# Patient Record
Sex: MALE | Race: WHITE | NOT HISPANIC OR LATINO | Employment: OTHER | URBAN - METROPOLITAN AREA
[De-identification: names, ages, dates, MRNs, and addresses within clinical notes are randomized per-mention and may not be internally consistent; named-entity substitution may affect disease eponyms.]

---

## 2021-04-16 ENCOUNTER — TELEPHONE (OUTPATIENT)
Dept: SCHEDULING | Facility: IMAGING CENTER | Age: 86
End: 2021-04-16

## 2021-05-28 RX ORDER — LEVOTHYROXINE SODIUM 175 UG/1
175 TABLET ORAL
COMMUNITY
End: 2021-06-01 | Stop reason: SDUPTHER

## 2021-05-28 NOTE — PROGRESS NOTES
NEW PATIENT VISIT PRE-VISIT PLANNING    1.  [employer] Patient is checked in Patient Demographics?Yes    2.  Immunizations were updated in Epic using Reconcile Outside Information activity? Yes         3.  Is this appointment scheduled as a Hospital Follow-Up? No    4.  Patient is due for the following Health Maintenance Topics:   Health Maintenance Due   Topic Date Due   • IMM DTaP/Tdap/Td Vaccine (1 - Tdap) Never done   • COLONOSCOPY  Never done   • IMM ZOSTER VACCINES (1 of 2) Never done   • IMM PNEUMOCOCCAL VACCINE: 65+ Years (1 of 1 - PPSV23) Never done   • COVID-19 Vaccine (2 - Pfizer 2-dose series) 05/13/2021       - Patient has completed Colonoscopy/FIT and HMR Letter faxed to:  [phone].    5.  Reviewed/Updated the following with patient:       •   Preferred Pharmacy? Yes       •   Preferred Lab? No       •   Preferred Communication? Yes       •   Allergies? Yes       •   Medications? YES. Was Abstract Encounter opened and chart updated? YES       •   Social History? Yes       •   Family History (document living status of immediate family members and if + hx of  cancer, diabetes, hypertension, hyperlipidemia, heart attack, stroke) Yes    6.  Updated Care Team?       •   DME Company (gait device, O2, CPAP, etc.) N\A       •   Other Specialists (eye doctor, derm, GYN, cardiology, endo, etc): N\A    7.  AHA (Puls8) form printed for Provider? N/A

## 2021-06-01 ENCOUNTER — OFFICE VISIT (OUTPATIENT)
Dept: MEDICAL GROUP | Age: 86
End: 2021-06-01
Payer: MEDICARE

## 2021-06-01 VITALS
HEART RATE: 87 BPM | OXYGEN SATURATION: 93 % | DIASTOLIC BLOOD PRESSURE: 72 MMHG | TEMPERATURE: 97.7 F | WEIGHT: 181.6 LBS | SYSTOLIC BLOOD PRESSURE: 122 MMHG | BODY MASS INDEX: 26.9 KG/M2 | HEIGHT: 69 IN

## 2021-06-01 DIAGNOSIS — E03.8 OTHER SPECIFIED HYPOTHYROIDISM: ICD-10-CM

## 2021-06-01 DIAGNOSIS — R10.13 EPIGASTRIC PAIN: ICD-10-CM

## 2021-06-01 DIAGNOSIS — H54.3 IMPAIRED VISION IN BOTH EYES: ICD-10-CM

## 2021-06-01 DIAGNOSIS — E53.8 VITAMIN B 12 DEFICIENCY: ICD-10-CM

## 2021-06-01 DIAGNOSIS — E78.5 DYSLIPIDEMIA: ICD-10-CM

## 2021-06-01 DIAGNOSIS — I48.20 CHRONIC ATRIAL FIBRILLATION (HCC): ICD-10-CM

## 2021-06-01 DIAGNOSIS — G47.33 OSA ON CPAP: ICD-10-CM

## 2021-06-01 DIAGNOSIS — E55.9 VITAMIN D DEFICIENCY: ICD-10-CM

## 2021-06-01 PROCEDURE — 99204 OFFICE O/P NEW MOD 45 MIN: CPT | Performed by: INTERNAL MEDICINE

## 2021-06-01 RX ORDER — LEVOTHYROXINE SODIUM 175 UG/1
175 TABLET ORAL
Qty: 90 TABLET | Refills: 4 | Status: SHIPPED | OUTPATIENT
Start: 2021-06-01 | End: 2021-06-21 | Stop reason: SDUPTHER

## 2021-06-01 ASSESSMENT — ENCOUNTER SYMPTOMS
MUSCULOSKELETAL NEGATIVE: 1
PSYCHIATRIC NEGATIVE: 1
RESPIRATORY NEGATIVE: 1
NEUROLOGICAL NEGATIVE: 1
GASTROINTESTINAL NEGATIVE: 1
EYES NEGATIVE: 1
CONSTITUTIONAL NEGATIVE: 1
CARDIOVASCULAR NEGATIVE: 1

## 2021-06-01 ASSESSMENT — PATIENT HEALTH QUESTIONNAIRE - PHQ9: CLINICAL INTERPRETATION OF PHQ2 SCORE: 0

## 2021-06-01 NOTE — PROGRESS NOTES
Subjective:      Guru Pena is a 89 y.o. 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  He relocated to [place] with his wife about 3 months ago and is living in assisted living at [facility] with minimal need for assistance to to no evidence of impaired mental status or memory.  He does require the use of a walker for stability of gait impaired balance.    He needs review of his medications and new lab work and follow-up of the above issues and problems related above.  Since changing his diet over the last few weeks his digestive problems have improved with no further midepigastric pain but would still like to see a GI doctor for further evaluation management.  Patient avoids all NSAIDs.  Has a prior history of peptic ulcer due to H. pylori several years ago which resolved with appropriate antibiotic therapy.    The hip pain does not bother him excessively after he gets up and moves around warms up.  His balance is well controlled with his walker.  I explained we can discuss his thick nail problem on follow-up visit.  He feels he might be napping too much during the day but explained that this would be very common for someone at 89 and I see no issues with this as long as its not causing him to wake up during the middle the night and ramble unobserved.    Outpatient Medications Prior to Visit   Medication Sig Dispense Refill   • apixaban (ELIQUIS) 5mg Tab Take 5 mg by mouth 2 times a day.     • Multiple  "Vitamins-Minerals (PRESERVISION AREDS 2 PO) Take  by mouth 2 times a day.     • Cholecalciferol (VITAMIN D3 PO) Take  by mouth.     • levothyroxine (SYNTHROID) 175 MCG Tab Take 175 mcg by mouth every morning on an empty stomach. Once a day, Sunday NIGHT take extra 1/2 Tablet       No facility-administered medications prior to visit.     Patient has no known allergies.  No visits with results within 1 Month(s) from this visit.   Latest known visit with results is:   No results found for any previous visit.      No results found for: HBA1C  No results found for: SODIUM, POTASSIUM, CHLORIDE, CO2, GLUCOSE, BUN, CREATININE, BUNCREATRAT, GLOMRATE, ALKPHOSPHAT, ASTSGOT, ALTSGPT, TBILIRUBIN, ALB  No results found for: INR  No results found for: CHOLSTRLTOT, LDL, HDL, TRIGLYCERIDE    No results found for: TESTOSTERONE  No results found for: TSH  No results found for: FREET4  No results found for: URICACID  No components found for: VITB12  No results found for: 25HYDROXY    '            HPI    Review of Systems   Constitutional: Negative.    HENT: Negative.    Eyes: Negative.    Respiratory: Negative.    Cardiovascular: Negative.    Gastrointestinal: Negative.    Genitourinary: Negative.    Musculoskeletal: Negative.    Skin: Negative.    Neurological: Negative.    Endo/Heme/Allergies: Negative.    Psychiatric/Behavioral: Negative.           Objective:     /72 (BP Location: Right arm, Patient Position: Sitting, BP Cuff Size: Adult)   Pulse 87   Temp 36.5 °C (97.7 °F) (Temporal)   Ht 1.753 m (5' 9\")   Wt 82.4 kg (181 lb 9.6 oz)   SpO2 93%   BMI 26.82 kg/m²      Physical Exam  Constitutional:       General: He is not in acute distress.     Appearance: He is well-developed. He is not diaphoretic.   HENT:      Head: Normocephalic and atraumatic.      Right Ear: External ear normal.      Left Ear: External ear normal.      Nose: Nose normal.      Mouth/Throat:      Pharynx: No oropharyngeal exudate.   Eyes:      " General: No scleral icterus.        Right eye: No discharge.         Left eye: No discharge.      Conjunctiva/sclera: Conjunctivae normal.      Pupils: Pupils are equal, round, and reactive to light.   Neck:      Thyroid: No thyromegaly.      Vascular: No JVD.      Trachea: No tracheal deviation.   Cardiovascular:      Rate and Rhythm: Normal rate. Rhythm irregular.      Heart sounds: Normal heart sounds. No murmur heard.   No friction rub. No gallop.       Comments: Irregularly irregular rhythm with controlled rate of around 70-90.  Variable intensity of S1  Pulmonary:      Effort: Pulmonary effort is normal. No respiratory distress.      Breath sounds: Normal breath sounds. No stridor. No wheezing or rales.   Chest:      Chest wall: No tenderness.   Abdominal:      General: Bowel sounds are normal. There is no distension.      Palpations: Abdomen is soft. There is no mass.      Tenderness: There is no abdominal tenderness. There is no guarding or rebound.   Musculoskeletal:         General: No tenderness. Normal range of motion.      Cervical back: Normal range of motion and neck supple.   Lymphadenopathy:      Cervical: No cervical adenopathy.   Skin:     General: Skin is warm and dry.      Coloration: Skin is not pale.      Findings: No erythema or rash.   Neurological:      Mental Status: He is alert and oriented to person, place, and time.      Motor: No abnormal muscle tone.      Coordination: Coordination normal.      Deep Tendon Reflexes: Reflexes are normal and symmetric. Reflexes normal.   Psychiatric:         Behavior: Behavior normal.         Thought Content: Thought content normal.         Judgment: Judgment normal.                        Assessment/Plan:        1. Epigastric pain  This problem has basically resolved with change in diet but will refer to GI for further evaluation at the request.  - REFERRAL TO GASTROENTEROLOGY  - Comp Metabolic Panel; Future  - CBC WITH DIFFERENTIAL; Future    2. Other  specified hypothyroidism-good control continue current regimen and check TSH     - levothyroxine (SYNTHROID) 175 MCG Tab; Take 1 tablet by mouth every morning on an empty stomach. Once a day, Sunday NIGHT take extra 1/2 Tablet  Dispense: 90 tablet; Refill: 4  - TSH; Future  - Comp Metabolic Panel; Future    3. Chronic atrial fibrillation (HCC)  Patient remains in atrial fibrillation with a controlled rate at this time.  Continue with anticoagulation therapy and consider referral to cardiology.    4. Vitamin D deficiency  Unknown control.  Check level  - VITAMIN D,25 HYDROXY; Future    5. Impaired vision in both eyes     - REFERRAL TO OPHTHALMOLOGY    6. JAMES on CPAP  Patient been doing well on CPAP and uses it during his naps during the day.  Refer to pulmonary medicine for further management  - REFERRAL TO PULMONARY AND SLEEP MEDICINE    7. Dyslipidemia      Needs lab recheck.  Has never had any serious problem with dyslipidemia not require treatment for.  - Lipid Profile; Future    8. Vitamin B 12 deficiency-patient wants vitamin levels checked.     - VITAMIN B12; Future  - FOLATE; Future

## 2021-06-11 ENCOUNTER — HOSPITAL ENCOUNTER (OUTPATIENT)
Dept: LAB | Facility: MEDICAL CENTER | Age: 86
End: 2021-06-11
Attending: INTERNAL MEDICINE
Payer: MEDICARE

## 2021-06-11 DIAGNOSIS — E78.5 DYSLIPIDEMIA: ICD-10-CM

## 2021-06-11 DIAGNOSIS — E03.8 OTHER SPECIFIED HYPOTHYROIDISM: ICD-10-CM

## 2021-06-11 DIAGNOSIS — R10.13 EPIGASTRIC PAIN: ICD-10-CM

## 2021-06-11 DIAGNOSIS — E53.8 VITAMIN B 12 DEFICIENCY: ICD-10-CM

## 2021-06-11 DIAGNOSIS — E55.9 VITAMIN D DEFICIENCY: ICD-10-CM

## 2021-06-11 LAB
25(OH)D3 SERPL-MCNC: 45 NG/ML (ref 30–100)
ALBUMIN SERPL BCP-MCNC: 3.9 G/DL (ref 3.2–4.9)
ALBUMIN/GLOB SERPL: 1.2 G/DL
ALP SERPL-CCNC: 93 U/L (ref 30–99)
ALT SERPL-CCNC: 17 U/L (ref 2–50)
ANION GAP SERPL CALC-SCNC: 11 MMOL/L (ref 7–16)
AST SERPL-CCNC: 21 U/L (ref 12–45)
BASOPHILS # BLD AUTO: 0.6 % (ref 0–1.8)
BASOPHILS # BLD: 0.03 K/UL (ref 0–0.12)
BILIRUB SERPL-MCNC: 0.7 MG/DL (ref 0.1–1.5)
BUN SERPL-MCNC: 25 MG/DL (ref 8–22)
CALCIUM SERPL-MCNC: 9 MG/DL (ref 8.5–10.5)
CHLORIDE SERPL-SCNC: 107 MMOL/L (ref 96–112)
CHOLEST SERPL-MCNC: 157 MG/DL (ref 100–199)
CO2 SERPL-SCNC: 25 MMOL/L (ref 20–33)
CREAT SERPL-MCNC: 1.4 MG/DL (ref 0.5–1.4)
EOSINOPHIL # BLD AUTO: 0.11 K/UL (ref 0–0.51)
EOSINOPHIL NFR BLD: 2.1 % (ref 0–6.9)
ERYTHROCYTE [DISTWIDTH] IN BLOOD BY AUTOMATED COUNT: 56 FL (ref 35.9–50)
FASTING STATUS PATIENT QL REPORTED: NORMAL
FOLATE SERPL-MCNC: 15.4 NG/ML
GLOBULIN SER CALC-MCNC: 3.3 G/DL (ref 1.9–3.5)
GLUCOSE SERPL-MCNC: 96 MG/DL (ref 65–99)
HCT VFR BLD AUTO: 43.4 % (ref 42–52)
HDLC SERPL-MCNC: 49 MG/DL
HGB BLD-MCNC: 14.6 G/DL (ref 14–18)
IMM GRANULOCYTES # BLD AUTO: 0.02 K/UL (ref 0–0.11)
IMM GRANULOCYTES NFR BLD AUTO: 0.4 % (ref 0–0.9)
LDLC SERPL CALC-MCNC: 98 MG/DL
LYMPHOCYTES # BLD AUTO: 1.85 K/UL (ref 1–4.8)
LYMPHOCYTES NFR BLD: 35.1 % (ref 22–41)
MCH RBC QN AUTO: 33.3 PG (ref 27–33)
MCHC RBC AUTO-ENTMCNC: 33.6 G/DL (ref 33.7–35.3)
MCV RBC AUTO: 99.1 FL (ref 81.4–97.8)
MONOCYTES # BLD AUTO: 0.67 K/UL (ref 0–0.85)
MONOCYTES NFR BLD AUTO: 12.7 % (ref 0–13.4)
NEUTROPHILS # BLD AUTO: 2.59 K/UL (ref 1.82–7.42)
NEUTROPHILS NFR BLD: 49.1 % (ref 44–72)
NRBC # BLD AUTO: 0 K/UL
NRBC BLD-RTO: 0 /100 WBC
PLATELET # BLD AUTO: 170 K/UL (ref 164–446)
PMV BLD AUTO: 11.4 FL (ref 9–12.9)
POTASSIUM SERPL-SCNC: 4.8 MMOL/L (ref 3.6–5.5)
PROT SERPL-MCNC: 7.2 G/DL (ref 6–8.2)
RBC # BLD AUTO: 4.38 M/UL (ref 4.7–6.1)
SODIUM SERPL-SCNC: 143 MMOL/L (ref 135–145)
TRIGL SERPL-MCNC: 51 MG/DL (ref 0–149)
TSH SERPL DL<=0.005 MIU/L-ACNC: 14.5 UIU/ML (ref 0.38–5.33)
VIT B12 SERPL-MCNC: 371 PG/ML (ref 211–911)
WBC # BLD AUTO: 5.3 K/UL (ref 4.8–10.8)

## 2021-06-11 PROCEDURE — 82746 ASSAY OF FOLIC ACID SERUM: CPT

## 2021-06-11 PROCEDURE — 36415 COLL VENOUS BLD VENIPUNCTURE: CPT

## 2021-06-11 PROCEDURE — 82607 VITAMIN B-12: CPT

## 2021-06-11 PROCEDURE — 80061 LIPID PANEL: CPT

## 2021-06-11 PROCEDURE — 80053 COMPREHEN METABOLIC PANEL: CPT

## 2021-06-11 PROCEDURE — 82306 VITAMIN D 25 HYDROXY: CPT

## 2021-06-11 PROCEDURE — 84443 ASSAY THYROID STIM HORMONE: CPT

## 2021-06-11 PROCEDURE — 85025 COMPLETE CBC W/AUTO DIFF WBC: CPT

## 2021-06-21 ENCOUNTER — OFFICE VISIT (OUTPATIENT)
Dept: MEDICAL GROUP | Age: 86
End: 2021-06-21
Payer: MEDICARE

## 2021-06-21 ENCOUNTER — HOSPITAL ENCOUNTER (INPATIENT)
Facility: MEDICAL CENTER | Age: 86
LOS: 1 days | DRG: 683 | End: 2021-06-23
Attending: EMERGENCY MEDICINE | Admitting: INTERNAL MEDICINE
Payer: MEDICARE

## 2021-06-21 ENCOUNTER — APPOINTMENT (OUTPATIENT)
Dept: RADIOLOGY | Facility: MEDICAL CENTER | Age: 86
DRG: 683 | End: 2021-06-21
Attending: EMERGENCY MEDICINE
Payer: MEDICARE

## 2021-06-21 VITALS
HEIGHT: 69 IN | HEART RATE: 98 BPM | WEIGHT: 178.6 LBS | OXYGEN SATURATION: 92 % | DIASTOLIC BLOOD PRESSURE: 80 MMHG | BODY MASS INDEX: 26.45 KG/M2 | SYSTOLIC BLOOD PRESSURE: 110 MMHG | TEMPERATURE: 97.7 F

## 2021-06-21 DIAGNOSIS — I48.20 CHRONIC ATRIAL FIBRILLATION (HCC): ICD-10-CM

## 2021-06-21 DIAGNOSIS — Z79.01 ANTICOAGULATED: ICD-10-CM

## 2021-06-21 DIAGNOSIS — R53.1 WEAKNESS: ICD-10-CM

## 2021-06-21 DIAGNOSIS — N17.9 AKI (ACUTE KIDNEY INJURY) (HCC): ICD-10-CM

## 2021-06-21 DIAGNOSIS — Z23 NEED FOR VACCINATION: ICD-10-CM

## 2021-06-21 DIAGNOSIS — E03.8 OTHER SPECIFIED HYPOTHYROIDISM: ICD-10-CM

## 2021-06-21 DIAGNOSIS — L60.2 NAIL HYPERTROPHY: ICD-10-CM

## 2021-06-21 LAB
ALBUMIN SERPL BCP-MCNC: 4 G/DL (ref 3.2–4.9)
ALBUMIN/GLOB SERPL: 1.1 G/DL
ALP SERPL-CCNC: 88 U/L (ref 30–99)
ALT SERPL-CCNC: 23 U/L (ref 2–50)
ANION GAP SERPL CALC-SCNC: 12 MMOL/L (ref 7–16)
AST SERPL-CCNC: 31 U/L (ref 12–45)
BASOPHILS # BLD AUTO: 0.3 % (ref 0–1.8)
BASOPHILS # BLD: 0.02 K/UL (ref 0–0.12)
BILIRUB SERPL-MCNC: 0.4 MG/DL (ref 0.1–1.5)
BUN SERPL-MCNC: 31 MG/DL (ref 8–22)
CALCIUM SERPL-MCNC: 8.8 MG/DL (ref 8.5–10.5)
CHLORIDE SERPL-SCNC: 101 MMOL/L (ref 96–112)
CO2 SERPL-SCNC: 21 MMOL/L (ref 20–33)
CREAT SERPL-MCNC: 1.64 MG/DL (ref 0.5–1.4)
EKG IMPRESSION: NORMAL
EOSINOPHIL # BLD AUTO: 0.01 K/UL (ref 0–0.51)
EOSINOPHIL NFR BLD: 0.1 % (ref 0–6.9)
ERYTHROCYTE [DISTWIDTH] IN BLOOD BY AUTOMATED COUNT: 53.9 FL (ref 35.9–50)
FLUAV RNA SPEC QL NAA+PROBE: NEGATIVE
FLUBV RNA SPEC QL NAA+PROBE: NEGATIVE
GLOBULIN SER CALC-MCNC: 3.7 G/DL (ref 1.9–3.5)
GLUCOSE SERPL-MCNC: 110 MG/DL (ref 65–99)
HCT VFR BLD AUTO: 44.4 % (ref 42–52)
HGB BLD-MCNC: 14.8 G/DL (ref 14–18)
IMM GRANULOCYTES # BLD AUTO: 0.03 K/UL (ref 0–0.11)
IMM GRANULOCYTES NFR BLD AUTO: 0.4 % (ref 0–0.9)
LYMPHOCYTES # BLD AUTO: 0.86 K/UL (ref 1–4.8)
LYMPHOCYTES NFR BLD: 12 % (ref 22–41)
MCH RBC QN AUTO: 32.6 PG (ref 27–33)
MCHC RBC AUTO-ENTMCNC: 33.3 G/DL (ref 33.7–35.3)
MCV RBC AUTO: 97.8 FL (ref 81.4–97.8)
MONOCYTES # BLD AUTO: 1.26 K/UL (ref 0–0.85)
MONOCYTES NFR BLD AUTO: 17.5 % (ref 0–13.4)
NEUTROPHILS # BLD AUTO: 5 K/UL (ref 1.82–7.42)
NEUTROPHILS NFR BLD: 69.7 % (ref 44–72)
NRBC # BLD AUTO: 0 K/UL
NRBC BLD-RTO: 0 /100 WBC
PLATELET # BLD AUTO: 159 K/UL (ref 164–446)
PMV BLD AUTO: 10.2 FL (ref 9–12.9)
POTASSIUM SERPL-SCNC: 4.2 MMOL/L (ref 3.6–5.5)
PROT SERPL-MCNC: 7.7 G/DL (ref 6–8.2)
RBC # BLD AUTO: 4.54 M/UL (ref 4.7–6.1)
RSV RNA SPEC QL NAA+PROBE: NEGATIVE
SARS-COV-2 RNA RESP QL NAA+PROBE: NOTDETECTED
SODIUM SERPL-SCNC: 134 MMOL/L (ref 135–145)
SPECIMEN SOURCE: NORMAL
TROPONIN T SERPL-MCNC: 49 NG/L (ref 6–19)
WBC # BLD AUTO: 7.2 K/UL (ref 4.8–10.8)

## 2021-06-21 PROCEDURE — 93005 ELECTROCARDIOGRAM TRACING: CPT

## 2021-06-21 PROCEDURE — C9803 HOPD COVID-19 SPEC COLLECT: HCPCS | Performed by: EMERGENCY MEDICINE

## 2021-06-21 PROCEDURE — 99285 EMERGENCY DEPT VISIT HI MDM: CPT

## 2021-06-21 PROCEDURE — 0241U HCHG SARS-COV-2 COVID-19 NFCT DS RESP RNA 4 TRGT MIC: CPT

## 2021-06-21 PROCEDURE — 90471 IMMUNIZATION ADMIN: CPT | Performed by: INTERNAL MEDICINE

## 2021-06-21 PROCEDURE — 36415 COLL VENOUS BLD VENIPUNCTURE: CPT

## 2021-06-21 PROCEDURE — 85025 COMPLETE CBC W/AUTO DIFF WBC: CPT

## 2021-06-21 PROCEDURE — 93005 ELECTROCARDIOGRAM TRACING: CPT | Performed by: EMERGENCY MEDICINE

## 2021-06-21 PROCEDURE — G0378 HOSPITAL OBSERVATION PER HR: HCPCS

## 2021-06-21 PROCEDURE — 700105 HCHG RX REV CODE 258: Performed by: EMERGENCY MEDICINE

## 2021-06-21 PROCEDURE — 71045 X-RAY EXAM CHEST 1 VIEW: CPT

## 2021-06-21 PROCEDURE — 84484 ASSAY OF TROPONIN QUANT: CPT

## 2021-06-21 PROCEDURE — 90750 HZV VACC RECOMBINANT IM: CPT | Performed by: INTERNAL MEDICINE

## 2021-06-21 PROCEDURE — 80053 COMPREHEN METABOLIC PANEL: CPT

## 2021-06-21 PROCEDURE — 99214 OFFICE O/P EST MOD 30 MIN: CPT | Mod: 25 | Performed by: INTERNAL MEDICINE

## 2021-06-21 RX ORDER — SODIUM CHLORIDE 9 MG/ML
1000 INJECTION, SOLUTION INTRAVENOUS ONCE
Status: COMPLETED | OUTPATIENT
Start: 2021-06-21 | End: 2021-06-21

## 2021-06-21 RX ORDER — ONDANSETRON 4 MG/1
4 TABLET, ORALLY DISINTEGRATING ORAL EVERY 4 HOURS PRN
Status: DISCONTINUED | OUTPATIENT
Start: 2021-06-21 | End: 2021-06-21

## 2021-06-21 RX ORDER — FUROSEMIDE 20 MG/1
TABLET ORAL
COMMUNITY
Start: 2021-05-21 | End: 2021-06-21

## 2021-06-21 RX ORDER — LEVOTHYROXINE SODIUM 175 UG/1
175 TABLET ORAL
Status: DISCONTINUED | OUTPATIENT
Start: 2021-06-22 | End: 2021-06-23 | Stop reason: HOSPADM

## 2021-06-21 RX ORDER — LABETALOL HYDROCHLORIDE 5 MG/ML
10 INJECTION, SOLUTION INTRAVENOUS EVERY 4 HOURS PRN
Status: DISCONTINUED | OUTPATIENT
Start: 2021-06-21 | End: 2021-06-23 | Stop reason: HOSPADM

## 2021-06-21 RX ORDER — ENALAPRILAT 1.25 MG/ML
1.25 INJECTION INTRAVENOUS EVERY 6 HOURS PRN
Status: DISCONTINUED | OUTPATIENT
Start: 2021-06-21 | End: 2021-06-23 | Stop reason: HOSPADM

## 2021-06-21 RX ORDER — LEVOTHYROXINE SODIUM 175 UG/1
175 TABLET ORAL
Qty: 108 TABLET | Refills: 4 | Status: SHIPPED | OUTPATIENT
Start: 2021-06-21 | End: 2021-06-30

## 2021-06-21 RX ORDER — ONDANSETRON 2 MG/ML
4 INJECTION INTRAMUSCULAR; INTRAVENOUS EVERY 4 HOURS PRN
Status: DISCONTINUED | OUTPATIENT
Start: 2021-06-21 | End: 2021-06-21

## 2021-06-21 RX ORDER — VIT A/VIT C/VIT E/ZINC/COPPER 2148-113
1 TABLET ORAL 2 TIMES DAILY
Status: ON HOLD | COMMUNITY
End: 2023-08-18

## 2021-06-21 RX ORDER — ACETAMINOPHEN 325 MG/1
650 TABLET ORAL EVERY 6 HOURS PRN
Status: DISCONTINUED | OUTPATIENT
Start: 2021-06-21 | End: 2021-06-23 | Stop reason: HOSPADM

## 2021-06-21 RX ORDER — DIGOXIN 125 MCG
TABLET ORAL
COMMUNITY
Start: 2021-06-11 | End: 2021-06-21

## 2021-06-21 RX ORDER — VITAMIN B COMPLEX
1000 TABLET ORAL DAILY
COMMUNITY

## 2021-06-21 RX ORDER — SODIUM CHLORIDE 9 MG/ML
INJECTION, SOLUTION INTRAVENOUS CONTINUOUS
Status: DISCONTINUED | OUTPATIENT
Start: 2021-06-21 | End: 2021-06-23 | Stop reason: HOSPADM

## 2021-06-21 RX ADMIN — SODIUM CHLORIDE 1000 ML: 9 INJECTION, SOLUTION INTRAVENOUS at 21:43

## 2021-06-21 ASSESSMENT — CHA2DS2 SCORE
AGE 75 OR GREATER: YES
AGE 65 TO 74: NO
PRIOR STROKE OR TIA OR THROMBOEMBOLISM: NO
HYPERTENSION: NO
SEX: MALE
VASCULAR DISEASE: NO
DIABETES: NO
CHF OR LEFT VENTRICULAR DYSFUNCTION: NO
CHA2DS2 VASC SCORE: 2

## 2021-06-21 ASSESSMENT — ENCOUNTER SYMPTOMS
EYES NEGATIVE: 1
RESPIRATORY NEGATIVE: 1
PSYCHIATRIC NEGATIVE: 1
CONSTITUTIONAL NEGATIVE: 1
NEUROLOGICAL NEGATIVE: 1
CARDIOVASCULAR NEGATIVE: 1
GASTROINTESTINAL NEGATIVE: 1
MUSCULOSKELETAL NEGATIVE: 1

## 2021-06-21 ASSESSMENT — FIBROSIS 4 INDEX
FIB4 SCORE: 2.85
FIB4 SCORE: 2.85

## 2021-06-21 NOTE — PROGRESS NOTES
"Subjective:      Guru Pena is a 89 y.o. male who presents with Lab Results, Medication Management (dosage adjustment), and Foot Problem (scaling swelling)    .FU  Patient Active Problem List    Diagnosis Date Noted   • Other specified hypothyroidism 06/01/2021   • Chronic atrial fibrillation (HCC) 06/01/2021   • Vitamin D deficiency 06/01/2021   • Impaired vision in both eyes 06/01/2021     No Known Allergies  ,  Outpatient Medications Prior to Visit   Medication Sig Dispense Refill   • Multiple Vitamins-Minerals (PRESERVISION AREDS 2 PO) Take  by mouth 2 times a day.     • Cholecalciferol (VITAMIN D3 PO) Take  by mouth.     • furosemide (LASIX) 20 MG Tab  (Patient not taking: Reported on 6/21/2021)     • digoxin (LANOXIN) 125 MCG Tab  (Patient not taking: Reported on 6/21/2021)     • levothyroxine (SYNTHROID) 175 MCG Tab Take 1 tablet by mouth every morning on an empty stomach. Once a day, Sunday NIGHT take extra 1/2 Tablet 90 tablet 4   • apixaban (ELIQUIS) 5mg Tab Take 5 mg by mouth 2 times a day.       No facility-administered medications prior to visit.             HPI    Review of Systems   Constitutional: Negative.    HENT: Negative.    Eyes: Negative.    Respiratory: Negative.    Cardiovascular: Negative.    Gastrointestinal: Negative.    Genitourinary: Negative.    Musculoskeletal: Negative.    Skin: Negative.    Neurological: Negative.    Endo/Heme/Allergies: Negative.    Psychiatric/Behavioral: Negative.           Objective:     /80 (BP Location: Left arm, Patient Position: Sitting, BP Cuff Size: Adult)   Pulse 98   Temp 36.5 °C (97.7 °F) (Temporal)   Ht 1.753 m (5' 9\")   Wt 81 kg (178 lb 9.6 oz)   SpO2 92%   BMI 26.37 kg/m²      Physical Exam  Constitutional:       General: He is not in acute distress.     Appearance: He is well-developed. He is not diaphoretic.   HENT:      Head: Normocephalic and atraumatic.      Right Ear: External ear normal.      Left Ear: External ear normal. "      Nose: Nose normal.      Mouth/Throat:      Pharynx: No oropharyngeal exudate.   Eyes:      General: No scleral icterus.        Right eye: No discharge.         Left eye: No discharge.      Conjunctiva/sclera: Conjunctivae normal.      Pupils: Pupils are equal, round, and reactive to light.   Neck:      Thyroid: No thyromegaly.      Vascular: No JVD.      Trachea: No tracheal deviation.   Cardiovascular:      Rate and Rhythm: Normal rate and regular rhythm.      Heart sounds: Normal heart sounds. No murmur heard.   No friction rub. No gallop.    Pulmonary:      Effort: Pulmonary effort is normal. No respiratory distress.      Breath sounds: Normal breath sounds. No stridor. No wheezing or rales.   Chest:      Chest wall: No tenderness.   Abdominal:      General: Bowel sounds are normal. There is no distension.      Palpations: Abdomen is soft. There is no mass.      Tenderness: There is no abdominal tenderness. There is no guarding or rebound.   Musculoskeletal:         General: No tenderness. Normal range of motion.      Cervical back: Normal range of motion and neck supple.   Lymphadenopathy:      Cervical: No cervical adenopathy.   Skin:     General: Skin is warm and dry.      Coloration: Skin is not pale.      Findings: No erythema or rash.   Neurological:      Mental Status: He is alert and oriented to person, place, and time.      Motor: No abnormal muscle tone.      Coordination: Coordination normal.      Deep Tendon Reflexes: Reflexes are normal and symmetric. Reflexes normal.   Psychiatric:         Behavior: Behavior normal.         Thought Content: Thought content normal.         Judgment: Judgment normal.                 Hospital Outpatient Visit on 06/11/2021   Component Date Value   • Folate -Folic Acid 06/11/2021 15.4    • Vitamin B12 -True Cobala* 06/11/2021 371    • 25-Hydroxy   Vitamin D 25 06/11/2021 45    • WBC 06/11/2021 5.3    • RBC 06/11/2021 4.38*   • Hemoglobin 06/11/2021 14.6    •  Hematocrit 06/11/2021 43.4    • MCV 06/11/2021 99.1*   • MCH 06/11/2021 33.3*   • MCHC 06/11/2021 33.6*   • RDW 06/11/2021 56.0*   • Platelet Count 06/11/2021 170    • MPV 06/11/2021 11.4    • Neutrophils-Polys 06/11/2021 49.10    • Lymphocytes 06/11/2021 35.10    • Monocytes 06/11/2021 12.70    • Eosinophils 06/11/2021 2.10    • Basophils 06/11/2021 0.60    • Immature Granulocytes 06/11/2021 0.40    • Nucleated RBC 06/11/2021 0.00    • Neutrophils (Absolute) 06/11/2021 2.59    • Lymphs (Absolute) 06/11/2021 1.85    • Monos (Absolute) 06/11/2021 0.67    • Eos (Absolute) 06/11/2021 0.11    • Baso (Absolute) 06/11/2021 0.03    • Immature Granulocytes (a* 06/11/2021 0.02    • NRBC (Absolute) 06/11/2021 0.00    • Cholesterol,Tot 06/11/2021 157    • Triglycerides 06/11/2021 51    • HDL 06/11/2021 49    • LDL 06/11/2021 98    • Sodium 06/11/2021 143    • Potassium 06/11/2021 4.8    • Chloride 06/11/2021 107    • Co2 06/11/2021 25    • Anion Gap 06/11/2021 11.0    • Glucose 06/11/2021 96    • Bun 06/11/2021 25*   • Creatinine 06/11/2021 1.40    • Calcium 06/11/2021 9.0    • AST(SGOT) 06/11/2021 21    • ALT(SGPT) 06/11/2021 17    • Alkaline Phosphatase 06/11/2021 93    • Total Bilirubin 06/11/2021 0.7    • Albumin 06/11/2021 3.9    • Total Protein 06/11/2021 7.2    • Globulin 06/11/2021 3.3    • A-G Ratio 06/11/2021 1.2    • TSH 06/11/2021 14.500*   • Fasting Status 06/11/2021 Fasting    • GFR If  06/11/2021 57*   • GFR If Non  [employer]i* 06/11/2021 47*      No results found for: HBA1C  Lab Results   Component Value Date/Time    SODIUM 143 06/11/2021 06:42 AM    POTASSIUM 4.8 06/11/2021 06:42 AM    CHLORIDE 107 06/11/2021 06:42 AM    CO2 25 06/11/2021 06:42 AM    GLUCOSE 96 06/11/2021 06:42 AM    BUN 25 (H) 06/11/2021 06:42 AM    CREATININE 1.40 06/11/2021 06:42 AM    ALKPHOSPHAT 93 06/11/2021 06:42 AM    ASTSGOT 21 06/11/2021 06:42 AM    ALTSGPT 17 06/11/2021 06:42 AM    TBILIRUBIN 0.7 06/11/2021 06:42  AM     No results found for: INR  Lab Results   Component Value Date/Time    CHOLSTRLTOT 157 06/11/2021 06:42 AM    LDL 98 06/11/2021 06:42 AM    HDL 49 06/11/2021 06:42 AM    TRIGLYCERIDE 51 06/11/2021 06:42 AM       No results found for: TESTOSTERONE  No results found for: TSH  No results found for: FREET4  No results found for: URICACID  No components found for: VITB12  Lab Results   Component Value Date/Time    25HYDROXY 45 06/11/2021 06:42 AM   \  ]    TSH = 14.     Assessment/Plan:        1. Other specified hypothyroidism-not at goal.  Increased dosage to 1 extra pill a week instead of one half extra pill a week.  Recheck in 3 months.     - levothyroxine (SYNTHROID) 175 MCG Tab; Take 1 tablet by mouth every morning on an empty stomach for 102 days. Once a day, Sunday NIGHT take extra 1 Tablet- NEW RX  Dispense: 108 tablet; Refill: 4  - TSH; Future    2. Chronic atrial fibrillation (HCC)   Good control continue current regimen  - apixaban (ELIQUIS) 5mg Tab; Take 1 tablet by mouth 2 times a day.  Dispense: 180 tablet; Refill: 4    3. Anticoagulated  Good control continue current regimen  - apixaban (ELIQUIS) 5mg Tab; Take 1 tablet by mouth 2 times a day.  Dispense: 180 tablet; Refill: 4    4. Need for vaccination      - Shingrix Vaccine    5. Nail hypertrophy            - REFERRAL TO PODIATRY

## 2021-06-22 PROBLEM — R19.7 DIARRHEA: Status: ACTIVE | Noted: 2021-06-22

## 2021-06-22 PROBLEM — R53.1 GENERALIZED WEAKNESS: Status: ACTIVE | Noted: 2021-06-22

## 2021-06-22 PROBLEM — N17.9 ACUTE RENAL FAILURE (ARF) (HCC): Status: ACTIVE | Noted: 2021-06-22

## 2021-06-22 PROBLEM — I48.91 ATRIAL FIBRILLATION (HCC): Status: ACTIVE | Noted: 2021-06-01

## 2021-06-22 LAB
ANION GAP SERPL CALC-SCNC: 10 MMOL/L (ref 7–16)
BUN SERPL-MCNC: 28 MG/DL (ref 8–22)
CALCIUM SERPL-MCNC: 8 MG/DL (ref 8.5–10.5)
CHLORIDE SERPL-SCNC: 103 MMOL/L (ref 96–112)
CO2 SERPL-SCNC: 21 MMOL/L (ref 20–33)
CREAT SERPL-MCNC: 1.41 MG/DL (ref 0.5–1.4)
ERYTHROCYTE [DISTWIDTH] IN BLOOD BY AUTOMATED COUNT: 56.7 FL (ref 35.9–50)
GLUCOSE SERPL-MCNC: 84 MG/DL (ref 65–99)
HCT VFR BLD AUTO: 41 % (ref 42–52)
HGB BLD-MCNC: 13.6 G/DL (ref 14–18)
MAGNESIUM SERPL-MCNC: 2.1 MG/DL (ref 1.5–2.5)
MCH RBC QN AUTO: 33.2 PG (ref 27–33)
MCHC RBC AUTO-ENTMCNC: 33.2 G/DL (ref 33.7–35.3)
MCV RBC AUTO: 100 FL (ref 81.4–97.8)
PLATELET # BLD AUTO: 136 K/UL (ref 164–446)
PMV BLD AUTO: 10.8 FL (ref 9–12.9)
POTASSIUM SERPL-SCNC: 4.2 MMOL/L (ref 3.6–5.5)
RBC # BLD AUTO: 4.1 M/UL (ref 4.7–6.1)
SODIUM SERPL-SCNC: 134 MMOL/L (ref 135–145)
TROPONIN T SERPL-MCNC: 46 NG/L (ref 6–19)
WBC # BLD AUTO: 5.2 K/UL (ref 4.8–10.8)

## 2021-06-22 PROCEDURE — A9270 NON-COVERED ITEM OR SERVICE: HCPCS | Performed by: INTERNAL MEDICINE

## 2021-06-22 PROCEDURE — 83735 ASSAY OF MAGNESIUM: CPT

## 2021-06-22 PROCEDURE — 770006 HCHG ROOM/CARE - MED/SURG/GYN SEMI*

## 2021-06-22 PROCEDURE — 36415 COLL VENOUS BLD VENIPUNCTURE: CPT

## 2021-06-22 PROCEDURE — 84484 ASSAY OF TROPONIN QUANT: CPT

## 2021-06-22 PROCEDURE — 700102 HCHG RX REV CODE 250 W/ 637 OVERRIDE(OP): Performed by: INTERNAL MEDICINE

## 2021-06-22 PROCEDURE — 97161 PT EVAL LOW COMPLEX 20 MIN: CPT

## 2021-06-22 PROCEDURE — 80048 BASIC METABOLIC PNL TOTAL CA: CPT

## 2021-06-22 PROCEDURE — 85027 COMPLETE CBC AUTOMATED: CPT

## 2021-06-22 PROCEDURE — 99223 1ST HOSP IP/OBS HIGH 75: CPT | Mod: AI | Performed by: INTERNAL MEDICINE

## 2021-06-22 PROCEDURE — 700105 HCHG RX REV CODE 258: Performed by: INTERNAL MEDICINE

## 2021-06-22 RX ADMIN — SODIUM CHLORIDE: 9 INJECTION, SOLUTION INTRAVENOUS at 08:26

## 2021-06-22 RX ADMIN — SODIUM CHLORIDE: 9 INJECTION, SOLUTION INTRAVENOUS at 16:12

## 2021-06-22 RX ADMIN — APIXABAN 5 MG: 5 TABLET, FILM COATED ORAL at 17:23

## 2021-06-22 RX ADMIN — APIXABAN 2.5 MG: 5 TABLET, FILM COATED ORAL at 05:22

## 2021-06-22 RX ADMIN — METOPROLOL TARTRATE 12.5 MG: 25 TABLET, FILM COATED ORAL at 04:06

## 2021-06-22 RX ADMIN — SODIUM CHLORIDE: 9 INJECTION, SOLUTION INTRAVENOUS at 00:45

## 2021-06-22 RX ADMIN — METOPROLOL TARTRATE 12.5 MG: 25 TABLET, FILM COATED ORAL at 17:23

## 2021-06-22 RX ADMIN — LEVOTHYROXINE SODIUM 175 MCG: 0.17 TABLET ORAL at 05:22

## 2021-06-22 ASSESSMENT — COGNITIVE AND FUNCTIONAL STATUS - GENERAL
MOBILITY SCORE: 18
STANDING UP FROM CHAIR USING ARMS: A LITTLE
MOVING FROM LYING ON BACK TO SITTING ON SIDE OF FLAT BED: UNABLE
WALKING IN HOSPITAL ROOM: A LITTLE
HELP NEEDED FOR BATHING: A LITTLE
TURNING FROM BACK TO SIDE WHILE IN FLAT BAD: A LITTLE
SUGGESTED CMS G CODE MODIFIER MOBILITY: CK
MOVING FROM LYING ON BACK TO SITTING ON SIDE OF FLAT BED: A LITTLE
CLIMB 3 TO 5 STEPS WITH RAILING: A LOT
SUGGESTED CMS G CODE MODIFIER DAILY ACTIVITY: CJ
MOVING TO AND FROM BED TO CHAIR: A LITTLE
MOBILITY SCORE: 15
DAILY ACTIVITIY SCORE: 20
STANDING UP FROM CHAIR USING ARMS: A LITTLE
SUGGESTED CMS G CODE MODIFIER MOBILITY: CK
TOILETING: A LITTLE
CLIMB 3 TO 5 STEPS WITH RAILING: A LOT
MOVING TO AND FROM BED TO CHAIR: A LITTLE
DRESSING REGULAR UPPER BODY CLOTHING: A LITTLE
DRESSING REGULAR LOWER BODY CLOTHING: A LITTLE
WALKING IN HOSPITAL ROOM: A LITTLE

## 2021-06-22 ASSESSMENT — ENCOUNTER SYMPTOMS
SORE THROAT: 0
CHILLS: 0
DIARRHEA: 0
NAUSEA: 0
ABDOMINAL PAIN: 0
NECK PAIN: 0
COUGH: 0
HEADACHES: 0
STRIDOR: 0
WEIGHT LOSS: 0
BLURRED VISION: 0
PALPITATIONS: 0
FEVER: 0
MYALGIAS: 0
DEPRESSION: 0
INSOMNIA: 0
HEMOPTYSIS: 0
VOMITING: 0
DOUBLE VISION: 0
BACK PAIN: 0
BRUISES/BLEEDS EASILY: 0
DIZZINESS: 0

## 2021-06-22 ASSESSMENT — LIFESTYLE VARIABLES
HAVE YOU EVER FELT YOU SHOULD CUT DOWN ON YOUR DRINKING: NO
ALCOHOL_USE: NO
EVER FELT BAD OR GUILTY ABOUT YOUR DRINKING: NO
TOTAL SCORE: 0
AVERAGE NUMBER OF DAYS PER WEEK YOU HAVE A DRINK CONTAINING ALCOHOL: 0
EVER HAD A DRINK FIRST THING IN THE MORNING TO STEADY YOUR NERVES TO GET RID OF A HANGOVER: NO
CONSUMPTION TOTAL: NEGATIVE
TOTAL SCORE: 0
DOES PATIENT WANT TO STOP DRINKING: NO
HOW MANY TIMES IN THE PAST YEAR HAVE YOU HAD 5 OR MORE DRINKS IN A DAY: 0
ON A TYPICAL DAY WHEN YOU DRINK ALCOHOL HOW MANY DRINKS DO YOU HAVE: 0
HAVE PEOPLE ANNOYED YOU BY CRITICIZING YOUR DRINKING: NO
TOTAL SCORE: 0

## 2021-06-22 ASSESSMENT — GAIT ASSESSMENTS
DISTANCE (FEET): 60
ASSISTIVE DEVICE: FRONT WHEEL WALKER
GAIT LEVEL OF ASSIST: MINIMAL ASSIST

## 2021-06-22 ASSESSMENT — PATIENT HEALTH QUESTIONNAIRE - PHQ9
2. FEELING DOWN, DEPRESSED, IRRITABLE, OR HOPELESS: NOT AT ALL
SUM OF ALL RESPONSES TO PHQ9 QUESTIONS 1 AND 2: 0
1. LITTLE INTEREST OR PLEASURE IN DOING THINGS: NOT AT ALL

## 2021-06-22 NOTE — PROGRESS NOTES
SBAR report received, patient in bed resting comfortably at this time, awake and alert. Able to communicate needs at this time. Call light within reach. Bed in lowest locked position. Bed alarm in place.

## 2021-06-22 NOTE — CARE PLAN
Problem: Knowledge Deficit - Standard  Goal: Patient and family/care givers will demonstrate understanding of plan of care, disease process/condition, diagnostic tests and medications  Outcome: Progressing   The patient is Stable - Low risk of patient condition declining or worsening    Shift Goals  Clinical Goals: rest, rehydrate  Patient Goals: rest, ambulate     Progress made toward(s) clinical / shift goals:  encourage fluids, administer IV fluids.     Patient is not progressing towards the following goals:

## 2021-06-22 NOTE — PROGRESS NOTES
Pt arrived on unit via transport. Ambulated to bed with handheld assist. Denies pain. Pt oriented to room and call light. Waffle cushion, bed alarm, appropriate fall precautions in place. Pt brought his own CPAP. States that he feels much better after being rehydrated. Pt resting comfortably at this time.

## 2021-06-22 NOTE — ASSESSMENT & PLAN NOTE
Somewhat decompensated and hypovolemic, IV fluid challenge, Lopressor trial, continue Eliquis.  Rate controlled, sufficient BP today.

## 2021-06-22 NOTE — DIETARY
"Nutrition services: Day 0 of admit.  Guru Pena is a 89 y.o. male with admitting DX of generalized weakness and ERIN.    Consult received for weight loss and poor PO intake.    Pt seen at bedside with CNA present doing vitals. Pt reports is tolerating current clear liquids diet and denies N/V/abdominal pain. CNA reports pt ate most of lunch (%) today. Pt reports eating normal amount prior to admit, did not specify further and usual weight is 168 lbs with current weight of 178 lbs.    Assessment:  Height: 185.4 cm (6' 1\")  Weight: 80.7 kg (178 lb) - stand up scale  Body mass index is 23.48 kg/m²., BMI classification: normal  Diet/Intake: clear liquids, % x 2 meals on clears    Evaluation:   1. PMH of Afib and hypothyroid. Pt presents with 24 hours of diarrhea and generalized weakness. MD notes pt is poor historian.  2. CM notes likely discharge plan for HH pending choice and acceptance.  3. Per weight history, pt previously 181 lbs on 6/1/21 and current weight is 178 lbs, down 3 lbs (1.6%) in ~3 weeks which is not significant.  4. Observed pt's muscle and fat mass has some loss but is consistent with pt's age.  5. Skin: No wounds per flow sheets.  6. Labs: Na 134, BUN 28, creatinine 1.41, GFR 47  7. Meds: eliquis, synthroid, tylenol prn, Ns infusion  8. Last BM: 6/22    Malnutrition Risk: Malnutrition criteria not met at this time.    Recommendations/Plan:  1. Advance diet to liberalized regular diet.  2. Encourage intake of meals.  3. Document intake of all meals as % taken in ADL's to provide interdisciplinary communication across all shifts.   4. Monitor weight.  5. Nutrition rep will continue to see patient for ongoing meal and snack preferences.     RD following.          "

## 2021-06-22 NOTE — THERAPY
"Physical Therapy   Initial Evaluation     Patient Name: Guru Pena  Age:  89 y.o., Sex:  male  Medical Record #: [id number]  Today's Date: 6/22/2021     Precautions: Fall Risk    Assessment  Patient is a 89 y.o. male who was admitted to [facility] for diarrhea and weakness. He has medical diagnoses of acute renal failure and Afib. He presented to PT with decreased activity tolerance however he appears to be close to baseline and stated he feels much stronger than he did yesterday. Patient was able to perform bed mobility with supervision, and transfer and ambulation with a FWW and min A for safety. His strength was grossly 4/5 BLE. Patient stated that he is independent with ADLs at home and walks to the dining room with his wife using his FWW for meals. Patient and son both stated that patient may benefit from having someone assist with cleaning and laundry. Given patient's current status, expect patient to be able to return to living situation once medically cleared. Patient will not be actively followed for physical therapy services at this time, however may be seen if requested by physician for 1 more visit within 30 days to address any discharge or equipment needs.    Plan    Recommend Physical Therapy 3 times per week until therapy goals are met for the following treatments:  Community Re-integration, Equipment, Gait Training, Manual Therapy, Neuro Re-Education / Balance, Self Care/Home Evaluation, Therapeutic Activities and Therapeutic Exercises    DC Equipment Recommendations: None (patient has FWW at home)  Discharge Recommendations: Recommend home health for continued physical therapy services (pt to return to [facility])       Subjective    \"My wife and I have been  over 70 years.\"     Objective       06/22/21 1140   Total Time Spent   Total Time Spent (Mins) 20   Charge Group   PT Evaluation PT Evaluation Low   Initial Contact Note    Initial Contact Note Order Received and Verified, " Physical Therapy Evaluation in Progress with Full Report to Follow.   Precautions   Precautions Fall Risk   Vitals   O2 (LPM) 0   O2 Delivery Device None - Room Air   Pain 0 - 10 Group   Therapist Pain Assessment Nurse Notified  (no report of pain)   Prior Living Situation   Prior Services Other (Comments)  ([facility])   Housing / Facility Assisted Living Residence   Steps Into Home 0   Steps In Home 0   Equipment Owned Front-Wheel Walker   Lives with - Patient's Self Care Capacity Spouse   Comments 3 room apartment, pt and wife both mobile and able to care for themselves, but used the dining room for meals    Prior Level of Functional Mobility   Bed Mobility Independent   Transfer Status Independent   Ambulation Independent   Distance Ambulation (Feet)   (community)   Assistive Devices Used Front-Wheel Walker   Stairs   (does not need to navigate stairs)   Comments pt reported he walks to the dining room with his FWW for 3 meals a day   Cognition    Cognition / Consciousness WDL   Level of Consciousness Alert   Comments pleasant, cooperative   Passive ROM Lower Body   Passive ROM Lower Body WDL   Active ROM Lower Body    Active ROM Lower Body  WDL   Strength Lower Body   Lower Body Strength  WDL   Sensation Lower Body   Lower Extremity Sensation   Not Tested   Lower Body Muscle Tone   Lower Body Muscle Tone  WDL   Neurological Concerns   Neurological Concerns No   Coordination Lower Body    Coordination Lower Body  WDL   Balance Assessment   Sitting Balance (Static) Fair   Sitting Balance (Dynamic) Fair   Standing Balance (Static) Fair   Standing Balance (Dynamic) Fair -   Weight Shift Sitting Fair   Weight Shift Standing Fair   Comments w/ FWW, no overt LOB   Gait Analysis   Gait Level Of Assist Minimal Assist  (for safety)   Assistive Device Front Wheel Walker   Distance (Feet) 60   # of Times Distance was Traveled 1   Deviation   (decreased johnnie, step length)   # of Stairs Climbed 0   Weight  Bearing Status FWB   Comments ambulated in room   Bed Mobility    Supine to Sit Supervised   Sit to Supine   (NT - left in bathroom with CNA)   Scooting Supervised   Rolling Supervised   Functional Mobility   Sit to Stand Minimal Assist   Bed, Chair, Wheelchair Transfer Minimal Assist   Transfer Method Stand Step   How much difficulty does the patient currently have...   Turning over in bed (including adjusting bedclothes, sheets and blankets)? 3   Sitting down on and standing up from a chair with arms (e.g., wheelchair, bedside commode, etc.) 1   Moving from lying on back to sitting on the side of the bed? 3   How much help from another person does the patient currently need...   Moving to and from a bed to a chair (including a wheelchair)? 3   Need to walk in a hospital room? 3   Climbing 3-5 steps with a railing? 2   6 clicks Mobility Score 15   Activity Tolerance   Sitting in Chair NT   Sitting Edge of Bed 6 min   Standing 10 min   Comments no report of fatigue, dizziness, SOB   Edema / Skin Assessment   Edema / Skin  Not Assessed   Patient / Family Goals    Patient / Family Goal #1 return home/to OF   Short Term Goals    Short Term Goal # 1 Patient will perform transfers with supervision within 6tx in order to achieve functional transfer and progress independence   Short Term Goal # 2 Patient will ambulate 500ft with supervision within 6tx in order to access environment at OF   Education Group   Education Provided Role of Physical Therapist   Role of Physical Therapist Patient Response Patient;Family;Acceptance;Explanation;Demonstration;Verbal Demonstration;Action Demonstration   Problem List    Problems Impaired Transfers;Impaired Ambulation;Functional Strength Deficit;Decreased Activity Tolerance   Anticipated Discharge Equipment and Recommendations   DC Equipment Recommendations None  (patient has FWW at home)   Discharge Recommendations Recommend home health for continued physical therapy services  (pt  to return to [facility])   Interdisciplinary Plan of Care Collaboration   IDT Collaboration with  Nursing;Certified Nursing Assistant;Family / Caregiver   Patient Position at End of Therapy Other (Comments);Family / Friend in Room  (in bathroom with CNA)   Collaboration Comments discussed with RN   Session Information   Date / Session Number  6/22-1 (1/3, 6/28)   Priority   (.)

## 2021-06-22 NOTE — CARE PLAN
Problem: Nutritional:  Goal: Achieve adequate nutritional intake  Description: Patient will tolerate diet past clear liquids and consume 50% of meals  Outcome: Progressing

## 2021-06-22 NOTE — ED NOTES
Med rec updated and complete. Allergies reviewed.  Met with pt at bedside. Pt denies antibiotic use . Pt is a resident of [address] [place] [address]. Pt stated that he takes care of his own medications.  Pt stated that he saw his PCP today.        Home pharmacy [facility] [phone]    No current facility-administered medications on file prior to encounter.     Current Outpatient Medications on File Prior to Encounter   Medication Sig Dispense Refill   • vitamin D (CHOLECALCIFEROL) 1000 Unit (25 mcg) Tab Take 1,000 Units by mouth every day.     • Multiple Vitamins-Minerals (PRESERVISION AREDS) Tab Take 1 tablet by mouth every day.     • levothyroxine (SYNTHROID) 175 MCG Tab Take 1 tablet by mouth every morning on an empty stomach for 102 days. Once a day, Sunday NIGHT take extra 1 Tablet- NEW RX (Patient taking differently: Take 175-300 mcg by mouth every morning on an empty stomach. Once a day, Sunday NIGHT take extra 1 Tablet- NEW RX  175 mcg on Monday, Tuesday, Wednesday, Thursday , Friday, Saturday  300 mcg on Sunday) 108 tablet 4   • apixaban (ELIQUIS) 5mg Tab Take 1 tablet by mouth 2 times a day. 180 tablet 4

## 2021-06-22 NOTE — ED NOTES
Pt son to . Concerned with fathers condition. States he believes father is starting to become confused. Rn Aware

## 2021-06-22 NOTE — ED TRIAGE NOTES
Chief Complaint   Patient presents with   • Diarrhea     Severe diarrhea starting yesterday that was watery in nature.    • Weakness     This evening started to feel weak and had a hard time standing up     Pt wheeled to triage for above complaint.  Pt is AO x 4, follows commands, and responds appropriately to questions. Patient's breathing is unlabored and pain is currently 0/10 on the 0-10 pain scale.  Pt placed in EKG room then back to lobby. Patient educated on triage process and encouraged to alert staff for any changes.

## 2021-06-22 NOTE — ED PROVIDER NOTES
"ED Provider Note    CHIEF COMPLAINT  Chief Complaint   Patient presents with   • Diarrhea     Severe diarrhea starting yesterday that was watery in nature.    • Weakness     This evening started to feel weak and had a hard time standing up       HPI  Guru Pena is a 89 y.o. male here for evaluation of diarrhea and generalized weakness.  Patient states that he has been having intermittent diarrhea over the last couple of days, to the point where he is \"going in my pants.\"  Patient has no fever chills or vomiting, he has no nausea.  Patient has no abdominal pain currently and no cramping.  States he did have cramping a couple of days ago and this has resolved.  No chest pain, no shortness of breath.  He has not taken anything for the diarrhea prior to arrival.  He lives with his wife, and he is here with his son.    ROS  See HPI for further details, o/w negative.     PAST MEDICAL HISTORY   has a past medical history of A-fib (HCC) and Hypothyroid.    SOCIAL HISTORY  Social History     Tobacco Use   • Smoking status: Never Smoker   • Smokeless tobacco: Never Used   Vaping Use   • Vaping Use: Never used   Substance and Sexual Activity   • Alcohol use: Never   • Drug use: Never   • Sexual activity: Not on file       Family History  No bleeding disorders    SURGICAL HISTORY  patient denies any surgical history    CURRENT MEDICATIONS  Home Medications     Reviewed by Alex Lopez R.N. (Registered Nurse) on 06/21/21 at 1826  Med List Status: Partial   Medication Last Dose Status   apixaban (ELIQUIS) 5mg Tab  Active   Cholecalciferol (VITAMIN D3 PO)  Active   levothyroxine (SYNTHROID) 175 MCG Tab  Active   Multiple Vitamins-Minerals (PRESERVISION AREDS 2 PO)  Active                ALLERGIES  No Known Allergies    REVIEW OF SYSTEMS  See HPI for further details. Review of systems as above, otherwise all other systems are negative.     PHYSICAL EXAM  Constitutional: Elderly, well developed, well nourished. No " acute distress.  HEENT: Normocephalic, atraumatic. Posterior pharynx clear and moist.  Eyes:  EOMI. Normal sclera.  Neck: Supple, Full range of motion, nontender.  Chest/Pulmonary: clear to ausculation. Symmetrical expansion.   Cardio: Regular rate and rhythm with no murmur.   Abdomen: Soft, nontender. No peritoneal signs. No guarding. No palpable masses.  Musculoskeletal: No deformity, no edema, neurovascular intact.   Neuro: Clear speech, appropriate, cooperative, cranial nerves II-XII grossly intact.  Psych: Normal mood and affect    PROCEDURES     MEDICAL RECORD  I have reviewed patient's medical record and pertinent results are listed.    COURSE & MEDICAL DECISION MAKING  I have reviewed any medical record information, laboratory studies and radiographic results as noted above.    Results for orders placed or performed during the hospital encounter of 06/21/21   CBC with Differential   Result Value Ref Range    WBC 7.2 4.8 - 10.8 K/uL    RBC 4.54 (L) 4.70 - 6.10 M/uL    Hemoglobin 14.8 14.0 - 18.0 g/dL    Hematocrit 44.4 42.0 - 52.0 %    MCV 97.8 81.4 - 97.8 fL    MCH 32.6 27.0 - 33.0 pg    MCHC 33.3 (L) 33.7 - 35.3 g/dL    RDW 53.9 (H) 35.9 - 50.0 fL    Platelet Count 159 (L) 164 - 446 K/uL    MPV 10.2 9.0 - 12.9 fL    Neutrophils-Polys 69.70 44.00 - 72.00 %    Lymphocytes 12.00 (L) 22.00 - 41.00 %    Monocytes 17.50 (H) 0.00 - 13.40 %    Eosinophils 0.10 0.00 - 6.90 %    Basophils 0.30 0.00 - 1.80 %    Immature Granulocytes 0.40 0.00 - 0.90 %    Nucleated RBC 0.00 /100 WBC    Neutrophils (Absolute) 5.00 1.82 - 7.42 K/uL    Lymphs (Absolute) 0.86 (L) 1.00 - 4.80 K/uL    Monos (Absolute) 1.26 (H) 0.00 - 0.85 K/uL    Eos (Absolute) 0.01 0.00 - 0.51 K/uL    Baso (Absolute) 0.02 0.00 - 0.12 K/uL    Immature Granulocytes (abs) 0.03 0.00 - 0.11 K/uL    NRBC (Absolute) 0.00 K/uL   Complete Metabolic Panel (CMP)   Result Value Ref Range    Sodium 134 (L) 135 - 145 mmol/L    Potassium 4.2 3.6 - 5.5 mmol/L    Chloride  101 96 - 112 mmol/L    Co2 21 20 - 33 mmol/L    Anion Gap 12.0 7.0 - 16.0    Glucose 110 (H) 65 - 99 mg/dL    Bun 31 (H) 8 - 22 mg/dL    Creatinine 1.64 (H) 0.50 - 1.40 mg/dL    Calcium 8.8 8.5 - 10.5 mg/dL    AST(SGOT) 31 12 - 45 U/L    ALT(SGPT) 23 2 - 50 U/L    Alkaline Phosphatase 88 30 - 99 U/L    Total Bilirubin 0.4 0.1 - 1.5 mg/dL    Albumin 4.0 3.2 - 4.9 g/dL    Total Protein 7.7 6.0 - 8.2 g/dL    Globulin 3.7 (H) 1.9 - 3.5 g/dL    A-G Ratio 1.1 g/dL   Troponin   Result Value Ref Range    Troponin T 49 (H) 6 - 19 ng/L   ESTIMATED GFR   Result Value Ref Range    GFR If  47 (A) >60 mL/min/1.73 m 2    GFR If Non  39 (A) >60 mL/min/1.73 m 2   EKG   Result Value Ref Range    Report       [facility] Emergency Dept.    Test Date:  2021  Pt Name:    JANICE HARRISON                  Department: ER  MRN:        [id number]                      Room:  Gender:     Male                         Technician: [id number]  :        [id number]                   Requested By:ER TRIAGE PROTOCOL  Order #:    [id number]                    Reading MD:    Measurements  Intervals                                Axis  Rate:       110                          P:  LA:                                      QRS:        -77  QRSD:       170                          T:          31  QT:         400  QTc:        542    Interpretive Statements  ATRIAL FIBRILLATION, V-RATE    RBBB AND LAFB  No previous ECG available for comparison       Ekg;  Irregularly irregular at 110. No st elevation, no st depressio, qtc 542.  No comparison.     The pt will be admitted to the hospitalist service.     HYDRATION: Based on the patient's presentation of Dehydration the patient was given IV fluids. IV Hydration was used because oral hydration was not adequate alone. Upon recheck following hydration, the patient was improved.      FINAL IMPRESSION  1. Weakness     2. ERIN (acute kidney injury) (HCC)              Electronically signed by: Jake Schwartz D.O., 6/21/2021 10:14 PM

## 2021-06-22 NOTE — PROGRESS NOTES
4 Eyes Skin Assessment Completed by Batool RN and KAELYN Esposito.    Head WDL  Ears Redness and Blanching  Nose WDL  Mouth WDL  Neck WDL  Breast/Chest WDL  Shoulder Blades WDL  Spine WDL  (R) Arm/Elbow/Hand WDL  (L) Arm/Elbow/Hand WDL  Abdomen WDL  Groin WDL  Scrotum/Coccyx/Buttocks Redness and Blanching  (R) Leg Edema  (L) Leg Edema  (R) Heel/Foot/Toe Swelling  (L) Heel/Foot/Toe Swelling      Bilat calves and feet dark/discolored and flaky with pitting edema. Slight redness on top of ears from mask, skin intact.           Interventions In Place Waffle Overlay, Pillows and Pressure Redistribution Mattress    Possible Skin Injury No    Pictures Uploaded Into Epic N/A  Wound Consult Placed N/A  RN Wound Prevention Protocol Ordered No

## 2021-06-22 NOTE — H&P
"Hospital Medicine History & Physical Note    Date of Service  6/22/2021    Primary Care Physician  Alex Smart M.D.    Consultants    Code Status  Full Code    Chief Complaint  Chief Complaint   Patient presents with   • Diarrhea     Severe diarrhea starting yesterday that was watery in nature.    • Weakness     This evening started to feel weak and had a hard time standing up       History of Presenting Illness  89 y.o. male who presented 6/21/2021 with 24 hours of diarrhea and generalized weakness.  Patient states that he has been having intermittent stool incontinence reporting \"going in my pants.\"  Patient has no fever chills or vomiting, he has no nausea.  Patient has no abdominal pain currently and no cramping.  States he did have cramping a couple of days ago and this has resolved.  No chest pain, no shortness of breath.  He has not taken anything for the diarrhea prior to arrival.  He denies recent infectious contacts or antibiotics.   Patient is otherwise socially appropriate but a poor historian.       Review of Systems  Review of Systems   Constitutional: Negative for fever, malaise/fatigue and weight loss.   HENT: Negative for sore throat and tinnitus.    Eyes: Negative for blurred vision and double vision.   Respiratory: Negative for cough, hemoptysis and stridor.    Cardiovascular: Negative for chest pain and palpitations.   Gastrointestinal: Negative for nausea and vomiting.   Genitourinary: Negative for dysuria and urgency.   Musculoskeletal: Negative for myalgias and neck pain.   Skin: Negative for itching and rash.   Neurological: Negative for dizziness and headaches.   Endo/Heme/Allergies: Does not bruise/bleed easily.   Psychiatric/Behavioral: Negative for depression. The patient does not have insomnia.        Past Medical History   has a past medical history of A-fib (HCC) and Hypothyroid.    Surgical History   has no past surgical history on file.     Family History  family history is not " on file.  Unknown to patient    Social History   reports that he has never smoked. He has never used smokeless tobacco. He reports that he does not drink alcohol and does not use drugs.    Allergies  No Known Allergies    Medications  Prior to Admission Medications   Prescriptions Last Dose Informant Patient Reported? Taking?   Multiple Vitamins-Minerals (PRESERVISION AREDS) Tab 6/21/2021 at 0730 Patient Yes Yes   Sig: Take 1 tablet by mouth every day.   apixaban (ELIQUIS) 5mg Tab 6/21/2021 at 1700 Patient No No   Sig: Take 1 tablet by mouth 2 times a day.   levothyroxine (SYNTHROID) 175 MCG Tab 6/21/2021 at 0600 Patient No No   Sig: Take 1 tablet by mouth every morning on an empty stomach for 102 days. Once a day, Sunday NIGHT take extra 1 Tablet- NEW RX   Patient taking differently: Take 175-300 mcg by mouth every morning on an empty stomach. Once a day, Sunday NIGHT take extra 1 Tablet- NEW RX  175 mcg on Monday, Tuesday, Wednesday, Thursday , Friday, Saturday  300 mcg on Sunday   vitamin D (CHOLECALCIFEROL) 1000 Unit (25 mcg) Tab 6/21/2021 at 0730 Patient Yes Yes   Sig: Take 1,000 Units by mouth every day.      Facility-Administered Medications: None       Physical Exam  Temp:  [36.1 °C (97 °F)-37.2 °C (99 °F)] 36.1 °C (97 °F)  Pulse:  [] 114  Resp:  [14-20] 20  BP: (116-146)/(75-93) 146/93  SpO2:  [91 %-95 %] 95 %    Physical Exam  Vitals and nursing note reviewed.   Constitutional:       General: He is not in acute distress.     Appearance: Normal appearance. He is normal weight. He is not toxic-appearing.   HENT:      Head: Normocephalic and atraumatic.      Nose: Nose normal. No congestion or rhinorrhea.      Mouth/Throat:      Mouth: Mucous membranes are moist.      Pharynx: Oropharynx is clear.   Eyes:      Extraocular Movements: Extraocular movements intact.      Conjunctiva/sclera: Conjunctivae normal.      Pupils: Pupils are equal, round, and reactive to light.   Neck:      Vascular: No carotid  bruit.   Cardiovascular:      Rate and Rhythm: Normal rate. Rhythm irregular.      Pulses: Normal pulses.      Heart sounds: Normal heart sounds. No murmur heard.   No gallop.    Pulmonary:      Effort: No respiratory distress.      Breath sounds: Normal breath sounds. No wheezing or rales.   Abdominal:      General: Abdomen is flat. Bowel sounds are normal. There is no distension.      Palpations: Abdomen is soft. There is no mass.      Tenderness: There is no abdominal tenderness.      Hernia: No hernia is present.   Musculoskeletal:         General: No tenderness or signs of injury.      Cervical back: Normal range of motion and neck supple. No muscular tenderness.   Lymphadenopathy:      Cervical: No cervical adenopathy.   Skin:     Capillary Refill: Capillary refill takes less than 2 seconds.      Coloration: Skin is not jaundiced or pale.      Findings: No bruising.   Neurological:      General: No focal deficit present.      Mental Status: He is alert and oriented to person, place, and time. Mental status is at baseline.      Cranial Nerves: No cranial nerve deficit.      Motor: No weakness.      Coordination: Coordination normal.   Psychiatric:         Mood and Affect: Mood normal.         Thought Content: Thought content normal.         Judgment: Judgment normal.         Laboratory:  Recent Labs     06/21/21 2050   WBC 7.2   RBC 4.54*   HEMOGLOBIN 14.8   HEMATOCRIT 44.4   MCV 97.8   MCH 32.6   MCHC 33.3*   RDW 53.9*   PLATELETCT 159*   MPV 10.2     Recent Labs     06/21/21 2050   SODIUM 134*   POTASSIUM 4.2   CHLORIDE 101   CO2 21   GLUCOSE 110*   BUN 31*   CREATININE 1.64*   CALCIUM 8.8     Recent Labs     06/21/21 2050   ALTSGPT 23   ASTSGOT 31   ALKPHOSPHAT 88   TBILIRUBIN 0.4   GLUCOSE 110*         No results for input(s): NTPROBNP in the last 72 hours.      Recent Labs     06/21/21 2050   TROPONINT 49*       Imaging:  DX-CHEST-PORTABLE (1 VIEW)   Final Result      Bibasilar opacities could indicate  atelectasis or aspiration.      Aortic ectasia and cardiac silhouette enlargement      Right hemidiaphragm elevation could be from eventration or diaphragmatic paralysis            Assessment/Plan:  I anticipate this patient will require at least two midnights for appropriate medical management, necessitating inpatient admission.    Acute renal failure (ARF) (HCC)  Assessment & Plan  Largely prerenal.  Avoid nephrotoxic meds and doses   IV fluid challenge, follow-up chemistry    Generalized weakness  Assessment & Plan  Likely secondary to acute illness though complicated by generalized debility.  Follow-up PT consult    Diarrhea  Assessment & Plan  Unclear cause, symptomatic management, consider work-up if persistent or fever    Atrial fibrillation (HCC)  Assessment & Plan  Somewhat decompensated and hypovolemic, IV fluid challenge, Lopressor trial, continue Eliquis

## 2021-06-22 NOTE — DISCHARGE PLANNING
Anticipated Discharge Disposition: hh    Action: Lsw met with pt to discuss HH. Pt stated that he would like to research HH before making choice. Pt asked Lsw to leave choice form in room.     Barriers to Discharge: HH choice and acceptance    Plan: Lsw to f/u    1622: Lsw met with pt to f/u with HH. Pt stated he has not had time to research agencies but will do so. Lsw left resources for caregiving. Lsw left vm for son Damon.

## 2021-06-22 NOTE — ED TRIAGE NOTES
"Patient vital signs rechecked and documented per [facility]. Patient denies any new needs at this time.  Patient updated on wait times, thanked for patience. Pt informed to alert triage tech or triage RN with any needs and/or changes in condition; patient verbalized understanding. Patients family member stated \"no changes, just tired.\"   "

## 2021-06-22 NOTE — CARE PLAN
The patient is Stable - Low risk of patient condition declining or worsening    Shift Goals  Clinical Goals: Rehydration, rest  Patient Goals: Rest    Progress made toward(s) clinical / shift goals:  Pt states that he feels better after being rehydrated. Discussed POC, fall and safety precautions. Pt is sleeping comfortably.     Patient is not progressing towards the following goals: n/a    Problem: Knowledge Deficit - Standard  Goal: Patient and family/care givers will demonstrate understanding of plan of care, disease process/condition, diagnostic tests and medications  Outcome: Progressing

## 2021-06-22 NOTE — ASSESSMENT & PLAN NOTE
Largely prerenal.  Avoid nephrotoxic meds and doses  Resolved with IV fluids.  Back to baseline of ~1.4.

## 2021-06-23 VITALS
WEIGHT: 178 LBS | TEMPERATURE: 97.6 F | HEART RATE: 106 BPM | SYSTOLIC BLOOD PRESSURE: 154 MMHG | OXYGEN SATURATION: 97 % | DIASTOLIC BLOOD PRESSURE: 97 MMHG | BODY MASS INDEX: 23.59 KG/M2 | HEIGHT: 73 IN | RESPIRATION RATE: 16 BRPM

## 2021-06-23 PROBLEM — N17.9 ACUTE RENAL FAILURE (ARF) (HCC): Status: RESOLVED | Noted: 2021-06-22 | Resolved: 2021-06-23

## 2021-06-23 PROBLEM — R19.7 DIARRHEA: Status: RESOLVED | Noted: 2021-06-22 | Resolved: 2021-06-23

## 2021-06-23 LAB
ANION GAP SERPL CALC-SCNC: 10 MMOL/L (ref 7–16)
BUN SERPL-MCNC: 19 MG/DL (ref 8–22)
CALCIUM SERPL-MCNC: 8 MG/DL (ref 8.5–10.5)
CHLORIDE SERPL-SCNC: 107 MMOL/L (ref 96–112)
CO2 SERPL-SCNC: 20 MMOL/L (ref 20–33)
CREAT SERPL-MCNC: 1.17 MG/DL (ref 0.5–1.4)
GLUCOSE SERPL-MCNC: 91 MG/DL (ref 65–99)
POTASSIUM SERPL-SCNC: 4.7 MMOL/L (ref 3.6–5.5)
SODIUM SERPL-SCNC: 137 MMOL/L (ref 135–145)

## 2021-06-23 PROCEDURE — 99239 HOSP IP/OBS DSCHRG MGMT >30: CPT | Performed by: INTERNAL MEDICINE

## 2021-06-23 PROCEDURE — A9270 NON-COVERED ITEM OR SERVICE: HCPCS | Performed by: INTERNAL MEDICINE

## 2021-06-23 PROCEDURE — 700102 HCHG RX REV CODE 250 W/ 637 OVERRIDE(OP): Performed by: INTERNAL MEDICINE

## 2021-06-23 PROCEDURE — 80048 BASIC METABOLIC PNL TOTAL CA: CPT

## 2021-06-23 PROCEDURE — 700105 HCHG RX REV CODE 258: Performed by: INTERNAL MEDICINE

## 2021-06-23 PROCEDURE — 36415 COLL VENOUS BLD VENIPUNCTURE: CPT

## 2021-06-23 RX ORDER — ONDANSETRON 2 MG/ML
4 INJECTION INTRAMUSCULAR; INTRAVENOUS EVERY 4 HOURS PRN
Status: DISCONTINUED | OUTPATIENT
Start: 2021-06-23 | End: 2021-06-23 | Stop reason: HOSPADM

## 2021-06-23 RX ORDER — ONDANSETRON 4 MG/1
4 TABLET, ORALLY DISINTEGRATING ORAL EVERY 6 HOURS PRN
Qty: 20 TABLET | Refills: 0 | Status: SHIPPED | OUTPATIENT
Start: 2021-06-23 | End: 2022-02-01

## 2021-06-23 RX ORDER — ACETAMINOPHEN 325 MG/1
650 TABLET ORAL EVERY 6 HOURS PRN
Qty: 30 TABLET | Refills: 0 | COMMUNITY
Start: 2021-06-23 | End: 2021-06-30

## 2021-06-23 RX ADMIN — METOPROLOL TARTRATE 12.5 MG: 25 TABLET, FILM COATED ORAL at 16:16

## 2021-06-23 RX ADMIN — SODIUM CHLORIDE: 9 INJECTION, SOLUTION INTRAVENOUS at 16:16

## 2021-06-23 RX ADMIN — SODIUM CHLORIDE: 9 INJECTION, SOLUTION INTRAVENOUS at 08:14

## 2021-06-23 RX ADMIN — APIXABAN 5 MG: 5 TABLET, FILM COATED ORAL at 16:16

## 2021-06-23 RX ADMIN — LEVOTHYROXINE SODIUM 175 MCG: 0.17 TABLET ORAL at 04:44

## 2021-06-23 RX ADMIN — METOPROLOL TARTRATE 12.5 MG: 25 TABLET, FILM COATED ORAL at 04:44

## 2021-06-23 RX ADMIN — APIXABAN 5 MG: 5 TABLET, FILM COATED ORAL at 04:44

## 2021-06-23 NOTE — CARE PLAN
The patient is Stable - Low risk of patient condition declining or worsening    Shift Goals  Clinical Goals: rehydrate and safety  Patient Goals: rehydrate and safety     Progress made toward(s) clinical / shift goals:    Patient is not progressing towards the following goals:    Progress made toward(s) clinical / shift goals:   Patient isreceiving IV fluids and has been voiding on his own.     Patient is not progressing towards the following goals: Pt did not eat dinner this evening     Problem: Knowledge Deficit - Standard  Goal: Patient and family/care givers will demonstrate understanding of plan of care, disease process/condition, diagnostic tests and medications  Outcome: Progressing      POC and discharge instructions given/discussed with the patient and son (Damon). Questions answered. Verbalized understanding.

## 2021-06-23 NOTE — HOSPITAL COURSE
Guru Pena is a 89 y.o. male who presented 6/21/2021 with 24 hours of diarrhea and generalized weakness. Patient states that he has been having intermittent stool incontinence. Patient had no fever chills or vomiting, he has no nausea.   He was admitted. Diarrhea and ERIN resolved with IV fluids.

## 2021-06-23 NOTE — DOCUMENTATION QUERY
[facility]                                                                       Query Response Note      PATIENT:               JANICE HARRISON  ACCT #:                  [id number]  MRN:                     [id number]  :                      [id number]  ADMIT DATE:       2021 9:18 PM  DISCH DATE:          RESPONDING  PROVIDER #:        [id number]           QUERY TEXT:    Atrial fibrillation is documented in the Medical Record.  Please specify the type.    NOTE:  If an appropriate response is not listed below, please respond with a new note.      The patient's Clinical Indicators include:  Per H&P and Progress Note: atrial fibrillation, somewhat decompensated & hypovolemic.  Rate controlled   EKG: atrial fibrillation, V-rate .  rate 110  Risk Factors: history of atrial fib  Treatment: Lopressor, Eliquis     Thank You,  Cyndie Wise RN, BSN  Clinical    Connect via [employer]  Options provided:   -- Paroxysmal atrial fibrillation (self-terminating or intermittent spontaneously or with intervention within 7 days of onset)   -- Permanent atrial fibrillation (persistent or longstanding persistent AF where cardioversion cannot or will not be performed   -- Other persistent atrial fibrillation (AF that does not terminate within 7 days or that requires repeat pharmacological or electrical cardioversion.   -- Longstanding persistent atrial fibrillation (AF that is persistent and continuous, lasting longer than 1 year)   -- Chronic atrial fibrillation, unspecified (may refer to persistent, longstanding persistent or permanent AF.  A more specific descriptive term is preferred over the nonspecific AF   -- Unable to determine      Query created by: Cyndie Wise on 2021 9:54 AM    RESPONSE TEXT:    Permanent atrial fibrillation (persistent or longstanding persistent AF where cardioversion cannot or will  not be performed          Electronically signed by:  ALFONZO PAIGE MD 6/23/2021 10:29 AM

## 2021-06-23 NOTE — PROGRESS NOTES
0655 Patient's in bed. Bedside report recevied from ANGELICA Camp RN at the beginning of the shift.     0814 Patient's sitting up in bed. IVF patent & infusing. Noted forgetfulness, re oriented. Fall protocol in effect. Call light within reach. Reminded patient to call for assist. Assessment completed. No distress noted. Plan of car reviewed with the patient. Verbalized understanding.    0849 New order received and acknowledged fron Dr Hayes (see MAR).    0940 Patient's in bed. No distress noted. No c/o's nausea.     1125 Patient's in bed. No distress noted.    1355 Patient's sitting up in bed, having lunch. No distress noted.     1535 Patient's in bed. No distress noted.     1740   Discharge instructions given to the patient and Damon (son).  Questions answered. Patient was discharged with patient's belongings, e prescriptions to Pharmacy via w/c accompanied by one female CNA & son to Assisted Living.

## 2021-06-23 NOTE — PROGRESS NOTES
"Hospital Medicine Daily Progress Note    Date of Service  6/22/2021    Chief Complaint  89 y.o. male admitted 6/21/2021 with diarrhea    Hospital Course  89 y.o. male who presented 6/21/2021 with 24 hours of diarrhea and generalized weakness.  Patient states that he has been having intermittent stool incontinence reporting \"going in my pants.\"  Patient has no fever chills or vomiting, he has no nausea.  Patient has no abdominal pain currently and no cramping.  States he did have cramping a couple of days ago and this has resolved.  No chest pain, no shortness of breath.  He has not taken anything for the diarrhea prior to arrival.  He denies recent infectious contacts or antibiotics.   Patient is otherwise socially appropriate but a poor historian.      Interval Problem Update  Diarrhea resolved.  ERIN resolved after IV fluids.  PT recommended home health.    I have personally seen and examined the patient at bedside. I discussed the plan of care with patient, multidisciplinary team including bedside RN, charge RN, .       Consultants/Specialty  Physical Therapy    Code Status  Full Code    Disposition  Return to assisted living with home health, per PT evaluation.    Review of Systems  Review of Systems   Constitutional: Negative for chills and fever.   Gastrointestinal: Negative for abdominal pain, diarrhea, nausea and vomiting.   Musculoskeletal: Negative for back pain.   All other systems reviewed and are negative.       Physical Exam  Temp:  [35.8 °C (96.5 °F)-37.2 °C (99 °F)] 36.7 °C (98 °F)  Pulse:  [] 58  Resp:  [14-20] 16  BP: (108-146)/(64-93) 141/64  SpO2:  [91 %-98 %] 98 %    Physical Exam  Vitals and nursing note reviewed.   Constitutional:       General: He is not in acute distress.     Appearance: He is normal weight.   HENT:      Head: Normocephalic and atraumatic.      Right Ear: External ear normal.      Left Ear: External ear normal.      Nose: Nose normal.      Mouth/Throat:      " Mouth: Mucous membranes are moist.      Pharynx: Oropharynx is clear.   Eyes:      General: No scleral icterus.     Conjunctiva/sclera: Conjunctivae normal.   Cardiovascular:      Rate and Rhythm: Normal rate. Rhythm irregular.   Pulmonary:      Effort: Pulmonary effort is normal.      Breath sounds: Normal breath sounds.   Abdominal:      Palpations: Abdomen is soft.      Tenderness: There is no abdominal tenderness. There is no guarding.   Musculoskeletal:         General: No swelling or deformity.      Cervical back: Normal range of motion and neck supple.   Skin:     General: Skin is warm and dry.   Neurological:      General: No focal deficit present.      Mental Status: He is alert and oriented to person, place, and time.   Psychiatric:         Mood and Affect: Mood normal.         Behavior: Behavior normal.         Fluids    Intake/Output Summary (Last 24 hours) at 6/22/2021 1821  Last data filed at 6/22/2021 1800  Gross per 24 hour   Intake --   Output 1175 ml   Net -1175 ml       Laboratory  Recent Labs     06/21/21 2050 06/22/21  0442   WBC 7.2 5.2   RBC 4.54* 4.10*   HEMOGLOBIN 14.8 13.6*   HEMATOCRIT 44.4 41.0*   MCV 97.8 100.0*   MCH 32.6 33.2*   MCHC 33.3* 33.2*   RDW 53.9* 56.7*   PLATELETCT 159* 136*   MPV 10.2 10.8     Recent Labs     06/21/21 2050 06/22/21  0442   SODIUM 134* 134*   POTASSIUM 4.2 4.2   CHLORIDE 101 103   CO2 21 21   GLUCOSE 110* 84   BUN 31* 28*   CREATININE 1.64* 1.41*   CALCIUM 8.8 8.0*                   Imaging  DX-CHEST-PORTABLE (1 VIEW)   Final Result      Bibasilar opacities could indicate atelectasis or aspiration.      Aortic ectasia and cardiac silhouette enlargement      Right hemidiaphragm elevation could be from eventration or diaphragmatic paralysis           Assessment/Plan  Generalized weakness- (present on admission)  Assessment & Plan  Likely secondary to acute illness though complicated by generalized debility.  PT recommended home health for continued  therapy.    Diarrhea- (present on admission)  Assessment & Plan  Unclear cause.  Resolved.    Acute renal failure (ARF) (HCC)- (present on admission)  Assessment & Plan  Largely prerenal.  Avoid nephrotoxic meds and doses  Resolved with IV fluids.  Back to baseline of ~1.4.    Atrial fibrillation (HCC)- (present on admission)  Assessment & Plan  Somewhat decompensated and hypovolemic, IV fluid challenge, Lopressor trial, continue Eliquis.  Rate controlled, sufficient BP today.       VTE prophylaxis: apixaban

## 2021-06-23 NOTE — CARE PLAN
The patient is Stable - Low risk of patient condition declining or worsening    Shift Goals  Clinical Goals: rest, rehydrate  Patient Goals: rest, ambulate     Progress made toward(s) clinical / shift goals:  Pt is sleeping comfortably, denies pain. Pt is receiving IV fluids and has been voiding on his own.    Patient is not progressing towards the following goals: Pt did not eat dinner this evening    Problem: Knowledge Deficit - Standard  Goal: Patient and family/care givers will demonstrate understanding of plan of care, disease process/condition, diagnostic tests and medications  Outcome: Progressing

## 2021-06-23 NOTE — DISCHARGE SUMMARY
Discharge Summary    CHIEF COMPLAINT ON ADMISSION  Chief Complaint   Patient presents with   • Diarrhea     Severe diarrhea starting yesterday that was watery in nature.    • Weakness     This evening started to feel weak and had a hard time standing up       Reason for Admission  Diarrhea; Weakness     Admission Date  6/21/2021    CODE STATUS  Full Code    HPI & HOSPITAL COURSE  Guru Pena is a 89 y.o. male who presented 6/21/2021 with 24 hours of diarrhea and generalized weakness. Patient states that he has been having intermittent stool incontinence. Patient had no fever chills or vomiting, he has no nausea.   He was admitted. Diarrhea and ERIN resolved with IV fluids.      Therefore, he is discharged in good and stable condition to home with close outpatient follow-up.    The patient met 2-midnight criteria for an inpatient stay at the time of discharge.    Discharge Date  6/23/2021    FOLLOW UP ITEMS POST DISCHARGE  Incontinence and preventative care    DISCHARGE DIAGNOSES  Active Problems:    Generalized weakness POA: Yes  Resolved Problems:    Atrial fibrillation with RVR ([facility]) POA: Yes    Acute renal failure (ARF) ([facility]) POA: Yes    Diarrhea POA: Yes      FOLLOW UP  Future Appointments   Date Time Provider Department Center   9/21/2021 10:00 AM Alex Smart M.D. [facility]     No follow-up provider specified.    MEDICATIONS ON DISCHARGE     Medication List      START taking these medications      Instructions   acetaminophen 325 MG Tabs  Commonly known as: Tylenol   Take 2 Tablets by mouth every 6 hours as needed for Mild Pain or Moderate Pain.  Dose: 650 mg     metoprolol tartrate 25 MG Tabs  Commonly known as: LOPRESSOR   Take 0.5 Tablets by mouth 2 times a day.  Dose: 12.5 mg     ondansetron 4 MG Tbdp  Commonly known as: ZOFRAN ODT   Take 1 tablet by mouth every 6 hours as needed for Nausea.  Dose: 4 mg        CHANGE how you take these medications      Instructions   levothyroxine 175 MCG  Tabs  What changed:   · how much to take  · additional instructions  Commonly known as: SYNTHROID   Take 1 tablet by mouth every morning on an empty stomach for 102 days. Once a day, Sunday NIGHT take extra 1 Tablet- NEW RX  Dose: 175 mcg        CONTINUE taking these medications      Instructions   apixaban 5mg Tabs  Commonly known as: Eliquis   Take 1 tablet by mouth 2 times a day.  Dose: 5 mg     PreserVision AREDS Tabs   Take 1 tablet by mouth every day.  Dose: 1 tablet     vitamin D 1000 Unit (25 mcg) Tabs  Commonly known as: cholecalciferol   Take 1,000 Units by mouth every day.  Dose: 1,000 Units            Allergies  No Known Allergies    DIET  Orders Placed This Encounter   Procedures   • Diet Order Diet: Regular     Standing Status:   Standing     Number of Occurrences:   1     Order Specific Question:   Diet:     Answer:   Regular [1]       ACTIVITY  As tolerated.  Weight bearing as tolerated    CONSULTATIONS  None     PROCEDURES  None     LABORATORY  Lab Results   Component Value Date    SODIUM 137 06/23/2021    POTASSIUM 4.7 06/23/2021    CHLORIDE 107 06/23/2021    CO2 20 06/23/2021    GLUCOSE 91 06/23/2021    BUN 19 06/23/2021    CREATININE 1.17 06/23/2021        Lab Results   Component Value Date    WBC 5.2 06/22/2021    HEMOGLOBIN 13.6 (L) 06/22/2021    HEMATOCRIT 41.0 (L) 06/22/2021    PLATELETCT 136 (L) 06/22/2021      DX-CHEST-PORTABLE (1 VIEW)   Final Result      Bibasilar opacities could indicate atelectasis or aspiration.      Aortic ectasia and cardiac silhouette enlargement      Right hemidiaphragm elevation could be from eventration or diaphragmatic paralysis         Total time of the discharge process exceeds 45 minutes.

## 2021-06-24 NOTE — DISCHARGE INSTRUCTIONS
Discharge Instructions    Discharged to home/Assisted Living  by car with relative. Discharged via wheelchair, hospital escort: Yes.  Special equipment needed: own CPAP    Be sure to schedule a follow-up appointment with your primary care doctor or any specialists as instructed.     Discharge Plan:        I understand that a diet low in cholesterol, fat, and sodium is recommended for good health. Unless I have been given specific instructions below for another diet, I accept this instruction as my diet prescription.   Other diet: Regular as tolerated    Special Instructions:     Follow up with PCP, call for appointment.    · Is patient discharged on Warfarin / Coumadin?   No     Depression / Suicide Risk    As you are discharged from this [facility] facility, it is important to learn how to keep safe from harming yourself.    Recognize the warning signs:  · Abrupt changes in personality, positive or negative- including increase in energy   · Giving away possessions  · Change in eating patterns- significant weight changes-  positive or negative  · Change in sleeping patterns- unable to sleep or sleeping all the time   · Unwillingness or inability to communicate  · Depression  · Unusual sadness, discouragement and loneliness  · Talk of wanting to die  · Neglect of personal appearance   · Rebelliousness- reckless behavior  · Withdrawal from people/activities they love  · Confusion- inability to concentrate     If you or a loved one observes any of these behaviors or has concerns about self-harm, here's what you can do:  · Talk about it- your feelings and reasons for harming yourself  · Remove any means that you might use to hurt yourself (examples: pills, rope, extension cords, firearm)  · Get professional help from the community (Mental Health, Substance Abuse, psychological counseling)  · Do not be alone:Call your Safe Contact- someone whom you trust who will be there for you.  · Call your local CRISIS HOTLINE  516-6733 or 311-563-7652  · Call your local Children's Mobile Crisis Response Team [place] (845) 038-9863 or www.[place]  · Call the toll free National Suicide Prevention Hotlines   · National Suicide Prevention Lifeline 643-594-UNMW (7440)  · [employer] Line Network 800-SUICIDE (140-5116)        Acute Kidney Injury, Adult    Acute kidney injury is a sudden worsening of kidney function. The kidneys are organs that have several jobs. They filter the blood to remove waste products and extra fluid. They also maintain a healthy balance of minerals and hormones in the body, which helps control blood pressure and keep bones strong. With this condition, your kidneys do not do their jobs as well as they should.  This condition ranges from mild to severe. Over time it may develop into long-lasting (chronic) kidney disease. Early detection and treatment may prevent acute kidney injury from developing into a chronic condition.  What are the causes?  Common causes of this condition include:  · A problem with blood flow to the kidneys. This may be caused by:  ? Low blood pressure (hypotension) or shock.  ? Blood loss.  ? Heart and blood vessel (cardiovascular) disease.  ? Severe burns.  ? Liver disease.  · Direct damage to the kidneys. This may be caused by:  ? Certain medicines.  ? A kidney infection.  ? Poisoning.  ? Being around or in contact with toxic substances.  ? A surgical wound.  ? A hard, direct hit to the kidney area.  · A sudden blockage of urine flow. This may be caused by:  ? Cancer.  ? Kidney stones.  ? An enlarged prostate in males.  What are the signs or symptoms?  Symptoms of this condition may not be obvious until the condition becomes severe. Symptoms of this condition can include:  · Tiredness (lethargy), or difficulty staying awake.  · Nausea or vomiting.  · Swelling (edema) of the face, legs, ankles, or feet.  · Problems with urination, such as:  ? Abdominal pain, or pain along the side  of your stomach (flank).  ? Decreased urine production.  ? Decrease in the force of urine flow.  · Muscle twitches and cramps, especially in the legs.  · Confusion or trouble concentrating.  · Loss of appetite.  · Fever.  How is this diagnosed?  This condition may be diagnosed with tests, including:  · Blood tests.  · Urine tests.  · Imaging tests.  · A test in which a sample of tissue is removed from the kidneys to be examined under a microscope (kidney biopsy).  How is this treated?  Treatment for this condition depends on the cause and how severe the condition is. In mild cases, treatment may not be needed. The kidneys may heal on their own. In more severe cases, treatment will involve:  · Treating the cause of the kidney injury. This may involve changing any medicines you are taking or adjusting your dosage.  · Fluids. You may need specialized IV fluids to balance your body's needs.  · Having a catheter placed to drain urine and prevent blockages.  · Preventing problems from occurring. This may mean avoiding certain medicines or procedures that can cause further injury to the kidneys.  In some cases treatment may also require:  · A procedure to remove toxic wastes from the body (dialysis or continuous renal replacement therapy - CRRT).  · Surgery. This may be done to repair a torn kidney, or to remove the blockage from the urinary system.  Follow these instructions at home:  Medicines  · Take over-the-counter and prescription medicines only as told by your health care provider.  · Do not take any new medicines without your health care provider's approval. Many medicines can worsen your kidney damage.  · Do not take any vitamin and mineral supplements without your health care provider's approval. Many nutritional supplements can worsen your kidney damage.  Lifestyle  · If your health care provider prescribed changes to your diet, follow them. You may need to decrease the amount of protein you eat.  · Achieve and  maintain a healthy weight. If you need help with this, ask your health care provider.  · Start or continue an exercise plan. Try to exercise at least 30 minutes a day, 5 days a week.  · Do not use any tobacco products, such as cigarettes, chewing tobacco, and e-cigarettes. If you need help quitting, ask your health care provider.  General instructions  · Keep track of your blood pressure. Report changes in your blood pressure as told by your health care provider.  · Stay up to date with immunizations. Ask your health care provider which immunizations you need.  · Keep all follow-up visits as told by your health care provider. This is important.  Where to find more information  · American Association of Kidney Patients: www.aakp.org  · National Kidney Foundation: www.kidney.org  · American Kidney Fund: www.akfinc.org  · Life Options Rehabilitation Program:  ? www.lifeoptions.org  ? www.kidneyschool.org  Contact a health care provider if:  · Your symptoms get worse.  · You develop new symptoms.  Get help right away if:  · You develop symptoms of worsening kidney disease, which include:  ? Headaches.  ? Abnormally dark or light skin.  ? Easy bruising.  ? Frequent hiccups.  ? Chest pain.  ? Shortness of breath.  ? End of menstruation in women.  ? Seizures.  ? Confusion or altered mental status.  ? Abdominal or back pain.  ? Itchiness.  · You have a fever.  · Your body is producing less urine.  · You have pain or bleeding when you urinate.  Summary  · Acute kidney injury is a sudden worsening of kidney function.  · Acute kidney injury can be caused by problems with blood flow to the kidneys, direct damage to the kidneys, and sudden blockage of urine flow.  · Symptoms of this condition may not be obvious until it becomes severe. Symptoms may include edema, lethargy, confusion, nausea or vomiting, and problems passing urine.  · This condition can usually be diagnosed with blood tests, urine tests, and imaging tests.  Sometimes a kidney biopsy is done to diagnose this condition.  · Treatment for this condition often involves treating the underlying cause. It is treated with fluids, medicines, dialysis, diet changes, or surgery.  This information is not intended to replace advice given to you by your health care provider. Make sure you discuss any questions you have with your health care provider.  Document Released: 07/02/2012 Document Revised: 11/30/2018 Document Reviewed: 12/08/2017  [employer] Patient Education © 2020 [employer]

## 2021-06-30 ENCOUNTER — APPOINTMENT (OUTPATIENT)
Dept: RADIOLOGY | Facility: MEDICAL CENTER | Age: 86
DRG: 281 | End: 2021-06-30
Attending: EMERGENCY MEDICINE
Payer: MEDICARE

## 2021-06-30 ENCOUNTER — HOSPITAL ENCOUNTER (INPATIENT)
Facility: MEDICAL CENTER | Age: 86
LOS: 12 days | DRG: 281 | End: 2021-07-12
Attending: EMERGENCY MEDICINE | Admitting: INTERNAL MEDICINE
Payer: MEDICARE

## 2021-06-30 ENCOUNTER — OFFICE VISIT (OUTPATIENT)
Dept: MEDICAL GROUP | Age: 86
End: 2021-06-30
Payer: MEDICARE

## 2021-06-30 VITALS
BODY MASS INDEX: 26.66 KG/M2 | HEIGHT: 69 IN | TEMPERATURE: 99.1 F | OXYGEN SATURATION: 92 % | HEART RATE: 156 BPM | SYSTOLIC BLOOD PRESSURE: 126 MMHG | DIASTOLIC BLOOD PRESSURE: 80 MMHG | WEIGHT: 180 LBS

## 2021-06-30 DIAGNOSIS — R60.0 BILATERAL LEG EDEMA: ICD-10-CM

## 2021-06-30 DIAGNOSIS — R06.03 ACUTE RESPIRATORY DISTRESS: ICD-10-CM

## 2021-06-30 DIAGNOSIS — R79.89 ELEVATED TROPONIN: ICD-10-CM

## 2021-06-30 DIAGNOSIS — R60.0 LOWER EXTREMITY EDEMA: ICD-10-CM

## 2021-06-30 DIAGNOSIS — R60.9 PERIPHERAL EDEMA: ICD-10-CM

## 2021-06-30 DIAGNOSIS — N17.9 AKI (ACUTE KIDNEY INJURY) (HCC): ICD-10-CM

## 2021-06-30 DIAGNOSIS — Z79.01 ANTICOAGULATED: ICD-10-CM

## 2021-06-30 DIAGNOSIS — R53.1 GENERALIZED WEAKNESS: ICD-10-CM

## 2021-06-30 DIAGNOSIS — E55.9 VITAMIN D DEFICIENCY: ICD-10-CM

## 2021-06-30 DIAGNOSIS — E03.8 OTHER SPECIFIED HYPOTHYROIDISM: ICD-10-CM

## 2021-06-30 DIAGNOSIS — I48.91 ATRIAL FIBRILLATION WITH RVR (HCC): ICD-10-CM

## 2021-06-30 DIAGNOSIS — R06.02 SHORTNESS OF BREATH: ICD-10-CM

## 2021-06-30 DIAGNOSIS — Z09 HOSPITAL DISCHARGE FOLLOW-UP: ICD-10-CM

## 2021-06-30 DIAGNOSIS — I48.91 ATRIAL FIBRILLATION WITH RAPID VENTRICULAR RESPONSE (HCC): ICD-10-CM

## 2021-06-30 LAB
ALBUMIN SERPL BCP-MCNC: 3.7 G/DL (ref 3.2–4.9)
ALBUMIN/GLOB SERPL: 1.2 G/DL
ALP SERPL-CCNC: 84 U/L (ref 30–99)
ALT SERPL-CCNC: 18 U/L (ref 2–50)
ANION GAP SERPL CALC-SCNC: 14 MMOL/L (ref 7–16)
APPEARANCE UR: CLEAR
AST SERPL-CCNC: 25 U/L (ref 12–45)
BACTERIA #/AREA URNS HPF: ABNORMAL /HPF
BASOPHILS # BLD AUTO: 0.5 % (ref 0–1.8)
BASOPHILS # BLD: 0.04 K/UL (ref 0–0.12)
BILIRUB SERPL-MCNC: 0.8 MG/DL (ref 0.1–1.5)
BILIRUB UR QL STRIP.AUTO: NEGATIVE
BUN SERPL-MCNC: 27 MG/DL (ref 8–22)
CALCIUM SERPL-MCNC: 8.2 MG/DL (ref 8.4–10.2)
CHLORIDE SERPL-SCNC: 106 MMOL/L (ref 96–112)
CO2 SERPL-SCNC: 18 MMOL/L (ref 20–33)
COLOR UR: YELLOW
CREAT SERPL-MCNC: 1.54 MG/DL (ref 0.5–1.4)
EKG IMPRESSION: NORMAL
EOSINOPHIL # BLD AUTO: 0.04 K/UL (ref 0–0.51)
EOSINOPHIL NFR BLD: 0.5 % (ref 0–6.9)
EPI CELLS #/AREA URNS HPF: ABNORMAL /HPF
ERYTHROCYTE [DISTWIDTH] IN BLOOD BY AUTOMATED COUNT: 50.4 FL (ref 35.9–50)
GLOBULIN SER CALC-MCNC: 3.2 G/DL (ref 1.9–3.5)
GLUCOSE SERPL-MCNC: 128 MG/DL (ref 65–99)
GLUCOSE UR STRIP.AUTO-MCNC: NEGATIVE MG/DL
HCT VFR BLD AUTO: 40.8 % (ref 42–52)
HGB BLD-MCNC: 14.1 G/DL (ref 14–18)
IMM GRANULOCYTES # BLD AUTO: 0.05 K/UL (ref 0–0.11)
IMM GRANULOCYTES NFR BLD AUTO: 0.7 % (ref 0–0.9)
KETONES UR STRIP.AUTO-MCNC: ABNORMAL MG/DL
LEUKOCYTE ESTERASE UR QL STRIP.AUTO: NEGATIVE
LYMPHOCYTES # BLD AUTO: 1.05 K/UL (ref 1–4.8)
LYMPHOCYTES NFR BLD: 14.2 % (ref 22–41)
MCH RBC QN AUTO: 33 PG (ref 27–33)
MCHC RBC AUTO-ENTMCNC: 34.6 G/DL (ref 33.7–35.3)
MCV RBC AUTO: 95.6 FL (ref 81.4–97.8)
MICRO URNS: ABNORMAL
MONOCYTES # BLD AUTO: 0.92 K/UL (ref 0–0.85)
MONOCYTES NFR BLD AUTO: 12.4 % (ref 0–13.4)
MUCOUS THREADS #/AREA URNS HPF: ABNORMAL /HPF
NEUTROPHILS # BLD AUTO: 5.31 K/UL (ref 1.82–7.42)
NEUTROPHILS NFR BLD: 71.7 % (ref 44–72)
NITRITE UR QL STRIP.AUTO: NEGATIVE
NRBC # BLD AUTO: 0 K/UL
NRBC BLD-RTO: 0 /100 WBC
NT-PROBNP SERPL IA-MCNC: 6426 PG/ML (ref 0–125)
PH UR STRIP.AUTO: 5 [PH] (ref 5–8)
PLATELET # BLD AUTO: 242 K/UL (ref 164–446)
PMV BLD AUTO: 10 FL (ref 9–12.9)
POTASSIUM SERPL-SCNC: 4 MMOL/L (ref 3.6–5.5)
PROT SERPL-MCNC: 6.9 G/DL (ref 6–8.2)
PROT UR QL STRIP: 100 MG/DL
RBC # BLD AUTO: 4.27 M/UL (ref 4.7–6.1)
RBC # URNS HPF: ABNORMAL /HPF
RBC UR QL AUTO: NEGATIVE
SODIUM SERPL-SCNC: 138 MMOL/L (ref 135–145)
SP GR UR STRIP.AUTO: 1.02
TROPONIN T SERPL-MCNC: 52 NG/L (ref 6–19)
WBC # BLD AUTO: 7.4 K/UL (ref 4.8–10.8)
WBC #/AREA URNS HPF: ABNORMAL /HPF

## 2021-06-30 PROCEDURE — 84484 ASSAY OF TROPONIN QUANT: CPT

## 2021-06-30 PROCEDURE — 700111 HCHG RX REV CODE 636 W/ 250 OVERRIDE (IP): Performed by: EMERGENCY MEDICINE

## 2021-06-30 PROCEDURE — 99215 OFFICE O/P EST HI 40 MIN: CPT | Performed by: INTERNAL MEDICINE

## 2021-06-30 PROCEDURE — 99285 EMERGENCY DEPT VISIT HI MDM: CPT

## 2021-06-30 PROCEDURE — 96374 THER/PROPH/DIAG INJ IV PUSH: CPT

## 2021-06-30 PROCEDURE — 83880 ASSAY OF NATRIURETIC PEPTIDE: CPT

## 2021-06-30 PROCEDURE — A9270 NON-COVERED ITEM OR SERVICE: HCPCS | Performed by: INTERNAL MEDICINE

## 2021-06-30 PROCEDURE — 96375 TX/PRO/DX INJ NEW DRUG ADDON: CPT

## 2021-06-30 PROCEDURE — 700111 HCHG RX REV CODE 636 W/ 250 OVERRIDE (IP): Performed by: INTERNAL MEDICINE

## 2021-06-30 PROCEDURE — 93005 ELECTROCARDIOGRAM TRACING: CPT

## 2021-06-30 PROCEDURE — 700102 HCHG RX REV CODE 250 W/ 637 OVERRIDE(OP): Performed by: INTERNAL MEDICINE

## 2021-06-30 PROCEDURE — 71045 X-RAY EXAM CHEST 1 VIEW: CPT

## 2021-06-30 PROCEDURE — 700105 HCHG RX REV CODE 258: Performed by: HOSPITALIST

## 2021-06-30 PROCEDURE — 85025 COMPLETE CBC W/AUTO DIFF WBC: CPT

## 2021-06-30 PROCEDURE — 99223 1ST HOSP IP/OBS HIGH 75: CPT | Performed by: INTERNAL MEDICINE

## 2021-06-30 PROCEDURE — 80053 COMPREHEN METABOLIC PANEL: CPT

## 2021-06-30 PROCEDURE — 93005 ELECTROCARDIOGRAM TRACING: CPT | Performed by: EMERGENCY MEDICINE

## 2021-06-30 PROCEDURE — 81001 URINALYSIS AUTO W/SCOPE: CPT

## 2021-06-30 PROCEDURE — 94660 CPAP INITIATION&MGMT: CPT

## 2021-06-30 PROCEDURE — 700111 HCHG RX REV CODE 636 W/ 250 OVERRIDE (IP): Performed by: HOSPITALIST

## 2021-06-30 PROCEDURE — 770020 HCHG ROOM/CARE - TELE (206)

## 2021-06-30 RX ORDER — DILTIAZEM HYDROCHLORIDE 5 MG/ML
10 INJECTION INTRAVENOUS ONCE
Status: COMPLETED | OUTPATIENT
Start: 2021-06-30 | End: 2021-06-30

## 2021-06-30 RX ORDER — LEVOTHYROXINE SODIUM 175 UG/1
87.5-175 TABLET ORAL
COMMUNITY
End: 2022-02-01 | Stop reason: SDUPTHER

## 2021-06-30 RX ORDER — LEVOTHYROXINE SODIUM 175 UG/1
175 TABLET ORAL
Status: DISCONTINUED | OUTPATIENT
Start: 2021-07-01 | End: 2021-07-12 | Stop reason: HOSPADM

## 2021-06-30 RX ORDER — ONDANSETRON 4 MG/1
4 TABLET, ORALLY DISINTEGRATING ORAL EVERY 4 HOURS PRN
Status: DISCONTINUED | OUTPATIENT
Start: 2021-06-30 | End: 2021-07-12 | Stop reason: HOSPADM

## 2021-06-30 RX ORDER — DILTIAZEM HYDROCHLORIDE 5 MG/ML
10 INJECTION INTRAVENOUS ONCE
Status: DISCONTINUED | OUTPATIENT
Start: 2021-06-30 | End: 2021-06-30

## 2021-06-30 RX ORDER — FUROSEMIDE 10 MG/ML
40 INJECTION INTRAMUSCULAR; INTRAVENOUS ONCE
Status: COMPLETED | OUTPATIENT
Start: 2021-06-30 | End: 2021-06-30

## 2021-06-30 RX ORDER — ACETAMINOPHEN 325 MG/1
650 TABLET ORAL EVERY 6 HOURS PRN
Status: DISCONTINUED | OUTPATIENT
Start: 2021-06-30 | End: 2021-06-30

## 2021-06-30 RX ORDER — ONDANSETRON 2 MG/ML
4 INJECTION INTRAMUSCULAR; INTRAVENOUS EVERY 4 HOURS PRN
Status: DISCONTINUED | OUTPATIENT
Start: 2021-06-30 | End: 2021-07-12 | Stop reason: HOSPADM

## 2021-06-30 RX ORDER — POLYETHYLENE GLYCOL 3350 17 G/17G
1 POWDER, FOR SOLUTION ORAL
Status: DISCONTINUED | OUTPATIENT
Start: 2021-06-30 | End: 2021-07-12 | Stop reason: HOSPADM

## 2021-06-30 RX ORDER — BISACODYL 10 MG
10 SUPPOSITORY, RECTAL RECTAL
Status: DISCONTINUED | OUTPATIENT
Start: 2021-06-30 | End: 2021-07-12 | Stop reason: HOSPADM

## 2021-06-30 RX ORDER — AMOXICILLIN 250 MG
2 CAPSULE ORAL 2 TIMES DAILY
Status: DISCONTINUED | OUTPATIENT
Start: 2021-06-30 | End: 2021-07-12 | Stop reason: HOSPADM

## 2021-06-30 RX ADMIN — DILTIAZEM HYDROCHLORIDE 10 MG: 5 INJECTION INTRAVENOUS at 12:28

## 2021-06-30 RX ADMIN — METOPROLOL TARTRATE 12.5 MG: 25 TABLET, FILM COATED ORAL at 14:58

## 2021-06-30 RX ADMIN — METOPROLOL TARTRATE 25 MG: 25 TABLET, FILM COATED ORAL at 22:19

## 2021-06-30 RX ADMIN — APIXABAN 2.5 MG: 2.5 TABLET, FILM COATED ORAL at 17:16

## 2021-06-30 RX ADMIN — FUROSEMIDE 40 MG: 10 INJECTION, SOLUTION INTRAMUSCULAR; INTRAVENOUS at 14:58

## 2021-06-30 RX ADMIN — DILTIAZEM HYDROCHLORIDE 5 MG/HR: 5 INJECTION INTRAVENOUS at 22:15

## 2021-06-30 ASSESSMENT — CHA2DS2 SCORE
CHF OR LEFT VENTRICULAR DYSFUNCTION: NO
VASCULAR DISEASE: NO
AGE 65 TO 74: NO
DIABETES: NO
HYPERTENSION: NO
SEX: MALE
PRIOR STROKE OR TIA OR THROMBOEMBOLISM: NO
AGE 75 OR GREATER: YES
CHA2DS2 VASC SCORE: 2

## 2021-06-30 ASSESSMENT — ENCOUNTER SYMPTOMS
NEUROLOGICAL NEGATIVE: 1
EYE PAIN: 0
LOSS OF CONSCIOUSNESS: 0
PSYCHIATRIC NEGATIVE: 1
VOMITING: 0
PALPITATIONS: 0
EYES NEGATIVE: 1
SHORTNESS OF BREATH: 1
SHORTNESS OF BREATH: 1
DIZZINESS: 0
FEVER: 0
NAUSEA: 1
NAUSEA: 0
ORTHOPNEA: 0
CHILLS: 0
NAUSEA: 1
DEPRESSION: 0
DIARRHEA: 0
MUSCULOSKELETAL NEGATIVE: 1
HEADACHES: 0
COUGH: 0
BLURRED VISION: 0
MYALGIAS: 0
ABDOMINAL PAIN: 0
SHORTNESS OF BREATH: 0
SORE THROAT: 0

## 2021-06-30 ASSESSMENT — LIFESTYLE VARIABLES
TOTAL SCORE: 0
TOTAL SCORE: 0
ALCOHOL_USE: NO
HAVE YOU EVER FELT YOU SHOULD CUT DOWN ON YOUR DRINKING: NO
EVER HAD A DRINK FIRST THING IN THE MORNING TO STEADY YOUR NERVES TO GET RID OF A HANGOVER: NO
AVERAGE NUMBER OF DAYS PER WEEK YOU HAVE A DRINK CONTAINING ALCOHOL: 0
TOTAL SCORE: 0
HAVE PEOPLE ANNOYED YOU BY CRITICIZING YOUR DRINKING: NO
EVER HAD A DRINK FIRST THING IN THE MORNING TO STEADY YOUR NERVES TO GET RID OF A HANGOVER: NO
EVER FELT BAD OR GUILTY ABOUT YOUR DRINKING: NO
CONSUMPTION TOTAL: NEGATIVE
TOTAL SCORE: 0
HAVE YOU EVER FELT YOU SHOULD CUT DOWN ON YOUR DRINKING: NO
DO YOU DRINK ALCOHOL: NO
ON A TYPICAL DAY WHEN YOU DRINK ALCOHOL HOW MANY DRINKS DO YOU HAVE: 0
TOTAL SCORE: 0
HAVE PEOPLE ANNOYED YOU BY CRITICIZING YOUR DRINKING: NO
HOW MANY TIMES IN THE PAST YEAR HAVE YOU HAD 5 OR MORE DRINKS IN A DAY: 0
TOTAL SCORE: 0
EVER FELT BAD OR GUILTY ABOUT YOUR DRINKING: NO
CONSUMPTION TOTAL: INCOMPLETE

## 2021-06-30 ASSESSMENT — COGNITIVE AND FUNCTIONAL STATUS - GENERAL
TURNING FROM BACK TO SIDE WHILE IN FLAT BAD: A LITTLE
STANDING UP FROM CHAIR USING ARMS: A LOT
SUGGESTED CMS G CODE MODIFIER DAILY ACTIVITY: CK
MOBILITY SCORE: 15
MOVING TO AND FROM BED TO CHAIR: A LITTLE
DAILY ACTIVITIY SCORE: 15
DRESSING REGULAR LOWER BODY CLOTHING: A LOT
PERSONAL GROOMING: A LOT
MOVING FROM LYING ON BACK TO SITTING ON SIDE OF FLAT BED: A LITTLE
SUGGESTED CMS G CODE MODIFIER MOBILITY: CK
WALKING IN HOSPITAL ROOM: A LOT
DRESSING REGULAR UPPER BODY CLOTHING: A LITTLE
HELP NEEDED FOR BATHING: A LOT
CLIMB 3 TO 5 STEPS WITH RAILING: A LOT
TOILETING: A LOT

## 2021-06-30 ASSESSMENT — FIBROSIS 4 INDEX
FIB4 SCORE: 2.31
FIB4 SCORE: 4.52
FIB4 SCORE: 4.52

## 2021-06-30 ASSESSMENT — PAIN DESCRIPTION - PAIN TYPE: TYPE: ACUTE PAIN;CHRONIC PAIN

## 2021-06-30 NOTE — ED TRIAGE NOTES
"Chief Complaint   Patient presents with   • Atrial Fibrillation   Arrives from PCP office with a new onset of a fib with RVR per [facility]. Pt denies any issues or pain, he reports this \"happens all the time.\" Alert  "

## 2021-06-30 NOTE — PROGRESS NOTES
"Subjective:      Guru Pena is a 89 y.o. male who presents with Hospital Follow-up (6/21/21- dehydration, diarrhea, a.fib controlled VR), Shortness of Breath, Fatigue, Leg Swelling, and Anorexia  Patient presents for 1 week hospital discharge follow-up visit after 2-day stay for acute dehydration and diarrhea and generalized weakness difficulty ambulating.  After rehydration patient is feeling much better discharged back to ER physicians assisted living arrangement (but without the need for assistance with activities of daily living such as dressing bathing and feeding).     However since discharge the patient has gotten progressively weaker with increasing shortness of breath, leg swelling, progressive weakness and loss of appetite.  Denies any fever, chills, chest pain, PND orthopnea cough, confusion dysuria frequency, diarrhea, bleeding or abdominal pain.  He states that upon discharge a week ago he.  His son confirms this.  Review of the record shows he had no leg edema according to ER physician's physical exam.    Past medical history significant for  longstanding atrial fibrillation on anticoagulation with Eliquis, hypothyroidism, and vitamin D deficiency.    Medications-Eliquis, levothyroxine vitamin D and metoprolol.    Allergies-none              HPI    Review of Systems   Constitutional: Positive for malaise/fatigue.   HENT: Negative.    Eyes: Negative.    Respiratory: Positive for shortness of breath.    Cardiovascular: Positive for leg swelling.   Gastrointestinal: Positive for nausea.   Genitourinary: Negative.    Musculoskeletal: Negative.    Skin: Negative.    Neurological: Negative.    Endo/Heme/Allergies: Negative.    Psychiatric/Behavioral: Negative.           Objective:     /80 (BP Location: Left arm, Patient Position: Sitting, BP Cuff Size: Adult)   Pulse (!) 156   Temp 37.3 °C (99.1 °F) (Temporal)   Ht 1.753 m (5' 9\")   Wt 81.6 kg (180 lb)   SpO2 92%   BMI 26.58 kg/m²  "     Physical Exam  Constitutional:       General: He is not in acute distress.     Appearance: He is well-developed. He is not diaphoretic.   HENT:      Head: Normocephalic and atraumatic.      Right Ear: External ear normal.      Left Ear: External ear normal.      Nose: Nose normal.      Mouth/Throat:      Pharynx: No oropharyngeal exudate.   Eyes:      General: No scleral icterus.        Right eye: No discharge.         Left eye: No discharge.      Conjunctiva/sclera: Conjunctivae normal.      Pupils: Pupils are equal, round, and reactive to light.   Neck:      Thyroid: No thyromegaly.      Vascular: No JVD.      Trachea: No tracheal deviation.   Cardiovascular:      Rate and Rhythm: Tachycardia present. Rhythm irregular.      Heart sounds: Normal heart sounds. No murmur heard.   No friction rub. No gallop.       Comments: Neck veins appear distended  Pulmonary:      Effort: Pulmonary effort is normal. No respiratory distress.      Breath sounds: No stridor. Wheezing present. No rales.      Comments: I can hear an occasional faint wheeze on forced expiration cannot detect any rales or rhonchi.  However breath sounds are markedly diminished.  Chest:      Chest wall: No tenderness.   Abdominal:      General: Bowel sounds are normal. There is no distension.      Palpations: Abdomen is soft. There is no mass.      Tenderness: There is no abdominal tenderness. There is no guarding or rebound.   Musculoskeletal:         General: Swelling present. No tenderness. Normal range of motion.      Cervical back: Normal range of motion and neck supple.      Comments: 4+ bilateral pretibial edema and ankle edema.  No calf tenderness   Lymphadenopathy:      Cervical: No cervical adenopathy.   Skin:     General: Skin is warm and dry.      Coloration: Skin is not pale.      Findings: No erythema or rash.   Neurological:      Mental Status: He is alert and oriented to person, place, and time.      Motor: No abnormal muscle tone.       Coordination: Coordination normal.      Deep Tendon Reflexes: Reflexes are normal and symmetric. Reflexes normal.   Psychiatric:         Behavior: Behavior normal.         Thought Content: Thought content normal.         Judgment: Judgment normal.       EKG shows rapid atrial fibrillation at about 150.  No ST segment changes of elevation or depression to suggest ischemia.                  Assessment/Plan:        1. Hospital discharge follow-up- 6/21/21- dehydration, diarrhea, a.fib controlled VR    patient now appears to be in heart failure with volume overload.  As well as rapid atrial fibrillation.  Patient does not appear stable enough to be transported by car and therefore therefore will be transported by ambulance to the ER.   Spoke with Dr. Zraate at [facility] emergency room who agrees to accept the patient in transfer.       2. Acute respiratory distress-see above.  Suspect acute decompensation of heart failure.  Unclear etiology.  Needs higher level care.  Sent to ER.  No prior echocardiogram available for review.       3. Atrial fibrillation with rapid ventricular response (HCC)  As above    4. Bilateral leg edema      As above  5. Anticoagulated- Eliquis for a. fib  As above.  No bleeding.    6. Other specified hypothyroidism-most recent TSH on 6/11/2021 was elevated at 14.  Needs follow-up.

## 2021-06-30 NOTE — ASSESSMENT & PLAN NOTE
Chronic afib managed with eliquis and Lopressor 12.5 BID at home  Patient presented from PCP with rate in the 140's which improved with cardizem  Transitioned to carvedilol for better rate control  Dose adjusted for intermittent bradycardia   Currently heart rate in good range

## 2021-06-30 NOTE — H&P
Hospital Medicine History & Physical Note    Date of Service  6/30/2021    Primary Care Physician  Alex Smart M.D.    Consultants  N/A    Code Status  Full Code    Chief Complaint  Chief Complaint   Patient presents with   • Atrial Fibrillation       History of Presenting Illness  89 y.o. male who presented 6/30/2021 with Afib RVR. Medical history is significant for Afib (on Eliquis), hypothyroidism. Recent history significant for hospitalization 06/22-06/23/2021 for dehydration secondary to diarrhea. Patient was at outpatient follow up visit today with PCP and was found to be in Afib RVR. Patient additionally endorses fatigue, mild shortness of breath and increased lower extremity swelling. He denies chest pain. Reports compliance with   Upon presentation to the ED. Presenting vitals: /93, , RR 16, SpO2 92% on room air. Initial laboratory evaluation demonstrated WBC 7.4, Hb 14.1, Cr 1.54, troponin 52, BNP 6426. CXR demonstrated no acute process. Patient received Cardizem 10mg IV with improvement in HR from 140 to 100 to 120.     Review of Systems  Review of Systems   Constitutional: Positive for malaise/fatigue. Negative for chills and fever.   HENT: Negative for congestion and sore throat.    Eyes: Negative for blurred vision and pain.   Respiratory: Negative for cough and shortness of breath.    Cardiovascular: Positive for leg swelling. Negative for chest pain, palpitations and orthopnea.   Gastrointestinal: Negative for abdominal pain, nausea and vomiting.   Genitourinary: Negative for dysuria and frequency.   Musculoskeletal: Negative for myalgias.   Neurological: Negative for dizziness and loss of consciousness.   Psychiatric/Behavioral: Negative for depression.       Past Medical History   has a past medical history of A-fib (HCC) and Hypothyroid.    Surgical History   has no past surgical history on file.     Family History  family history is not on file.     Social History   reports that  he has never smoked. He has never used smokeless tobacco. He reports that he does not drink alcohol and does not use drugs.    Allergies  No Known Allergies    Medications  Prior to Admission Medications   Prescriptions Last Dose Informant Patient Reported? Taking?   Multiple Vitamins-Minerals (PRESERVISION AREDS) Tab  Patient Yes No   Sig: Take 1 tablet by mouth every day.   acetaminophen (TYLENOL) 325 MG Tab   Yes No   Sig: Take 2 Tablets by mouth every 6 hours as needed for Mild Pain or Moderate Pain.   Patient not taking: Reported on 6/30/2021   apixaban (ELIQUIS) 5mg Tab  Patient No No   Sig: Take 1 tablet by mouth 2 times a day.   levothyroxine (SYNTHROID) 175 MCG Tab  Patient No No   Sig: Take 1 tablet by mouth every morning on an empty stomach for 102 days. Once a day, Sunday NIGHT take extra 1 Tablet- NEW RX   Patient taking differently: Take 175-300 mcg by mouth every morning on an empty stomach. Once a day, Sunday NIGHT take extra 1 Tablet- NEW RX  175 mcg on Monday, Tuesday, Wednesday, Thursday , Friday, Saturday  300 mcg on Sunday   metoprolol tartrate (LOPRESSOR) 25 MG Tab   No No   Sig: Take 0.5 Tablets by mouth 2 times a day.   ondansetron (ZOFRAN ODT) 4 MG TABLET DISPERSIBLE   No No   Sig: Take 1 tablet by mouth every 6 hours as needed for Nausea.   vitamin D (CHOLECALCIFEROL) 1000 Unit (25 mcg) Tab  Patient Yes No   Sig: Take 1,000 Units by mouth every day.      Facility-Administered Medications: None       Physical Exam  Temp:  [36.6 °C (97.8 °F)] 36.6 °C (97.8 °F)  Pulse:  [] 61  Resp:  [16] 16  BP: (102-129)/(75-98) 129/98  SpO2:  [92 %-94 %] 92 %    Physical Exam  Constitutional:       General: He is not in acute distress.     Appearance: He is not toxic-appearing.      Comments: Pleasant, conversational   HENT:      Head: Normocephalic.      Right Ear: External ear normal.      Left Ear: External ear normal.      Nose: Nose normal. No congestion.      Mouth/Throat:      Mouth: Mucous  membranes are moist.      Pharynx: No oropharyngeal exudate.   Eyes:      General: No scleral icterus.     Pupils: Pupils are equal, round, and reactive to light.   Cardiovascular:      Rate and Rhythm: Tachycardia present. Rhythm irregular.      Heart sounds: No murmur heard.     Pulmonary:      Effort: No respiratory distress.      Breath sounds: No wheezing.      Comments: Faint crackles auscultated in left lower lung base  Abdominal:      Tenderness: There is no abdominal tenderness. There is no guarding or rebound.   Musculoskeletal:         General: Swelling present. No deformity.      Right lower leg: Edema present.      Left lower leg: Edema present.   Skin:     Coloration: Skin is not jaundiced.      Findings: No bruising.   Neurological:      General: No focal deficit present.      Mental Status: He is alert and oriented to person, place, and time.      Cranial Nerves: No cranial nerve deficit.   Psychiatric:         Mood and Affect: Mood normal.         Behavior: Behavior normal.         Thought Content: Thought content normal.         Judgment: Judgment normal.         Laboratory:  Recent Labs     06/30/21  1204   WBC 7.4   RBC 4.27*   HEMOGLOBIN 14.1   HEMATOCRIT 40.8*   MCV 95.6   MCH 33.0   MCHC 34.6   RDW 50.4*   PLATELETCT 242   MPV 10.0     Recent Labs     06/30/21  1204   SODIUM 138   POTASSIUM 4.0   CHLORIDE 106   CO2 18*   GLUCOSE 128*   BUN 27*   CREATININE 1.54*   CALCIUM 8.2*     Recent Labs     06/30/21  1204   ALTSGPT 18   ASTSGOT 25   ALKPHOSPHAT 84   TBILIRUBIN 0.8   GLUCOSE 128*         Recent Labs     06/30/21  1204   NTPROBNP 6426*         Recent Labs     06/30/21  1204   TROPONINT 52*       Imaging:  DX-CHEST-PORTABLE (1 VIEW)   Final Result      1.  No significant change since 6/21/2021      2.  Interstitial densities which are likely fibrosis      3.  Enlarged cardiac silhouette      EC-ECHOCARDIOGRAM COMPLETE W/O CONT    (Results Pending)         Assessment/Plan:  I anticipate this  patient will require at least two midnights for appropriate medical management, necessitating inpatient admission.    * Atrial fibrillation with RVR (HCC)  Assessment & Plan  Chronic afib managed with eliquis and Lopressor 12.5 BID  Patient presents from PCP with rate in the 140's which improved with cardizem  Patient appears hypervolemic which may be contributing to uncontrolled rate  Lasix 40mg IV x1  2D echo  Admit to tele  Increase lopressor to TID    ERIN (acute kidney injury) ([facility])  Assessment & Plan  Cr 1.5 with baseline around 1.1  Monitor response to Lasix    Elevated troponin  Assessment & Plan  Denies chest pain  Troponin 52  Suspect type 2 mechanism secondary to increased demand in setting of volume overload and afib RVR    Lower extremity edema  Assessment & Plan  Elevated BNP 6426  Volume overload possibly contributing to afib  Lasix 40mg IV x1  Monitor response  I&O  2D echo    Anticoagulated- Eliquis for a. fib- (present on admission)  Assessment & Plan  Continue eliquis    Other specified hypothyroidism- (present on admission)  Assessment & Plan  Continue synthroid    DVT Prophylaxis: Eliquis

## 2021-06-30 NOTE — ASSESSMENT & PLAN NOTE
Elevated BNP 6426  Monitor response  Lasix DC'ed with improved volume status  ECHO with EF of 60% without diastolic dysfunction noted

## 2021-06-30 NOTE — ASSESSMENT & PLAN NOTE
Continues to deny chest pain  Troponin 52 - minimally elevated in stable range  Suspect type 2 mechanism secondary to increased demand in setting of volume overload and afib RVR

## 2021-06-30 NOTE — ED NOTES
Med rec updated and complete on 6/21/2021  Allergies reviewed  Interviewed pt with family at bedside with permission from pt.  Asked pt last time he took his medications. Pt does not remember taking any medications today   Pt reports that he has been taking his LEVOTHYROXINE 175MG half tablet on Sunday not a 2 tablets.  Pt reports no antibiotics in the last 30 days.      No current facility-administered medications on file prior to encounter.     Current Outpatient Medications on File Prior to Encounter   Medication Sig Dispense Refill   • levothyroxine (SYNTHROID) 175 MCG Tab Take 87.5-175 mcg by mouth every morning on an empty stomach. Once a day, Sunday NIGHT take extra 1 Tablet- NEW RX Per pt has not been taking 2 tablets  175 mcg on Monday, Tuesday, Wednesday, Thursday , Friday, Saturday  87.5 mcg on Sunday     • metoprolol tartrate (LOPRESSOR) 25 MG Tab Take 0.5 Tablets by mouth 2 times a day. 30 tablet 0   • ondansetron (ZOFRAN ODT) 4 MG TABLET DISPERSIBLE Take 1 tablet by mouth every 6 hours as needed for Nausea. 20 tablet 0   • apixaban (ELIQUIS) 5mg Tab Take 1 tablet by mouth 2 times a day. 180 tablet 4   • vitamin D (CHOLECALCIFEROL) 1000 Unit (25 mcg) Tab Take 1,000 Units by mouth every day.     • Multiple Vitamins-Minerals (PRESERVISION AREDS) Tab Take 1 tablet by mouth 2 times a day.

## 2021-06-30 NOTE — ED PROVIDER NOTES
ED Provider Note    ED Provider Note    Primary care provider: Alex Smart M.D.  Means of arrival: EMS  History obtained from: patient  History limited by: None    CHIEF COMPLAINT  Chief Complaint   Patient presents with   • Atrial Fibrillation       HPI 2:00  Guru Pena is a 89 y.o. male who presents to the Emergency Department via EMS from his PCPs office.  He presented there in follow-up from his recent hospitalization.  He has a long history of atrial fibrillation, despite triage note, that indicates its new in onset.  He is already anticoagulated on Eliquis.  Patient reports that since his discharge from the hospital, he has had difficulty eating, often if he drinks too fast it makes him nauseated.  His p.o. intake has been decreased.  His urine output has been decreased and he has felt more weak, tired and short of breath.  He does not sense that his heart rate is fast. He does state that he often has pain to his ankles but he has not noticed the swelling.  No family members at the bedside.  He denies fever or GI symptoms.  No recent trauma or falls.  No chest pain.      REVIEW OF SYSTEMS  Review of Systems   Constitutional: Negative for fever.   HENT: Negative for congestion.    Respiratory: Positive for shortness of breath.    Cardiovascular: Positive for leg swelling. Negative for chest pain.   Gastrointestinal: Positive for nausea. Negative for abdominal pain, diarrhea and vomiting.   Musculoskeletal: Negative for myalgias.   Neurological: Negative for headaches.       PAST MEDICAL HISTORY   has a past medical history of A-fib (HCC) and Hypothyroid.    SURGICAL HISTORY  patient denies any surgical history    SOCIAL HISTORY  Social History     Tobacco Use   • Smoking status: Never Smoker   • Smokeless tobacco: Never Used   Vaping Use   • Vaping Use: Never used   Substance Use Topics   • Alcohol use: Never   • Drug use: Never      Social History     Substance and Sexual Activity   Drug Use  "Never       FAMILY HISTORY  No family history on file.    CURRENT MEDICATIONS  Home Medications    **Home medications have not yet been reviewed for this encounter**         ALLERGIES  No Known Allergies    PHYSICAL EXAM  VITAL SIGNS: /98   Pulse 61   Temp 36.6 °C (97.8 °F) (Temporal)   Resp 16   Ht 1.854 m (6' 1\")   Wt 81 kg (178 lb 9.2 oz)   SpO2 92%   BMI 23.56 kg/m²   Vitals reviewed.  Constitutional: Patient is oriented to person, place, and time. Appears well-developed and well-nourished. Mild distress.    Head: Normocephalic and atraumatic.   Ears: Normal external ears bilaterally.   Mouth/Throat: Oropharynx is clear and moist, no exudates.   Eyes: Conjunctivae are normal. Pupils are equal and round   Neck: Normal range of motion. Neck supple.  Cardiovascular: Normal rate, regular rhythm and normal heart sounds. Normal peripheral pulses.  Pulmonary/Chest: Effort normal and breath sounds normal. No respiratory distress, no wheezes. Coarse breath sounds at bases.   Abdominal: Soft. Bowel sounds are normal. There is no tenderness. No rebound or guarding, or peritoneal signs. No CVA tenderness.  Musculoskeletal: bilateral pitting edema. No tenderness  Lymphadenopathy: No cervical adenopathy.   Neurological: No focal deficits. CN 2-12 intact. Gait not tested in ED.  Skin: Skin is warm and dry. No erythema. No pallor.   Psychiatric: Patient has a normal mood and affect.     LABS  Results for orders placed or performed during the hospital encounter of 06/30/21   CBC w/ Differential   Result Value Ref Range    WBC 7.4 4.8 - 10.8 K/uL    RBC 4.27 (L) 4.70 - 6.10 M/uL    Hemoglobin 14.1 14.0 - 18.0 g/dL    Hematocrit 40.8 (L) 42.0 - 52.0 %    MCV 95.6 81.4 - 97.8 fL    MCH 33.0 27.0 - 33.0 pg    MCHC 34.6 33.7 - 35.3 g/dL    RDW 50.4 (H) 35.9 - 50.0 fL    Platelet Count 242 164 - 446 K/uL    MPV 10.0 9.0 - 12.9 fL    Neutrophils-Polys 71.70 44.00 - 72.00 %    Lymphocytes 14.20 (L) 22.00 - 41.00 %    " Monocytes 12.40 0.00 - 13.40 %    Eosinophils 0.50 0.00 - 6.90 %    Basophils 0.50 0.00 - 1.80 %    Immature Granulocytes 0.70 0.00 - 0.90 %    Nucleated RBC 0.00 /100 WBC    Neutrophils (Absolute) 5.31 1.82 - 7.42 K/uL    Lymphs (Absolute) 1.05 1.00 - 4.80 K/uL    Monos (Absolute) 0.92 (H) 0.00 - 0.85 K/uL    Eos (Absolute) 0.04 0.00 - 0.51 K/uL    Baso (Absolute) 0.04 0.00 - 0.12 K/uL    Immature Granulocytes (abs) 0.05 0.00 - 0.11 K/uL    NRBC (Absolute) 0.00 K/uL   Complete Metabolic Panel (CMP)   Result Value Ref Range    Sodium 138 135 - 145 mmol/L    Potassium 4.0 3.6 - 5.5 mmol/L    Chloride 106 96 - 112 mmol/L    Co2 18 (L) 20 - 33 mmol/L    Anion Gap 14.0 7.0 - 16.0    Glucose 128 (H) 65 - 99 mg/dL    Bun 27 (H) 8 - 22 mg/dL    Creatinine 1.54 (H) 0.50 - 1.40 mg/dL    Calcium 8.2 (L) 8.4 - 10.2 mg/dL    AST(SGOT) 25 12 - 45 U/L    ALT(SGPT) 18 2 - 50 U/L    Alkaline Phosphatase 84 30 - 99 U/L    Total Bilirubin 0.8 0.1 - 1.5 mg/dL    Albumin 3.7 3.2 - 4.9 g/dL    Total Protein 6.9 6.0 - 8.2 g/dL    Globulin 3.2 1.9 - 3.5 g/dL    A-G Ratio 1.2 g/dL   Troponin STAT   Result Value Ref Range    Troponin T 52 (H) 6 - 19 ng/L   proBrain Natriuretic Peptide, NT   Result Value Ref Range    NT-proBNP 6426 (H) 0 - 125 pg/mL   ESTIMATED GFR   Result Value Ref Range    GFR If  51 (A) >60 mL/min/1.73 m 2    GFR If Non  42 (A) >60 mL/min/1.73 m 2   URINALYSIS    Specimen: Urine, Clean Catch   Result Value Ref Range    Color Yellow     Character Clear     Specific Gravity 1.025 <1.035    Ph 5.0 5.0 - 8.0    Glucose Negative Negative mg/dL    Ketones Trace (A) Negative mg/dL    Protein 100 (A) Negative mg/dL    Bilirubin Negative Negative    Nitrite Negative Negative    Leukocyte Esterase Negative Negative    Occult Blood Negative Negative    Micro Urine Req Microscopic    URINE MICROSCOPIC (W/UA)   Result Value Ref Range    WBC Rare (A) /hpf    RBC Rare /hpf    Bacteria Rare (A) None /hpf     Epithelial Cells Rare Few /hpf    Mucous Threads Few /hpf   EKG   Result Value Ref Range    Report       [facility] Emergency Dept.    Test Date:  2021  Pt Name:    JANICE HARRISON                  Department: EDSM  MRN:        [id number]                      [facility]  Gender:     Male                         Technician: HRR  :        [id number]                   Requested By:ER TRIAGE PROTOCOL  Order #:    [id number]                    Reading MD: VELASQUEZ WALLER DO    Measurements  Intervals                                Axis  Rate:       139                          P:  HI:                                      QRS:        -84  QRSD:       160                          T:          38  QT:         360  QTc:        548    Interpretive Statements  ATRIAL FIBRILLATION, V-RATE    VENTRICULAR BIGEMINY  RBBB AND LAFB  Compared to ECG 2021 18:31:01  Ventricular premature complex(es) now present  Electronically Signed On 2021 12:06:06 PDT by VELASQUEZ WALLER DO         All labs reviewed by me.    EKG Interpretation  Interpreted by me    RADIOLOGY  DX-CHEST-PORTABLE (1 VIEW)   Final Result      1.  No significant change since 2021      2.  Interstitial densities which are likely fibrosis      3.  Enlarged cardiac silhouette        The radiologist's interpretation of all radiological studies have been reviewed by me.    COURSE & MEDICAL DECISION MAKING  Pertinent Labs & Imaging studies reviewed. (See chart for details)    Obtained and reviewed past medical records.  Patient saw his primary care doctor today in follow-up.  Noted to have a history of hospitalization for dehydration, diarrhea and atrial fibrillation.  Patient complains of shortness of breath, fatigue and lower extremity swelling.  His PCP notes that the patient has been progressively more weak over the course of his time since discharge.  No orthopnea or chest pain.  Apparently, patient was not  experiencing any lower extremity edema in the ER.  He has a longstanding history of AF and is on Eliquis for anticoagulation.  Typically, his rate is controlled.  Prior to that, patient was admitted to the hospital I have reviewed the discharge summary.  He was admitted on June 21, discharged on June 23.  He was admitted with generalized weakness and diarrhea.  Indeed, as PCP noted, there was no prior documentation of lower extremity edema.    12:05 PM - Patient seen and examined at bedside.  This is a very pleasant, awake, alert, 89-year-old male.  No family available for further questioning.  He presents via EMS from his PCPs office.  There is concern for AF with rapid rate noted there and it continues here in the ED.  Have ordered Cardizem for rate control.  He does have lower extremity edema.  He did not denies any chest pain or fever.  He has not had further diarrhea.  No GI symptoms.      12:52 PM data reviewed.  White blood cell count is normal 7.4.  H&H 14 and 40.  There is no neutrophilic shift.  Chemistry significant for a low bicarbonate of 18, previously 20 and 21 on his prior admission.  Glucose elevated at 128.  Creatinine elevated 1.54, it has been elevated 1.64 on June 21 but improved to 1.1 on June 23, prior to recent discharge.  LFTs were normal.  Troponin was elevated 52.  Await BNP.    BNP resulted greater than 6400.  Urinalysis is unrevealing.    1:58 PM discussed with the hospitalist, Dr. Nogueira, who agrees to admit the patient to their service.  Awaiting urine analysis which she is aware of.  He is also made aware of the other pertinent elements of this case.  An elevated troponin, lower extremity edema with an elevated BNP without history of CHF.  We discussed treatment with Cardizem and his reaction to it.  I hesitate to give an additional rate controlling medication because his heart rate will drop down into the 60s and ranges up to the 120s.  His son Casimiro is at the bedside and gives  additional information that really echoes the patient's complaints.  Both are in agreement with plan for admission to the hospital for ongoing care.    2:02PM Dr. Nogueira in the department to evaluate the patient.    Patient is admitted in stable but guarded condition.    FINAL IMPRESSION  1. Atrial fibrillation with RVR (HCC)    2. Peripheral edema    3. Generalized weakness    4. Shortness of breath

## 2021-07-01 ENCOUNTER — APPOINTMENT (OUTPATIENT)
Dept: RADIOLOGY | Facility: MEDICAL CENTER | Age: 86
DRG: 281 | End: 2021-07-01
Attending: INTERNAL MEDICINE
Payer: MEDICARE

## 2021-07-01 ENCOUNTER — APPOINTMENT (OUTPATIENT)
Dept: CARDIOLOGY | Facility: MEDICAL CENTER | Age: 86
DRG: 281 | End: 2021-07-01
Attending: INTERNAL MEDICINE
Payer: MEDICARE

## 2021-07-01 LAB
ALBUMIN SERPL BCP-MCNC: 3.4 G/DL (ref 3.2–4.9)
BASOPHILS # BLD AUTO: 0.3 % (ref 0–1.8)
BASOPHILS # BLD: 0.02 K/UL (ref 0–0.12)
BUN SERPL-MCNC: 28 MG/DL (ref 8–22)
CALCIUM SERPL-MCNC: 8.6 MG/DL (ref 8.4–10.2)
CHLORIDE SERPL-SCNC: 104 MMOL/L (ref 96–112)
CO2 SERPL-SCNC: 22 MMOL/L (ref 20–33)
CREAT SERPL-MCNC: 1.68 MG/DL (ref 0.5–1.4)
EOSINOPHIL # BLD AUTO: 0.04 K/UL (ref 0–0.51)
EOSINOPHIL NFR BLD: 0.6 % (ref 0–6.9)
ERYTHROCYTE [DISTWIDTH] IN BLOOD BY AUTOMATED COUNT: 51.5 FL (ref 35.9–50)
GLUCOSE SERPL-MCNC: 104 MG/DL (ref 65–99)
HCT VFR BLD AUTO: 41.4 % (ref 42–52)
HGB BLD-MCNC: 14.3 G/DL (ref 14–18)
IMM GRANULOCYTES # BLD AUTO: 0.04 K/UL (ref 0–0.11)
IMM GRANULOCYTES NFR BLD AUTO: 0.6 % (ref 0–0.9)
LV EJECT FRACT  99904: 60
LV EJECT FRACT MOD 2C 99903: 56.97
LV EJECT FRACT MOD 4C 99902: 65.18
LV EJECT FRACT MOD BP 99901: 62.52
LYMPHOCYTES # BLD AUTO: 1.4 K/UL (ref 1–4.8)
LYMPHOCYTES NFR BLD: 19.3 % (ref 22–41)
MAGNESIUM SERPL-MCNC: 2 MG/DL (ref 1.5–2.5)
MCH RBC QN AUTO: 33.2 PG (ref 27–33)
MCHC RBC AUTO-ENTMCNC: 34.5 G/DL (ref 33.7–35.3)
MCV RBC AUTO: 96.1 FL (ref 81.4–97.8)
MONOCYTES # BLD AUTO: 1.03 K/UL (ref 0–0.85)
MONOCYTES NFR BLD AUTO: 14.2 % (ref 0–13.4)
NEUTROPHILS # BLD AUTO: 4.72 K/UL (ref 1.82–7.42)
NEUTROPHILS NFR BLD: 65 % (ref 44–72)
NRBC # BLD AUTO: 0 K/UL
NRBC BLD-RTO: 0 /100 WBC
PHOSPHATE SERPL-MCNC: 3.6 MG/DL (ref 2.5–4.5)
PLATELET # BLD AUTO: 230 K/UL (ref 164–446)
PMV BLD AUTO: 10.7 FL (ref 9–12.9)
POTASSIUM SERPL-SCNC: 3.8 MMOL/L (ref 3.6–5.5)
RBC # BLD AUTO: 4.31 M/UL (ref 4.7–6.1)
SODIUM SERPL-SCNC: 141 MMOL/L (ref 135–145)
WBC # BLD AUTO: 7.3 K/UL (ref 4.8–10.8)

## 2021-07-01 PROCEDURE — A9270 NON-COVERED ITEM OR SERVICE: HCPCS | Performed by: INTERNAL MEDICINE

## 2021-07-01 PROCEDURE — 85025 COMPLETE CBC W/AUTO DIFF WBC: CPT

## 2021-07-01 PROCEDURE — 94660 CPAP INITIATION&MGMT: CPT

## 2021-07-01 PROCEDURE — 700101 HCHG RX REV CODE 250: Performed by: INTERNAL MEDICINE

## 2021-07-01 PROCEDURE — 93306 TTE W/DOPPLER COMPLETE: CPT | Mod: 26 | Performed by: INTERNAL MEDICINE

## 2021-07-01 PROCEDURE — 700111 HCHG RX REV CODE 636 W/ 250 OVERRIDE (IP): Performed by: INTERNAL MEDICINE

## 2021-07-01 PROCEDURE — 83735 ASSAY OF MAGNESIUM: CPT

## 2021-07-01 PROCEDURE — 700102 HCHG RX REV CODE 250 W/ 637 OVERRIDE(OP): Performed by: INTERNAL MEDICINE

## 2021-07-01 PROCEDURE — 74230 X-RAY XM SWLNG FUNCJ C+: CPT

## 2021-07-01 PROCEDURE — 94760 N-INVAS EAR/PLS OXIMETRY 1: CPT

## 2021-07-01 PROCEDURE — 92611 MOTION FLUOROSCOPY/SWALLOW: CPT

## 2021-07-01 PROCEDURE — 99232 SBSQ HOSP IP/OBS MODERATE 35: CPT | Performed by: INTERNAL MEDICINE

## 2021-07-01 PROCEDURE — 80069 RENAL FUNCTION PANEL: CPT

## 2021-07-01 PROCEDURE — 93306 TTE W/DOPPLER COMPLETE: CPT

## 2021-07-01 PROCEDURE — 770020 HCHG ROOM/CARE - TELE (206)

## 2021-07-01 PROCEDURE — 92610 EVALUATE SWALLOWING FUNCTION: CPT

## 2021-07-01 RX ORDER — METOPROLOL TARTRATE 1 MG/ML
5 INJECTION, SOLUTION INTRAVENOUS
Status: DISCONTINUED | OUTPATIENT
Start: 2021-07-01 | End: 2021-07-12 | Stop reason: HOSPADM

## 2021-07-01 RX ORDER — DILTIAZEM HYDROCHLORIDE 5 MG/ML
10 INJECTION INTRAVENOUS EVERY 4 HOURS PRN
Status: DISCONTINUED | OUTPATIENT
Start: 2021-07-01 | End: 2021-07-01

## 2021-07-01 RX ORDER — FUROSEMIDE 10 MG/ML
40 INJECTION INTRAMUSCULAR; INTRAVENOUS
Status: DISCONTINUED | OUTPATIENT
Start: 2021-07-01 | End: 2021-07-01

## 2021-07-01 RX ORDER — CARVEDILOL 6.25 MG/1
12.5 TABLET ORAL 2 TIMES DAILY WITH MEALS
Status: DISCONTINUED | OUTPATIENT
Start: 2021-07-01 | End: 2021-07-03

## 2021-07-01 RX ADMIN — METOPROLOL TARTRATE 25 MG: 25 TABLET, FILM COATED ORAL at 13:10

## 2021-07-01 RX ADMIN — CARVEDILOL 12.5 MG: 6.25 TABLET, FILM COATED ORAL at 18:44

## 2021-07-01 RX ADMIN — APIXABAN 2.5 MG: 2.5 TABLET, FILM COATED ORAL at 05:48

## 2021-07-01 RX ADMIN — APIXABAN 2.5 MG: 2.5 TABLET, FILM COATED ORAL at 18:44

## 2021-07-01 RX ADMIN — DOCUSATE SODIUM 50 MG AND SENNOSIDES 8.6 MG 2 TABLET: 8.6; 5 TABLET, FILM COATED ORAL at 05:45

## 2021-07-01 RX ADMIN — FUROSEMIDE 40 MG: 10 INJECTION, SOLUTION INTRAMUSCULAR; INTRAVENOUS at 08:37

## 2021-07-01 RX ADMIN — LEVOTHYROXINE SODIUM 175 MCG: 0.17 TABLET ORAL at 05:45

## 2021-07-01 RX ADMIN — METOPROLOL TARTRATE 5 MG: 5 INJECTION, SOLUTION INTRAVENOUS at 15:23

## 2021-07-01 ASSESSMENT — ENCOUNTER SYMPTOMS
WHEEZING: 0
NAUSEA: 0
SENSORY CHANGE: 0
HEADACHES: 0
CLAUDICATION: 0
WEAKNESS: 0
DIARRHEA: 0
ORTHOPNEA: 0
SORE THROAT: 0
CHILLS: 0
EYE DISCHARGE: 0
ABDOMINAL PAIN: 0
BLOOD IN STOOL: 0
SHORTNESS OF BREATH: 0
DEPRESSION: 0
BLURRED VISION: 0
BACK PAIN: 0
PHOTOPHOBIA: 0
VOMITING: 0
MYALGIAS: 0
FLANK PAIN: 0
SPEECH CHANGE: 0
SPUTUM PRODUCTION: 0
PALPITATIONS: 0
COUGH: 0
HEARTBURN: 0
FEVER: 0
DIZZINESS: 0
DOUBLE VISION: 0
HEMOPTYSIS: 0
BRUISES/BLEEDS EASILY: 0

## 2021-07-01 ASSESSMENT — FIBROSIS 4 INDEX: FIB4 SCORE: 2.43

## 2021-07-01 ASSESSMENT — PAIN DESCRIPTION - PAIN TYPE: TYPE: ACUTE PAIN

## 2021-07-01 NOTE — PROGRESS NOTES
Hospital Medicine Daily Progress Note    Date of Service  7/1/2021    Chief Complaint  89 y.o. male admitted 6/30/2021 with palpitations    Hospital Course    89 y.o. male who presented 6/30/2021 with Afib RVR. Medical history is significant for Afib (on Eliquis), hypothyroidism. Recent history significant for hospitalization 06/22-06/23/2021 for dehydration secondary to diarrhea. Patient was at outpatient follow up visit today with PCP and was found to be in Afib RVR. Patient additionally endorses fatigue, mild shortness of breath and increased lower extremity swelling. He denies chest pain. Reports compliance with   Upon presentation to the ED. Presenting vitals: /93, , RR 16, SpO2 92% on room air. Initial laboratory evaluation demonstrated WBC 7.4, Hb 14.1, Cr 1.54, troponin 52, BNP 6426. CXR demonstrated no acute process. Patient received Cardizem 10mg IV with improvement in HR from 140 to 100 to 120.     Interval Problem Update    7/1/2021: The patient was admitted for further evaluation and management of atrial fibrillation with rapid ventricular response.  His all medications have been adjusted and he is currently rate controlled.  There was some concern for underlying CHF however echocardiogram showed a preserved ejection fraction of 60% although diastolic function could not be assessed due to arrhythmia.  On physical examination patient is not overtly overloaded.  In terms of her trigger of his A. fib with RVR, patient states that he is compliant with his home medications and denies any febrile illness and he is currently maintained on levothyroxine.  Chest x-ray with findings concerning for pulmonary fibrosis which may be partially contributing to his symptoms.  We will send for outpatient pulmonary follow-up.  At this time patient is comfortable in bed.  He denies any chest pains, palpitations, or shortness of breath.  No other overnight events reported.    Consultants/Specialty  None    Code  Status  Full Code    Disposition  PT/OT with recommendations for SNF    Review of Systems  Review of Systems   Constitutional: Negative for chills, fever and malaise/fatigue.   HENT: Negative for congestion, hearing loss, sore throat and tinnitus.    Eyes: Negative for blurred vision, double vision, photophobia and discharge.   Respiratory: Negative for cough, hemoptysis, sputum production, shortness of breath and wheezing.    Cardiovascular: Negative for chest pain, palpitations, orthopnea, claudication and leg swelling.   Gastrointestinal: Negative for abdominal pain, blood in stool, diarrhea, heartburn, melena, nausea and vomiting.   Genitourinary: Negative for dysuria, flank pain, hematuria and urgency.   Musculoskeletal: Negative for back pain, joint pain and myalgias.   Skin: Negative for itching and rash.   Neurological: Negative for dizziness, sensory change, speech change, weakness and headaches.   Endo/Heme/Allergies: Does not bruise/bleed easily.   Psychiatric/Behavioral: Negative for depression and suicidal ideas.        Physical Exam  Temp:  [36.2 °C (97.1 °F)-36.6 °C (97.8 °F)] 36.2 °C (97.2 °F)  Pulse:  [] 55  Resp:  [16-18] 16  BP: (102-132)/() 118/85  SpO2:  [84 %-96 %] 95 %    Physical Exam  Vitals reviewed.   Constitutional:       Appearance: Normal appearance. He is obese.   HENT:      Head: Normocephalic and atraumatic.      Nose: No congestion or rhinorrhea.      Mouth/Throat:      Mouth: Mucous membranes are moist.      Pharynx: Oropharynx is clear.   Eyes:      General: No scleral icterus.     Extraocular Movements: Extraocular movements intact.      Conjunctiva/sclera: Conjunctivae normal.      Pupils: Pupils are equal, round, and reactive to light.   Cardiovascular:      Rate and Rhythm: Normal rate. Rhythm irregular.      Pulses: Normal pulses.      Heart sounds: Normal heart sounds. No murmur heard.   No friction rub. No gallop.    Pulmonary:      Effort: Pulmonary effort is  normal. No respiratory distress.      Breath sounds: Normal breath sounds. No stridor. No wheezing, rhonchi or rales.   Abdominal:      General: Abdomen is flat. Bowel sounds are normal. There is no distension.      Palpations: Abdomen is soft. There is no mass.      Tenderness: There is no abdominal tenderness. There is no guarding or rebound.   Musculoskeletal:         General: No swelling, tenderness or deformity.      Cervical back: Neck supple. No rigidity. No muscular tenderness.   Lymphadenopathy:      Cervical: No cervical adenopathy.   Skin:     General: Skin is warm and dry.      Findings: No erythema or rash.   Neurological:      General: No focal deficit present.      Mental Status: He is alert and oriented to person, place, and time. Mental status is at baseline.      Cranial Nerves: No cranial nerve deficit.      Sensory: No sensory deficit.      Motor: No weakness.   Psychiatric:         Mood and Affect: Mood normal.         Behavior: Behavior normal.         Thought Content: Thought content normal.         Fluids    Intake/Output Summary (Last 24 hours) at 7/1/2021 1336  Last data filed at 7/1/2021 1200  Gross per 24 hour   Intake 200 ml   Output 3925 ml   Net -3725 ml       Laboratory  Recent Labs     06/30/21  1204 07/01/21  0209   WBC 7.4 7.3   RBC 4.27* 4.31*   HEMOGLOBIN 14.1 14.3   HEMATOCRIT 40.8* 41.4*   MCV 95.6 96.1   MCH 33.0 33.2*   MCHC 34.6 34.5   RDW 50.4* 51.5*   PLATELETCT 242 230   MPV 10.0 10.7     Recent Labs     06/30/21  1204 07/01/21  0209   SODIUM 138 141   POTASSIUM 4.0 3.8   CHLORIDE 106 104   CO2 18* 22   GLUCOSE 128* 104*   BUN 27* 28*   CREATININE 1.54* 1.68*   CALCIUM 8.2* 8.6                   Imaging  EC-ECHOCARDIOGRAM COMPLETE W/O CONT   Final Result      DX-CHEST-PORTABLE (1 VIEW)   Final Result      1.  No significant change since 6/21/2021      2.  Interstitial densities which are likely fibrosis      3.  Enlarged cardiac silhouette      DX-ESOPHAGUS -  [id number]    (Results Pending)        Assessment/Plan  * Atrial fibrillation with RVR ([facility])  Assessment & Plan  Chronic afib managed with eliquis and Lopressor 12.5 BID  Patient presents from PCP with rate in the 140's which improved with cardizem  Patient appears hypervolemic which may be contributing to uncontrolled rate  Lasix 40mg IV x1  2D echo  Admit to tele  Increase lopressor to TID    Elevated troponin  Assessment & Plan  Denies chest pain  Troponin 52  Suspect type 2 mechanism secondary to increased demand in setting of volume overload and afib RVR    ERIN (acute kidney injury) ([facility])  Assessment & Plan  Cr 1.5 with baseline around 1.1  Monitor response to Lasix    Lower extremity edema  Assessment & Plan  Elevated BNP 6426  Volume overload possibly contributing to afib  Lasix 40mg IV x1  Monitor response  I&O  2D echo    Anticoagulated- Eliquis for a. fib- (present on admission)  Assessment & Plan  Continue eliquis    Other specified hypothyroidism- (present on admission)  Assessment & Plan  Continue synthroid         VTE prophylaxis: Apixaban

## 2021-07-01 NOTE — PROGRESS NOTES
Patient had three pauses of 2, 2.25, and 2.7 seconds with Heart rate of sustaining in the 60's Afib. Dr. Rivera informed and paused cardizem drip per MD.

## 2021-07-01 NOTE — THERAPY
"Speech Language Pathology   Clinical Swallow Evaluation     Patient Name: Guru Pena  AGE:  89 y.o., SEX:  male  Medical Record #: [id number]  Today's Date: 7/1/2021     Precautions: Fall Risk, Swallow Precautions (See Comments)    Assessment    Patient is 89 y.o. male \"who presented 6/30/2021 with Afib RVR. Medical history is significant for Afib (on Eliquis), hypothyroidism. Recent history significant for hospitalization 06/22-06/23/2021 for dehydration secondary to diarrhea. Patient was at outpatient follow up visit today with PCP and was found to be in Afib RVR. Patient additionally endorses fatigue, mild shortness of breath and increased lower extremity swelling.\" CXR 6/21 showed \"Bibasilar opacities could indicate atelectasis or aspiration\" and repeat CXR 6/30 showed no significant change/improvement. Pt initially passed nursing dysphagia screen; however, when repeated later in the evening, he had wet/gurgly vocal quality after serial sips and is now NPO/NN. No SLP hx on file.     Pt was seen today for CSE. Pt was AAOx3 with confusion to city \"[place], [place].\" Pt endorsed recent globus sensation with solid foods above the suprasternal notch that started approximately 2-3 weeks ago, describing the sensation as \"food stalls.\" Pt denied coughing/choking with liquids and/or pills. Oral mech exam was grossly WFL/intact. Pt was provided with PO trials (as noted below.) Pt consumed single sips of thin water with minimal/subtle throat clearing; however, had increased wet vocal quality and throat clearing following serial sips of thin liquids which may be concerning for possible aspiration/penetration. Symptoms resolved with thickened juice and no further coughing/choking was appreciated. Pt then consumed applesauce and pudding with no clinical s/sx of aspiration/penetration; however, endorsed need for liquid wash. For peaches, Pt had throat clearing for mixed consistencies; however, was able to chew peaches with " "appropriate mastication and no oral residue after the swallow. Pt declined crackers, as they \"crumble\" too easily.    At this time, recommend modified PO diet of soft, bite-sized solids with mildly thick liquids and meds per tolerance (whole with MTL wash or floated in puree). Please monitor with meals and hold PO if any difficulties/concerns. Pharyngeal dysphagia is suspected and recommend diagnostic swallow study (MBS) to further evaluate oropharyngeal swallow functioning. RN and MD aware. MBS to be completed at 11:30am today.    Plan    Recommend Speech Therapy 3 times per week until therapy goals are met for the following treatments:  Dysphagia Training and Patient / Family / Caregiver Education.    Discharge Recommendations: Recommend post-acute placement for additional speech therapy services prior to discharge home     Objective     07/01/21 0925   Prior Level Of Function   Communication Within Functional Limits   Swallow Impaired  (recent decline over the past 2-3 weeks per patient)   Dentition Intact   Hearing Within Functional Limits for Evaluation   Vision Within Functional Limits for Evaluation   Oral Motor Eval    Is Patient Able to Complete Oral Motor Eval Yes, Within Normal Limits   Laryngeal Function   Voice Quality Within Functional Limits   Volitional Cough Within Functional Limits   Excursion Upon Swallow Complete   Oral Food Presentation   Single Swallow Mildly Thick (2) - (Nectar Thick)  Within Functional Limits   Serial Swallow Mildly Thick (2) - (Nectar Thick) Within Functional Limits   Single Swallow Thin (0) Minimal   Serial Swallow Thin (0) Moderate   Liquidised (3) Within Functional Limits   Pureed (4) Within Functional Limits   Soft & Bite-Sized (6) - (Dysphagia III) Within Functional Limits   Regular (7) Not Tested   Self Feeding Independent   Dysphagia Strategies / Recommendations   Compensatory Strategies Monitor During Meals;Head of Bed 90 Degrees During Eating / Drinking;Single Sips " / Bites;Alternate Solids / Liquids;No Talking During Eating / Drinking   Diet / Liquid Recommendation Soft & Bite-Sized (6) - (Dysphagia III);Mildly Thick (2) - (Nectar Thick)   Medication Administration  Whole with Liquid Wash;Float Whole with Puree   Therapy Interventions Dysphagia Therapy By Speech Language Pathologist   Short Term Goals   Short Term Goal # 1 Pt will consume PO diet of SB6/MT2 with no clinical s/sx of aspiration/penetration.   Education Group   Education Provided Dysphagia   Dysphagia Patient Response Patient;Acceptance;Explanation;Verbal Demonstration;Action Demonstration;Reinforcement Needed   Problem List   Problem List Dysphagia

## 2021-07-01 NOTE — CARE PLAN
The patient is Stable - Low risk of patient condition declining or worsening    Shift Goals  Clinical Goals: Monitor Hemodynamic stability  Patient Goals: Eat  Family Goals: Informed in care plan.     Progress made toward(s) clinical / shift goals:  Patients HR is decreasing and blood pressure is remaining above 100's systolically.     Patient is not progressing towards the following goals: Patietn is still NPO pending speech eval.     Problem: Knowledge Deficit - Standard  Goal: Patient and family/care givers will demonstrate understanding of plan of care, disease process/condition, diagnostic tests and medications  Outcome: Progressing   Patient demonstrated verbal understanding of education provided.   Problem: Fall Risk  Goal: Patient will remain free from falls  Outcome: Progressing   Patient utilities call light appropriately. Demonstrated verbal understanding of fall prevention rationale.

## 2021-07-01 NOTE — CARE PLAN
Problem: Knowledge Deficit - Standard  Goal: Patient and family/care givers will demonstrate understanding of plan of care, disease process/condition, diagnostic tests and medications  Outcome: Progressing     Problem: Fall Risk  Goal: Patient will remain free from falls  Outcome: Progressing     The patient is Stable - Low risk of patient condition declining or worsening    Shift Goals  Clinical Goals: administer medications for afib/RVR and monitor effectiveness. SLP eval today  Patient Goals: Pt in hungry and wanting to eat when possible  Family Goals: Informed in care plan.     Progress made toward(s) clinical / shift goals: Pt encouraged PO intake post SLP eval    Patient is not progressing towards the following goals:  Fall risk- bed alarmed, pt instructed use of call light but is forgetful. Strip alarm in use.

## 2021-07-01 NOTE — PROGRESS NOTES
Telemetry Shift Summary    Rhythm Afib  HR Range 72 - 130  Ectopy fPVC, r - o Big/Trig, oCoup  Measurements -/0.20/-        Normal Values  Rhythm SR  HR Range    Measurements 0.12-0.20 / 0.06-0.10  / 0.30-0.52   167.64

## 2021-07-01 NOTE — PROGRESS NOTES
4 Eyes Skin Assessment Completed by KAELYN Tomas and KAELYN Wilson.    Head WDL  Ears Redness  Nose WDL  Mouth WDL  Neck WDL  Breast/Chest WDL  Shoulder Blades WDL  Spine WDL  (R) Arm/Elbow/Hand WDL  (L) Arm/Elbow/Hand WDL  Abdomen WDL  Groin Excoriation  Scrotum/Coccyx/Buttocks Redness, Moisture  (R) Leg Edema  (L) Leg Edema  (R) Heel/Foot/Toe Blanching  (L) Heel/Foot/Toe Blanching          Devices In Places Tele Box      Interventions In Place Sacral Mepilex    Possible Skin Injury No    Pictures Uploaded Into Epic N/A  Wound Consult Placed N/A  RN Wound Prevention Protocol Ordered Yes

## 2021-07-01 NOTE — PROGRESS NOTES
Report received from KAELYN Zaragoza. Pt resting comfortably in bed, POC discussed. Echo tech at bedside. Pt NPO pending swallow eval. Pt instructed to notify RN with any and all needs and agrees.

## 2021-07-01 NOTE — FLOWSHEET NOTE
Patient demonstrates the ability to remove CPAP mask with out assistance.       06/30/21 2046   Non-Invasive Ventilation [facility]   Nocturnal CPAP or BIPAP CPAP - Home Unit   $ System Evaluation Yes   Settings (If Known) per home settings

## 2021-07-01 NOTE — PROGRESS NOTES
Report received from Jacey about 1520 and pt transferred via [facility].  Pt 2 person assist to ambulate to the bed.  Socks and shoes removed; pt has 2+ edema with koffi colored skin with flaky skin.  Removed pants and pt was saturated with foul-smelling urine and loose stool combo in his depends.  Cleaned pt up and placed a mepiplex.  When asking about home situation, he says that he lives with his elderly wife at [address] on the independent side but have considered getting a girl to come help with showers, grooming and things around the apartment. He normally uses a walker, but has been feeling too weak to ambulate.  Swallow eval done with med pass and he passed; kitchen brought him a tray.

## 2021-07-01 NOTE — THERAPY
"Speech Language Pathology   Video Swallow Evaluation     Patient Name: Guru Pena  AGE:  89 y.o., SEX:  male  Medical Record #: [id number]  Today's Date: 7/1/2021     Precautions: Fall Risk, Swallow Precautions (See Comments)    Assessment    Patient is 89 y.o. male \"who presented 6/30/2021 with Afib RVR. Medical history is significant for Afib (on Eliquis), hypothyroidism. Recent history significant for hospitalization 06/22-06/23/2021 for dehydration secondary to diarrhea. Patient was at outpatient follow up visit today with PCP and was found to be in Afib RVR. Patient additionally endorses fatigue, mild shortness of breath and increased lower extremity swelling.\" CXR 6/21 showed \"Bibasilar opacities could indicate atelectasis or aspiration\" and repeat CXR 6/30 showed no significant change/improvement.    Pt was seen today for Modified Barium Swallow Study. Discussed with the patient the risks, benefits, and alternatives of the MBSS procedure. Patient acknowledges and agreeable to proceed with the procedure. Pt was administered barium items and lateral views were obtained. Pt demonstrated penetration before and during the swallow for thin liquids, mildly thick liquids and pudding, and silent aspiration during and after the swallow for consecutive straw sips of thin liquids. With verbal cue to cough, Pt able to clear aspirate from airway. Oral dysphagia characterized by premature spillage to piriforms (for liquids), prolonged/ineffective mastication for advanced solids, delayed oral transit, trace-collection oral residue after the swallow and delayed swallow initiation to the valleculae. Pharyngeal dysphagia characterized by reduced base of tongue retraction, diminished pharyngeal stripping wave, and reduced hyolaryngeal elevation resulting in trace-collection residue in valleculae, collection residue in piriforms and penetration/silent aspiration episodes as described above. Pt able to improve clearance of " "residue with secondary swallows; however, appeared to fatigue near end of session.    At this time, recommend modified PO diet of soft, bite-sized solids with mildly thick liquids and meds with mildly thick liquids or floated in puree. Please monitor with meals and hold PO if any difficulties/concerns or change in status. SLP following. Thank you.    Plan    Recommend Speech Therapy 3 times per week until therapy goals are met for the following treatments:  Dysphagia Training and Patient / Family / Caregiver Education.    Discharge Recommendations: Recommend post-acute placement for additional speech therapy services prior to discharge home     Objective     07/01/21 1215   Prior Level Of Function   Communication Within Functional Limits   Swallow Impaired  (recent decline over the past 2-3 weeks per patient)   Dentition Intact   History / Background Information   Prior Level of Function for Eating / Swallowing independent; regular/thins   Diagnosis afib, oropharyngeal dysphagia   Onset Date Of Dysphagia 6/1/2021   Dysphagia Symptoms Warranting Video Swallow coughing, wet/gurgly vocal quality   General Anatomy / Physiology WFL   \"Dry\" / Saliva Swallow Observations Complete   Procedure   Patient Seated in  MBS chair   Seated at (Degrees) 90   Views Completed Lateral   Oral Phase   Mildly Thick (2) - (Nectar Thick) Premature Spillage Into Pyriform Sinus;Oral Residue After the Swallow   Thin (0) Oral Residue After the Swallow;Premature Spillage Into Pyriform Sinus   Pureed (4) Oral Residue After the Swallow   Soft & Bite-Sized (6) - (Dysphagia III) Oral Residue After the Swallow   Regular-Easy to Chew (7) Ineffective Mastication;Delayed Oral Transit;Impaired Anterior / Posterior Bolus Movement;Oral Residue After the Swallow   Pharyngeal Phase   Mildly Thick (2) - (Nectar Thick) Delayed Onset of Swallow;Reduced Tongue Base Retraction;Residue In Valleculae;Residue In Pyriform Sinus;Reduced Hyo-Laryngeal " Elevation;Penetration Before Swallow;Penetration During Swallow   Thin (0) Delayed Onset of Swallow;Reduced Tongue Base Retraction;Residue In Valleculae;Residue In Pyriform Sinus;Reduced Hyo-Laryngeal Elevation;Penetration Before Swallow;Penetration During Swallow;Aspiration During Swallow;Aspiration After Swallow;Absent Cough Response to Penetration / Aspiration   Pureed (4) Delayed Onset of Swallow;Residue In Valleculae;Residue In Pyriform Sinus;Reduced Hyo-Laryngeal Elevation;Penetration Before Swallow   Soft & Bite-Sized (6) - (Dysphagia III) Delayed Onset of Swallow;Residue In Valleculae;Residue In Pyriform Sinus;Reduced Hyo-Laryngeal Elevation   Regular-Easy to Chew (7) Delayed Onset of Swallow;Residue In Valleculae;Residue In Pyriform Sinus;Reduced Hyo-Laryngeal Elevation   Compensatory Strategies Attempted   Multiple Swallows effective in reducing residue   Effortful Swallows minimally effective in reducing residue   Cough / Clear After Swallow effective in clearing aspirate   Liquid Wash After Swallow effective in reducing residue   Penetration Aspiration Scale   Penetration Aspiration Scale 8 - Material passes glottis and is not ejected, visible subglottic stasis, absent patient response   Impression   Dysphagia Present Yes   Oral - Pharyngeal Mild - Moderate Impairment   Prognosis   Prognosis for Improvement Fair   Barriers to Improvement age   Positive Indicators for Improvement insight, motivation   Recommendations   Medication Administration  Whole with Liquid Wash;Float Whole with Puree   Strategies / Precautions Small Bites;Small Sips;Multiple Swallows;Effortful Swallow;Alternate Solids / Liquids;Sitting Upright at 90 Degrees while Eating;Stay Upright at Least 30 Minutes After Meals;Oral Care After Meals;Monitor Temperature and Lung Sounds   Interventions Dysphagia Therapy by SLP;Compensatory Safe Swallow Strategy Training;Patient / Caregiver Education / Training   Short Term Goals   Short Term Goal  # 1 Pt will consume PO diet of SB6/MT2 with no clinical s/sx of aspiration/penetration.   Goal Outcome # 1 Progressing as expected   Education Group   Education Provided Dysphagia   Dysphagia Patient Response Patient;Acceptance;Explanation;Verbal Demonstration;Action Demonstration;Reinforcement Needed

## 2021-07-02 LAB
ANION GAP SERPL CALC-SCNC: 13 MMOL/L (ref 7–16)
BUN SERPL-MCNC: 29 MG/DL (ref 8–22)
CALCIUM SERPL-MCNC: 8.5 MG/DL (ref 8.4–10.2)
CHLORIDE SERPL-SCNC: 101 MMOL/L (ref 96–112)
CO2 SERPL-SCNC: 23 MMOL/L (ref 20–33)
CREAT SERPL-MCNC: 1.33 MG/DL (ref 0.5–1.4)
ERYTHROCYTE [DISTWIDTH] IN BLOOD BY AUTOMATED COUNT: 52.2 FL (ref 35.9–50)
GLUCOSE SERPL-MCNC: 106 MG/DL (ref 65–99)
HCT VFR BLD AUTO: 43.2 % (ref 42–52)
HGB BLD-MCNC: 14.7 G/DL (ref 14–18)
MAGNESIUM SERPL-MCNC: 1.9 MG/DL (ref 1.5–2.5)
MCH RBC QN AUTO: 33 PG (ref 27–33)
MCHC RBC AUTO-ENTMCNC: 34 G/DL (ref 33.7–35.3)
MCV RBC AUTO: 96.9 FL (ref 81.4–97.8)
PHOSPHATE SERPL-MCNC: 4 MG/DL (ref 2.5–4.5)
PLATELET # BLD AUTO: 221 K/UL (ref 164–446)
PMV BLD AUTO: 10.7 FL (ref 9–12.9)
POTASSIUM SERPL-SCNC: 3.3 MMOL/L (ref 3.6–5.5)
RBC # BLD AUTO: 4.46 M/UL (ref 4.7–6.1)
SODIUM SERPL-SCNC: 137 MMOL/L (ref 135–145)
WBC # BLD AUTO: 7.7 K/UL (ref 4.8–10.8)

## 2021-07-02 PROCEDURE — 84100 ASSAY OF PHOSPHORUS: CPT

## 2021-07-02 PROCEDURE — A9270 NON-COVERED ITEM OR SERVICE: HCPCS | Performed by: INTERNAL MEDICINE

## 2021-07-02 PROCEDURE — 99232 SBSQ HOSP IP/OBS MODERATE 35: CPT | Performed by: INTERNAL MEDICINE

## 2021-07-02 PROCEDURE — 770020 HCHG ROOM/CARE - TELE (206)

## 2021-07-02 PROCEDURE — 94760 N-INVAS EAR/PLS OXIMETRY 1: CPT

## 2021-07-02 PROCEDURE — 94660 CPAP INITIATION&MGMT: CPT

## 2021-07-02 PROCEDURE — 80048 BASIC METABOLIC PNL TOTAL CA: CPT

## 2021-07-02 PROCEDURE — 700102 HCHG RX REV CODE 250 W/ 637 OVERRIDE(OP): Performed by: INTERNAL MEDICINE

## 2021-07-02 PROCEDURE — 83735 ASSAY OF MAGNESIUM: CPT

## 2021-07-02 PROCEDURE — 85027 COMPLETE CBC AUTOMATED: CPT

## 2021-07-02 RX ORDER — POTASSIUM CHLORIDE 20 MEQ/1
40 TABLET, EXTENDED RELEASE ORAL ONCE
Status: COMPLETED | OUTPATIENT
Start: 2021-07-02 | End: 2021-07-02

## 2021-07-02 RX ADMIN — APIXABAN 2.5 MG: 2.5 TABLET, FILM COATED ORAL at 18:58

## 2021-07-02 RX ADMIN — DOCUSATE SODIUM 50 MG AND SENNOSIDES 8.6 MG 2 TABLET: 8.6; 5 TABLET, FILM COATED ORAL at 05:56

## 2021-07-02 RX ADMIN — CARVEDILOL 12.5 MG: 6.25 TABLET, FILM COATED ORAL at 09:07

## 2021-07-02 RX ADMIN — LEVOTHYROXINE SODIUM 175 MCG: 0.17 TABLET ORAL at 05:56

## 2021-07-02 RX ADMIN — APIXABAN 2.5 MG: 2.5 TABLET, FILM COATED ORAL at 05:56

## 2021-07-02 RX ADMIN — CARVEDILOL 12.5 MG: 6.25 TABLET, FILM COATED ORAL at 17:30

## 2021-07-02 RX ADMIN — POTASSIUM CHLORIDE 40 MEQ: 1500 TABLET, EXTENDED RELEASE ORAL at 09:07

## 2021-07-02 ASSESSMENT — ENCOUNTER SYMPTOMS
FEVER: 0
HEMOPTYSIS: 0
BLURRED VISION: 0
COUGH: 0
SENSORY CHANGE: 0
BACK PAIN: 0
EYE DISCHARGE: 0
SPUTUM PRODUCTION: 0
PALPITATIONS: 0
HEARTBURN: 0
BRUISES/BLEEDS EASILY: 0
NAUSEA: 0
PHOTOPHOBIA: 0
CHILLS: 0
VOMITING: 0
DIZZINESS: 0
MYALGIAS: 0
DOUBLE VISION: 0
HEADACHES: 0
DEPRESSION: 0
ORTHOPNEA: 0
SPEECH CHANGE: 0

## 2021-07-02 ASSESSMENT — PAIN DESCRIPTION - PAIN TYPE
TYPE: ACUTE PAIN;CHRONIC PAIN
TYPE: CHRONIC PAIN;ACUTE PAIN
TYPE: ACUTE PAIN;CHRONIC PAIN
TYPE: ACUTE PAIN;CHRONIC PAIN

## 2021-07-02 ASSESSMENT — FIBROSIS 4 INDEX
FIB4 SCORE: 2.53
FIB4 SCORE: 2.53

## 2021-07-02 NOTE — DOCUMENTATION QUERY
[facility]                                                                       Query Response Note      PATIENT:               JANICE HARRISON  ACCT #:                  [id number]  MRN:                     [id number]  :                      [id number]  ADMIT DATE:       2021 11:56 AM  DISCH DATE:          RESPONDING  PROVIDER #:        [id number]           QUERY TEXT:    Suspect type 2 mechanism secondary to increased demand in setting of volume overload and AFIB RVR per progress note .  Can this finding / documentation be further specified          NOTE:  If an appropriate response is not listed below, please respond with a new note.                                                                                                                                        The patient's Clinical Indicators include:  - Findings:  Progress note 21:  elevated troponin, denies chest pain, suspect type 2 mechanism secondary to increased demand in the setting of volume overload and AFIB RVR.   There was some concern for underlying CHF however  echocardiogram showed a preserved ejection fraction of 60% although diastolic function could not be assessed due to arrhythmia.  - On physical examination patient is not overtly overloaded  - Troponin 21 49, troponin 21 46, troponin 21 52,  NT proBNP 6426  - EKG 21:  ATRIAL FIBRILLATION, V-RATE  , VENTRICULAR BIGEMINY, RBBB AND LAFB, Compared to ECG 2021 18:31:01 Ventricular premature complex(es) now present   - echocardiogram 21:  Normal left ventricular chamber size. Mild concentric left ventricular hypertrophy. Normal left ventricular systolic function.  LVEF 60%.  Normal right ventricular size. Reduced right ventricular systolic   function.    - Treatments:  Lasix, EKG, echocardiogram, chest x ray, increase Lopressor, cadizem    - Risk factors:  AFIB, elevated  troponin, SOB, lower extremity edema, volume overload     Thank You,  Noreen Garcia, RN  Clinical Documentation   [email or web address]  Connect via Vo[employer] Messenger  Options provided:   -- NSTEMI   -- Type 2 MI (due to demand ischemia or secondary to ischemic imbalance)   -- Other forms of acute ischemic heart disease   -- Unable to determine      Query created by: Noreen Garcia on 7/2/2021 11:30 AM    RESPONSE TEXT:    Type 2 MI (due to demand ischemia or secondary to ischemic imbalance)          Electronically signed by:  JOSE ARMANDO RAMOS MD 7/2/2021 2:00 PM

## 2021-07-02 NOTE — PROGRESS NOTES
Telemetry Shift Summary    Rhythm Afib, RVR to 180s, BBB  HR Range   Ectopy Freq PVC// Occas. Coup// Rare-occas trig/big// rare triplets     Measurements -/0.16/-    Normal Values  Rhythm SR  HR Range    Measurements 0.12-0.20 / 0.06-0.10  / 0.30-0.52

## 2021-07-02 NOTE — PROGRESS NOTES
[facility] Medicine Daily Progress Note    Date of Service  7/2/2021    Chief Complaint  89 y.o. male admitted 6/30/2021 with palpitations    Hospital Course    89 y.o. male who presented 6/30/2021 with Afib RVR. Medical history is significant for Afib (on Eliquis), hypothyroidism. Recent history significant for hospitalization 06/22-06/23/2021 for dehydration secondary to diarrhea. Patient was at outpatient follow up visit today with PCP and was found to be in Afib RVR. Patient additionally endorses fatigue, mild shortness of breath and increased lower extremity swelling. He denies chest pain. Reports compliance with   Upon presentation to the ED. Presenting vitals: /93, , RR 16, SpO2 92% on room air. Initial laboratory evaluation demonstrated WBC 7.4, Hb 14.1, Cr 1.54, troponin 52, BNP 6426. CXR demonstrated no acute process. Patient received Cardizem 10mg IV with improvement in HR from 140 to 100 to 120.     Interval Problem Update    7/1/2021: The patient was admitted for further evaluation and management of atrial fibrillation with rapid ventricular response.  His all medications have been adjusted and he is currently rate controlled.  There was some concern for underlying CHF however echocardiogram showed a preserved ejection fraction of 60% although diastolic function could not be assessed due to arrhythmia.  On physical examination patient is not overtly overloaded.  In terms of her trigger of his A. fib with RVR, patient states that he is compliant with his home medications and denies any febrile illness and he is currently maintained on levothyroxine.  Chest x-ray with findings concerning for pulmonary fibrosis which may be partially contributing to his symptoms.  We will send for outpatient pulmonary follow-up.  At this time patient is comfortable in bed.  He denies any chest pains, palpitations, or shortness of breath.  No other overnight events reported.    7/2/2021: The patient was seen and  evaluated at bedside and appears comfortable.  He continues to tolerate oral intake and denies any chest pains, palpitations, or shortness of breath.  At this time we are pending placement to skilled nursing facility for continued physical therapy.  No other overnight events reported.    Consultants/Specialty  None    Code Status  Full Code    Disposition  PT/OT with recommendations for SNF    Review of Systems  Review of Systems   Constitutional: Negative for chills and fever.   HENT: Negative for congestion, hearing loss and tinnitus.    Eyes: Negative for blurred vision, double vision, photophobia and discharge.   Respiratory: Negative for cough, hemoptysis and sputum production.    Cardiovascular: Negative for chest pain, palpitations and orthopnea.   Gastrointestinal: Negative for heartburn, nausea and vomiting.   Genitourinary: Negative for dysuria, hematuria and urgency.   Musculoskeletal: Negative for back pain and myalgias.   Skin: Negative for itching and rash.   Neurological: Negative for dizziness, sensory change, speech change and headaches.   Endo/Heme/Allergies: Does not bruise/bleed easily.   Psychiatric/Behavioral: Negative for depression and suicidal ideas.        Physical Exam  Temp:  [35.8 °C (96.4 °F)-36.4 °C (97.6 °F)] 36.4 °C (97.6 °F)  Pulse:  [] 67  Resp:  [17-18] 18  BP: ()/() 97/63  SpO2:  [90 %-98 %] 93 %    Physical Exam  Vitals reviewed.   Constitutional:       Appearance: Normal appearance. He is obese.   HENT:      Head: Normocephalic and atraumatic.      Nose: No congestion or rhinorrhea.      Mouth/Throat:      Mouth: Mucous membranes are moist.      Pharynx: Oropharynx is clear.   Eyes:      General: No scleral icterus.     Extraocular Movements: Extraocular movements intact.      Conjunctiva/sclera: Conjunctivae normal.      Pupils: Pupils are equal, round, and reactive to light.   Cardiovascular:      Rate and Rhythm: Normal rate. Rhythm irregular.      Pulses:  Normal pulses.      Heart sounds: Normal heart sounds.   Pulmonary:      Effort: Pulmonary effort is normal. No respiratory distress.      Breath sounds: Normal breath sounds. No stridor.   Abdominal:      General: Abdomen is flat. Bowel sounds are normal.      Palpations: Abdomen is soft.   Musculoskeletal:         General: No swelling, tenderness or deformity.      Cervical back: Neck supple. No rigidity. No muscular tenderness.   Lymphadenopathy:      Cervical: No cervical adenopathy.   Skin:     General: Skin is warm and dry.   Neurological:      General: No focal deficit present.      Mental Status: He is alert and oriented to person, place, and time. Mental status is at baseline.      Cranial Nerves: No cranial nerve deficit.      Sensory: No sensory deficit.   Psychiatric:         Mood and Affect: Mood normal.         Behavior: Behavior normal.         Thought Content: Thought content normal.         Fluids    Intake/Output Summary (Last 24 hours) at 7/2/2021 1509  Last data filed at 7/2/2021 1301  Gross per 24 hour   Intake 600 ml   Output 1500 ml   Net -900 ml       Laboratory  Recent Labs     06/30/21  1204 07/01/21  0209 07/02/21  0155   WBC 7.4 7.3 7.7   RBC 4.27* 4.31* 4.46*   HEMOGLOBIN 14.1 14.3 14.7   HEMATOCRIT 40.8* 41.4* 43.2   MCV 95.6 96.1 96.9   MCH 33.0 33.2* 33.0   MCHC 34.6 34.5 34.0   RDW 50.4* 51.5* 52.2*   PLATELETCT 242 230 221   MPV 10.0 10.7 10.7     Recent Labs     06/30/21  1204 07/01/21  0209 07/02/21  0155   SODIUM 138 141 137   POTASSIUM 4.0 3.8 3.3*   CHLORIDE 106 104 101   CO2 18* 22 23   GLUCOSE 128* 104* 106*   BUN 27* 28* 29*   CREATININE 1.54* 1.68* 1.33   CALCIUM 8.2* 8.6 8.5                   Imaging  DX-ESOPHAGUS - [id number]   Final Result      EC-ECHOCARDIOGRAM COMPLETE W/O CONT   Final Result      DX-CHEST-PORTABLE (1 VIEW)   Final Result      1.  No significant change since 6/21/2021      2.  Interstitial densities which are likely fibrosis      3.  Enlarged  cardiac silhouette           Assessment/Plan  * Atrial fibrillation with RVR ([facility])  Assessment & Plan  Chronic afib managed with eliquis and Lopressor 12.5 BID  Patient presents from PCP with rate in the 140's which improved with cardizem  Patient appears hypervolemic which may be contributing to uncontrolled rate  Lasix 40mg IV x1  2D echo  Admit to tele  Increase lopressor to TID    Elevated troponin  Assessment & Plan  Denies chest pain  Troponin 52  Suspect type 2 mechanism secondary to increased demand in setting of volume overload and afib RVR    ERIN (acute kidney injury) ([facility])  Assessment & Plan  Cr 1.5 with baseline around 1.1  Monitor response to Lasix    Lower extremity edema  Assessment & Plan  Elevated BNP 6426  Volume overload possibly contributing to afib  Lasix 40mg IV x1  Monitor response  I&O  2D echo    Anticoagulated- Eliquis for a. fib- (present on admission)  Assessment & Plan  Continue eliquis    Other specified hypothyroidism- (present on admission)  Assessment & Plan  Continue synthroid       VTE prophylaxis: Apixaban

## 2021-07-02 NOTE — DISCHARGE PLANNING
Received Choice form at 4033  Agency/Facility Name: 1. [facility] 2. [place] 3. [facility]   Referral sent per Choice form @ 8032

## 2021-07-02 NOTE — FLOWSHEET NOTE
07/01/21 2215   Events/Summary/Plan   Events/Summary/Plan CPAP started   Skin Inspection Respiratory Device Intact   Vital Signs   Pulse 82   Respiration 18   Pulse Oximetry 93 %   $ Pulse Oximetry (Spot Check) Yes   Respiratory Assessment   Level of Consciousness Alert   Chest Exam   Work Of Breathing / Effort Within Normal Limits;Mild   Oxygen   O2 Delivery Device CPAP   Non-Invasive Ventilation [facility]   Nocturnal CPAP or BIPAP CPAP - Home Unit   $ System Evaluation Yes   Settings (If Known)   (home settings)   FiO2 or LPM 0

## 2021-07-02 NOTE — PROGRESS NOTES
Received patient from NOC RN. Assessment complete. A&Ox4, but very forgetful. Denies pain. POC discussed. Call light within reach. Bed in low, locked position. All needs attended to at this time.

## 2021-07-02 NOTE — DISCHARGE PLANNING
"Care Transition Team Assessment    Met with pt at bedside to complete assessment. Pt reports he lives at [facility] w/ spouse. Pt states they live in a studio apartment and are both independent with ADL's and IADL's. Facility only provides assistance with meals and cleaning 1x/week. Pt uses a FWW at baseline. Pt states his son provides assistance with transportation. Pt is established with PCP.  Attempted to discuss SNF placement with pt. Pt fixated on current diet and restrictions requesting \"paper and pencil\".   PT/OT pending    Information Source  Orientation Level: Oriented X4, Other (Comment) (forgetful)  Information Given By: Patient  Who is responsible for making decisions for patient? : Patient    Elopement Risk  Legal Hold: No  Ambulatory or Self Mobile in Wheelchair: Yes  Disoriented: No  Psychiatric Symptoms: None  History of Wandering: No  Elopement this Admit: No  Vocalizing Wanting to Leave: No  Displays Behaviors, Body Language Wanting to Leave: No-Not at Risk for Elopement  Elopement Risk: Not at Risk for Elopement    Interdisciplinary Discharge Planning  Lives with - Patient's Self Care Capacity: Spouse  Housing / Facility: Skilled Nursing Facility  Prior Services: [facility], Skilled Home Health Services    Discharge Preparedness  What is your plan after discharge?: Uncertain - pending medical team collaboration  What are your discharge supports?: Spouse  Prior Functional Level: Ambulatory, Independent with Activities of Daily Living, Independent with Medication Management, Uses Walker  Difficulity with ADLs: Walking  Difficulity with IADLs: Cooking, Driving    Functional Assesment  Prior Functional Level: Ambulatory, Independent with Activities of Daily Living, Independent with Medication Management, Uses Walker    Finances  Financial Barriers to Discharge: No  Prescription Coverage: Yes    Vision / Hearing Impairment  Vision Impairment : Yes  Right Eye Vision: Impaired, " Wears Glasses  Left Eye Vision: Impaired, Wears Glasses  Hearing Impairment : Yes  Hearing Impairment: Both Ears, Hearing Device Not Available    Domestic Abuse  Have you ever been the victim of abuse or violence?: No  Physical Abuse or Sexual Abuse: No  Verbal Abuse or Emotional Abuse: No  Possible Abuse/Neglect Reported to:: Not Applicable    Psychological Assessment  History of Substance Abuse: None  History of Psychiatric Problems: No  Non-compliant with Treatment: No  Newly Diagnosed Illness: Yes    Discharge Risks or Barriers  Discharge risks or barriers?: No    Anticipated Discharge Information  Discharge Disposition: Still a Patient (30)

## 2021-07-02 NOTE — CARE PLAN
Problem: Fall Risk  Goal: Patient will remain free from falls  Outcome: Progressing  Note: Reoriented pt, reminded him about condom catheter (so no need to get up for urination) and use of call light/bed alarm. Pt has not fallen this shift.      Problem: Pain - Standard  Goal: Alleviation of pain or a reduction in pain to the patient’s comfort goal  Outcome: Progressing  Flowsheets (Taken 7/1/2021 2043)  Pain Rating Scale (NPRS): 0  Note: Pt stated he does not have any pain. Nonverbal descriptors show pt calmly sleeping, body relaxed, respirations unlabored.    The patient is Stable - Low risk of patient condition declining or worsening    Shift Goals  Clinical Goals: monitor Afib/RVR  Patient Goals: rest, monitor HR  Family Goals: Informed in care plan.     Progress made toward(s) clinical / shift goals:  Hr still Afib, but no pauses yet during this shift. No falls, and pain has been managed or 0/10    Patient is not progressing towards the following goals: n/a

## 2021-07-02 NOTE — DISCHARGE PLANNING
Anticipated Discharge Disposition:   SNF then home to [facility]    Action:   Chart review complete.     Per MD and PT/OT, this patient will need SNF placement prior to returning to his [facility]. RN CM to collect choice from patient.     1240: RN CM met with patient at bedside to discuss discharge planning. RN CM explained the purpose and duration of SNF to patient. Patient is agreeable and gave choice for 1- [facility], 2- [facility], 3- [facility].     1300: Choice form faxed to [facility]      PASRR: [id number]    1445: RN CM received a call from Tameka with the [employer]. She will be coming by to see the patient today.     1545: KAELYN HERNANDES spoke with Tameka with the fundamentals. She spoke with the patient who is agreeable to go to [place]. Tameka with begin authorization. PT/OT notes pending.     Barriers to Discharge:   [facility] insurance authorization   PT/OT notes  Medical Clearance    Plan:   Hospital care management will continue to assist with discharge planning needs.

## 2021-07-02 NOTE — CARE PLAN
The patient is Stable - Low risk of patient condition declining or worsening    Shift Goals  Clinical Goals: control HR  Patient Goals: Rest, order food from kitchen  Family Goals: Informed in care plan.     Progress made toward(s) clinical / shift goals:  ordered food, HR more controlled    Patient is not progressing towards the following goals:

## 2021-07-03 LAB
ANION GAP SERPL CALC-SCNC: 9 MMOL/L (ref 7–16)
BUN SERPL-MCNC: 30 MG/DL (ref 8–22)
CALCIUM SERPL-MCNC: 8.5 MG/DL (ref 8.4–10.2)
CHLORIDE SERPL-SCNC: 102 MMOL/L (ref 96–112)
CO2 SERPL-SCNC: 27 MMOL/L (ref 20–33)
CREAT SERPL-MCNC: 1.4 MG/DL (ref 0.5–1.4)
EKG IMPRESSION: NORMAL
ERYTHROCYTE [DISTWIDTH] IN BLOOD BY AUTOMATED COUNT: 54.1 FL (ref 35.9–50)
GLUCOSE SERPL-MCNC: 103 MG/DL (ref 65–99)
HCT VFR BLD AUTO: 43.2 % (ref 42–52)
HGB BLD-MCNC: 14.5 G/DL (ref 14–18)
MAGNESIUM SERPL-MCNC: 2 MG/DL (ref 1.5–2.5)
MCH RBC QN AUTO: 32.9 PG (ref 27–33)
MCHC RBC AUTO-ENTMCNC: 33.6 G/DL (ref 33.7–35.3)
MCV RBC AUTO: 98 FL (ref 81.4–97.8)
PHOSPHATE SERPL-MCNC: 3.2 MG/DL (ref 2.5–4.5)
PLATELET # BLD AUTO: 258 K/UL (ref 164–446)
PMV BLD AUTO: 10.9 FL (ref 9–12.9)
POTASSIUM SERPL-SCNC: 4 MMOL/L (ref 3.6–5.5)
RBC # BLD AUTO: 4.41 M/UL (ref 4.7–6.1)
SODIUM SERPL-SCNC: 138 MMOL/L (ref 135–145)
WBC # BLD AUTO: 8.1 K/UL (ref 4.8–10.8)

## 2021-07-03 PROCEDURE — 84100 ASSAY OF PHOSPHORUS: CPT

## 2021-07-03 PROCEDURE — 93005 ELECTROCARDIOGRAM TRACING: CPT | Performed by: INTERNAL MEDICINE

## 2021-07-03 PROCEDURE — 94760 N-INVAS EAR/PLS OXIMETRY 1: CPT

## 2021-07-03 PROCEDURE — 85027 COMPLETE CBC AUTOMATED: CPT

## 2021-07-03 PROCEDURE — 700102 HCHG RX REV CODE 250 W/ 637 OVERRIDE(OP): Performed by: INTERNAL MEDICINE

## 2021-07-03 PROCEDURE — 94660 CPAP INITIATION&MGMT: CPT

## 2021-07-03 PROCEDURE — A9270 NON-COVERED ITEM OR SERVICE: HCPCS | Performed by: INTERNAL MEDICINE

## 2021-07-03 PROCEDURE — 83735 ASSAY OF MAGNESIUM: CPT

## 2021-07-03 PROCEDURE — 80048 BASIC METABOLIC PNL TOTAL CA: CPT

## 2021-07-03 PROCEDURE — 93010 ELECTROCARDIOGRAM REPORT: CPT | Performed by: INTERNAL MEDICINE

## 2021-07-03 PROCEDURE — 99232 SBSQ HOSP IP/OBS MODERATE 35: CPT | Performed by: INTERNAL MEDICINE

## 2021-07-03 PROCEDURE — 770020 HCHG ROOM/CARE - TELE (206)

## 2021-07-03 RX ORDER — CARVEDILOL 6.25 MG/1
6.25 TABLET ORAL 2 TIMES DAILY WITH MEALS
Status: DISCONTINUED | OUTPATIENT
Start: 2021-07-03 | End: 2021-07-12 | Stop reason: HOSPADM

## 2021-07-03 RX ADMIN — CARVEDILOL 6.25 MG: 6.25 TABLET, FILM COATED ORAL at 17:14

## 2021-07-03 RX ADMIN — DOCUSATE SODIUM 50 MG AND SENNOSIDES 8.6 MG 2 TABLET: 8.6; 5 TABLET, FILM COATED ORAL at 17:14

## 2021-07-03 RX ADMIN — DOCUSATE SODIUM 50 MG AND SENNOSIDES 8.6 MG 2 TABLET: 8.6; 5 TABLET, FILM COATED ORAL at 06:40

## 2021-07-03 RX ADMIN — LEVOTHYROXINE SODIUM 175 MCG: 0.17 TABLET ORAL at 06:40

## 2021-07-03 RX ADMIN — CARVEDILOL 12.5 MG: 6.25 TABLET, FILM COATED ORAL at 06:40

## 2021-07-03 RX ADMIN — APIXABAN 2.5 MG: 2.5 TABLET, FILM COATED ORAL at 06:39

## 2021-07-03 RX ADMIN — APIXABAN 2.5 MG: 2.5 TABLET, FILM COATED ORAL at 17:15

## 2021-07-03 ASSESSMENT — ENCOUNTER SYMPTOMS
BRUISES/BLEEDS EASILY: 0
HEMOPTYSIS: 0
SENSORY CHANGE: 0
HEADACHES: 0
PHOTOPHOBIA: 0
HEARTBURN: 0
NAUSEA: 0
BACK PAIN: 0
DEPRESSION: 0
FEVER: 0
COUGH: 0
PALPITATIONS: 0
MYALGIAS: 0
DOUBLE VISION: 0
BLURRED VISION: 0
DIZZINESS: 0
CHILLS: 0

## 2021-07-03 ASSESSMENT — PAIN DESCRIPTION - PAIN TYPE
TYPE: ACUTE PAIN;CHRONIC PAIN
TYPE: ACUTE PAIN
TYPE: ACUTE PAIN;CHRONIC PAIN

## 2021-07-03 NOTE — PROGRESS NOTES
0905 - Dr. Brown notified that pt was bradycardic to the 40s with a 2.4 second pause.    0916 - Dr. Brown notified that pt experienced a 4.2 second arrest while talking on the phone, pt is asymptomatic. Per Dr. Brown,continue to monitor.    0937 - Alerted Dr. Brown that pt continues to have frequent 1-2 second pauses and was bradycardic to the mid 30s. Dr. Brown to adjust dosing for Coreg, EKG obtained. Dr. Brown to review pt's telemetry strips.    1100 - Reviewed tele strips with Dr. Brown with Charge RN and monitor tech. Per Dr. Brown, continue with current plan of care.

## 2021-07-03 NOTE — PROGRESS NOTES
Telemetry Shift Summary    Rhythm: AFIB W/ BBB  HR:   Ectopy: F-PVC, R-COUP, R-TRIG, R-TRIP    Measurements for strip printed 7/3/2021 at 1403  HR 99  - / 0.12 / -    Normal Values  Rhythm: SR  HR:   Measurements: 0.12-0.20 / 0.06-0.10 / 0.30-0.52

## 2021-07-03 NOTE — PROGRESS NOTES
[facility] Medicine Daily Progress Note    Date of Service  7/3/2021    Chief Complaint  89 y.o. male admitted 6/30/2021 with palpitations    Hospital Course    89 y.o. male who presented 6/30/2021 with Afib RVR. Medical history is significant for Afib (on Eliquis), hypothyroidism. Recent history significant for hospitalization 06/22-06/23/2021 for dehydration secondary to diarrhea. Patient was at outpatient follow up visit today with PCP and was found to be in Afib RVR. Patient additionally endorses fatigue, mild shortness of breath and increased lower extremity swelling. He denies chest pain. Reports compliance with   Upon presentation to the ED. Presenting vitals: /93, , RR 16, SpO2 92% on room air. Initial laboratory evaluation demonstrated WBC 7.4, Hb 14.1, Cr 1.54, troponin 52, BNP 6426. CXR demonstrated no acute process. Patient received Cardizem 10mg IV with improvement in HR from 140 to 100 to 120.     Interval Problem Update    7/1/2021: The patient was admitted for further evaluation and management of atrial fibrillation with rapid ventricular response.  His all medications have been adjusted and he is currently rate controlled.  There was some concern for underlying CHF however echocardiogram showed a preserved ejection fraction of 60% although diastolic function could not be assessed due to arrhythmia.  On physical examination patient is not overtly overloaded.  In terms of her trigger of his A. fib with RVR, patient states that he is compliant with his home medications and denies any febrile illness and he is currently maintained on levothyroxine.  Chest x-ray with findings concerning for pulmonary fibrosis which may be partially contributing to his symptoms.  We will send for outpatient pulmonary follow-up.  At this time patient is comfortable in bed.  He denies any chest pains, palpitations, or shortness of breath.  No other overnight events reported.    7/2/2021: The patient was seen and  evaluated at bedside and appears comfortable.  He continues to tolerate oral intake and denies any chest pains, palpitations, or shortness of breath.  At this time we are pending placement to skilled nursing facility for continued physical therapy.  No other overnight events reported.    7/3/2021: The patient is seen and evaluated at bedside.  He denies any chest pains, discharge, shortness of breath.  Patient was noted to have nonsustained episodes of bradycardia with heart rate into the 30s as well as nonsustained pauses.  During this time patient remained asymptomatic.  Medications have been adjusted accordingly.  Repeat EKG shows heart rate of 75 with rhythm of atrial fibrillation and premature ventricular complexes.  We will continue to monitor closely.  No other overnight events reported.    Consultants/Specialty  None    Code Status  Full Code    Disposition  Pending [facility]    Review of Systems  Review of Systems   Constitutional: Negative for chills and fever.   HENT: Negative for congestion, hearing loss and tinnitus.    Eyes: Negative for blurred vision, double vision and photophobia.   Respiratory: Negative for cough and hemoptysis.    Cardiovascular: Negative for chest pain and palpitations.   Gastrointestinal: Negative for heartburn and nausea.   Genitourinary: Negative for dysuria and urgency.   Musculoskeletal: Negative for back pain and myalgias.   Skin: Negative for itching and rash.   Neurological: Negative for dizziness, sensory change and headaches.   Endo/Heme/Allergies: Does not bruise/bleed easily.   Psychiatric/Behavioral: Negative for depression and suicidal ideas.        Physical Exam  Temp:  [36.4 °C (97.6 °F)-36.7 °C (98.1 °F)] 36.4 °C (97.6 °F)  Pulse:  [] 95  Resp:  [16-18] 18  BP: ()/(60-85) 121/85  SpO2:  [90 %-95 %] 95 %    Physical Exam  Vitals reviewed.   Constitutional:       Appearance: Normal appearance. He is obese.   HENT:      Head: Normocephalic and atraumatic.       Nose: No congestion or rhinorrhea.      Mouth/Throat:      Mouth: Mucous membranes are moist.      Pharynx: Oropharynx is clear.   Eyes:      Extraocular Movements: Extraocular movements intact.      Conjunctiva/sclera: Conjunctivae normal.      Pupils: Pupils are equal, round, and reactive to light.   Cardiovascular:      Rate and Rhythm: Normal rate. Rhythm irregular.      Pulses: Normal pulses.      Heart sounds: Normal heart sounds.   Pulmonary:      Effort: Pulmonary effort is normal. No respiratory distress.      Breath sounds: Normal breath sounds. No stridor.   Abdominal:      General: Abdomen is flat. Bowel sounds are normal.      Palpations: Abdomen is soft.   Musculoskeletal:         General: No swelling, tenderness or deformity.      Cervical back: Neck supple. No rigidity. No muscular tenderness.   Lymphadenopathy:      Cervical: No cervical adenopathy.   Skin:     General: Skin is warm and dry.   Neurological:      General: No focal deficit present.      Mental Status: He is alert and oriented to person, place, and time. Mental status is at baseline.   Psychiatric:         Mood and Affect: Mood normal.         Behavior: Behavior normal.         Thought Content: Thought content normal.         Fluids    Intake/Output Summary (Last 24 hours) at 7/3/2021 0959  Last data filed at 7/3/2021 0802  Gross per 24 hour   Intake 840 ml   Output 700 ml   Net 140 ml       Laboratory  Recent Labs     07/01/21  0209 07/02/21  0155 07/03/21  0107   WBC 7.3 7.7 8.1   RBC 4.31* 4.46* 4.41*   HEMOGLOBIN 14.3 14.7 14.5   HEMATOCRIT 41.4* 43.2 43.2   MCV 96.1 96.9 98.0*   MCH 33.2* 33.0 32.9   MCHC 34.5 34.0 33.6*   RDW 51.5* 52.2* 54.1*   PLATELETCT 230 221 258   MPV 10.7 10.7 10.9     Recent Labs     07/01/21  0209 07/02/21  0155 07/03/21  0107   SODIUM 141 137 138   POTASSIUM 3.8 3.3* 4.0   CHLORIDE 104 101 102   CO2 22 23 27   GLUCOSE 104* 106* 103*   BUN 28* 29* 30*   CREATININE 1.68* 1.33 1.40   CALCIUM 8.6 8.5 8.5                    Imaging  DX-ESOPHAGUS - [id number]   Final Result      EC-ECHOCARDIOGRAM COMPLETE W/O CONT   Final Result      DX-CHEST-PORTABLE (1 VIEW)   Final Result      1.  No significant change since 6/21/2021      2.  Interstitial densities which are likely fibrosis      3.  Enlarged cardiac silhouette           Assessment/Plan  * Atrial fibrillation with RVR (HCC)  Assessment & Plan  Chronic afib managed with eliquis and Lopressor 12.5 BID  Patient presents from PCP with rate in the 140's which improved with cardizem  Tolerating carvedilol      Elevated troponin  Assessment & Plan  Denies chest pain  Troponin 52  Suspect type 2 mechanism secondary to increased demand in setting of volume overload and afib RVR    ERIN (acute kidney injury) ([facility])  Assessment & Plan  Cr 1.5 with baseline around 1.1  Monitor response to Lasix  Improving    Lower extremity edema  Assessment & Plan  Elevated BNP 6426  Monitor response  Lasix DC'ed with improved volume status  ECHO with EF of 60% without diastolic dysfunction noted    Anticoagulated- Eliquis for a. fib- (present on admission)  Assessment & Plan  Continue eliquis    Other specified hypothyroidism- (present on admission)  Assessment & Plan  Continue synthroid       VTE prophylaxis: Apixaban

## 2021-07-03 NOTE — PROGRESS NOTES
Telemetry Shift Summary     Rhythm AFIB with BBB  HR Range 64 - 104  Ectopy O PVC; R COUP; O BIG; F PVC; O TRIG  Measurements - / 0.12 / -           Normal Values  Rhythm SR  HR Range    Measurements 0.12-0.20 / 0.06-0.10  / 0.30-0.52

## 2021-07-03 NOTE — CARE PLAN
Problem: Fall Risk  Goal: Patient will remain free from falls  7/3/2021 0218 by Cassandra Ayala, R.N.  Note: Pt has not fallen during this shift.   7/3/2021 0156 by Cassandra Ayala, R.N.  Outcome: Progressing     Problem: Skin Integrity  Goal: Skin integrity is maintained or improved  Outcome: Progressing  Note: Pt has not shown any new or increased signs of skin breakdown.    The patient is Stable - Low risk of patient condition declining or worsening    Shift Goals  Clinical Goals: control HR,   Patient Goals: rest, control HR  Family Goals: Informed in care plan.     Progress made toward(s) clinical / shift goals:  no falls or new skin breakdown. Tele has shown Afib, but no pauses this shift.     Patient is not progressing towards the following goals: n/a

## 2021-07-03 NOTE — PROGRESS NOTES
Received bedside report and assumed care from KAELYN Manley.   Repositioned pt, now pt resting comfortably, no current needs, universal fall and safety precautions in place, bed in lowest position and call light within reach.

## 2021-07-03 NOTE — CARE PLAN
The patient is Watcher - Medium risk of patient condition declining or worsening    Shift Goals  Clinical Goals: control HR  Patient Goals: eat good food    Progress made toward(s) clinical / shift goals:  Pt with frequent ectopy and cardiac pauses - continue to monitor on tele and update MD as appropriate; coordinate with patient for foods he can tolerate and communicate with kitchen       Problem: Discharge Barriers/Planning  Goal: Patient's continuum of care needs are met  Outcome: Progressing  Note: Discuss with MD for PT/OT eval to assist in SNF placement     Problem: Dysphagia  Goal: Dysphagia will improve  Outcome: Progressing  Note: SLP following pt. Pt with a modified diet per SLP recommendations.

## 2021-07-03 NOTE — DISCHARGE PLANNING
Anticipated Discharge Disposition: SNF     Action: LSW received handoff for pt. Note in Epic from RNGRETA, Modesta @[phone] stating that [employer] has submitted insurance auth. Insurance auth cannot be completed during the weekend. Due to the holiday weekend anticipated update on Tuesday.     Barriers to Discharge: Insurance auth,      Plan: Await insurance auth, care management to continue to follow for d/c needs

## 2021-07-03 NOTE — FLOWSHEET NOTE
07/02/21 2120   Events/Summary/Plan   Events/Summary/Plan CPAP started (no supplemental 02 to CPAP)   Skin Inspection Respiratory Device Intact   Vital Signs   Pulse 97   Respiration 18   Pulse Oximetry 94 %   $ Pulse Oximetry (Spot Check) Yes   Respiratory Assessment   Level of Consciousness Alert   Chest Exam   Work Of Breathing / Effort Mild   Oxygen   O2 Delivery Device CPAP   Non-Invasive Ventilation [employer]   Nocturnal CPAP or BIPAP CPAP - Home Unit   $ System Evaluation Yes   Settings (If Known)   (home settings)   FiO2 or LPM 0

## 2021-07-03 NOTE — PROGRESS NOTES
Telemetry Shift Summary    Rhythm Afib, Aflutter w/ BBB  HR Range   Ectopy O-F PVC// R- coup// R-O bigem/trigem  Measurements -/0.14/-    2.6 sec pause    (Hx: ^^180s, 3.12 sec pause)     Normal Values  Rhythm SR  HR Range    Measurements 0.12-0.20 / 0.06-0.10  / 0.30-0.52

## 2021-07-04 PROCEDURE — 94660 CPAP INITIATION&MGMT: CPT

## 2021-07-04 PROCEDURE — 700102 HCHG RX REV CODE 250 W/ 637 OVERRIDE(OP): Performed by: INTERNAL MEDICINE

## 2021-07-04 PROCEDURE — A9270 NON-COVERED ITEM OR SERVICE: HCPCS | Performed by: INTERNAL MEDICINE

## 2021-07-04 PROCEDURE — 94760 N-INVAS EAR/PLS OXIMETRY 1: CPT

## 2021-07-04 PROCEDURE — 99232 SBSQ HOSP IP/OBS MODERATE 35: CPT | Performed by: INTERNAL MEDICINE

## 2021-07-04 PROCEDURE — 770020 HCHG ROOM/CARE - TELE (206)

## 2021-07-04 RX ADMIN — LEVOTHYROXINE SODIUM 175 MCG: 0.17 TABLET ORAL at 06:10

## 2021-07-04 RX ADMIN — CARVEDILOL 6.25 MG: 6.25 TABLET, FILM COATED ORAL at 09:10

## 2021-07-04 RX ADMIN — CARVEDILOL 6.25 MG: 6.25 TABLET, FILM COATED ORAL at 17:48

## 2021-07-04 RX ADMIN — APIXABAN 2.5 MG: 2.5 TABLET, FILM COATED ORAL at 06:10

## 2021-07-04 RX ADMIN — APIXABAN 2.5 MG: 2.5 TABLET, FILM COATED ORAL at 17:48

## 2021-07-04 ASSESSMENT — ENCOUNTER SYMPTOMS
COUGH: 0
CHILLS: 0
HEARTBURN: 0
BLURRED VISION: 0
BRUISES/BLEEDS EASILY: 0
NAUSEA: 0
HEMOPTYSIS: 0
MYALGIAS: 0
BACK PAIN: 0
DIZZINESS: 0
HEADACHES: 0
DEPRESSION: 0
PALPITATIONS: 0
DOUBLE VISION: 0
FEVER: 0

## 2021-07-04 ASSESSMENT — PAIN DESCRIPTION - PAIN TYPE
TYPE: ACUTE PAIN;CHRONIC PAIN

## 2021-07-04 NOTE — FLOWSHEET NOTE
07/03/21 2115   Events/Summary/Plan   Events/Summary/Plan CPAP Check   Skin Inspection Respiratory Device Intact   Vital Signs   Pulse 80   Respiration 18   Pulse Oximetry 93 %   $ Pulse Oximetry (Spot Check) Yes   Respiratory Assessment   Respiratory Pattern Within Normal Limits   Level of Consciousness Alert   Chest Exam   Work Of Breathing / Effort Mild   Breath Sounds   RUL Breath Sounds Clear   RML Breath Sounds Clear   RLL Breath Sounds Clear;Diminished   ASIF Breath Sounds Clear   LLL Breath Sounds Clear;Diminished   Oxygen   O2 (LPM) 3   O2 Delivery Device Nasal Cannula;CPAP   Non-Invasive Ventilation [employer]   Nocturnal CPAP or BIPAP CPAP - Home Unit   $ System Evaluation Yes   FiO2 or LPM 3

## 2021-07-04 NOTE — PROGRESS NOTES
[facility] Medicine Daily Progress Note    Date of Service  7/4/2021    Chief Complaint  89 y.o. male admitted 6/30/2021 with palpitations    Hospital Course    89 y.o. male who presented 6/30/2021 with Afib RVR. Medical history is significant for Afib (on Eliquis), hypothyroidism. Recent history significant for hospitalization 06/22-06/23/2021 for dehydration secondary to diarrhea. Patient was at outpatient follow up visit today with PCP and was found to be in Afib RVR. Patient additionally endorses fatigue, mild shortness of breath and increased lower extremity swelling. He denies chest pain. Reports compliance with   Upon presentation to the ED. Presenting vitals: /93, , RR 16, SpO2 92% on room air. Initial laboratory evaluation demonstrated WBC 7.4, Hb 14.1, Cr 1.54, troponin 52, BNP 6426. CXR demonstrated no acute process. Patient received Cardizem 10mg IV with improvement in HR from 140 to 100 to 120.     Interval Problem Update    7/1/2021: The patient was admitted for further evaluation and management of atrial fibrillation with rapid ventricular response.  His all medications have been adjusted and he is currently rate controlled.  There was some concern for underlying CHF however echocardiogram showed a preserved ejection fraction of 60% although diastolic function could not be assessed due to arrhythmia.  On physical examination patient is not overtly overloaded.  In terms of her trigger of his A. fib with RVR, patient states that he is compliant with his home medications and denies any febrile illness and he is currently maintained on levothyroxine.  Chest x-ray with findings concerning for pulmonary fibrosis which may be partially contributing to his symptoms.  We will send for outpatient pulmonary follow-up.  At this time patient is comfortable in bed.  He denies any chest pains, palpitations, or shortness of breath.  No other overnight events reported.    7/2/2021: The patient was seen and  evaluated at bedside and appears comfortable.  He continues to tolerate oral intake and denies any chest pains, palpitations, or shortness of breath.  At this time we are pending placement to skilled nursing facility for continued physical therapy.  No other overnight events reported.    7/3/2021: The patient is seen and evaluated at bedside.  He denies any chest pains, discharge, shortness of breath.  Patient was noted to have nonsustained episodes of bradycardia with heart rate into the 30s as well as nonsustained pauses.  During this time patient remained asymptomatic.  Medications have been adjusted accordingly.  Repeat EKG shows heart rate of 75 with rhythm of atrial fibrillation and premature ventricular complexes.  We will continue to monitor closely.  No other overnight events reported.    7/4/2021: Patient's heart rate continues to be well controlled.  He remains pleasant and is alert and oriented to time place and person.  He denies any chest pains, palpitations, shortness of breath.  We are pending placement to skilled nursing facility for continued physical therapy.  Patient is currently medically cleared for discharge once placement has been arranged.  No other overnight events reported.    Consultants/Specialty  None    Code Status  Full Code    Disposition  Pending [facility]    Review of Systems  Review of Systems   Constitutional: Negative for chills and fever.   HENT: Negative for hearing loss and tinnitus.    Eyes: Negative for blurred vision and double vision.   Respiratory: Negative for cough and hemoptysis.    Cardiovascular: Negative for chest pain and palpitations.   Gastrointestinal: Negative for heartburn and nausea.   Genitourinary: Negative for dysuria and urgency.   Musculoskeletal: Negative for back pain and myalgias.   Skin: Negative for itching and rash.   Neurological: Negative for dizziness and headaches.   Endo/Heme/Allergies: Does not bruise/bleed easily.   Psychiatric/Behavioral:  Negative for depression and suicidal ideas.        Physical Exam  Temp:  [35.6 °C (96 °F)-36.6 °C (97.8 °F)] 36.2 °C (97.1 °F)  Pulse:  [] 95  Resp:  [16-20] 16  BP: (111-137)/(71-86) 111/71  SpO2:  [93 %-98 %] 98 %    Physical Exam  Vitals reviewed.   Constitutional:       Appearance: Normal appearance. He is obese.   HENT:      Head: Normocephalic and atraumatic.      Nose: No congestion or rhinorrhea.      Mouth/Throat:      Mouth: Mucous membranes are moist.      Pharynx: Oropharynx is clear.   Eyes:      Extraocular Movements: Extraocular movements intact.      Conjunctiva/sclera: Conjunctivae normal.      Pupils: Pupils are equal, round, and reactive to light.   Cardiovascular:      Rate and Rhythm: Normal rate. Rhythm irregular.      Pulses: Normal pulses.      Heart sounds: Normal heart sounds.   Pulmonary:      Effort: Pulmonary effort is normal.      Breath sounds: Normal breath sounds.   Abdominal:      General: Abdomen is flat. Bowel sounds are normal.      Palpations: Abdomen is soft.   Musculoskeletal:         General: No swelling or tenderness.      Cervical back: Neck supple. No muscular tenderness.   Skin:     General: Skin is warm and dry.   Neurological:      General: No focal deficit present.      Mental Status: He is alert and oriented to person, place, and time. Mental status is at baseline.   Psychiatric:         Mood and Affect: Mood normal.         Behavior: Behavior normal.         Thought Content: Thought content normal.         Fluids    Intake/Output Summary (Last 24 hours) at 7/4/2021 1328  Last data filed at 7/3/2021 2020  Gross per 24 hour   Intake 120 ml   Output 300 ml   Net -180 ml       Laboratory  Recent Labs     07/02/21  0155 07/03/21 0107   WBC 7.7 8.1   RBC 4.46* 4.41*   HEMOGLOBIN 14.7 14.5   HEMATOCRIT 43.2 43.2   MCV 96.9 98.0*   MCH 33.0 32.9   MCHC 34.0 33.6*   RDW 52.2* 54.1*   PLATELETCT 221 258   MPV 10.7 10.9     Recent Labs     07/02/21  0155 07/03/21 0107    SODIUM 137 138   POTASSIUM 3.3* 4.0   CHLORIDE 101 102   CO2 23 27   GLUCOSE 106* 103*   BUN 29* 30*   CREATININE 1.33 1.40   CALCIUM 8.5 8.5                   Imaging  DX-ESOPHAGUS - [id number]   Final Result      EC-ECHOCARDIOGRAM COMPLETE W/O CONT   Final Result      DX-CHEST-PORTABLE (1 VIEW)   Final Result      1.  No significant change since 6/21/2021      2.  Interstitial densities which are likely fibrosis      3.  Enlarged cardiac silhouette           Assessment/Plan  * Atrial fibrillation with RVR ([facility])  Assessment & Plan  Chronic afib managed with eliquis and Lopressor 12.5 BID at home  Patient presents from PCP with rate in the 140's which improved with cardizem  Transitioned to carvedilol for better rate control  Dose adjusted for intermittent bradycardia       Elevated troponin  Assessment & Plan  Denies chest pain  Troponin 52  Suspect type 2 mechanism secondary to increased demand in setting of volume overload and afib RVR    ERIN (acute kidney injury) ([facility])  Assessment & Plan  Cr 1.5 with baseline around 1.1  Monitor response to Lasix  Improving    Lower extremity edema  Assessment & Plan  Elevated BNP 6426  Monitor response  Lasix DC'ed with improved volume status  ECHO with EF of 60% without diastolic dysfunction noted    Anticoagulated- Eliquis for a. fib- (present on admission)  Assessment & Plan  Continue eliquis    Other specified hypothyroidism- (present on admission)  Assessment & Plan  Continue synthroid       VTE prophylaxis: Apixaban

## 2021-07-04 NOTE — CARE PLAN
Problem: Fall Risk  Goal: Patient will remain free from falls  Outcome: Progressing  Note: Reminded pt of high fall risk, pt verbalized agreement to use call light and not try to get out of bed on own. Pt has not fallen this shift.      Problem: Skin Integrity  Goal: Skin integrity is maintained or improved  Outcome: Progressing  Note: Pt has not shown new or increased signs of skin breakdown. Sacral mepilex applied along with barrier paste to prevent any perineal skin breakdown.    The patient is Stable - Low risk of patient condition declining or worsening    Shift Goals  Clinical Goals: monitor HR, tele   Patient Goals: eat, rest  Family Goals: Informed in care plan.     Progress made toward(s) clinical / shift goals: pt has not fallen ad has been able to rest. Pt is still showing Afib but has been asymptomatic with any pauses. HR and tele rhythm being monitored.     Patient is not progressing towards the following goals: n/a

## 2021-07-04 NOTE — PROGRESS NOTES
Provided updates to pt's daughter, Mai, about pt's plan of care regarding discharge planning. Discussed that pt will need SNF for further rehabilitation services before returning to [facility]. All questions answered at this time.    If unable to get ahold of pt's son, Damon, please call Mai. Phone number in chart and listed below for reference.    [phone]

## 2021-07-04 NOTE — FLOWSHEET NOTE
07/03/21 2115   Events/Summary/Plan   Events/Summary/Plan CPAP Check   Skin Inspection Respiratory Device Intact   Vital Signs   Pulse 80   Respiration 18   Pulse Oximetry 93 %   $ Pulse Oximetry (Spot Check) Yes   Respiratory Assessment   Respiratory Pattern Within Normal Limits   Level of Consciousness Alert   Chest Exam   Work Of Breathing / Effort Mild   Breath Sounds   RUL Breath Sounds Clear   RML Breath Sounds Clear   RLL Breath Sounds Clear;Diminished   ASIF Breath Sounds Clear   LLL Breath Sounds Clear;Diminished   Oxygen   O2 (LPM) 3   O2 Delivery Device Nasal Cannula

## 2021-07-04 NOTE — PROGRESS NOTES
Received bedside report and assumed care from KAELYN Rome.   Pt resting comfortably in bed after evening assessment and on cell phone with sonDamon. No current needs, universal fall and safety precautions in place, bed in lowest position and call light within reach.

## 2021-07-04 NOTE — PROGRESS NOTES
Received patient from NOC RN. Assessment complete. A&Ox4, easy to reorient- wakes up confused. Denies pain. POC discussed. Call light within reach. Bed in low, locked position. All needs attended to at this time.

## 2021-07-04 NOTE — CARE PLAN
The patient is Watcher - Medium risk of patient condition declining or worsening    Shift Goals  Clinical Goals: monitor for HR control  Patient Goals: Placement  Family Goals: Informed in care plan.     Progress made toward(s) clinical / shift goals: medicate pt as ordered    Patient is not progressing towards the following goals:

## 2021-07-04 NOTE — PROGRESS NOTES
Telemetry Shift Summary    Rhythm Afib, BBB  HR Range   Ectopy O-F PVC// R- coup, trigem, trip// O-F- pauses up to 2 seconds   Measurements -/0.16/-    Hx: (day 7/3):   --2.8 sec pause (longest)- asympto  --4.2 sec arrest- asympto    Normal Values  Rhythm SR  HR Range    Measurements 0.12-0.20 / 0.06-0.10  / 0.30-0.52

## 2021-07-04 NOTE — FLOWSHEET NOTE
07/03/21 2116   Events/Summary/Plan   Events/Summary/Plan CPAP Check   Skin Inspection Respiratory Device Intact

## 2021-07-05 PROCEDURE — 700102 HCHG RX REV CODE 250 W/ 637 OVERRIDE(OP): Performed by: INTERNAL MEDICINE

## 2021-07-05 PROCEDURE — 97162 PT EVAL MOD COMPLEX 30 MIN: CPT

## 2021-07-05 PROCEDURE — A9270 NON-COVERED ITEM OR SERVICE: HCPCS | Performed by: INTERNAL MEDICINE

## 2021-07-05 PROCEDURE — 99232 SBSQ HOSP IP/OBS MODERATE 35: CPT | Performed by: INTERNAL MEDICINE

## 2021-07-05 PROCEDURE — 92526 ORAL FUNCTION THERAPY: CPT

## 2021-07-05 PROCEDURE — 770020 HCHG ROOM/CARE - TELE (206)

## 2021-07-05 PROCEDURE — 97166 OT EVAL MOD COMPLEX 45 MIN: CPT

## 2021-07-05 PROCEDURE — 94760 N-INVAS EAR/PLS OXIMETRY 1: CPT

## 2021-07-05 PROCEDURE — 94660 CPAP INITIATION&MGMT: CPT

## 2021-07-05 RX ADMIN — LEVOTHYROXINE SODIUM 175 MCG: 0.17 TABLET ORAL at 05:42

## 2021-07-05 RX ADMIN — CARVEDILOL 6.25 MG: 6.25 TABLET, FILM COATED ORAL at 07:47

## 2021-07-05 RX ADMIN — CARVEDILOL 6.25 MG: 6.25 TABLET, FILM COATED ORAL at 17:17

## 2021-07-05 RX ADMIN — DOCUSATE SODIUM 50 MG AND SENNOSIDES 8.6 MG 2 TABLET: 8.6; 5 TABLET, FILM COATED ORAL at 17:17

## 2021-07-05 RX ADMIN — APIXABAN 2.5 MG: 2.5 TABLET, FILM COATED ORAL at 05:42

## 2021-07-05 RX ADMIN — APIXABAN 2.5 MG: 2.5 TABLET, FILM COATED ORAL at 17:17

## 2021-07-05 ASSESSMENT — COGNITIVE AND FUNCTIONAL STATUS - GENERAL
TOILETING: A LOT
STANDING UP FROM CHAIR USING ARMS: A LITTLE
PERSONAL GROOMING: A LOT
CLIMB 3 TO 5 STEPS WITH RAILING: A LOT
DRESSING REGULAR UPPER BODY CLOTHING: A LITTLE
EATING MEALS: A LITTLE
MOBILITY SCORE: 15
SUGGESTED CMS G CODE MODIFIER MOBILITY: CK
MOVING TO AND FROM BED TO CHAIR: A LITTLE
TURNING FROM BACK TO SIDE WHILE IN FLAT BAD: A LITTLE
DAILY ACTIVITIY SCORE: 14
MOVING FROM LYING ON BACK TO SITTING ON SIDE OF FLAT BED: UNABLE
DRESSING REGULAR LOWER BODY CLOTHING: A LOT
HELP NEEDED FOR BATHING: A LOT
SUGGESTED CMS G CODE MODIFIER DAILY ACTIVITY: CK
WALKING IN HOSPITAL ROOM: A LITTLE

## 2021-07-05 ASSESSMENT — ENCOUNTER SYMPTOMS
HEADACHES: 0
WHEEZING: 0
DEPRESSION: 0
BRUISES/BLEEDS EASILY: 0
HEARTBURN: 0
BACK PAIN: 0
FEVER: 0
EYE REDNESS: 0
FLANK PAIN: 0
LOSS OF CONSCIOUSNESS: 0
MYALGIAS: 0
HEMOPTYSIS: 0
BLURRED VISION: 0
DIZZINESS: 0
SEIZURES: 0
COUGH: 0
PALPITATIONS: 0
NAUSEA: 0
FALLS: 0
CHILLS: 0
PND: 0
DOUBLE VISION: 0

## 2021-07-05 ASSESSMENT — GAIT ASSESSMENTS
DISTANCE (FEET): 100
GAIT LEVEL OF ASSIST: MINIMAL ASSIST
DEVIATION: STEP TO;INCREASED BASE OF SUPPORT;BRADYKINETIC;SHUFFLED GAIT;DECREASED HEEL STRIKE;DECREASED TOE OFF
DISTANCE (FEET): 15
ASSISTIVE DEVICE: FRONT WHEEL WALKER

## 2021-07-05 ASSESSMENT — ACTIVITIES OF DAILY LIVING (ADL): TOILETING: INDEPENDENT

## 2021-07-05 ASSESSMENT — FIBROSIS 4 INDEX: FIB4 SCORE: 2.17

## 2021-07-05 NOTE — CARE PLAN
Problem: Fall Risk  Goal: Patient will remain free from falls  Outcome: Progressing     Problem: Mobility  Goal: Patient's capacity to carry out activities will improve  Outcome: Progressing  Note: Assisted pt into cardiac chair this morning with FWW. Pt was able to stand and pivot into chair with stand by assist. Strip alarm on chair.    The patient is Stable - Low risk of patient condition declining or worsening    Shift Goals  Clinical Goals: mobility, monitor HR  Patient Goals: rest  Family Goals: Informed in care plan.     Progress made toward(s) clinical / shift goals:  pts HR has been 70-80s this morning, pt up in cardiac chair    Patient is not progressing towards the following goals:

## 2021-07-05 NOTE — FLOWSHEET NOTE
07/04/21 7499   Events/Summary/Plan   Events/Summary/Plan CPAP Check   Skin Inspection Respiratory Device Intact

## 2021-07-05 NOTE — CARE PLAN
The patient is Stable - Low risk of patient condition declining or worsening    Shift Goals  Clinical Goals: Monitor HR  Patient Goals: Placement  Family Goals: Informed in care plan.     Progress made toward(s) clinical / shift goals:  Patient on telemetry monitor. Continue to monitor and treat as needed.     Patient is not progressing towards the following goals:

## 2021-07-05 NOTE — FLOWSHEET NOTE
07/04/21 2232   Events/Summary/Plan   Events/Summary/Plan CPAP Check   Skin Inspection Respiratory Device Intact   Vital Signs   Pulse 94   Respiration 18   Pulse Oximetry 97 %   $ Pulse Oximetry (Spot Check) Yes   O2 Alarms Set & Reviewed Yes   Respiratory Assessment   Respiratory Pattern Within Normal Limits   Level of Consciousness Alert   Chest Exam   Work Of Breathing / Effort Mild   Breath Sounds   RUL Breath Sounds Clear   RML Breath Sounds Clear   RLL Breath Sounds Diminished   ASIF Breath Sounds Clear   LLL Breath Sounds Diminished   Oxygen   O2 (LPM) 3   O2 Delivery Device CPAP   Non-Invasive Ventilation [facility] [employer]   Nocturnal CPAP or BIPAP CPAP - Home Unit   $ System Evaluation Yes   FiO2 or LPM 3

## 2021-07-05 NOTE — DISCHARGE PLANNING
Anticipated Discharge Disposition:   [facility]     Action:  Chart review complete.     1135: RN CM asked DPA to follow up on insurance auth for [facility]. SNF still pending PT/OT notes. RN CM sent a voalte message to the MD and bedside RN requesting PT/OT order and for them to see the patient today.     1315: PT/OT notes in place and recommendation for PAP. DPA notified.     Barriers to Discharge:   SNF insurance auth    Plan:   Hospital care management will continue to assist with discharge planning needs.

## 2021-07-05 NOTE — THERAPY
Speech Language Pathology  Daily Treatment     Patient Name: Guru Pena  Age:  89 y.o., Sex:  male  Medical Record #: [id number]  Today's Date: 7/5/2021     Precautions  Precautions: Fall Risk    Assessment    The patient was seen on this date for a dysphagia treatment to assess tolerance of his current soft and bite sized diet with thin liquids. The patient was provided PO trials of thin liquids prior to PO intake to assess tolerance of thin liquids between meals. The patient was provided education on results of previous MBSS and taught strategies including single sips. The patient consumed small sips without any overt s/sx of aspiration. The patient was also provided PO trials of peaches. The patient had some throat clearing during PO consumption while also talking, the patient was cued to resume story after deglutition, which was effective in eliminating s/sx of aspiration.     Recommend continue soft and bite sized solids with mildly thick liquids. Thin liquids OK between meals as requested following oral care. Please monitor with meals. Medications whole with mildly thick liquid wash.     Plan    Continue current treatment plan.    Discharge Recommendations: Recommend post-acute placement for additional speech therapy services prior to discharge home    Objective       07/05/21 1508   Dysphagia    Diet / Liquid Recommendation Soft & Bite-Sized (6) - (Dysphagia III);Mildly Thick (2) - (Nectar Thick)   Nutritional Liquid Intake Rating Scale Thickened beverages (mildly thick unless otherwise specified)   Nutritional Food Intake Rating Scale Total oral diet with multiple consistencies without special preparation but with specific food limitations   Nursing Communication Swallow Precaution Sign Posted at Head of Bed   Skilled Intervention Verbal Cueing;Tactile Cueing   Recommended Route of Medication Administration   Medication Administration  Whole with Liquid Wash;Float Whole with Puree   Patient / Family  "Goals   Patient / Family Goal #1 \"Walnuts and gatorade\"   Goal #1 Outcome Progressing as expected   Short Term Goals   Short Term Goal # 1 Pt will consume PO diet of SB6/MT2 with no clinical s/sx of aspiration/penetration.   Goal Outcome # 1 Progressing as expected         "

## 2021-07-05 NOTE — DISCHARGE PLANNING
Agency/Facility Name: [facility]  Spoke To: Luba  Outcome: Requesting PT/OT notes, anticipating getting insurance authorization Wednesday if PT/OT notes are up today.    JUAN JOSE Byers notified.

## 2021-07-05 NOTE — PROGRESS NOTES
Telemetry Shift Summary    Rhythm Afib w/ BBB  HR Range   Ectopy fPvc, rCoup/Big/Trig  Measurements -/0.16/-    Normal Values  Rhythm SR  HR Range    Measurements 0.12-0.20 / 0.06-0.10  / 0.30-0.52

## 2021-07-05 NOTE — FLOWSHEET NOTE
07/05/21 0206   Events/Summary/Plan   Events/Summary/Plan CPAP Check   Skin Inspection Respiratory Device Intact

## 2021-07-05 NOTE — FLOWSHEET NOTE
07/05/21 0206   Events/Summary/Plan   Events/Summary/Plan CPAP Check   Skin Inspection Respiratory Device Intact   Respiratory Assessment   Respiratory Pattern Within Normal Limits   Level of Consciousness Alert   Chest Exam   Work Of Breathing / Effort Mild   Oxygen   O2 (LPM) 3   O2 Delivery Device CPAP   Non-Invasive Ventilation [facility]   Nocturnal CPAP or BIPAP CPAP - Home Unit   $ System Evaluation Yes   FiO2 or LPM 3

## 2021-07-05 NOTE — PROGRESS NOTES
Monitor Summary     Rhythm:afib 76-96  Measurements: -/0.16/-  ECTOPIES: opvc, rcouplets    pts HR touched down to 42, unsustained, asymptomatic.         Normal Values  Rhythm SR  HR Range    Measurements 0.12-0.20 / 0.06-0.10  / 0.30-0.52

## 2021-07-05 NOTE — THERAPY
Physical Therapy   Initial Evaluation     Patient Name: Guru Pena  Age:  89 y.o., Sex:  male  Medical Record #: [id number]  Today's Date: 7/5/2021     Precautions: (P) Fall Risk    Assessment  Patient is 89 y.o. male who was recently admitted for Afib w/RVR and BLE edema. Pt presented to PT with impaired balance, impaired coordination, impaired gait, poor upright posture, dec muscle length in BLE, weakness, and poor activity tolerance. These impairments are effecting the patients ability to safely perform bed mobility, sit<>stands, tranfers, and ambulation at his prior level of function. Pt is a high fall risk with all upright mobility and requires Min A for all functional mobility with FWW use. With current functional mobility pt would benefit from post acute therapy prior to d/c home and may require DEJUAN assistance once d/c from SNF. Will continue to follow while in house.     Plan    Recommend Physical Therapy 3 times per week until therapy goals are met for the following treatments:  Bed Mobility, Community Re-integration, Equipment, Gait Training, Manual Therapy, Neuro Re-Education / Balance, Self Care/Home Evaluation, Stair Training, Therapeutic Activities and Therapeutic Exercises    DC Equipment Recommendations: (P) Unable to determine at this time  Discharge Recommendations: (P) Recommend post-acute placement for additional physical therapy services prior to discharge home     Objective       07/05/21 1217   Initial Contact Note    Initial Contact Note Order Received and Verified, Physical Therapy Evaluation in Progress with Full Report to Follow.   Precautions   Precautions Fall Risk   Pain 0 - 10 Group   Therapist Pain Assessment 0;Nurse Notified   Prior Living Situation   Prior Services [facility] Services;Skilled Home Health Services   Housing / Facility Independent Living Facility;1 Story Apartment / Condo  (five star)   Steps Into Home 0   Steps In Home 0   Equipment Owned Front-Wheel Walker    Lives with - Patient's Self Care Capacity Spouse   Comments pt states spouse is unable to assist physically; both are quite frail and use FWW    Prior Level of Functional Mobility   Bed Mobility Independent   Transfer Status Independent   Ambulation Independent   Distance Ambulation (Feet)   (household distances)   Assistive Devices Used Front-Wheel Walker   Comments pt states of an IPLOF within his apartment setting   Cognition    Cognition / Consciousness WDL   Level of Consciousness Alert   Comments pleasant/cooperative; [place]   Passive ROM Lower Body   Passive ROM Lower Body X   Comments presents with dec muscle length at hamstrings; demonstrates with flexed knee posture when standing and ambulating, this appears to be baseline   Active ROM Lower Body    Active ROM Lower Body  X   Comments same as above   Strength Lower Body   Lower Body Strength  X   Gross Strength Generalized Weakness, Equal Bilaterally   Sensation Lower Body   Lower Extremity Sensation   Not Tested   Lower Body Muscle Tone   Lower Body Muscle Tone  WDL   Strength Upper Body   Upper Body Strength  X   Gross Strength Generalized Weakness, Equal Bilaterally.    Upper Body Muscle Tone   Upper Body Muscle Tone  WDL   Neurological Concerns   Neurological Concerns No   Coordination Lower Body    Coordination Lower Body  X   Comments presetns with poor footplacement during ambulation; inc LIYA    Balance Assessment   Sitting Balance (Static) Fair +   Sitting Balance (Dynamic) Fair   Standing Balance (Static) Fair   Standing Balance (Dynamic) Fair -   Weight Shift Sitting Fair   Weight Shift Standing Fair   Comments w/fww use   Gait Analysis   Gait Level Of Assist Minimal Assist   Assistive Device Front Wheel Walker   Distance (Feet) 100   # of Times Distance was Traveled 105   Deviation Step To;Increased Base Of Support;Bradykinetic;Shuffled Gait;Decreased Heel Strike;Decreased Toe Off   Weight Bearing Status fwb   Comments does well with cues for  long strides and upright standing posture    Bed Mobility    Comments found up in chair; pt states he sleeps in flat bed    Functional Mobility   Sit to Stand Minimal Assist   Bed, Chair, Wheelchair Transfer Minimal Assist   Transfer Method Stand Step   Mobility sit<>stand, ambulation, transfer to chair    Comments cues for handplacement upon sitting; presents with posterior lean upon standing, requires manual facilition for forward lean upon standing    How much difficulty does the patient currently have...   Turning over in bed (including adjusting bedclothes, sheets and blankets)? 3   Sitting down on and standing up from a chair with arms (e.g., wheelchair, bedside commode, etc.) 1   Moving from lying on back to sitting on the side of the bed? 3   How much help from another person does the patient currently need...   Moving to and from a bed to a chair (including a wheelchair)? 3   Need to walk in a hospital room? 3   Climbing 3-5 steps with a railing? 2   6 clicks Mobility Score 15   Activity Tolerance   Sitting in Chair 10 mins   Sitting Edge of Bed NT   Standing 6 mins   Comments limited by fatigue and weakness    Edema / Skin Assessment   Edema / Skin  Not Assessed   Patient / Family Goals    Patient / Family Goal #1 to go home   Short Term Goals    Short Term Goal # 1 pt will go supine<>sit w/hob flat and rails down w/spv in 6tx    Short Term Goal # 2 pt will go sit<>stand w/fww w/spv in 6tx    Short Term Goal # 3 pt will transfer bed<>chair w/fww w/spv in 6tx    Short Term Goal # 4 pt will ambulate for 150ft w/fww w/spv in 6tx    Education Group   Education Provided Role of Physical Therapist   Role of Physical Therapist Patient Response Patient;Acceptance;Explanation;Demonstration;Verbal Demonstration;Action Demonstration   Problem List    Problems Impaired Bed Mobility;Impaired Transfers;Impaired Ambulation;Impaired Balance;Functional Strength Deficit;Impaired Coordination;Decreased Activity  Tolerance;Motor Planning / Sequencing   Anticipated Discharge Equipment and Recommendations   DC Equipment Recommendations Unable to determine at this time   Discharge Recommendations Recommend post-acute placement for additional physical therapy services prior to discharge home   Interdisciplinary Plan of Care Collaboration   IDT Collaboration with  Nursing   Patient Position at End of Therapy Seated;Chair Alarm On;Tray Table within Reach;Call Light within Reach;Phone within Reach   Collaboration Comments aware of visit and recs    Session Information   Date / Session Number  7/5-1 (1/3, 7/11)

## 2021-07-05 NOTE — PROGRESS NOTES
Bedside report received by Sindhu ART. Plan of care discussed. Safety measures in place, call light in reach.    Patient assisted by 2 from cardiac chair back to bed by stand and pivot. Patient appeared to be very weak and unable to tolerate the activity very well.     Assessment complete. No complaints or concerns at this time. Patient ready for bed and awaiting RT to help with cpap.

## 2021-07-05 NOTE — THERAPY
"Occupational Therapy   Initial Evaluation     Patient Name: Guru Pena  Age:  89 y.o., Sex:  male  Medical Record #: [id number]  Today's Date: 7/5/2021     Precautions  Precautions: Fall Risk    Assessment  Patient is 89 y.o. male with a diagnosis of A-fib with RVR discovered at [facility].  Pt was recently in hospital for diarrhea and dehydration. Pt presents weak with limited activity tolerance and balance for ADLs and mobility.  He normally is indep with simple self cares in indep side of [facility] facility, but currently is requiring min to maxA with dressing, toileting and bathing as well as assist with mobility.  Pt would benefit from short stay SNF prior to home to get him back to [facility] as spouse is also frail and unable to be of assist to him at home.  OT will follow while in house     Plan    Recommend Occupational Therapy 3 times per week until therapy goals are met for the following treatments:  Adaptive Equipment, Neuro Re-Education / Balance, Self Care/Activities of Daily Living, Therapeutic Activities and Therapeutic Exercises.    DC Equipment Recommendations: Unable to determine at this time  Discharge Recommendations: Recommend post-acute placement for additional occupational therapy services prior to discharge home       Prior Living Situation   Prior Services Skilled Home Health Services;[facility]   Housing / Facility Independent Living Facility;1 Story Apartment / Condo  (5 star ILF)   Steps Into Home 0   Steps In Home 0   Bathroom Set up Walk In Shower;Grab Bars  (pt said no when asked about shower chair)   Equipment Owned Front-Wheel Walker;Grab Bar(s) By Toilet   Lives with - Patient's Self Care Capacity Spouse   Comments Pt states he normally can do his own self care, but \"today I can't, its hard.\"   Prior Level of ADL Function   Self Feeding Independent   Grooming / Hygiene Independent   Bathing Unable To Determine At This Time   Dressing Independent   Toileting " Independent   Prior Level of IADL Function   Medication Management Unable To Determine At This Time   Laundry Unable To Determine At This Time   Kitchen Mobility Requires Assist   Finances Unable To Determine At This Time   Home Management Requires Assist   Shopping Requires Assist   Prior Level Of Mobility Independent With Device in Home   Driving / Transportation Relatives / Others Provide Transportation   Occupation (Pre-Hospital Vocational) Retired Due To Age   Leisure Interests Unable To Determine At This Time   Comments Pt lives in indep living, unclear if he was getting assist at home, pt unable to fully report   Precautions   Precautions Fall Risk   Vitals   O2 Delivery Device None - Room Air   Pain 0 - 10 Group   Therapist Pain Assessment 0;Nurse Notified;During Activity   Cognition    Cognition / Consciousness X   Level of Consciousness Alert   Comments [place], pleasant, cooperative.  Pt perseverating on some exercises and postural cues he was given for walking, and was unable to change topic well to answer OT PLOF questions.  He is able to follow simple directions for safety    Active ROM Upper Body   Active ROM Upper Body  WDL   Dominant Hand Right   Comments gross WFL. slightly limited by kyphotic posture   Strength Upper Body   Upper Body Strength  X   Gross Strength Generalized Weakness, Equal Bilaterally.    Upper Body Muscle Tone   Upper Body Muscle Tone  WDL   Balance Assessment   Sitting Balance (Static) Fair +   Sitting Balance (Dynamic) Fair   Standing Balance (Static) Fair -   Standing Balance (Dynamic) Poor +   Weight Shift Sitting Fair   Weight Shift Standing Fair   Comments cues for safety with FWW, appears more fatigued after working with PT a short time prior to OT   Bed Mobility    Supine to Sit   (UIC pre and post)   ADL Assessment   Eating Supervision   Grooming Supervision;Seated   Upper Body Dressing Minimal Assist   Lower Body Dressing Maximal Assist   Toileting Maximal Assist  (condom  cath)   Comments asked for help with clothing mgmt and hygiene after BM in [place].  Attempted socks, was unable to reach to his feet   Functional Mobility   Sit to Stand Minimal Assist   Bed, Chair, Wheelchair Transfer Minimal Assist   Toilet Transfers Minimal Assist  (able to use grab bar effectively to stand from toilet, CGA)   Transfer Method Stand Step   Mobility Sit to stand, amb to toilet and then back to chair   Distance (Feet) 15   # of Times Distance was Traveled 2   Comments with FWW, legs appeared to be buckling somewhat from fatigue   Visual Perception   Visual Perception    (appears WFL)   Activity Tolerance   Sitting in Chair > 15 min   Standing 3 min x2   Patient / Family Goals   Patient / Family Goal #1 home   Short Term Goals   Short Term Goal # 1 Pt will dress supervised with setup in 6 visits   Short Term Goal # 2 Pt will stand at sink 5 min to groom supervised in 6 visits   Short Term Goal # 3 Pt will toilet superivsed in 6 visits

## 2021-07-05 NOTE — PROGRESS NOTES
[facility] Medicine Daily Progress Note    Date of Service  7/5/2021    Chief Complaint  89 y.o. male admitted 6/30/2021 with palpitations    Hospital Course    89 y.o. male who presented 6/30/2021 with Afib RVR. Medical history is significant for Afib (on Eliquis), hypothyroidism. Recent history significant for hospitalization 06/22-06/23/2021 for dehydration secondary to diarrhea. Patient was at outpatient follow up visit today with PCP and was found to be in Afib RVR. Patient additionally endorses fatigue, mild shortness of breath and increased lower extremity swelling. He denies chest pain. Reports compliance with   Upon presentation to the ED. Presenting vitals: /93, , RR 16, SpO2 92% on room air. Initial laboratory evaluation demonstrated WBC 7.4, Hb 14.1, Cr 1.54, troponin 52, BNP 6426. CXR demonstrated no acute process. Patient received Cardizem 10mg IV with improvement in HR from 140 to 100 to 120.     Interval Problem Update    7/1/2021: The patient was admitted for further evaluation and management of atrial fibrillation with rapid ventricular response.  His all medications have been adjusted and he is currently rate controlled.  There was some concern for underlying CHF however echocardiogram showed a preserved ejection fraction of 60% although diastolic function could not be assessed due to arrhythmia.  On physical examination patient is not overtly overloaded.  In terms of her trigger of his A. fib with RVR, patient states that he is compliant with his home medications and denies any febrile illness and he is currently maintained on levothyroxine.  Chest x-ray with findings concerning for pulmonary fibrosis which may be partially contributing to his symptoms.  We will send for outpatient pulmonary follow-up.  At this time patient is comfortable in bed.  He denies any chest pains, palpitations, or shortness of breath.  No other overnight events reported.    7/2/2021: The patient was seen and  evaluated at bedside and appears comfortable.  He continues to tolerate oral intake and denies any chest pains, palpitations, or shortness of breath.  At this time we are pending placement to skilled nursing facility for continued physical therapy.  No other overnight events reported.    7/3/2021: The patient is seen and evaluated at bedside.  He denies any chest pains, discharge, shortness of breath.  Patient was noted to have nonsustained episodes of bradycardia with heart rate into the 30s as well as nonsustained pauses.  During this time patient remained asymptomatic.  Medications have been adjusted accordingly.  Repeat EKG shows heart rate of 75 with rhythm of atrial fibrillation and premature ventricular complexes.  We will continue to monitor closely.  No other overnight events reported.    7/4/2021: Patient's heart rate continues to be well controlled.  He remains pleasant and is alert and oriented to time place and person.  He denies any chest pains, palpitations, shortness of breath.  We are pending placement to skilled nursing facility for continued physical therapy.  Patient is currently medically cleared for discharge once placement has been arranged.  No other overnight events reported.    7/5/2021: Patient with mild delirium this morning without agitation. Encouraged staff to frequently reorient the patient. Patient without active complaints at this time. No other overnight events reported. Pending SNF approval.    Consultants/Specialty  None    Code Status  Full Code    Disposition  Pending SNF    Review of Systems  Review of Systems   Constitutional: Negative for chills and fever.   HENT: Negative for hearing loss and tinnitus.    Eyes: Negative for blurred vision, double vision and redness.   Respiratory: Negative for cough, hemoptysis and wheezing.    Cardiovascular: Negative for chest pain, palpitations, leg swelling and PND.   Gastrointestinal: Negative for heartburn, melena and nausea.    Genitourinary: Negative for dysuria, flank pain and urgency.   Musculoskeletal: Negative for back pain, falls, joint pain and myalgias.   Skin: Negative for itching and rash.   Neurological: Negative for dizziness, seizures, loss of consciousness and headaches.   Endo/Heme/Allergies: Does not bruise/bleed easily.   Psychiatric/Behavioral: Negative for depression and suicidal ideas.        Physical Exam  Temp:  [35.9 °C (96.7 °F)-36.4 °C (97.6 °F)] 35.9 °C (96.7 °F)  Pulse:  [] 120  Resp:  [18-20] 18  BP: (102-120)/(62-91) 112/70  SpO2:  [92 %-100 %] 92 %    Physical Exam  Vitals reviewed.   Constitutional:       Appearance: Normal appearance. He is obese.   HENT:      Head: Normocephalic and atraumatic.      Nose: No congestion or rhinorrhea.      Mouth/Throat:      Mouth: Mucous membranes are moist.      Pharynx: Oropharynx is clear.   Eyes:      Extraocular Movements: Extraocular movements intact.      Conjunctiva/sclera: Conjunctivae normal.      Pupils: Pupils are equal, round, and reactive to light.   Cardiovascular:      Rate and Rhythm: Normal rate. Rhythm irregular.      Pulses: Normal pulses.      Heart sounds: Normal heart sounds.   Pulmonary:      Effort: Pulmonary effort is normal.      Breath sounds: Normal breath sounds.   Abdominal:      General: Abdomen is flat. Bowel sounds are normal.      Palpations: Abdomen is soft.      Tenderness: There is no right CVA tenderness, left CVA tenderness, guarding or rebound.   Musculoskeletal:         General: No swelling or tenderness.      Cervical back: Neck supple. No rigidity or tenderness. No muscular tenderness.   Skin:     General: Skin is warm and dry.   Neurological:      General: No focal deficit present.      Mental Status: He is alert and oriented to person, place, and time. Mental status is at baseline.      Cranial Nerves: No cranial nerve deficit.      Sensory: No sensory deficit.   Psychiatric:         Mood and Affect: Mood normal.          Behavior: Behavior normal.         Thought Content: Thought content normal.         Fluids    Intake/Output Summary (Last 24 hours) at 7/5/2021 1223  Last data filed at 7/5/2021 0900  Gross per 24 hour   Intake 480 ml   Output 1100 ml   Net -620 ml       Laboratory  Recent Labs     07/03/21  0107   WBC 8.1   RBC 4.41*   HEMOGLOBIN 14.5   HEMATOCRIT 43.2   MCV 98.0*   MCH 32.9   MCHC 33.6*   RDW 54.1*   PLATELETCT 258   MPV 10.9     Recent Labs     07/03/21  0107   SODIUM 138   POTASSIUM 4.0   CHLORIDE 102   CO2 27   GLUCOSE 103*   BUN 30*   CREATININE 1.40   CALCIUM 8.5                   Imaging  DX-ESOPHAGUS - [id number]   Final Result      EC-ECHOCARDIOGRAM COMPLETE W/O CONT   Final Result      DX-CHEST-PORTABLE (1 VIEW)   Final Result      1.  No significant change since 6/21/2021      2.  Interstitial densities which are likely fibrosis      3.  Enlarged cardiac silhouette           Assessment/Plan  * Atrial fibrillation with RVR (HCC)  Assessment & Plan  Chronic afib managed with eliquis and Lopressor 12.5 BID at home  Patient presents from PCP with rate in the 140's which improved with cardizem  Transitioned to carvedilol for better rate control  Dose adjusted for intermittent bradycardia       Elevated troponin  Assessment & Plan  Denies chest pain  Troponin 52  Suspect type 2 mechanism secondary to increased demand in setting of volume overload and afib RVR    ERIN (acute kidney injury) (Formerly [facility])  Assessment & Plan  Cr 1.5 with baseline around 1.1  Monitor response to Lasix  Improving    Lower extremity edema  Assessment & Plan  Elevated BNP 6426  Monitor response  Lasix DC'ed with improved volume status  ECHO with EF of 60% without diastolic dysfunction noted    Anticoagulated- Eliquis for a. fib- (present on admission)  Assessment & Plan  Continue eliquis    Other specified hypothyroidism- (present on admission)  Assessment & Plan  Continue synthroid       VTE prophylaxis: Apixaban

## 2021-07-06 PROCEDURE — A9270 NON-COVERED ITEM OR SERVICE: HCPCS | Performed by: INTERNAL MEDICINE

## 2021-07-06 PROCEDURE — 94760 N-INVAS EAR/PLS OXIMETRY 1: CPT

## 2021-07-06 PROCEDURE — 700102 HCHG RX REV CODE 250 W/ 637 OVERRIDE(OP): Performed by: INTERNAL MEDICINE

## 2021-07-06 PROCEDURE — 770020 HCHG ROOM/CARE - TELE (206)

## 2021-07-06 PROCEDURE — 94660 CPAP INITIATION&MGMT: CPT

## 2021-07-06 PROCEDURE — 99232 SBSQ HOSP IP/OBS MODERATE 35: CPT | Performed by: HOSPITALIST

## 2021-07-06 RX ADMIN — APIXABAN 2.5 MG: 2.5 TABLET, FILM COATED ORAL at 17:29

## 2021-07-06 RX ADMIN — LEVOTHYROXINE SODIUM 175 MCG: 0.17 TABLET ORAL at 05:30

## 2021-07-06 RX ADMIN — CARVEDILOL 6.25 MG: 6.25 TABLET, FILM COATED ORAL at 08:42

## 2021-07-06 RX ADMIN — CARVEDILOL 6.25 MG: 6.25 TABLET, FILM COATED ORAL at 17:29

## 2021-07-06 RX ADMIN — APIXABAN 2.5 MG: 2.5 TABLET, FILM COATED ORAL at 05:30

## 2021-07-06 ASSESSMENT — ENCOUNTER SYMPTOMS
NAUSEA: 0
MUSCULOSKELETAL NEGATIVE: 1
SEIZURES: 0
DIZZINESS: 0
RESPIRATORY NEGATIVE: 1
PALPITATIONS: 0
GASTROINTESTINAL NEGATIVE: 1
FEVER: 0
FLANK PAIN: 0
VOMITING: 0
HEARTBURN: 0
SHORTNESS OF BREATH: 0
CONSTITUTIONAL NEGATIVE: 1
CARDIOVASCULAR NEGATIVE: 1
LOSS OF CONSCIOUSNESS: 0
DOUBLE VISION: 0
WEIGHT LOSS: 0
PND: 0
COUGH: 0
WHEEZING: 0
PSYCHIATRIC NEGATIVE: 1
FOCAL WEAKNESS: 0
BRUISES/BLEEDS EASILY: 0
DEPRESSION: 0
EYES NEGATIVE: 1
WEAKNESS: 0
CHILLS: 0
FALLS: 0
SORE THROAT: 0
EYE REDNESS: 0
BACK PAIN: 0
MYALGIAS: 0
NEUROLOGICAL NEGATIVE: 1
ABDOMINAL PAIN: 0
DIAPHORESIS: 0
HEADACHES: 0
BLURRED VISION: 0
HEMOPTYSIS: 0

## 2021-07-06 ASSESSMENT — LIFESTYLE VARIABLES: SUBSTANCE_ABUSE: 0

## 2021-07-06 ASSESSMENT — FIBROSIS 4 INDEX: FIB4 SCORE: 2.17

## 2021-07-06 ASSESSMENT — PAIN DESCRIPTION - PAIN TYPE: TYPE: ACUTE PAIN

## 2021-07-06 NOTE — FLOWSHEET NOTE
07/05/21 7330   Events/Summary/Plan   Events/Summary/Plan CPAP Check   Skin Inspection Respiratory Device Intact

## 2021-07-06 NOTE — DISCHARGE PLANNING
Anticipated Discharge Disposition:   SNF    Action:   Chart review complete.     0900: PT/OT notes in place. RN CM asked DPA to follow up on insurance authorization at [place] for this patient.     0915: PT/OT notes sent. Insurance auth remains pending     RM GRETA and LSW will continue to follow.     Barriers to Discharge:   SNF insurance authorization    Plan:   Hospital care management will continue to assist with discharge planning needs.

## 2021-07-06 NOTE — FACE TO FACE
Face to Face Supporting Documentation - Home Health    The encounter with this patient was in whole or in part the primary reason for home health admission.    Date of encounter:   Patient:                    MRN:                       YOB: 2021  Guru Pena  [id number]  7/20/1925     Home health to see patient for:  Skilled Nursing care for assessment, interventions & education    Skilled need for:  Exacerbation of Chronic Disease State afib    Skilled nursing interventions to include:  Comment: exam, vitals, PT/OT    Homebound status evidenced by:  Needs the assistance of another person in order to leave the home. Leaving home requires a considerable and taxing effort. There is a normal inability to leave the home.    Community Physician to provide follow up care: Alex Smart M.D.     Optional Interventions? No      I certify the face to face encounter for this home health care referral meets the CMS requirements and the encounter/clinical assessment with the patient was, in whole, or in part, for the medical condition(s) listed above, which is the primary reason for home health care. Based on my clinical findings: the service(s) are medically necessary, support the need for home health care, and the homebound criteria are met.  I certify that this patient has had a face to face encounter by myself.  Zahraa Tripathi M.D. - NPI: [id number]

## 2021-07-06 NOTE — DISCHARGE PLANNING
Agency/Facility Name: [facility]  Spoke To: Luba  Outcome: Luba just submitted the  New therapy notes for patient.      RN CM informed

## 2021-07-06 NOTE — PROGRESS NOTES
Monitor Summary     Rhythm:Afib   Measurements: -/0.14/-  ECTOPIES: fpvc, rcouplets, rbigem    HR down to 42 unsustained, asymptomatic         Normal Values  Rhythm SR  HR Range    Measurements 0.12-0.20 / 0.06-0.10  / 0.30-0.52

## 2021-07-06 NOTE — CARE PLAN
Problem: Knowledge Deficit - Standard  Goal: Patient and family/care givers will demonstrate understanding of plan of care, disease process/condition, diagnostic tests and medications  Outcome: Progressing  Note: Patient reoriented to surroundings and educated on plan of care. Patient has no questions regarding care at this time. Will require frequent reorientation.     Problem: Fall Risk  Goal: Patient will remain free from falls  Outcome: Progressing  Note: Patient has been educated to call for assistance, call bell within reach. Patient is steady while using FWW. All belongings are within reach.    The patient is Stable - Low risk of patient condition declining or worsening    Shift Goals  Clinical Goals: (P) Monitor HR  Patient Goals: (P) Up to chair for meals  Family Goals: Informed in care plan.     Progress made toward(s) clinical / shift goals:  Patient is up to chair for breakfast and took scheduled medication to control HR.     Patient is not progressing towards the following goals:

## 2021-07-06 NOTE — PROGRESS NOTES
Hospital Medicine Daily Progress Note    Date of Service  7/6/2021    Chief Complaint  89 y.o. male admitted 6/30/2021 with palpitations    Hospital Course  Patient is a 89 year old male with history of afib with rvr (on chronic eliquis), hypothyroidism.  He presented to the ER on 6/30/21 with fatigue and sob.  He was found to be in afib with rvr by his pcp.    In the ER he responded well to iv cardizem.   He was then admitted for further evaluation and management of the afib and his cardiac medication regimen was adjusted.  He was also referred to pulmonary medicine as an outpatient due to chest x ray finding concerning for pulmonary fibrosis. He was also noted to have intermittent delirium and suspected sundowning.    Interval Problem Update  Rate is currently controlled. Patient is currently alert and oriented to person, situation and year.  He denies pain, no dizziness.  He is eating breakfast.  He denies pain, no sob, stable on room air, using his chronic cpap at night.  ROS otherwise negative.  Per case management, awaiting insurance auth for snf - expected tomorrow    Consultants/Specialty  None    Code Status  Full Code    Disposition  Pending SNF    Review of Systems  Review of Systems   Constitutional: Negative.  Negative for chills, diaphoresis, fever, malaise/fatigue and weight loss.   HENT: Negative.  Negative for hearing loss, sore throat and tinnitus.    Eyes: Negative.  Negative for blurred vision, double vision and redness.   Respiratory: Negative.  Negative for cough, hemoptysis, shortness of breath and wheezing.    Cardiovascular: Negative.  Negative for chest pain, palpitations, leg swelling and PND.   Gastrointestinal: Negative.  Negative for abdominal pain, heartburn, melena, nausea and vomiting.   Genitourinary: Negative.  Negative for dysuria, flank pain and urgency.   Musculoskeletal: Negative.  Negative for back pain, falls, joint pain and myalgias.   Skin: Negative.  Negative for itching  and rash.   Neurological: Negative.  Negative for dizziness, focal weakness, seizures, loss of consciousness, weakness and headaches.   Endo/Heme/Allergies: Negative.  Does not bruise/bleed easily.   Psychiatric/Behavioral: Negative.  Negative for depression, substance abuse and suicidal ideas.   All other systems reviewed and are negative.       Physical Exam  Temp:  [36.1 °C (96.9 °F)-36.4 °C (97.5 °F)] 36.1 °C (96.9 °F)  Pulse:  [62-99] 62  Resp:  [17-20] 17  BP: (112-136)/(75-96) 112/75  SpO2:  [91 %-98 %] 91 %    Physical Exam  Vitals and nursing note reviewed. Exam conducted with a chaperone present.   Constitutional:       General: He is not in acute distress.     Appearance: Normal appearance. He is obese. He is not diaphoretic.   HENT:      Head: Normocephalic and atraumatic.      Nose: Nose normal. No congestion or rhinorrhea.      Mouth/Throat:      Mouth: Mucous membranes are moist.      Pharynx: Oropharynx is clear.   Eyes:      Extraocular Movements: Extraocular movements intact.      Conjunctiva/sclera: Conjunctivae normal.      Pupils: Pupils are equal, round, and reactive to light.   Cardiovascular:      Rate and Rhythm: Normal rate. Rhythm irregular.      Pulses: Normal pulses.      Heart sounds: Normal heart sounds.   Pulmonary:      Effort: Pulmonary effort is normal.      Breath sounds: Normal breath sounds.   Abdominal:      General: Abdomen is flat. Bowel sounds are normal.      Palpations: Abdomen is soft.      Tenderness: There is no right CVA tenderness, left CVA tenderness, guarding or rebound.   Musculoskeletal:         General: No swelling, tenderness or deformity. Normal range of motion.      Cervical back: Neck supple. No rigidity or tenderness. No muscular tenderness.   Skin:     General: Skin is warm and dry.      Capillary Refill: Capillary refill takes less than 2 seconds.   Neurological:      General: No focal deficit present.      Mental Status: He is alert and oriented to  person, place, and time. Mental status is at baseline.      Cranial Nerves: No cranial nerve deficit.      Sensory: No sensory deficit.   Psychiatric:         Mood and Affect: Mood normal.         Behavior: Behavior normal.         Thought Content: Thought content normal.         Fluids    Intake/Output Summary (Last 24 hours) at 7/6/2021 1243  Last data filed at 7/6/2021 0900  Gross per 24 hour   Intake 600 ml   Output 1400 ml   Net -800 ml       Laboratory                        Imaging  DX-ESOPHAGUS - [id number]   Final Result      EC-ECHOCARDIOGRAM COMPLETE W/O CONT   Final Result      DX-CHEST-PORTABLE (1 VIEW)   Final Result      1.  No significant change since 6/21/2021      2.  Interstitial densities which are likely fibrosis      3.  Enlarged cardiac silhouette           Assessment/Plan  * Atrial fibrillation with RVR (Formerly [facility])  Assessment & Plan  Chronic afib managed with eliquis and Lopressor 12.5 BID at home  Patient presented from PCP with rate in the 140's which improved with cardizem  Transitioned to carvedilol for better rate control  Dose adjusted for intermittent bradycardia   Currently heart rate in good range      Elevated troponin  Assessment & Plan  Continues to deny chest pain  Troponin 52 - minimally elevated in stable range  Suspect type 2 mechanism secondary to increased demand in setting of volume overload and afib RVR    ERIN (acute kidney injury) (Formerly [facility])  Assessment & Plan  Cr 1.5 with baseline around 1.1  Monitoring intermittently    Lower extremity edema  Assessment & Plan  Elevated BNP 6426  Monitor response  Lasix DC'ed with improved volume status  ECHO with EF of 60% without diastolic dysfunction noted    Anticoagulated- Eliquis for a. fib- (present on admission)  Assessment & Plan  Continue eliquis    Other specified hypothyroidism- (present on admission)  Assessment & Plan  Continue synthroid       VTE prophylaxis: Apixaban

## 2021-07-06 NOTE — FLOWSHEET NOTE
07/05/21 2130   Events/Summary/Plan   Events/Summary/Plan CPAP Check   Skin Inspection Respiratory Device Intact   Vital Signs   Pulse 96   Respiration 20   Pulse Oximetry 93 %   $ Pulse Oximetry (Spot Check) Yes   O2 Alarms Set & Reviewed Yes   Respiratory Assessment   Respiratory Pattern Within Normal Limits   Level of Consciousness Alert   Chest Exam   Work Of Breathing / Effort Mild   Breath Sounds   RUL Breath Sounds Clear   RML Breath Sounds Diminished   RLL Breath Sounds Diminished   ASIF Breath Sounds Clear   LLL Breath Sounds Diminished   Oxygen   O2 (LPM) 3   O2 Delivery Device CPAP

## 2021-07-06 NOTE — CARE PLAN
The patient is Stable - Low risk of patient condition declining or worsening    Shift Goals  Clinical Goals: Mobility, monitor HR  Patient Goals: rest  Family Goals: Informed in care plan.     Progress made toward(s) clinical / shift goals:  Assist patient to ambulate, using the bathroom instead of bedside commode. Monitor HR using tele monitor and provide interventions as needed.    Patient is not progressing towards the following goals:

## 2021-07-06 NOTE — FLOWSHEET NOTE
07/06/21 0220   Events/Summary/Plan   Events/Summary/Plan CPAP Check   Vital Signs   Pulse 95   Respiration 18   Pulse Oximetry 94 %   $ Pulse Oximetry (Spot Check) Yes   O2 Alarms Set & Reviewed Yes   Respiratory Assessment   Respiratory Pattern Within Normal Limits   Oxygen   O2 (LPM) 3   O2 Delivery Device CPAP   Non-Invasive Ventilation [employer]   Nocturnal CPAP or BIPAP CPAP - Home Unit   $ System Evaluation Yes   FiO2 or LPM 3

## 2021-07-06 NOTE — PROGRESS NOTES
Telemetry Shift Summary    Rhythm Afib w/ BBB  HR Range , lowest unsustained 49  Ectopy fPvc, rCoup/Big/Trig, 2.1 sec pause  Measurements -/0.16/-    Normal Values  Rhythm SR  HR Range    Measurements 0.12-0.20 / 0.06-0.10  / 0.30-0.52

## 2021-07-06 NOTE — PROGRESS NOTES
Bedside report received by Vicki ATR & Alexsandra DUFF. Plan of care discussed. Safety measures in place, call light in reach.    Assessment complete. No complaints or concerns at this time. RT in to help patient with Cpap.    Patient woke many times throughout the night and removed Cpap and tried to exit the bed. Patient was pleasant and easily redirectable each time. AOx2 throughout the night which matches previous assessment.

## 2021-07-07 PROBLEM — I48.91 ATRIAL FIBRILLATION WITH RVR (HCC): Status: RESOLVED | Noted: 2021-06-01 | Resolved: 2021-07-07

## 2021-07-07 PROBLEM — N17.9 AKI (ACUTE KIDNEY INJURY) (HCC): Status: RESOLVED | Noted: 2021-06-30 | Resolved: 2021-07-07

## 2021-07-07 PROBLEM — R60.0 LOWER EXTREMITY EDEMA: Status: RESOLVED | Noted: 2021-06-30 | Resolved: 2021-07-07

## 2021-07-07 PROCEDURE — A9270 NON-COVERED ITEM OR SERVICE: HCPCS | Performed by: INTERNAL MEDICINE

## 2021-07-07 PROCEDURE — 700102 HCHG RX REV CODE 250 W/ 637 OVERRIDE(OP): Performed by: INTERNAL MEDICINE

## 2021-07-07 PROCEDURE — 94660 CPAP INITIATION&MGMT: CPT

## 2021-07-07 PROCEDURE — 770020 HCHG ROOM/CARE - TELE (206)

## 2021-07-07 PROCEDURE — 92526 ORAL FUNCTION THERAPY: CPT

## 2021-07-07 PROCEDURE — A9270 NON-COVERED ITEM OR SERVICE: HCPCS | Performed by: HOSPITALIST

## 2021-07-07 PROCEDURE — 99232 SBSQ HOSP IP/OBS MODERATE 35: CPT | Performed by: HOSPITALIST

## 2021-07-07 PROCEDURE — 700102 HCHG RX REV CODE 250 W/ 637 OVERRIDE(OP): Performed by: HOSPITALIST

## 2021-07-07 RX ORDER — CARVEDILOL 6.25 MG/1
6.25 TABLET ORAL 2 TIMES DAILY WITH MEALS
Qty: 60 TABLET | Refills: 0 | Status: SHIPPED | OUTPATIENT
Start: 2021-07-07 | End: 2021-09-14 | Stop reason: SDUPTHER

## 2021-07-07 RX ADMIN — DOCUSATE SODIUM 50 MG AND SENNOSIDES 8.6 MG 2 TABLET: 8.6; 5 TABLET, FILM COATED ORAL at 17:04

## 2021-07-07 RX ADMIN — LEVOTHYROXINE SODIUM 175 MCG: 0.17 TABLET ORAL at 06:11

## 2021-07-07 RX ADMIN — CARVEDILOL 6.25 MG: 6.25 TABLET, FILM COATED ORAL at 17:04

## 2021-07-07 RX ADMIN — APIXABAN 5 MG: 5 TABLET, FILM COATED ORAL at 17:04

## 2021-07-07 RX ADMIN — CARVEDILOL 6.25 MG: 6.25 TABLET, FILM COATED ORAL at 08:24

## 2021-07-07 RX ADMIN — APIXABAN 2.5 MG: 2.5 TABLET, FILM COATED ORAL at 06:12

## 2021-07-07 RX ADMIN — DOCUSATE SODIUM 50 MG AND SENNOSIDES 8.6 MG 2 TABLET: 8.6; 5 TABLET, FILM COATED ORAL at 06:11

## 2021-07-07 ASSESSMENT — ENCOUNTER SYMPTOMS
CHILLS: 0
PALPITATIONS: 0
BLURRED VISION: 0
MUSCULOSKELETAL NEGATIVE: 1
FEVER: 0
WHEEZING: 0
LOSS OF CONSCIOUSNESS: 0
SHORTNESS OF BREATH: 0
SEIZURES: 0
VOMITING: 0
CONSTITUTIONAL NEGATIVE: 1
FALLS: 0
EYE REDNESS: 0
BACK PAIN: 0
HEARTBURN: 0
EYES NEGATIVE: 1
BRUISES/BLEEDS EASILY: 0
DEPRESSION: 0
HEADACHES: 0
DIZZINESS: 0
RESPIRATORY NEGATIVE: 1
CARDIOVASCULAR NEGATIVE: 1
PSYCHIATRIC NEGATIVE: 1
WEIGHT LOSS: 0
NEUROLOGICAL NEGATIVE: 1
ABDOMINAL PAIN: 0
NAUSEA: 0
SORE THROAT: 0
GASTROINTESTINAL NEGATIVE: 1
FOCAL WEAKNESS: 0
HEMOPTYSIS: 0
DIAPHORESIS: 0
FLANK PAIN: 0
DOUBLE VISION: 0
PND: 0
COUGH: 0
MYALGIAS: 0
WEAKNESS: 0

## 2021-07-07 ASSESSMENT — PAIN DESCRIPTION - PAIN TYPE: TYPE: ACUTE PAIN

## 2021-07-07 ASSESSMENT — LIFESTYLE VARIABLES: SUBSTANCE_ABUSE: 0

## 2021-07-07 NOTE — DISCHARGE SUMMARY
"Discharge Summary    CHIEF COMPLAINT ON ADMISSION  Chief Complaint   Patient presents with   • Atrial Fibrillation       Reason for Admission  EMS     Admission Date  6/30/2021    CODE STATUS  Full Code    HPI & HOSPITAL COURSE  Patient is a 89 year old male with history of afib with rvr (on chronic eliquis), hypothyroidism.  He presented to the ER on 6/30/21 with fatigue and sob.  He was found to be in afib with rvr by his pcp.    In the ER he responded well to iv cardizem.   He was then admitted for further evaluation and management of the afib and his cardiac medication regimen was adjusted.  His troponins were minimally elevated due to rvr, he had no associated chest pain or ischemic changes noted on his ekg.     He was referred to pulmonary medicine as an outpatient due to chest x ray finding concerning for pulmonary fibrosis. He was also noted to have intermittent delirium and suspected sundowning.  His afib is currently controlled on coreg and his chronic eliquis dose was adjusted for renal function.  He will be discharged to skilled nursing and he should follow up with his cardiology as an outpatient. His renal function is also back in his baseline range but will require intermittent monitoring.  He also had lower extremity edema on admission and this has nearly resolved.  An Echo revealed a preserved EF of 60%.     He was seen by speech and they have recommended \"1:1 feeding assistance with current diet of soft, bite-sized solids with mildly thick liquids and meds floated in puree (or whole with MTL wash, as tolerated.)\"  He will require ongoing speech therapy at skilled nursing.     Therefore, he is discharged in fair and stable condition to skilled nursing facility.    The patient met 2-midnight criteria for an inpatient stay at the time of discharge.    Discharge Date  7/12/21    FOLLOW UP ITEMS POST DISCHARGE  Cardiology  Pulm  Pcp    DISCHARGE DIAGNOSES  Principal Problem (Resolved):    Atrial " fibrillation with RVR (HCC) POA: Unknown  Active Problems:    Other specified hypothyroidism POA: Yes    Anticoagulated- Eliquis for a. fib POA: Yes    Elevated troponin POA: Unknown    Delirium POA: Unknown  Resolved Problems:    Lower extremity edema POA: Unknown    ERIN (acute kidney injury) (HCC) POA: Unknown      FOLLOW UP  Future Appointments   Date Time Provider Department Center   9/21/2021 10:00 AM Alex Smart M.D. [address] [address]  [phone]          MEDICATIONS ON DISCHARGE     Medication List      START taking these medications      Instructions   carvedilol 6.25 MG Tabs  Commonly known as: COREG   Doctor's comments: Hold sbp less 110 or hr less 50  Take 1 tablet by mouth 2 times a day with meals.  Dose: 6.25 mg        CHANGE how you take these medications      Instructions   apixaban 2.5mg Tabs  What changed:   · medication strength  · how much to take  Commonly known as: ELIQUIS   Take 1 tablet by mouth 2 times a day.  Dose: 2.5 mg        CONTINUE taking these medications      Instructions   levothyroxine 175 MCG Tabs  Commonly known as: SYNTHROID   Take 87.5-175 mcg by mouth every morning on an empty stomach. Once a day, Sunday NIGHT take extra 1 Tablet- NEW RX Per pt has not been taking 2 tablets  175 mcg on Monday, Tuesday, Wednesday, Thursday , Friday, Saturday  87.5 mcg on Sunday  Dose: 87.5-175 mcg     ondansetron 4 MG Tbdp  Commonly known as: ZOFRAN ODT   Take 1 tablet by mouth every 6 hours as needed for Nausea.  Dose: 4 mg     PreserVision AREDS Tabs   Take 1 tablet by mouth 2 times a day.  Dose: 1 tablet     vitamin D 1000 Unit (25 mcg) Tabs  Commonly known as: cholecalciferol   Take 1,000 Units by mouth every day.  Dose: 1,000 Units        STOP taking these medications    metoprolol tartrate 25 MG Tabs  Commonly known as: LOPRESSOR            Allergies  No Known Allergies    DIET  Orders Placed This Encounter    Procedures   • Diet Order Diet: Level 6 - Soft and Bite Sized (1:1 feeding; pills with applesauce or MTL; ok for thin H20 between meals as tolerated, Paper tray-pt request); Liquid level: Level 2 - Mildly Thick; Tray Modifications (optional): SLP - 1:1 Supervisi...     Standing Status:   Standing     Number of Occurrences:   1     Order Specific Question:   Diet:     Answer:   Level 6 - Soft and Bite Sized [23]     Comments:   1:1 feeding; pills with applesauce or MTL; ok for thin H20 between meals as tolerated, Paper tray-pt request     Order Specific Question:   Liquid level     Answer:   Level 2 - Mildly Thick     Order Specific Question:   Tray Modifications (optional)     Answer:   SLP - 1:1 Supervision by Nursing     Order Specific Question:   Tray Modifications (optional)     Answer:   SLP - Deliver to Nursing Station       ACTIVITY  As tolerated.  Weight bearing as tolerated    CONSULTATIONS    PROCEDURES  DX-ESOPHAGUS - [id number]   Final Result      EC-ECHOCARDIOGRAM COMPLETE W/O CONT   Final Result      DX-CHEST-PORTABLE (1 VIEW)   Final Result      1.  No significant change since 6/21/2021      2.  Interstitial densities which are likely fibrosis      3.  Enlarged cardiac silhouette            LABORATORY  Lab Results   Component Value Date    SODIUM 134 (L) 07/09/2021    POTASSIUM 4.1 07/09/2021    CHLORIDE 98 07/09/2021    CO2 23 07/09/2021    GLUCOSE 92 07/09/2021    BUN 26 (H) 07/09/2021    CREATININE 1.27 07/09/2021        Lab Results   Component Value Date    WBC 8.1 07/03/2021    HEMOGLOBIN 14.5 07/03/2021    HEMATOCRIT 43.2 07/03/2021    PLATELETCT 258 07/03/2021        Total time of the discharge was less than 30 minutes.

## 2021-07-07 NOTE — PROGRESS NOTES
0730: Received report and assumed care of pt. pT sleeping at this time, all needs met. Safety measures in place.    1000: Speech at bedside with pt assisting pt eating breakfast, all needs met at this time, no complaints of pain.

## 2021-07-07 NOTE — CARE PLAN
The patient is Stable - Low risk of patient condition declining or worsening    Shift Goals  Clinical Goals: monitor heart rate and rhythm  Patient Goals: sleep well; discharge  Family Goals: Informed in care plan.     Progress made toward(s) clinical / shift goals:  Patient is cardiac monitored. Monitor vital signs. Cluster care and limit interruptions.     Patient is not progressing towards the following goals:

## 2021-07-07 NOTE — PROGRESS NOTES
Tele Summary:     Rhythm: A fib  Rate: 60s-100s  Ectopy: Freq PVCs, rare bigeminy, trigeminy, couplets  Measurements: -/.12/-    Tele summary placed in pt chart

## 2021-07-07 NOTE — THERAPY
Speech Language Pathology  Daily Treatment     Patient Name: Guru Pena  Age:  89 y.o., Sex:  male  Medical Record #: [id number]  Today's Date: 7/7/2021     Precautions: Fall Risk, Swallow Precautions (See Comments)    Assessment    Pt was seen today for f/u dysphagia tx and therapeutic feeding session with lunch tray of soft, bite-sized solids and mildly thick liquids (SB6/MT2.) Per RN, Pt has required feeding assistance recently with meals but has been tolerating well without difficulties. Upon arrival to room, Pt was found asleep; however, was agreeable to fully wake up and consume breakfast tray of chopped pancakes, chopped sausage, minced apples, oatmeal, and orange juice. Pt demonstrated difficulties loading food onto utensil, and required feeding assistance from SLP. Pt consumed PO with no coughing, choking or other overt clinical s/sx of aspiration/penetration. With glasses donned, he continued to demonstrate poor spatial awareness of food and fork/spoon, and often poked at non-food items (such as syrup container, tray, etc.) - RN and OT notified re: these findings. Pt able to hold cup of mildly thick orange juice and feed himself with no overt clinical s/sx of aspiration, but then declined further PO due to feeling full. Quick re-assessment of oral mechanism exam at end of session revealed no oral residue, asymmetry and/or unilateral weakness (including tongue protrusion midline/WFL.)    At this time, recommend 1:1 feeding assistance with current diet of soft, bite-sized solids with mildly thick liquids and meds floated in puree (or whole with MTL wash, as tolerated.) Please hold PO if any difficulties/concerns or change in status. SLP following. Thank you.    Plan    Continue current treatment plan.    Discharge Recommendations: Recommend post-acute placement for additional speech therapy services prior to discharge home     Objective     07/07/21 1025   Cognitive-Linguistic   Level of Consciousness  Alert   Dysphagia    Positioning / Behavior Modification Self Monitoring;Modulate Rate or Bite Size;Multiple Swallows;Alternate Solids and Liquids   Other Treatments Breakfast tray of SB6/MT2   Diet / Liquid Recommendation Soft & Bite-Sized (6) - (Dysphagia III);Mildly Thick (2) - (Nectar Thick)   Nutritional Liquid Intake Rating Scale Thickened beverages (mildly thick unless otherwise specified)   Nutritional Food Intake Rating Scale Total oral diet with multiple consistencies without special preparation but with specific food limitations   Nursing Communication Swallow Precaution Sign Posted at Head of Bed   Skilled Intervention Compensatory Strategies;Verbal Cueing   Recommended Route of Medication Administration   Medication Administration  Whole with Liquid Wash;Float Whole with Puree   Short Term Goals   Short Term Goal # 1 Pt will consume PO diet of SB6/MT2 with no clinical s/sx of aspiration/penetration.   Goal Outcome # 1 Progressing as expected   Education Group   Education Provided Dysphagia;Role of Speech Therapy   Dysphagia Patient Response Patient;Acceptance;Explanation;Verbal Demonstration;Action Demonstration;Reinforcement Needed   Role of SLP Patient Response Patient;Acceptance;Explanation;Verbal Demonstration   Additional Comments will require ongoing assistance/education

## 2021-07-07 NOTE — PROGRESS NOTES
"Hospital Medicine Daily Progress Note    Date of Service  7/7/2021    Chief Complaint  89 y.o. male admitted 6/30/2021 with palpitations    Hospital Course  Patient is a 89 year old male with history of afib with rvr (on chronic eliquis), hypothyroidism.  He presented to the ER on 6/30/21 with fatigue and sob.  He was found to be in afib with rvr by his pcp.    In the ER he responded well to iv cardizem.   He was then admitted for further evaluation and management of the afib and his cardiac medication regimen was adjusted.  He was also referred to pulmonary medicine as an outpatient due to chest x ray finding concerning for pulmonary fibrosis. He was also noted to have intermittent delirium and suspected sundowning.    Interval Problem Update  Sun downing again, this am thought we were \"at his house\", did not know the year, calm and cooperative, denies pain, no dizziness, rate controlled.  Ros otherwise negative    Per case management, still awaiting insurance auth for snf     Consultants/Specialty  None    Code Status  Full Code    Disposition  Pending SNF    Review of Systems  Review of Systems   Constitutional: Negative.  Negative for chills, diaphoresis, fever, malaise/fatigue and weight loss.   HENT: Negative.  Negative for hearing loss, sore throat and tinnitus.    Eyes: Negative.  Negative for blurred vision, double vision and redness.   Respiratory: Negative.  Negative for cough, hemoptysis, shortness of breath and wheezing.    Cardiovascular: Negative.  Negative for chest pain, palpitations, leg swelling and PND.   Gastrointestinal: Negative.  Negative for abdominal pain, heartburn, melena, nausea and vomiting.   Genitourinary: Negative.  Negative for dysuria, flank pain and urgency.   Musculoskeletal: Negative.  Negative for back pain, falls, joint pain and myalgias.   Skin: Negative.  Negative for itching and rash.   Neurological: Negative.  Negative for dizziness, focal weakness, seizures, loss of " consciousness, weakness and headaches.   Endo/Heme/Allergies: Negative.  Does not bruise/bleed easily.   Psychiatric/Behavioral: Negative.  Negative for depression, substance abuse and suicidal ideas.   All other systems reviewed and are negative.       Physical Exam  Temp:  [36.3 °C (97.4 °F)-36.4 °C (97.5 °F)] 36.3 °C (97.4 °F)  Pulse:  [54-99] 54  Resp:  [16-18] 16  BP: (109-137)/(64-85) 112/64  SpO2:  [93 %-96 %] 93 %    Physical Exam  Vitals and nursing note reviewed. Exam conducted with a chaperone present.   Constitutional:       General: He is not in acute distress.     Appearance: Normal appearance. He is obese. He is not diaphoretic.   HENT:      Head: Normocephalic and atraumatic.      Nose: Nose normal. No congestion or rhinorrhea.      Mouth/Throat:      Mouth: Mucous membranes are moist.      Pharynx: Oropharynx is clear.   Eyes:      Extraocular Movements: Extraocular movements intact.      Conjunctiva/sclera: Conjunctivae normal.      Pupils: Pupils are equal, round, and reactive to light.   Cardiovascular:      Rate and Rhythm: Normal rate. Rhythm irregular.      Pulses: Normal pulses.      Heart sounds: Normal heart sounds.   Pulmonary:      Effort: Pulmonary effort is normal.      Breath sounds: Normal breath sounds.   Abdominal:      General: Abdomen is flat. Bowel sounds are normal.      Palpations: Abdomen is soft.      Tenderness: There is no right CVA tenderness, left CVA tenderness, guarding or rebound.   Musculoskeletal:         General: No swelling, tenderness or deformity. Normal range of motion.      Cervical back: Neck supple. No rigidity or tenderness. No muscular tenderness.   Skin:     General: Skin is warm and dry.      Capillary Refill: Capillary refill takes less than 2 seconds.   Neurological:      General: No focal deficit present.      Mental Status: He is alert and oriented to person, place, and time. Mental status is at baseline.      Cranial Nerves: No cranial nerve deficit.       Sensory: No sensory deficit.   Psychiatric:         Mood and Affect: Mood normal.         Behavior: Behavior normal.         Thought Content: Thought content normal.         Fluids    Intake/Output Summary (Last 24 hours) at 7/7/2021 1630  Last data filed at 7/7/2021 1534  Gross per 24 hour   Intake 240 ml   Output 1875 ml   Net -1635 ml       Laboratory                        Imaging  DX-ESOPHAGUS - [id number]   Final Result      EC-ECHOCARDIOGRAM COMPLETE W/O CONT   Final Result      DX-CHEST-PORTABLE (1 VIEW)   Final Result      1.  No significant change since 6/21/2021      2.  Interstitial densities which are likely fibrosis      3.  Enlarged cardiac silhouette           Assessment/Plan  Elevated troponin  Assessment & Plan  Continues to deny chest pain  Troponin 52 - minimally elevated in stable range  Suspect type 2 mechanism secondary to increased demand in setting of volume overload and afib RVR    Anticoagulated- Eliquis for a. fib- (present on admission)  Assessment & Plan  Continue eliquis    Other specified hypothyroidism- (present on admission)  Assessment & Plan  Continue synthroid       VTE prophylaxis: Apixaban

## 2021-07-07 NOTE — DISCHARGE PLANNING
Anticipated Discharge Disposition:   SNF when insurance auth successful     Action:   Chart review complete.     Per MD, patient is medically cleared to discharge to SNF when cleared. Insurance auth pending for [facility]. RN CM to ask JP to follow up with [facility] for insurance auth progress.     1028: KAELYN HERNANDES called Luba with [facility]. Luba is hoping to have insurance auth by the end of today. She will call either myself or JP Rollins when auth is completed.     Barriers to Discharge:   Insurance auth for SNF    Plan:   Hospital care management will continue to assist with discharge planning needs.

## 2021-07-07 NOTE — PROGRESS NOTES
Telemetry Shift Summary    Rhythm Afib  HR Range   Ectopy rPVC, rCoup, rTrig, rBig, rare 2 sec pause  Measurements -/0.12/-        Normal Values  Rhythm SR  HR Range    Measurements 0.12-0.20 / 0.06-0.10  / 0.30-0.52

## 2021-07-08 PROBLEM — R41.0 DELIRIUM: Status: ACTIVE | Noted: 2021-07-08

## 2021-07-08 PROCEDURE — 770020 HCHG ROOM/CARE - TELE (206)

## 2021-07-08 PROCEDURE — A9270 NON-COVERED ITEM OR SERVICE: HCPCS | Performed by: HOSPITALIST

## 2021-07-08 PROCEDURE — 97116 GAIT TRAINING THERAPY: CPT

## 2021-07-08 PROCEDURE — 700102 HCHG RX REV CODE 250 W/ 637 OVERRIDE(OP): Performed by: INTERNAL MEDICINE

## 2021-07-08 PROCEDURE — 97530 THERAPEUTIC ACTIVITIES: CPT

## 2021-07-08 PROCEDURE — A9270 NON-COVERED ITEM OR SERVICE: HCPCS | Performed by: INTERNAL MEDICINE

## 2021-07-08 PROCEDURE — 99232 SBSQ HOSP IP/OBS MODERATE 35: CPT | Performed by: HOSPITALIST

## 2021-07-08 PROCEDURE — 700102 HCHG RX REV CODE 250 W/ 637 OVERRIDE(OP): Performed by: HOSPITALIST

## 2021-07-08 PROCEDURE — 94660 CPAP INITIATION&MGMT: CPT

## 2021-07-08 RX ADMIN — APIXABAN 5 MG: 5 TABLET, FILM COATED ORAL at 16:53

## 2021-07-08 RX ADMIN — APIXABAN 5 MG: 5 TABLET, FILM COATED ORAL at 06:00

## 2021-07-08 RX ADMIN — CARVEDILOL 6.25 MG: 6.25 TABLET, FILM COATED ORAL at 07:54

## 2021-07-08 RX ADMIN — LEVOTHYROXINE SODIUM 175 MCG: 0.17 TABLET ORAL at 05:25

## 2021-07-08 ASSESSMENT — COGNITIVE AND FUNCTIONAL STATUS - GENERAL
MOBILITY SCORE: 14
SUGGESTED CMS G CODE MODIFIER MOBILITY: CL
TURNING FROM BACK TO SIDE WHILE IN FLAT BAD: A LITTLE
CLIMB 3 TO 5 STEPS WITH RAILING: TOTAL
WALKING IN HOSPITAL ROOM: A LOT
STANDING UP FROM CHAIR USING ARMS: A LOT
MOVING FROM LYING ON BACK TO SITTING ON SIDE OF FLAT BED: A LITTLE
MOVING TO AND FROM BED TO CHAIR: A LITTLE

## 2021-07-08 ASSESSMENT — GAIT ASSESSMENTS
GAIT LEVEL OF ASSIST: MODERATE ASSIST
DISTANCE (FEET): 50
ASSISTIVE DEVICE: FRONT WHEEL WALKER
DISTANCE (FEET): 60
DEVIATION: SHUFFLED GAIT;BRADYKINETIC

## 2021-07-08 ASSESSMENT — ENCOUNTER SYMPTOMS
WHEEZING: 0
ABDOMINAL PAIN: 0
COUGH: 0
HEADACHES: 0
VOMITING: 0
NAUSEA: 0
FLANK PAIN: 0
FEVER: 0
BACK PAIN: 0
NEUROLOGICAL NEGATIVE: 1
EYE REDNESS: 0
BRUISES/BLEEDS EASILY: 0
EYES NEGATIVE: 1
SEIZURES: 0
PND: 0
PSYCHIATRIC NEGATIVE: 1
LOSS OF CONSCIOUSNESS: 0
FOCAL WEAKNESS: 0
HEARTBURN: 0
MYALGIAS: 0
CONSTITUTIONAL NEGATIVE: 1
GASTROINTESTINAL NEGATIVE: 1
WEAKNESS: 0
CARDIOVASCULAR NEGATIVE: 1
RESPIRATORY NEGATIVE: 1
DIZZINESS: 0
WEIGHT LOSS: 0
SORE THROAT: 0
SHORTNESS OF BREATH: 0
CHILLS: 0
BLURRED VISION: 0
PALPITATIONS: 0
DOUBLE VISION: 0
HEMOPTYSIS: 0
DIAPHORESIS: 0
MUSCULOSKELETAL NEGATIVE: 1
DEPRESSION: 0
FALLS: 0

## 2021-07-08 ASSESSMENT — LIFESTYLE VARIABLES: SUBSTANCE_ABUSE: 0

## 2021-07-08 ASSESSMENT — PAIN DESCRIPTION - PAIN TYPE
TYPE: ACUTE PAIN
TYPE: ACUTE PAIN

## 2021-07-08 NOTE — PROGRESS NOTES
Telemetry Shift Summary    Rhythm AFib  HR Range 90s-120s  Ectopy frequent PVC, occasional couplets/bigeminy/trigeminy  Measurements --/0.12/--        Normal Values  Rhythm SR  HR Range    Measurements 0.12-0.20 / 0.06-0.10  / 0.30-0.52

## 2021-07-08 NOTE — THERAPY
"Physical Therapy   Daily Treatment     Patient Name: Guru Pena  Age:  89 y.o., Sex:  male  Medical Record #: [id number]  Today's Date: 7/8/2021     Precautions: Fall Risk    Assessment    Patient seen for follow up PT session and appears more confused today.  He needs physical assist for all mobility and short distance gait. Unable to self-correct posture despite cueing and ambulates with significant hip, knee and trunk flexion. Patient following commands but is unaware of where he is and why.  He will need placement for further therapy prior to DC back to [facility].     Plan    Continue current treatment plan.    DC Equipment Recommendations: Unable to determine at this time  Discharge Recommendations: Recommend post-acute placement for additional physical therapy services prior to discharge home      Subjective    \"Ok, come on in!\"     Objective     07/08/21 1340   Precautions   Precautions Fall Risk   Pain 0 - 10 Group   Therapist Pain Assessment 0;Post Activity Pain Same as Prior to Activity   Cognition    Cognition / Consciousness X   Orientation Level Not Oriented to Place;Not Oriented to Reason;Not Oriented to Time   Level of Consciousness Alert   Ability To Follow Commands 1 Step   Comments [place], pleasant and confused. Follows commands but with delays and needs tactile cueing    Sitting Lower Body Exercises   Sitting Lower Body Exercises Yes   Lat Pull 1 set of 10   Ankle Pumps 1 set of 10   Long Arc Quad 1 set of 10   Marching Reciprocal;1 set of 10   Comments pre-gait warm up    Neuro-Muscular Treatments   Neuro-Muscular Treatments Postural Changes;Postural Facilitation;Tapping;Verbal Cuing   Vision   Vision Comments wear glasses    Other Treatments   Other Treatments Provided re-orientation and postural facilitation with cueing for self-pacing    Balance   Sitting Balance (Static) Fair +   Sitting Balance (Dynamic) Fair   Standing Balance (Static) Poor +   Standing Balance (Dynamic) Trace + "   Weight Shift Sitting Fair   Weight Shift Standing Poor   Skilled Intervention Verbal Cuing;Tactile Cuing   Comments shuffled gait, kyphotic posture    Gait Analysis   Gait Level Of Assist Moderate Assist   Assistive Device Front Wheel Walker   Distance (Feet) 50   # of Times Distance was Traveled 1   Deviation Shuffled Gait;Bradykinetic   Skilled Intervention Verbal Cuing;Tactile Cuing;Sequencing;Postural Facilitation   Comments responds well to rhythmic cueing for progression of step length and johnnie. Needs Min-ModA to navigate FWW and fatigues easily    Bed Mobility    Supine to Sit Moderate Assist   Scooting Minimal Assist   Rolling Moderate Assist to Lt.   Skilled Intervention Verbal Cuing;Sequencing;Tactile Cuing   Functional Mobility   Sit to Stand Minimal Assist   Bed, Chair, Wheelchair Transfer Moderate Assist   Skilled Intervention Sequencing;Tactile Cuing;Postural Facilitation;Verbal Cuing   Comments Max fatigue at end of mobility    How much difficulty does the patient currently have...   Turning over in bed (including adjusting bedclothes, sheets and blankets)? 3   Sitting down on and standing up from a chair with arms (e.g., wheelchair, bedside commode, etc.) 3   Moving from lying on back to sitting on the side of the bed? 3   How much help from another person does the patient currently need...   Moving to and from a bed to a chair (including a wheelchair)? 2   Need to walk in a hospital room? 2   Climbing 3-5 steps with a railing? 1   6 clicks Mobility Score 14   Activity Tolerance   Sitting in Chair post session    Sitting Edge of Bed 5 min   Standing 12 min    Patient / Family Goals    Patient / Family Goal #1 to go home   Goal #1 Outcome Progressing as expected   Short Term Goals    Short Term Goal # 1 pt will go supine<>sit w/hob flat and rails down w/spv in 6tx    Goal Outcome # 1 goal not met   Short Term Goal # 2 pt will go sit<>stand w/fww w/spv in 6tx    Goal Outcome # 2 Goal not met    Short Term Goal # 3 pt will transfer bed<>chair w/fww w/spv in 6tx    Goal Outcome # 3 Goal not met   Short Term Goal # 4 pt will ambulate for 150ft w/fww w/spv in 6tx    Goal Outcome # 4 Goal not met   Anticipated Discharge Equipment and Recommendations   DC Equipment Recommendations Unable to determine at this time   Discharge Recommendations Recommend post-acute placement for additional physical therapy services prior to discharge home       Mabel Chen, PT, DPT, GCS

## 2021-07-08 NOTE — DISCHARGE PLANNING
Anticipated Discharge Disposition: [facility] when insurance auth is successful    Action: LSW called [facility], [phone], to check on insurance auth. Left VM for Jeanette w/ callback number, [phone].    Barriers to Discharge: Insurance auth    Plan: LSW to await callback from Jeanette regarding insurance auth.    0913: LSW reached out to UR RN, Brigida, via Voalte to see if there was anything more we could do for the insurance auth. Per Brigida, she has a contact at [facility] and will give them a call.    1000: LSW met w/ pt and pt's son, Damon, at bedside to deliver IMM and update on insurance auth. Damon expressed understanding that the d/c hold up is currently w/ insurance.Pt expressed understanding and signed IMM 7/8/2021 at 1000. LSW faxed IMM to [facility].    1045: Brigida left VM for Gillian at [facility]. Brigida provided this LSW w/ the number on the auth cert page ([phone]).     1120: LSW received message from Brigida stating she received a call back from Gillian. Per Gillian, they received the referral and Gillian is sending a message to Michaela (who is reviewing the SNF referral). Brigida gave Gillian this LSW's number to send to Michaela. Per Gillian, she thinks Michaela will call this LSW today.    1510: LSW received phone call from Brigida. Brigida was able to get in contact with Michaela from [facility]. Per Michaela, they still need necessary documentation. Per Brigida, she will send most recent PT/OT notes and necessary documents. Per Brigida, Michaela will be reviewing the referral tomorrow and will possibly have an answer by 1200. Michaela's number is [phone]. The reference number is [id number]. LSW to follow up w/ Michaela from [facility] tomorrow.

## 2021-07-08 NOTE — CARE PLAN
The patient is Stable - Low risk of patient condition declining or worsening    Shift Goals  Clinical Goals: Monitor heart rhythm and rate  Patient Goals: Keep comfortable  Family Goals: Informed in care plan.     Progress made toward(s) clinical / shift goals:  yes    Patient is not progressing towards the following goals: n/a  Problem: Knowledge Deficit - Standard  Goal: Patient and family/care givers will demonstrate understanding of plan of care, disease process/condition, diagnostic tests and medications  Outcome: Progressing     Problem: Fall Risk  Goal: Patient will remain free from falls  Outcome: Progressing

## 2021-07-08 NOTE — CARE PLAN
The patient is Stable - Low risk of patient condition declining or worsening    Shift Goals  Clinical Goals: Discharge to SNF  Patient Goals: Eat meals, visit with son  Family Goals: Receive update from care team    Progress made toward(s) clinical / shift goals:  yes     Patient is not progressing towards the following goals: n/a      Problem: Knowledge Deficit - Standard  Goal: Patient and family/care givers will demonstrate understanding of plan of care, disease process/condition, diagnostic tests and medications  Outcome: Progressing     Problem: Fall Risk  Goal: Patient will remain free from falls  Outcome: Progressing     Problem: Pain - Standard  Goal: Alleviation of pain or a reduction in pain to the patient’s comfort goal  Outcome: Progressing

## 2021-07-08 NOTE — THERAPY
Occupational Therapy  Daily Treatment     Patient Name: Guru Pena  Age:  89 y.o., Sex:  male  Medical Record #: [id number]  Today's Date: 7/8/2021     Precautions  Precautions: Fall Risk    Assessment    Pt agreeable to therapy.  Required more assist for bed mobility today.  Able to walk with FWW but slower pace and required increased assist for balance than previous sessions.  Fatigued easily today.  PT not safe to return to independent living, will need further inpt post acute therapy prior to home.  OT will follow while in house.    Plan    Continue current treatment plan.    DC Equipment Recommendations: Unable to determine at this time  Discharge Recommendations: Recommend post-acute placement for additional occupational therapy services prior to discharge home       07/08/21 1409   Cognition    Cognition / Consciousness X   Level of Consciousness Alert   Comments [place], pleasant, cooperative, confused.  Not oriented to place, unable to answer questions about his place of residence.  Gets distracted asking about weather and other random subjects.   Strength Upper Body   Upper Body Strength  X   Gross Strength Generalized Weakness, Equal Bilaterally.    Comments Using BUE to scoot back into the chair   Sitting Upper Body Exercises   Comments BUE AROM sitting EOB   Other Treatments   Other Treatments Provided BLE ex's seated EOB   Balance   Sitting Balance (Static) Fair +   Sitting Balance (Dynamic) Fair   Standing Balance (Static) Poor +   Standing Balance (Dynamic) Trace +   Weight Shift Sitting Fair   Weight Shift Standing Poor   Skilled Intervention Verbal Cuing;Tactile Cuing;Postural Facilitation   Comments shuffled gait, kyphotic posture, difficulty maintaining upright posture in standing   Bed Mobility    Supine to Sit Moderate Assist   Sit to Supine   (UIC post)   Scooting Minimal Assist   Rolling Moderate Assist to Lt.   Activities of Daily Living   Lower Body Dressing Maximal Assist   Functional  Mobility   Sit to Stand Minimal Assist   Bed, Chair, Wheelchair Transfer Minimal Assist   Mobility sit to stand, walk into [place].  Started at [place] x2, progressed to more ModA x 1-2 with fatigue   Distance (Feet) 60   # of Times Distance was Traveled 1   Skilled Intervention Verbal Cuing;Tactile Cuing;Postural Facilitation   Comments Pt quite fatigued by the end of this walk.  Activity tolerance appears less than previous visits   Visual Perception   Comments glasses, can't lift and turn head to see where his is going 2/2 kyphosis and ROM limitations   Activity Tolerance   Sitting in Chair > 15 min   Sitting Edge of Bed 8 min   Standing 8 min   Comments limited by fatigue   Patient / Family Goals   Patient / Family Goal #1 home   Short Term Goals   Short Term Goal # 1 Pt will dress supervised with setup in 6 visits   Goal Outcome # 1 Progressing slower than expected   Short Term Goal # 2 Pt will stand at sink 5 min to groom supervised in 6 visits   Goal Outcome # 2 Goal not met   Short Term Goal # 3 Pt will toilet superivsed in 6 visits   Goal Outcome # 3 Progressing slower than expected

## 2021-07-08 NOTE — DISCHARGE PLANNING
Agency/Facility Name: [facility]  Spoke To: Luba  Outcome: Still waiting on ins auth.    LSW informed

## 2021-07-08 NOTE — PROGRESS NOTES
Hospital Medicine Daily Progress Note    Date of Service  7/8/2021    Chief Complaint  89 y.o. male admitted 6/30/2021 with palpitations    Hospital Course  Patient is a 89 year old male with history of afib with rvr (on chronic eliquis), hypothyroidism.  He presented to the ER on 6/30/21 with fatigue and sob.  He was found to be in afib with rvr by his pcp.    In the ER he responded well to iv cardizem.   He was then admitted for further evaluation and management of the afib and his cardiac medication regimen was adjusted.  He was also referred to pulmonary medicine as an outpatient due to chest x ray finding concerning for pulmonary fibrosis. He was also noted to have intermittent delirium and suspected sundowning.    Interval Problem Update  Again confused this am, thinks we are at his home in [place] and the year is 1969.  He does recognize his son who is at bedside, per son he has been more clear in the afternoons.  Pt is alert and appropriate, denies pain, no sob, euvolemic on exam.  Heart rate well controlled, stable on room air.  Ros otherwise negative.  Discussed with patient, his son and bedside nursing    Per case management, still awaiting insurance auth for snf     Consultants/Specialty  None    Code Status  Full Code    Disposition  Pending SNF    Review of Systems  Review of Systems   Constitutional: Negative.  Negative for chills, diaphoresis, fever, malaise/fatigue and weight loss.   HENT: Negative.  Negative for hearing loss, sore throat and tinnitus.    Eyes: Negative.  Negative for blurred vision, double vision and redness.   Respiratory: Negative.  Negative for cough, hemoptysis, shortness of breath and wheezing.    Cardiovascular: Negative.  Negative for chest pain, palpitations, leg swelling and PND.   Gastrointestinal: Negative.  Negative for abdominal pain, heartburn, melena, nausea and vomiting.   Genitourinary: Negative.  Negative for dysuria, flank pain and urgency.   Musculoskeletal:  Negative.  Negative for back pain, falls, joint pain and myalgias.   Skin: Negative.  Negative for itching and rash.   Neurological: Negative.  Negative for dizziness, focal weakness, seizures, loss of consciousness, weakness and headaches.   Endo/Heme/Allergies: Negative.  Does not bruise/bleed easily.   Psychiatric/Behavioral: Negative.  Negative for depression, substance abuse and suicidal ideas.   All other systems reviewed and are negative.       Physical Exam  Temp:  [36.3 °C (97.4 °F)] 36.3 °C (97.4 °F)  Pulse:  [] 86  Resp:  [16-18] 17  BP: (109-141)/() 141/119  SpO2:  [93 %-96 %] 94 %    Physical Exam  Vitals and nursing note reviewed. Exam conducted with a chaperone present.   Constitutional:       General: He is not in acute distress.     Appearance: Normal appearance. He is obese. He is not diaphoretic.   HENT:      Head: Normocephalic and atraumatic.      Nose: Nose normal. No congestion or rhinorrhea.      Mouth/Throat:      Mouth: Mucous membranes are moist.      Pharynx: Oropharynx is clear.   Eyes:      Extraocular Movements: Extraocular movements intact.      Conjunctiva/sclera: Conjunctivae normal.      Pupils: Pupils are equal, round, and reactive to light.   Cardiovascular:      Rate and Rhythm: Normal rate. Rhythm irregular.      Pulses: Normal pulses.      Heart sounds: Normal heart sounds.   Pulmonary:      Effort: Pulmonary effort is normal.      Breath sounds: Normal breath sounds.   Abdominal:      General: Abdomen is flat. Bowel sounds are normal.      Palpations: Abdomen is soft.      Tenderness: There is no right CVA tenderness, left CVA tenderness, guarding or rebound.   Musculoskeletal:         General: No swelling, tenderness or deformity. Normal range of motion.      Cervical back: Neck supple. No rigidity or tenderness. No muscular tenderness.   Skin:     General: Skin is warm and dry.      Capillary Refill: Capillary refill takes less than 2 seconds.   Neurological:       General: No focal deficit present.      Mental Status: He is alert and oriented to person, place, and time. Mental status is at baseline.      Cranial Nerves: No cranial nerve deficit.      Sensory: No sensory deficit.   Psychiatric:         Mood and Affect: Mood normal.         Behavior: Behavior normal.         Thought Content: Thought content normal.         Fluids    Intake/Output Summary (Last 24 hours) at 7/8/2021 1136  Last data filed at 7/8/2021 0742  Gross per 24 hour   Intake 120 ml   Output 750 ml   Net -630 ml       Laboratory                        Imaging  DX-ESOPHAGUS - [id number]   Final Result      EC-ECHOCARDIOGRAM COMPLETE W/O CONT   Final Result      DX-CHEST-PORTABLE (1 VIEW)   Final Result      1.  No significant change since 6/21/2021      2.  Interstitial densities which are likely fibrosis      3.  Enlarged cardiac silhouette           Assessment/Plan  Delirium  Assessment & Plan  Suspect due to sundowning  following    Elevated troponin  Assessment & Plan  Continues to deny chest pain  Troponin 52 - minimally elevated in stable range  Suspect type 2 mechanism secondary to increased demand in setting of volume overload and afib RVR    Anticoagulated- Eliquis for a. fib- (present on admission)  Assessment & Plan  Continue eliquis    Other specified hypothyroidism- (present on admission)  Assessment & Plan  Continue synthroid       VTE prophylaxis: Apixaban

## 2021-07-08 NOTE — PROGRESS NOTES
Telemetry Shift Summary    Rhythm Afib  HR Range   Ectopy F PVC, R Couplet/Bigem/Trigem  Measurements -/0.16/-        Normal Values  Rhythm SR  HR Range    Measurements 0.12-0.20 / 0.06-0.10  / 0.30-0.52

## 2021-07-08 NOTE — PROGRESS NOTES
Received bedside report from KAELYN Chaudhry. Pt resting comfortably, no current needs, universal fall and safety precautions in place, bed in lowest position, bed alarm on and call light within reach.

## 2021-07-08 NOTE — PROGRESS NOTES
Gave bedside report to KAELYN Chaudhry. Pt is still resting in bed.   Universal fall and safety precautions in place, bed in lowest and call light within reach.   POC discussed with KAELYN Chaudhry.   Pt has no current needs at this time, care transferred.

## 2021-07-09 LAB
ANION GAP SERPL CALC-SCNC: 13 MMOL/L (ref 7–16)
BUN SERPL-MCNC: 26 MG/DL (ref 8–22)
CALCIUM SERPL-MCNC: 8.8 MG/DL (ref 8.4–10.2)
CHLORIDE SERPL-SCNC: 98 MMOL/L (ref 96–112)
CO2 SERPL-SCNC: 23 MMOL/L (ref 20–33)
CREAT SERPL-MCNC: 1.27 MG/DL (ref 0.5–1.4)
GLUCOSE SERPL-MCNC: 92 MG/DL (ref 65–99)
POTASSIUM SERPL-SCNC: 4.1 MMOL/L (ref 3.6–5.5)
SODIUM SERPL-SCNC: 134 MMOL/L (ref 135–145)

## 2021-07-09 PROCEDURE — A9270 NON-COVERED ITEM OR SERVICE: HCPCS | Performed by: INTERNAL MEDICINE

## 2021-07-09 PROCEDURE — 97116 GAIT TRAINING THERAPY: CPT

## 2021-07-09 PROCEDURE — 770020 HCHG ROOM/CARE - TELE (206)

## 2021-07-09 PROCEDURE — 94660 CPAP INITIATION&MGMT: CPT

## 2021-07-09 PROCEDURE — 99232 SBSQ HOSP IP/OBS MODERATE 35: CPT | Performed by: HOSPITALIST

## 2021-07-09 PROCEDURE — 700102 HCHG RX REV CODE 250 W/ 637 OVERRIDE(OP): Performed by: INTERNAL MEDICINE

## 2021-07-09 PROCEDURE — 700102 HCHG RX REV CODE 250 W/ 637 OVERRIDE(OP): Performed by: HOSPITALIST

## 2021-07-09 PROCEDURE — 80048 BASIC METABOLIC PNL TOTAL CA: CPT

## 2021-07-09 PROCEDURE — 97530 THERAPEUTIC ACTIVITIES: CPT

## 2021-07-09 PROCEDURE — 94760 N-INVAS EAR/PLS OXIMETRY 1: CPT

## 2021-07-09 PROCEDURE — A9270 NON-COVERED ITEM OR SERVICE: HCPCS | Performed by: HOSPITALIST

## 2021-07-09 RX ADMIN — APIXABAN 5 MG: 5 TABLET, FILM COATED ORAL at 05:36

## 2021-07-09 RX ADMIN — APIXABAN 5 MG: 5 TABLET, FILM COATED ORAL at 17:12

## 2021-07-09 RX ADMIN — LEVOTHYROXINE SODIUM 175 MCG: 0.17 TABLET ORAL at 05:36

## 2021-07-09 RX ADMIN — CARVEDILOL 6.25 MG: 6.25 TABLET, FILM COATED ORAL at 08:15

## 2021-07-09 RX ADMIN — CARVEDILOL 6.25 MG: 6.25 TABLET, FILM COATED ORAL at 17:12

## 2021-07-09 RX ADMIN — DOCUSATE SODIUM 50 MG AND SENNOSIDES 8.6 MG 2 TABLET: 8.6; 5 TABLET, FILM COATED ORAL at 05:36

## 2021-07-09 ASSESSMENT — ENCOUNTER SYMPTOMS
RESPIRATORY NEGATIVE: 1
CHILLS: 0
BACK PAIN: 0
NAUSEA: 0
DIAPHORESIS: 0
SEIZURES: 0
FALLS: 0
DOUBLE VISION: 0
NEUROLOGICAL NEGATIVE: 1
BRUISES/BLEEDS EASILY: 0
CARDIOVASCULAR NEGATIVE: 1
LOSS OF CONSCIOUSNESS: 0
WHEEZING: 0
EYES NEGATIVE: 1
DEPRESSION: 0
WEIGHT LOSS: 0
FOCAL WEAKNESS: 0
WEAKNESS: 0
MUSCULOSKELETAL NEGATIVE: 1
VOMITING: 0
EYE REDNESS: 0
DIZZINESS: 0
PALPITATIONS: 0
ABDOMINAL PAIN: 0
HEMOPTYSIS: 0
CONSTITUTIONAL NEGATIVE: 1
HEARTBURN: 0
SORE THROAT: 0
GASTROINTESTINAL NEGATIVE: 1
PSYCHIATRIC NEGATIVE: 1
COUGH: 0
PND: 0
FEVER: 0
BLURRED VISION: 0
SHORTNESS OF BREATH: 0
HEADACHES: 0
MYALGIAS: 0
FLANK PAIN: 0

## 2021-07-09 ASSESSMENT — COGNITIVE AND FUNCTIONAL STATUS - GENERAL
MOBILITY SCORE: 18
MOVING FROM LYING ON BACK TO SITTING ON SIDE OF FLAT BED: A LITTLE
SUGGESTED CMS G CODE MODIFIER MOBILITY: CK
MOVING TO AND FROM BED TO CHAIR: A LITTLE
STANDING UP FROM CHAIR USING ARMS: A LITTLE
CLIMB 3 TO 5 STEPS WITH RAILING: A LOT
WALKING IN HOSPITAL ROOM: A LITTLE

## 2021-07-09 ASSESSMENT — GAIT ASSESSMENTS
ASSISTIVE DEVICE: FRONT WHEEL WALKER
DEVIATION: SHUFFLED GAIT;BRADYKINETIC
GAIT LEVEL OF ASSIST: MINIMAL ASSIST
DISTANCE (FEET): 115

## 2021-07-09 ASSESSMENT — LIFESTYLE VARIABLES: SUBSTANCE_ABUSE: 0

## 2021-07-09 NOTE — CARE PLAN
The patient is Stable - Low risk of patient condition declining or worsening    Shift Goals  Clinical Goals: Rest  Patient Goals: Eat meals, visit with son  Family Goals: Receive update from care team    Progress made toward(s) clinical / shift goals:  Reduce interruptions and cluster care to improve rest. High risk of delirium.    Patient is not progressing towards the following goals:

## 2021-07-09 NOTE — PROGRESS NOTES
Bedside report received by Richa ART. Plan of care discussed. Safety measures in place, call light in reach. Bed alarm on.    Assessment complete. No complaints or concerns at this time. IV removed due leaking and signs of infiltration on flushing. Updating MD for need of access as patient is currently medically cleared, awaiting insurance approval for placement.     MD updated on IV access, ok for no IV with parameters, see nursing communication.     Patient found without cpap on, and attempting to exit bed. Patient states he needs to call his eye doctor. Patient easily redirected, yet fixated on calling his doctor. Repositioned, and cpap replaced. Patient quickly fell back to sleep. Bed alarm verified on.

## 2021-07-09 NOTE — PROGRESS NOTES
Hospital Medicine Daily Progress Note    Date of Service  7/9/2021    Chief Complaint  89 y.o. male admitted 6/30/2021 with palpitations    Hospital Course  Patient is a 89 year old male with history of afib with rvr (on chronic eliquis), hypothyroidism.  He presented to the ER on 6/30/21 with fatigue and sob.  He was found to be in afib with rvr by his pcp.    In the ER he responded well to iv cardizem.   He was then admitted for further evaluation and management of the afib and his cardiac medication regimen was adjusted.  He was also referred to pulmonary medicine as an outpatient due to chest x ray finding concerning for pulmonary fibrosis. He was also noted to have intermittent delirium and suspected sundowning.    Interval Problem Update  Much improved this am, son again at bedside, pt currently axox to person, place, situation and month (thought it was 2020), no chest pain, no sob, no dizziness.  Remains rate controlled  Still awaiting insurance authorization for snf  .  Discussed with patient, his son and bedside nursing      Consultants/Specialty  None    Code Status  Full Code    Disposition  Pending SNF insurance auth still    Review of Systems  Review of Systems   Constitutional: Negative.  Negative for chills, diaphoresis, fever, malaise/fatigue and weight loss.   HENT: Negative.  Negative for hearing loss, sore throat and tinnitus.    Eyes: Negative.  Negative for blurred vision, double vision and redness.   Respiratory: Negative.  Negative for cough, hemoptysis, shortness of breath and wheezing.    Cardiovascular: Negative.  Negative for chest pain, palpitations, leg swelling and PND.   Gastrointestinal: Negative.  Negative for abdominal pain, heartburn, melena, nausea and vomiting.   Genitourinary: Negative.  Negative for dysuria, flank pain and urgency.   Musculoskeletal: Negative.  Negative for back pain, falls, joint pain and myalgias.   Skin: Negative.  Negative for itching and rash.    Neurological: Negative.  Negative for dizziness, focal weakness, seizures, loss of consciousness, weakness and headaches.   Endo/Heme/Allergies: Negative.  Does not bruise/bleed easily.   Psychiatric/Behavioral: Negative.  Negative for depression, substance abuse and suicidal ideas.   All other systems reviewed and are negative.       Physical Exam  Temp:  [36.2 °C (97.1 °F)-36.8 °C (98.3 °F)] 36.7 °C (98.1 °F)  Pulse:  [] 88  Resp:  [16-18] 18  BP: ()/(61-98) 110/61  SpO2:  [92 %-93 %] 93 %    Physical Exam  Vitals and nursing note reviewed. Exam conducted with a chaperone present.   Constitutional:       General: He is not in acute distress.     Appearance: Normal appearance. He is obese. He is not diaphoretic.   HENT:      Head: Normocephalic and atraumatic.      Nose: Nose normal. No congestion or rhinorrhea.      Mouth/Throat:      Mouth: Mucous membranes are moist.      Pharynx: Oropharynx is clear.   Eyes:      Extraocular Movements: Extraocular movements intact.      Conjunctiva/sclera: Conjunctivae normal.      Pupils: Pupils are equal, round, and reactive to light.   Cardiovascular:      Rate and Rhythm: Normal rate. Rhythm irregular.      Pulses: Normal pulses.      Heart sounds: Normal heart sounds.   Pulmonary:      Effort: Pulmonary effort is normal.      Breath sounds: Normal breath sounds.   Abdominal:      General: Abdomen is flat. Bowel sounds are normal.      Palpations: Abdomen is soft.      Tenderness: There is no right CVA tenderness, left CVA tenderness, guarding or rebound.   Musculoskeletal:         General: No swelling, tenderness or deformity. Normal range of motion.      Cervical back: Neck supple. No rigidity or tenderness. No muscular tenderness.   Skin:     General: Skin is warm and dry.      Capillary Refill: Capillary refill takes less than 2 seconds.   Neurological:      General: No focal deficit present.      Mental Status: He is alert and oriented to person, place,  and time. Mental status is at baseline.      Cranial Nerves: No cranial nerve deficit.      Sensory: No sensory deficit.   Psychiatric:         Mood and Affect: Mood normal.         Behavior: Behavior normal.         Thought Content: Thought content normal.         Fluids    Intake/Output Summary (Last 24 hours) at 7/9/2021 1417  Last data filed at 7/9/2021 0905  Gross per 24 hour   Intake 640 ml   Output --   Net 640 ml       Laboratory      Recent Labs     07/09/21  0220   SODIUM 134*   POTASSIUM 4.1   CHLORIDE 98   CO2 23   GLUCOSE 92   BUN 26*   CREATININE 1.27   CALCIUM 8.8                   Imaging  DX-ESOPHAGUS - [id number]   Final Result      EC-ECHOCARDIOGRAM COMPLETE W/O CONT   Final Result      DX-CHEST-PORTABLE (1 VIEW)   Final Result      1.  No significant change since 6/21/2021      2.  Interstitial densities which are likely fibrosis      3.  Enlarged cardiac silhouette           Assessment/Plan  Delirium  Assessment & Plan  Suspect due to sundowning  Much improved today  following    Elevated troponin  Assessment & Plan  Continues to deny chest pain  Troponin 52 - minimally elevated in stable range  Suspect type 2 mechanism secondary to increased demand in setting of volume overload and afib RVR    Anticoagulated- Eliquis for a. fib- (present on admission)  Assessment & Plan  Continue eliquis    Other specified hypothyroidism- (present on admission)  Assessment & Plan  Continue synthroid       VTE prophylaxis: Apixaban

## 2021-07-09 NOTE — CARE PLAN
The patient is Stable - Low risk of patient condition declining or worsening    Shift Goals  Clinical Goals: sit up for meals, discharge to [facility]  Patient Goals: eat and rest  Family Goals: Receive update from care team    Progress made toward(s) clinical / shift goals:      Patient is not progressing towards the following goals: Patient still waiting on insurance auth approval to discharge to SNF    Problem: Discharge Barriers/Planning  Goal: Patient's continuum of care needs are met  Outcome: Not Progressing  Patient waiting on insurance auth for discharge    Problem: Pain - Standard  Goal: Alleviation of pain or a reduction in pain to the patient’s comfort goal  Outcome: Progressing  Patient denies any pain at the time of assessment.    Problem: Fall Risk  Goal: Patient will remain free from falls  Outcome: Progressing  Bed is locked and low, non slip socks in place, bed is locked and low, bed and strip alarm on.

## 2021-07-09 NOTE — PROGRESS NOTES
Telemetry Shift Summary    Rhythm Afib  HR Range   Ectopy oPvc/big/trig//coup  Measurements -/16/-    Normal Values  Rhythm SR  HR Range    Measurements 0.12-0.20 / 0.06-0.10  / 0.30-0.52

## 2021-07-09 NOTE — PROGRESS NOTES
Patient able to ambulate in hallway with PT/OT this afternoon. Noted increased weakness from previous evaluations. Situated up in cardiac chair with assistance from staff, patient is agreeable to remain seated upright in chair for dinner and then return to bed later this evening. Patient denies any pain, fall precautions maintained. Awaiting insurance authorization for placement to [facility].

## 2021-07-09 NOTE — PROGRESS NOTES
0715 Report received from Daniel ART. Plan of care discussed. Patient attempting to get out of bed to use urinal, MELANY Queen at bedside. Safety precautions in place.     0815 Assessment completed, patient is alert and oriented to self and place. Patient denies any pain at this time. Safety precautions in place.     1000 RN spoke to PAIGE Johnson regarding insurance auth, ABBIEW to call insurance company for updates.

## 2021-07-09 NOTE — DISCHARGE PLANNING
Anticipated Discharge Disposition:   [facility], [place] when insurance auth complete    Action:   Chart review complete.     LSW and RN GRETA assigned to case. JUSTIN Allred also following the case.     1200: note entered by LSW supervisor Wyatt about insurance auth. KAELYN HERNANDES reached out to Wyatt. Insurance auth remains pending.     1530: RN GRETA asked DPA to follow up with [place] again for insurance auth.     1535: Per DPA, insurance auth remains pending. MD and bedside RN aware. Patient's son made aware by LSW.     Barriers to Discharge:   Insurance auth for SNF    Plan:   Hospital care management will continue to assist with discharge planning needs.

## 2021-07-09 NOTE — THERAPY
Physical Therapy   Daily Treatment     Patient Name: Guru Pena  Age:  89 y.o., Sex:  male  Medical Record #: [id number]  Today's Date: 7/9/2021     Precautions: (P) Fall Risk    Assessment    Pt is progressing well with functional mobility. Pt was able to demonstrate improved ambulation during session and upright activity tolerance. Pt required less assist during bed mobility and upright ambulation, however, continues to require close guarding due to risks of falling and safety concerns with confusion. Pt demonstrated with SpO2 of 95% on RA with 115 ft of ambulation. Will continue to follow and recommend post acute therapy prior to d/c home.     Plan    Continue current treatment plan.    DC Equipment Recommendations: (P) Unable to determine at this time  Discharge Recommendations: (P) Recommend post-acute placement for additional physical therapy services prior to discharge home    Objective       07/09/21 1557   Precautions   Precautions Fall Risk   Pain 0 - 10 Group   Therapist Pain Assessment Nurse Notified;0   Cognition    Cognition / Consciousness X   Orientation Level Not Oriented to Place;Not Oriented to Reason;Not Oriented to Time   Level of Consciousness Alert   Ability To Follow Commands 1 Step   Comments [place], pleasant, cooperative, confused ; not oriented    Neuro-Muscular Treatments   Neuro-Muscular Treatments Postural Changes;Postural Facilitation;Tapping;Verbal Cuing   Balance   Sitting Balance (Static) Fair +   Sitting Balance (Dynamic) Fair   Standing Balance (Static) Fair -   Standing Balance (Dynamic) Fair -   Weight Shift Sitting Fair   Weight Shift Standing Fair   Skilled Intervention Verbal Cuing;Postural Facilitation;Facilitation   Comments w/fww; shuffling gait and poor upright posture    Gait Analysis   Gait Level Of Assist Minimal Assist   Assistive Device Front Wheel Walker   Distance (Feet) 115   # of Times Distance was Traveled 1   Deviation Shuffled Gait;Bradykinetic   Weight  Bearing Status fwb   Skilled Intervention Tactile Cuing;Verbal Cuing;Sequencing;Postural Facilitation   Comments does well with increasing johnnie and verbal cues for big large steps    Bed Mobility    Supine to Sit Minimal Assist   Sit to Supine Supervised  (SBA)   Scooting Minimal Assist   Skilled Intervention Verbal Cuing;Facilitation;Sequencing   Comments HOB elevated and rails up    Functional Mobility   Sit to Stand Minimal Assist   Bed, Chair, Wheelchair Transfer Minimal Assist   Transfer Method Stand Step   Mobility EOB, sit<>stand, ambulation, back to bed    Skilled Intervention Verbal Cuing;Tactile Cuing;Sequencing   How much difficulty does the patient currently have...   Turning over in bed (including adjusting bedclothes, sheets and blankets)? 4   Sitting down on and standing up from a chair with arms (e.g., wheelchair, bedside commode, etc.) 3   Moving from lying on back to sitting on the side of the bed? 3   How much help from another person does the patient currently need...   Moving to and from a bed to a chair (including a wheelchair)? 3   Need to walk in a hospital room? 3   Climbing 3-5 steps with a railing? 2   6 clicks Mobility Score 18   Activity Tolerance   Sitting in Chair NT   Sitting Edge of Bed 10 mins   Standing 8 mins   Comments limited due to fatigue    Patient / Family Goals    Patient / Family Goal #1 to go home   Goal #1 Outcome Progressing as expected   Short Term Goals    Short Term Goal # 1 pt will go supine<>sit w/hob flat and rails down w/spv in 6tx    Goal Outcome # 1 goal not met   Short Term Goal # 2 pt will go sit<>stand w/fww w/spv in 6tx    Goal Outcome # 2 Goal not met   Short Term Goal # 3 pt will transfer bed<>chair w/fww w/spv in 6tx    Goal Outcome # 3 Goal not met   Short Term Goal # 4 pt will ambulate for 150ft w/fww w/spv in 6tx    Goal Outcome # 4 Goal not met   Education Group   Education Provided Role of Physical Therapist   Role of Physical Therapist Patient  Response Patient;Acceptance;Explanation;Demonstration;Verbal Demonstration;Action Demonstration   Anticipated Discharge Equipment and Recommendations   DC Equipment Recommendations Unable to determine at this time   Discharge Recommendations Recommend post-acute placement for additional physical therapy services prior to discharge home   Interdisciplinary Plan of Care Collaboration   IDT Collaboration with  Nursing   Patient Position at End of Therapy In Bed;Bed Alarm On;Tray Table within Reach;Phone within Reach;Call Light within Reach   Collaboration Comments aware of visit and recs    Session Information   Date / Session Number  7/9-3 (3/3, 7/11)

## 2021-07-09 NOTE — DISCHARGE PLANNING
"JESSIKA called Mihcaela from [employer] at [phone], left message, asked to call Elizabeth ROSEN.       JESSIKA called main [employer] number: [phone]  Reference number: [id number]  Provided [facility] NPI: [id number]    Talked with [employer] system who reported, \"in clinical review\"  Talked with person who is checking slow down: She messaged the . She reports CM is busy, JESSIKA provided phone number to call back.   "

## 2021-07-09 NOTE — PROGRESS NOTES
Telemetry Shift Summary    Rhythm A fib with BBB  HR Range 65-98, down to 41  Ectopy F PVCs, R couplets and trigem, 2.6 sec pause  Measurements -/.16/-        Normal Values  Rhythm SR  HR Range    Measurements 0.12-0.20 / 0.06-0.10  / 0.30-0.52

## 2021-07-09 NOTE — DISCHARGE PLANNING
Anticipated Discharge Disposition: [facility] pending insurance auth    Action: LSW called Michaela from [facility] at [phone], left VM requesting call back.    Barriers to Discharge: Insurance authorization    Plan: LSW continue to f/u w/ [facility].    1130: LSW called Michaela to check on insurance auth and left VM.

## 2021-07-10 PROCEDURE — A9270 NON-COVERED ITEM OR SERVICE: HCPCS | Performed by: INTERNAL MEDICINE

## 2021-07-10 PROCEDURE — 700102 HCHG RX REV CODE 250 W/ 637 OVERRIDE(OP): Performed by: HOSPITALIST

## 2021-07-10 PROCEDURE — 700102 HCHG RX REV CODE 250 W/ 637 OVERRIDE(OP): Performed by: INTERNAL MEDICINE

## 2021-07-10 PROCEDURE — 94760 N-INVAS EAR/PLS OXIMETRY 1: CPT

## 2021-07-10 PROCEDURE — A9270 NON-COVERED ITEM OR SERVICE: HCPCS | Performed by: HOSPITALIST

## 2021-07-10 PROCEDURE — 94660 CPAP INITIATION&MGMT: CPT

## 2021-07-10 PROCEDURE — 770020 HCHG ROOM/CARE - TELE (206)

## 2021-07-10 PROCEDURE — 99232 SBSQ HOSP IP/OBS MODERATE 35: CPT | Performed by: HOSPITALIST

## 2021-07-10 RX ADMIN — DOCUSATE SODIUM 50 MG AND SENNOSIDES 8.6 MG 2 TABLET: 8.6; 5 TABLET, FILM COATED ORAL at 17:18

## 2021-07-10 RX ADMIN — APIXABAN 5 MG: 5 TABLET, FILM COATED ORAL at 17:18

## 2021-07-10 RX ADMIN — LEVOTHYROXINE SODIUM 175 MCG: 0.17 TABLET ORAL at 05:03

## 2021-07-10 RX ADMIN — APIXABAN 5 MG: 5 TABLET, FILM COATED ORAL at 05:03

## 2021-07-10 RX ADMIN — CARVEDILOL 6.25 MG: 6.25 TABLET, FILM COATED ORAL at 07:44

## 2021-07-10 ASSESSMENT — ENCOUNTER SYMPTOMS
SHORTNESS OF BREATH: 0
DIZZINESS: 0
NEUROLOGICAL NEGATIVE: 1
CHILLS: 0
BACK PAIN: 0
SEIZURES: 0
PALPITATIONS: 0
BRUISES/BLEEDS EASILY: 0
BLURRED VISION: 0
LOSS OF CONSCIOUSNESS: 0
FALLS: 0
HEADACHES: 0
DEPRESSION: 0
FLANK PAIN: 0
HEMOPTYSIS: 0
VOMITING: 0
SORE THROAT: 0
NAUSEA: 0
HEARTBURN: 0
WHEEZING: 0
WEIGHT LOSS: 0
MYALGIAS: 0
WEAKNESS: 0
DOUBLE VISION: 0
DIAPHORESIS: 0
EYE REDNESS: 0
PND: 0
EYES NEGATIVE: 1
FEVER: 0
CONSTITUTIONAL NEGATIVE: 1
COUGH: 0
MUSCULOSKELETAL NEGATIVE: 1
RESPIRATORY NEGATIVE: 1
GASTROINTESTINAL NEGATIVE: 1
ABDOMINAL PAIN: 0
FOCAL WEAKNESS: 0
PSYCHIATRIC NEGATIVE: 1
CARDIOVASCULAR NEGATIVE: 1

## 2021-07-10 ASSESSMENT — FIBROSIS 4 INDEX: FIB4 SCORE: 2.17

## 2021-07-10 ASSESSMENT — PAIN DESCRIPTION - PAIN TYPE: TYPE: ACUTE PAIN;CHRONIC PAIN

## 2021-07-10 ASSESSMENT — LIFESTYLE VARIABLES: SUBSTANCE_ABUSE: 0

## 2021-07-10 NOTE — PROGRESS NOTES
Pt son, Damon, at bedside. RN educated son on POC, discharge planning, and placed an insurance form for the patient in the patient's paper chart per son request. Son inquiring about insurance and if [facility] will be accepting the pt back. RN educated pt on discharge plan. Pt requesting contact from the /case management today. RN to pass on in discharge rounding.

## 2021-07-10 NOTE — CARE PLAN
The patient is Stable - Low risk of patient condition declining or worsening    Shift Goals  Clinical Goals: discharge to [place], oxygen complaince, monitor vitals  Patient Goals: eat, sleep, discharge, see son Damon  Family Goals: Receive update from care team    Progress made toward(s) clinical / shift goals:  Pt medically cleared to discharge to [facility] pending insurance approval. Projected for Monday. Pt vitals are stable and pt is more compliant with oxygen when reoriented if not oriented x4. Son at bedside. Education provided.    Patient is not progressing towards the following goals: N/A    Problem: Knowledge Deficit - Standard  Goal: Patient and family/care givers will demonstrate understanding of plan of care, disease process/condition, diagnostic tests and medications  Outcome: Progressing  Education completed with son and patient at bedside.     Problem: Fall Risk  Goal: Patient will remain free from falls  Outcome: Progressing  Pt displays HIGH fall risk based on RN assessment and fall risk charting tool. Proper fall risk sign placed outside door, treaded socks on, bed alarm ON, bed locked and in lowest position, charge RN and CNA aware, and call light within reach. Pt educated on fall risk and proper interventions. Safety measures in place at this time.      Problem: Pain - Standard  Goal: Alleviation of pain or a reduction in pain to the patient’s comfort goal  Outcome: Progressing  Pt reporting no pain.    Problem: Skin Integrity  Goal: Skin integrity is maintained or improved  Outcome: Progressing   Pt on waffle cushion and ambulated from bed to cardiac chair for meals.

## 2021-07-10 NOTE — PROGRESS NOTES
Bedside report received by Francy ART. Plan of care discussed. Safety measures in place, call light in reach.    Assessment complete. No complaints or concerns at this time.     Patient transferred to [facility].

## 2021-07-10 NOTE — DISCHARGE PLANNING
Anticipated Discharge Disposition: SNF    Action: Spoke with antonia at [facility]. Per Antonia auth remains pending at this time.     Barriers to Discharge: Insurance auth for SNF    Plan: Care coordination will continue to follow and assist with discharge planning

## 2021-07-10 NOTE — PROGRESS NOTES
Assumed report and patient care from Daniel ART via bedside report. Patient is resting comfortably in bed with no signs of labored breathing or restlessness. CPap removed and pt placed on 2L NC. POC discussed. No questions or concerns at this time. Safety measures in place, call light within reach.

## 2021-07-10 NOTE — FLOWSHEET NOTE
07/10/21 0217   Events/Summary/Plan   Events/Summary/Plan CPAP Check   Skin Inspection Respiratory Device Intact   Vital Signs   Pulse 96   Respiration 18   Pulse Oximetry 95 %   $ Pulse Oximetry (Spot Check) Yes   O2 Alarms Set & Reviewed Yes   Respiratory Assessment   Respiratory Pattern Within Normal Limits   Chest Exam   Work Of Breathing / Effort Mild   Non-Invasive Ventilation [facility]   Nocturnal CPAP or BIPAP CPAP - Home Unit   $ System Evaluation Yes   FiO2 or LPM 3

## 2021-07-10 NOTE — PROGRESS NOTES
"Hospital Medicine Daily Progress Note    Date of Service  7/10/2021    Chief Complaint  89 y.o. male admitted 6/30/2021 with palpitations    Hospital Course  Patient is a 89 year old male with history of afib with rvr (on chronic eliquis), hypothyroidism.  He presented to the ER on 6/30/21 with fatigue and sob.  He was found to be in afib with rvr by his pcp.    In the ER he responded well to iv cardizem.   He was then admitted for further evaluation and management of the afib and his cardiac medication regimen was adjusted.  He was also referred to pulmonary medicine as an outpatient due to chest x ray finding concerning for pulmonary fibrosis. He was also noted to have intermittent delirium and suspected sundowning.    Interval Problem Update  He remains alert and oriented.  Son again at bedside, he tells me he reached his father's insurance by phone and was told to \"call back on monday\". Discharge continues to be delayed due to insurance authorization. He remains medically cleared    Discussed with patient, his son and bedside nursing      Consultants/Specialty  None    Code Status  Full Code    Disposition  Pending SNF insurance auth still    Review of Systems  Review of Systems   Constitutional: Negative.  Negative for chills, diaphoresis, fever, malaise/fatigue and weight loss.   HENT: Negative.  Negative for hearing loss, sore throat and tinnitus.    Eyes: Negative.  Negative for blurred vision, double vision and redness.   Respiratory: Negative.  Negative for cough, hemoptysis, shortness of breath and wheezing.    Cardiovascular: Negative.  Negative for chest pain, palpitations, leg swelling and PND.   Gastrointestinal: Negative.  Negative for abdominal pain, heartburn, melena, nausea and vomiting.   Genitourinary: Negative.  Negative for dysuria, flank pain and urgency.   Musculoskeletal: Negative.  Negative for back pain, falls, joint pain and myalgias.   Skin: Negative.  Negative for itching and rash. "   Neurological: Negative.  Negative for dizziness, focal weakness, seizures, loss of consciousness, weakness and headaches.   Endo/Heme/Allergies: Negative.  Does not bruise/bleed easily.   Psychiatric/Behavioral: Negative.  Negative for depression, substance abuse and suicidal ideas.   All other systems reviewed and are negative.       Physical Exam  Temp:  [36.2 °C (97.1 °F)-36.8 °C (98.2 °F)] 36.2 °C (97.1 °F)  Pulse:  [66-98] 81  Resp:  [18] 18  BP: ()/(51-97) 89/59  SpO2:  [95 %-98 %] 98 %    Physical Exam  Vitals and nursing note reviewed. Exam conducted with a chaperone present.   Constitutional:       General: He is not in acute distress.     Appearance: Normal appearance. He is obese. He is not diaphoretic.   HENT:      Head: Normocephalic and atraumatic.      Nose: Nose normal. No congestion or rhinorrhea.      Mouth/Throat:      Mouth: Mucous membranes are moist.      Pharynx: Oropharynx is clear.   Eyes:      Extraocular Movements: Extraocular movements intact.      Conjunctiva/sclera: Conjunctivae normal.      Pupils: Pupils are equal, round, and reactive to light.   Cardiovascular:      Rate and Rhythm: Normal rate. Rhythm irregular.      Pulses: Normal pulses.      Heart sounds: Normal heart sounds.   Pulmonary:      Effort: Pulmonary effort is normal.      Breath sounds: Normal breath sounds.   Abdominal:      General: Abdomen is flat. Bowel sounds are normal.      Palpations: Abdomen is soft.      Tenderness: There is no right CVA tenderness, left CVA tenderness, guarding or rebound.   Musculoskeletal:         General: No swelling, tenderness or deformity. Normal range of motion.      Cervical back: Neck supple. No rigidity or tenderness. No muscular tenderness.   Skin:     General: Skin is warm and dry.      Capillary Refill: Capillary refill takes less than 2 seconds.   Neurological:      General: No focal deficit present.      Mental Status: He is alert and oriented to person, place, and  time. Mental status is at baseline.      Cranial Nerves: No cranial nerve deficit.      Sensory: No sensory deficit.   Psychiatric:         Mood and Affect: Mood normal.         Behavior: Behavior normal.         Thought Content: Thought content normal.         Fluids    Intake/Output Summary (Last 24 hours) at 7/10/2021 1446  Last data filed at 7/10/2021 1311  Gross per 24 hour   Intake 600 ml   Output 1025 ml   Net -425 ml       Laboratory      Recent Labs     07/09/21  0220   SODIUM 134*   POTASSIUM 4.1   CHLORIDE 98   CO2 23   GLUCOSE 92   BUN 26*   CREATININE 1.27   CALCIUM 8.8                   Imaging  DX-ESOPHAGUS - [id number]   Final Result      EC-ECHOCARDIOGRAM COMPLETE W/O CONT   Final Result      DX-CHEST-PORTABLE (1 VIEW)   Final Result      1.  No significant change since 6/21/2021      2.  Interstitial densities which are likely fibrosis      3.  Enlarged cardiac silhouette           Assessment/Plan  Delirium  Assessment & Plan  Suspect due to sundowning  Much improved, no significant episodes in over 48 hours    Elevated troponin  Assessment & Plan  Continues to deny chest pain  Troponin 52 - minimally elevated in stable range  Suspect type 2 mechanism secondary to increased demand in setting of volume overload and afib RVR    Anticoagulated- Eliquis for a. fib- (present on admission)  Assessment & Plan  Continue eliquis    Other specified hypothyroidism- (present on admission)  Assessment & Plan  Continue synthroid       VTE prophylaxis: Apixaban

## 2021-07-10 NOTE — FLOWSHEET NOTE
07/09/21 2210   Events/Summary/Plan   Events/Summary/Plan CPAP Check   Vital Signs   Pulse 66   Respiration 18   Pulse Oximetry 95 %   $ Pulse Oximetry (Spot Check) Yes   O2 Alarms Set & Reviewed Yes   Respiratory Assessment   Respiratory Pattern Within Normal Limits   Level of Consciousness Alert   Chest Exam   Work Of Breathing / Effort Mild   Breath Sounds   RUL Breath Sounds Clear   RML Breath Sounds Clear   RLL Breath Sounds Diminished   ASIF Breath Sounds Clear   LLL Breath Sounds Diminished   Secretions   Cough Non Productive   How Sputum Obtained Cough on Request   Sputum Amount Unable to Evaluate   Sputum Color Unable to Evaluate   Sputum Consistency Unable to Evaluate   Non-Invasive Ventilation [facility]   Nocturnal CPAP or BIPAP CPAP - Home Unit   $ System Evaluation Yes   FiO2 or LPM 3

## 2021-07-10 NOTE — CARE PLAN
The patient is Stable - Low risk of patient condition declining or worsening    Shift Goals  Clinical Goals: sit up for meals, discharge to [place]  Patient Goals: eat and rest  Family Goals: Receive update from care team    Progress made toward(s) clinical / shift goals:  Awaiting insurance approval for SNF.    Patient is not progressing towards the following goals:

## 2021-07-10 NOTE — FLOWSHEET NOTE
07/10/21 0217   Events/Summary/Plan   Events/Summary/Plan CPAP Check   Skin Inspection Respiratory Device Intact

## 2021-07-11 PROCEDURE — A9270 NON-COVERED ITEM OR SERVICE: HCPCS | Performed by: INTERNAL MEDICINE

## 2021-07-11 PROCEDURE — 94660 CPAP INITIATION&MGMT: CPT

## 2021-07-11 PROCEDURE — 94760 N-INVAS EAR/PLS OXIMETRY 1: CPT

## 2021-07-11 PROCEDURE — 700102 HCHG RX REV CODE 250 W/ 637 OVERRIDE(OP): Performed by: INTERNAL MEDICINE

## 2021-07-11 PROCEDURE — 99232 SBSQ HOSP IP/OBS MODERATE 35: CPT | Performed by: HOSPITALIST

## 2021-07-11 PROCEDURE — 770006 HCHG ROOM/CARE - MED/SURG/GYN SEMI*

## 2021-07-11 PROCEDURE — 700102 HCHG RX REV CODE 250 W/ 637 OVERRIDE(OP): Performed by: HOSPITALIST

## 2021-07-11 PROCEDURE — A9270 NON-COVERED ITEM OR SERVICE: HCPCS | Performed by: HOSPITALIST

## 2021-07-11 RX ADMIN — LEVOTHYROXINE SODIUM 175 MCG: 0.17 TABLET ORAL at 06:08

## 2021-07-11 RX ADMIN — APIXABAN 5 MG: 5 TABLET, FILM COATED ORAL at 06:08

## 2021-07-11 RX ADMIN — CARVEDILOL 6.25 MG: 6.25 TABLET, FILM COATED ORAL at 07:39

## 2021-07-11 RX ADMIN — DOCUSATE SODIUM 50 MG AND SENNOSIDES 8.6 MG 2 TABLET: 8.6; 5 TABLET, FILM COATED ORAL at 06:09

## 2021-07-11 RX ADMIN — APIXABAN 5 MG: 5 TABLET, FILM COATED ORAL at 17:06

## 2021-07-11 RX ADMIN — CARVEDILOL 6.25 MG: 6.25 TABLET, FILM COATED ORAL at 17:06

## 2021-07-11 ASSESSMENT — ENCOUNTER SYMPTOMS
BACK PAIN: 0
EYE REDNESS: 0
SEIZURES: 0
SHORTNESS OF BREATH: 0
FEVER: 0
FALLS: 0
PALPITATIONS: 0
NAUSEA: 0
DEPRESSION: 0
PSYCHIATRIC NEGATIVE: 1
MYALGIAS: 0
DIZZINESS: 0
BLURRED VISION: 0
HEMOPTYSIS: 0
EYES NEGATIVE: 1
CARDIOVASCULAR NEGATIVE: 1
LOSS OF CONSCIOUSNESS: 0
FLANK PAIN: 0
PND: 0
HEARTBURN: 0
CHILLS: 0
VOMITING: 0
DOUBLE VISION: 0
COUGH: 0
WEIGHT LOSS: 0
CONSTITUTIONAL NEGATIVE: 1
NEUROLOGICAL NEGATIVE: 1
HEADACHES: 0
SORE THROAT: 0
ABDOMINAL PAIN: 0
WHEEZING: 0
DIAPHORESIS: 0
WEAKNESS: 0
FOCAL WEAKNESS: 0
MUSCULOSKELETAL NEGATIVE: 1
RESPIRATORY NEGATIVE: 1
GASTROINTESTINAL NEGATIVE: 1
BRUISES/BLEEDS EASILY: 0

## 2021-07-11 ASSESSMENT — PATIENT HEALTH QUESTIONNAIRE - PHQ9
2. FEELING DOWN, DEPRESSED, IRRITABLE, OR HOPELESS: NOT AT ALL
1. LITTLE INTEREST OR PLEASURE IN DOING THINGS: NOT AT ALL
SUM OF ALL RESPONSES TO PHQ9 QUESTIONS 1 AND 2: 0

## 2021-07-11 ASSESSMENT — PAIN DESCRIPTION - PAIN TYPE: TYPE: ACUTE PAIN;CHRONIC PAIN

## 2021-07-11 ASSESSMENT — LIFESTYLE VARIABLES: SUBSTANCE_ABUSE: 0

## 2021-07-11 ASSESSMENT — FIBROSIS 4 INDEX: FIB4 SCORE: 2.17

## 2021-07-11 NOTE — CARE PLAN
The patient is Stable - Low risk of patient condition declining or worsening    Shift Goals  Clinical Goals: oxygen compliance  Patient Goals: sleep  Family Goals: Receive update from care team    Progress made toward(s) clinical / shift goals:    Problem: Fall Risk  Goal: Patient will remain free from falls  Outcome: Progressing  Note: Pt placed close to nursing station and checked on hourly. Bed locked and in lowest position, call light within reach. Communication to care team regarding fall risk. Assistive devices out of sight. Personal belongings within reach. Pt educated to utilize call light.     Problem: Skin Integrity  Goal: Skin integrity is maintained or improved  Outcome: Progressing  Note: Silicone oxygen tubing in place. Pt turns self regularly. Pressure redistribution mattress in place. RN checked pt's integumentary during initial assessment. RN will monitor for skin breakdown/ pressure ulcer development.

## 2021-07-11 NOTE — PROGRESS NOTES
Assumed care of pt. Received report from KAELYN Boykin. Pt resting in bed, sitting up. Safety precautions in place. Bed locked and in lowest position, call light within reach. Bed alarm on. Pt placed close to nursing station. No needs at this time.

## 2021-07-11 NOTE — FLOWSHEET NOTE
07/11/21 0228   Events/Summary/Plan   Events/Summary/Plan CPAP Check   Skin Inspection Respiratory Device Intact   Vital Signs   Pulse (!) 105   Respiration 18   Pulse Oximetry 95 %   $ Pulse Oximetry (Spot Check) Yes   O2 Alarms Set & Reviewed Yes   Respiratory Assessment   Respiratory Pattern Within Normal Limits   Chest Exam   Work Of Breathing / Effort Mild   Non-Invasive Ventilation [facility]   Nocturnal CPAP or BIPAP CPAP - Home Unit   $ System Evaluation Yes   FiO2 or LPM 3

## 2021-07-11 NOTE — FLOWSHEET NOTE
07/10/21 2130   Events/Summary/Plan   Events/Summary/Plan Pt placed on CPAP   Skin Inspection Respiratory Device Intact   Vital Signs   Pulse 96   Respiration 18   Pulse Oximetry 96 %   $ Pulse Oximetry (Spot Check) Yes   O2 Alarms Set & Reviewed Yes   Respiratory Assessment   Respiratory Pattern Within Normal Limits   Level of Consciousness Alert   Chest Exam   Work Of Breathing / Effort Mild   Breath Sounds   RUL Breath Sounds Clear   RML Breath Sounds Clear   RLL Breath Sounds Diminished   ASIF Breath Sounds Clear   LLL Breath Sounds Diminished   Secretions   Cough Non Productive   How Sputum Obtained Cough on Request   Sputum Amount Unable to Evaluate   Sputum Color Unable to Evaluate   Sputum Consistency Unable to Evaluate   Non-Invasive Ventilation [facility]   Nocturnal CPAP or BIPAP CPAP - Home Unit   $ System Evaluation Yes   FiO2 or LPM 3

## 2021-07-11 NOTE — PROGRESS NOTES
Report given to Adina ART via bedside report. Patient handed off in stable condition. Safety measures in place. Call light within reach. Care relinquished.

## 2021-07-11 NOTE — PROGRESS NOTES
[facility] Medicine Daily Progress Note    Date of Service  7/11/2021    Chief Complaint  89 y.o. male admitted 6/30/2021 with palpitations    Hospital Course  Patient is a 89 year old male with history of afib with rvr (on chronic eliquis), hypothyroidism.  He presented to the ER on 6/30/21 with fatigue and sob.  He was found to be in afib with rvr by his pcp.    In the ER he responded well to iv cardizem.   He was then admitted for further evaluation and management of the afib and his cardiac medication regimen was adjusted.  He was also referred to pulmonary medicine as an outpatient due to chest x ray finding concerning for pulmonary fibrosis. He was also noted to have intermittent delirium and suspected sundowning.    Interval Problem Update  No significant changes, he remains axox3 with a poor memory. Hr controlled, no cp, no sob.  ROS otherwise negative.  Still awaiting insurance auth for transfer back to snf.    Discussed with patient and bedside nursing    Consultants/Specialty  None    Code Status  Full Code    Disposition  Still pending SNF insurance authorization    Review of Systems  Review of Systems   Constitutional: Negative.  Negative for chills, diaphoresis, fever, malaise/fatigue and weight loss.   HENT: Negative.  Negative for hearing loss, sore throat and tinnitus.    Eyes: Negative.  Negative for blurred vision, double vision and redness.   Respiratory: Negative.  Negative for cough, hemoptysis, shortness of breath and wheezing.    Cardiovascular: Negative.  Negative for chest pain, palpitations, leg swelling and PND.   Gastrointestinal: Negative.  Negative for abdominal pain, heartburn, melena, nausea and vomiting.   Genitourinary: Negative.  Negative for dysuria, flank pain and urgency.   Musculoskeletal: Negative.  Negative for back pain, falls, joint pain and myalgias.   Skin: Negative.  Negative for itching and rash.   Neurological: Negative.  Negative for dizziness, focal weakness,  seizures, loss of consciousness, weakness and headaches.   Endo/Heme/Allergies: Negative.  Does not bruise/bleed easily.   Psychiatric/Behavioral: Negative.  Negative for depression, substance abuse and suicidal ideas.   All other systems reviewed and are negative.       Physical Exam  Temp:  [36.2 °C (97.1 °F)-36.3 °C (97.4 °F)] 36.3 °C (97.3 °F)  Pulse:  [] 80  Resp:  [17-18] 18  BP: ()/(58-95) 120/87  SpO2:  [95 %-99 %] 99 %    Physical Exam  Vitals and nursing note reviewed. Exam conducted with a chaperone present.   Constitutional:       General: He is not in acute distress.     Appearance: Normal appearance. He is obese. He is not diaphoretic.   HENT:      Head: Normocephalic and atraumatic.      Nose: Nose normal. No congestion or rhinorrhea.      Mouth/Throat:      Mouth: Mucous membranes are moist.      Pharynx: Oropharynx is clear.   Eyes:      Extraocular Movements: Extraocular movements intact.      Conjunctiva/sclera: Conjunctivae normal.      Pupils: Pupils are equal, round, and reactive to light.   Cardiovascular:      Rate and Rhythm: Normal rate. Rhythm irregular.      Pulses: Normal pulses.      Heart sounds: Normal heart sounds.   Pulmonary:      Effort: Pulmonary effort is normal.      Breath sounds: Normal breath sounds.   Abdominal:      General: Abdomen is flat. Bowel sounds are normal.      Palpations: Abdomen is soft.      Tenderness: There is no right CVA tenderness, left CVA tenderness, guarding or rebound.   Musculoskeletal:         General: No swelling, tenderness or deformity. Normal range of motion.      Cervical back: Neck supple. No rigidity or tenderness. No muscular tenderness.   Skin:     General: Skin is warm and dry.      Capillary Refill: Capillary refill takes less than 2 seconds.   Neurological:      General: No focal deficit present.      Mental Status: He is alert and oriented to person, place, and time. Mental status is at baseline.      Cranial Nerves: No  cranial nerve deficit.      Sensory: No sensory deficit.   Psychiatric:         Mood and Affect: Mood normal.         Behavior: Behavior normal.         Thought Content: Thought content normal.         Fluids    Intake/Output Summary (Last 24 hours) at 7/11/2021 0738  Last data filed at 7/11/2021 0600  Gross per 24 hour   Intake 600 ml   Output 900 ml   Net -300 ml       Laboratory      Recent Labs     07/09/21  0220   SODIUM 134*   POTASSIUM 4.1   CHLORIDE 98   CO2 23   GLUCOSE 92   BUN 26*   CREATININE 1.27   CALCIUM 8.8                   Imaging  DX-ESOPHAGUS - [id number]   Final Result      EC-ECHOCARDIOGRAM COMPLETE W/O CONT   Final Result      DX-CHEST-PORTABLE (1 VIEW)   Final Result      1.  No significant change since 6/21/2021      2.  Interstitial densities which are likely fibrosis      3.  Enlarged cardiac silhouette           Assessment/Plan  Delirium  Assessment & Plan  Suspect due to sundowning  Much improved, no significant episodes in over 48 hours    Elevated troponin  Assessment & Plan  Continues to deny chest pain  Troponin 52 - minimally elevated in stable range  Suspect type 2 mechanism secondary to increased demand in setting of volume overload and afib RVR    Anticoagulated- Eliquis for a. fib- (present on admission)  Assessment & Plan  Continue eliquis    Other specified hypothyroidism- (present on admission)  Assessment & Plan  Continue synthroid       VTE prophylaxis: Apixaban

## 2021-07-11 NOTE — FLOWSHEET NOTE
07/11/21 0228   Events/Summary/Plan   Events/Summary/Plan CPAP Check   Skin Inspection Respiratory Device Intact

## 2021-07-11 NOTE — FLOWSHEET NOTE
07/10/21 8825   Events/Summary/Plan   Events/Summary/Plan Pt placed on CPAP   Skin Inspection Respiratory Device Intact

## 2021-07-12 ENCOUNTER — PATIENT OUTREACH (OUTPATIENT)
Dept: HEALTH INFORMATION MANAGEMENT | Facility: OTHER | Age: 86
End: 2021-07-12

## 2021-07-12 VITALS
RESPIRATION RATE: 17 BRPM | WEIGHT: 157.85 LBS | BODY MASS INDEX: 20.92 KG/M2 | TEMPERATURE: 97.2 F | HEIGHT: 73 IN | OXYGEN SATURATION: 95 % | DIASTOLIC BLOOD PRESSURE: 57 MMHG | HEART RATE: 50 BPM | SYSTOLIC BLOOD PRESSURE: 93 MMHG

## 2021-07-12 PROBLEM — R41.0 DELIRIUM: Status: RESOLVED | Noted: 2021-07-08 | Resolved: 2021-07-12

## 2021-07-12 PROCEDURE — 94660 CPAP INITIATION&MGMT: CPT

## 2021-07-12 PROCEDURE — A9270 NON-COVERED ITEM OR SERVICE: HCPCS | Performed by: INTERNAL MEDICINE

## 2021-07-12 PROCEDURE — 700102 HCHG RX REV CODE 250 W/ 637 OVERRIDE(OP): Performed by: INTERNAL MEDICINE

## 2021-07-12 PROCEDURE — 99238 HOSP IP/OBS DSCHRG MGMT 30/<: CPT | Performed by: HOSPITALIST

## 2021-07-12 PROCEDURE — 97530 THERAPEUTIC ACTIVITIES: CPT

## 2021-07-12 PROCEDURE — 700102 HCHG RX REV CODE 250 W/ 637 OVERRIDE(OP): Performed by: HOSPITALIST

## 2021-07-12 PROCEDURE — 94760 N-INVAS EAR/PLS OXIMETRY 1: CPT

## 2021-07-12 PROCEDURE — 97110 THERAPEUTIC EXERCISES: CPT

## 2021-07-12 PROCEDURE — 97116 GAIT TRAINING THERAPY: CPT

## 2021-07-12 PROCEDURE — A9270 NON-COVERED ITEM OR SERVICE: HCPCS | Performed by: HOSPITALIST

## 2021-07-12 RX ORDER — LEVOTHYROXINE SODIUM 175 UG/1
175 TABLET ORAL
Qty: 108 TABLET | Refills: 4 | Status: SHIPPED | OUTPATIENT
Start: 2021-07-12 | End: 2021-10-22

## 2021-07-12 RX ADMIN — CARVEDILOL 6.25 MG: 6.25 TABLET, FILM COATED ORAL at 08:17

## 2021-07-12 RX ADMIN — LEVOTHYROXINE SODIUM 175 MCG: 0.17 TABLET ORAL at 05:30

## 2021-07-12 RX ADMIN — APIXABAN 5 MG: 5 TABLET, FILM COATED ORAL at 05:30

## 2021-07-12 ASSESSMENT — COGNITIVE AND FUNCTIONAL STATUS - GENERAL
TURNING FROM BACK TO SIDE WHILE IN FLAT BAD: A LITTLE
MOVING TO AND FROM BED TO CHAIR: A LITTLE
MOBILITY SCORE: 14
WALKING IN HOSPITAL ROOM: A LITTLE
STANDING UP FROM CHAIR USING ARMS: A LITTLE
CLIMB 3 TO 5 STEPS WITH RAILING: TOTAL
MOVING FROM LYING ON BACK TO SITTING ON SIDE OF FLAT BED: UNABLE
SUGGESTED CMS G CODE MODIFIER MOBILITY: CL

## 2021-07-12 ASSESSMENT — GAIT ASSESSMENTS
GAIT LEVEL OF ASSIST: MINIMAL ASSIST
DISTANCE (FEET): 115
ASSISTIVE DEVICE: FRONT WHEEL WALKER
DEVIATION: SHUFFLED GAIT;BRADYKINETIC;DECREASED HEEL STRIKE;DECREASED TOE OFF

## 2021-07-12 NOTE — DISCHARGE PLANNING
Agency/Facility Name: [facility]  Spoke To: Luba  Outcome: States she has followed up with ins company and is waiting for call back.    RN CM informed

## 2021-07-12 NOTE — DISCHARGE PLANNING
Anticipated Discharge Disposition:   [facility], [facility]     Action:   Chart review complete.     Per MD, patient is medically cleared to discharge to SNF when insurance auth is complete. RN CM asked [facility] to follow up for insurance auth.     0925: Per JP, [facility] has a call out to insurance to follow up. RN CM to call [facility] later this morning.     1122: Insurance auth is complete. [facility] has a bed available and is able to take him today at 1530. Bedside RN and MD notified RN CM asked [facility] if we would need to set up transport.     1146: RN CM met with patient and his son, Damon, at bedside. Damon signed the patient's cobra and IMM. IMM faxed to [facility]. If transport is not arranged, Damon is okay transferring his father over to [facility].     1155: MD signed COBRA form.     1200: informed that transport was set up by [facility] with [employer]. RN CM asked bedside RN to let patient's son know. Completed packet placed with patient's chart. Bedside RN reminded to put hard scripts in the packet.    Barriers to Discharge:   None     Plan:   Wait for transport  Hospital care management will continue to assist with discharge planning needs.

## 2021-07-12 NOTE — DISCHARGE PLANNING
Agency/Facility Name: [facility]  Spoke To: Kellen  Outcome: Pt accepted. Facility got ins auth. Bed and transport available 7/12 @ 1530    RN CM informed

## 2021-07-12 NOTE — PROGRESS NOTES
Heri Mahan from [employer] is transporting pt to [facility]. Pt belongings gathered and taken by pt's son, Adeel Jose to meet pt [facility]. Pt off unit at 1532.

## 2021-07-12 NOTE — CARE PLAN
The patient is Stable - Low risk of patient condition declining or worsening    Shift Goals  Clinical Goals: reminders for O2  Patient Goals: sleep  Family Goals: Receive update from care team    Progress made toward(s) clinical / shift goals:  reinforcing O2    Patient is not progressing towards the following goals:        Problem: Fall Risk  Goal: Patient will remain free from falls  Outcome: Progressing   Bed and strip alarm on.

## 2021-07-12 NOTE — DISCHARGE INSTRUCTIONS
Anticoagulation, Generic  Anticoagulants are medications used to prevent clots from developing in your veins. These medications are also known as blood thinners. If blood clots are untreated, they could travel to your lungs. This is called a pulmonary embolus. A blood clot in your lungs can be fatal.   Caregivers often use anticoagulants to prevent clots following surgery. Anticoagulants are also used along with aspirin when the heart is not getting enough blood.  Another anticoagulant called warfarin is started 2 to 3 days after a rapid-acting injectable anticoagulant is started. The rapid-acting anticoagulants are usually continued until warfarin has begun to work. Your caregiver will  this length of time by blood tests known as the prothrombin time (PT) and International Normalization Ratio (INR). This means that your blood is at the necessary and best level to prevent clots.  RISKS AND COMPLICATIONS  · If you have received recent epidural anesthesia, spinal anesthesia, or a spinal tap while receiving anticoagulants, you are at risk for developing a blood clot in or around the spine. This condition could result in long-term or permanent paralysis.  · Because anticoagulants thin your blood, severe bleeding may occur from any tissue or organ. Symptoms of the blood being too thin may include:  · Bleeding from the nose or gums that does not stop quickly.  · Unusual bruising or bruising easily.  · Swelling or pain at an injection site.  · A cut that does not stop bleeding within 10 minutes.  · Continual nausea for more than 1 day or vomiting blood.  · Coughing up blood.  · Blood in the urine which may appear as pink, red, or brown urine.  · Blood in bowel movements which may appear as red, dark or black stools.  · Sudden weakness or numbness of the face, arm, or leg, especially on one side of the body.  · Sudden confusion.  · Trouble speaking (aphasia) or understanding.  · Sudden trouble seeing in one or both  eyes.  · Sudden trouble walking.  · Dizziness.  · Loss of balance or coordination.  · Severe pain, such as a headache, joint pain, or back pain.  · Fever.  · Too little anticoagulation continues to allow the risk for blood clots.  HOME CARE INSTRUCTIONS   · Due to the complications of anticoagulants, it is very important that you take your anticoagulant as directed by your caregiver. Anticoagulants need to be taken exactly as instructed. Be sure you understand all your anticoagulant instructions.  · Warfarin. Your caregiver will advise you on the length of treatment (usually 3 6 months, sometimes lifelong).  · Take warfarin exactly as directed by your caregiver. It is recommended that you take your warfarin dose at the same time of the day. It is preferred that you take warfarin in the late afternoon. If you have been told to stop taking warfarin, do not resume taking warfarin until directed to do so by your caregiver. Follow your caregiver's instructions if you accidentally take an extra dose or miss a dose of warfarin. It is very important to take warfarin as directed since bleeding or blood clots could result in chronic or permanent injury, pain, or disability.  · Too much and too little warfarin are both dangerous. Too much warfarin increases the risk of bleeding. Too little warfarin continues to allow the risk for blood clots. While taking warfarin, you will need to have regular blood tests to measure your blood clotting time. These blood tests usually include both the PT and INR tests. The PT and INR results allow your caregiver to adjust your dose of warfarin. The dose can change for many reasons. It is critically important that you take warfarin exactly as prescribed, and that you have your PT and INR levels drawn exactly as directed. Follow up with your laboratory test appointments as directed. It is very important to keep your lab appointments. Not keeping lab appointments could result in a chronic or  permanent injury, pain, or disability.  · Many foods, especially foods high in vitamin K can interfere with warfarin and affect the PT and INR results. Foods high in vitamin K include spinach, kale, broccoli, cabbage, belén and turnip greens, brussels sprouts, peas, cauliflower, seaweed, and parsley as well as beef and pork liver, green tea, and soybean oil. You should eat a consistent amount of foods high in vitamin K. Avoid major changes in your diet, or notify your caregiver before changing your diet. Arrange a visit with a dietitian to answer your questions.  · Many medicines can interfere with warfarin and affect the PT and INR results. You must tell your caregiver about any and all medicines you take, this includes all vitamins and supplements. Ask your caregiver before taking these. Prescription and over-the-counter medicine consistency is critical to warfarin management. It is important that potential interactions are checked before you start a new medicine. Be especially cautious with aspirin and anti-inflammatory medicines. Ask your caregiver before taking these. Medicines such as antibiotics and acid-reducing medicine can interact with warfarin and can cause an increased warfarin effect. Warfarin can also interfere with the effectiveness of medicines you are taking. Do not take or discontinue any prescribed or over-the-counter medicine except on the advice of your caregiver or pharmacist.  · Some vitamins, supplements, and herbal products interfere with the effectiveness of warfarin. Vitamin E may increase the anticoagulant effects of warfarin. Vitamin K may can cause warfarin to be less effective. Do not take or discontinue any vitamin, supplement, or herbal product except on the advice of your caregiver or pharmacist.  · Alcohol can change the body's ability to handle warfarin. It is best to avoid alcoholic drinks or consume only very small amounts while taking warfarin. Notify your caregiver if you  change your alcohol intake. A sudden increase in alcohol use can increase your risk of bleeding. Chronic alcohol use can cause warfarin to be less effective.  · If you have a loss of appetite or get the stomach flu (viral gastroenteritis), talk to your caregiver as soon as possible. A decrease in your normal vitamin K intake can make you more sensitive to your usual dose of warfarin.  · Some medical conditions may increase your risk for bleeding while you are taking warfarin. A fever, diarrhea lasting more than a day, worsening heart failure, or worsening liver function are some medical conditions that could affect warfarin. Contact your caregiver if you have any of these medical conditions.  · Warfarin can have side effects, such as excessive bruising or bleeding. You will need to hold pressure over cuts for longer than usual.  · Be careful not to cut yourself when using sharp objects.  · Notify your dentist or other caregivers before procedures.  · Limit physical activities or sports that could result in a fall or cause injury. Avoid contact sports.  · Wear a medical alert bracelet or carry a medical alert card.  SEEK MEDICAL CARE IF:   · You develop any rashes.  · You have any worsening of the condition for which you are receiving anticoagulation therapy.  SEEK IMMEDIATE MEDICAL CARE IF:   · Bleeding from the nose or gums does not stop quickly.  · You have unusual bruising or are bruising easily.  · Swelling or pain occurs at an injection site.  · A cut does not stop bleeding within 10 minutes.  · You have continual nausea for more than 1 day or are vomiting blood.  · You are coughing up blood.  · You have blood in the urine.  · You have dark or black stools.  · You have sudden weakness or numbness of the face, arm, or leg, especially on one side of the body.  · You have sudden confusion.  · You have trouble speaking (aphasia) or understanding.  · You have sudden trouble seeing in one or both eyes.  · You have  sudden trouble walking.  · You have dizziness.  · You have a loss of balance or coordination.  · You have severe pain, such as a headache, joint pain, or back pain.  · You have a serious fall or head injury, even if you are not bleeding.  · You have an oral temperature above 102° F (38.9° C), not controlled by medicine.  ANY OF THESE SYMPTOMS MAY REPRESENT A SERIOUS PROBLEM THAT IS AN EMERGENCY. Do not wait to see if the symptoms will go away. Get medical help right away. Call your local emergency services (911 in [place] DO NOT drive yourself to the hospital.  MAKE SURE YOU:   · Understand these instructions.  · Will watch your condition.  · Will get help right away if you are not doing well or get worse.  Document Released: 12/18/2006 Document Revised: 09/11/2013 Document Reviewed: 07/22/2009  [employer]® Patient Information ©2014 [employer].      Atrial Fibrillation    Atrial fibrillation is a type of heartbeat that is irregular or fast (rapid). If you have this condition, your heart beats without any order. This makes it hard for your heart to pump blood in a normal way. Having this condition gives you more risk for stroke, heart failure, and other heart problems.  Atrial fibrillation may start all of a sudden and then stop on its own, or it may become a long-lasting problem.  What are the causes?  This condition may be caused by heart conditions, such as:  · High blood pressure.  · Heart failure.  · Heart valve disease.  · Heart surgery.  Other causes include:  · Pneumonia.  · Obstructive sleep apnea.  · Lung cancer.  · Thyroid disease.  · Drinking too much alcohol.  Sometimes the cause is not known.  What increases the risk?  You are more likely to develop this condition if:  · You smoke.  · You are older.  · You have diabetes.  · You are overweight.  · You have a family history of this condition.  · You exercise often and hard.  What are the signs or symptoms?  Common symptoms of this condition include:  · A  "feeling like your heart is beating very fast.  · Chest pain.  · Feeling short of breath.  · Feeling light-headed or weak.  · Getting tired easily.  Follow these instructions at home:  Medicines  · Take over-the-counter and prescription medicines only as told by your doctor.  · If your doctor gives you a blood-thinning medicine, take it exactly as told. Taking too much of it can cause bleeding. Taking too little of it does not protect you against clots. Clots can cause a stroke.  Lifestyle         · Do not use any tobacco products. These include cigarettes, chewing tobacco, and e-cigarettes. If you need help quitting, ask your doctor.  · Do not drink alcohol.  · Do not drink beverages that have caffeine. These include coffee, soda, and tea.  · Follow diet instructions as told by your doctor.  · Exercise regularly as told by your doctor.  General instructions  · If you have a condition that causes breathing to stop for a short period of time (apnea), treat it as told by your doctor.  · Keep a healthy weight. Do not use diet pills unless your doctor says they are safe for you. Diet pills may make heart problems worse.  · Keep all follow-up visits as told by your doctor. This is important.  Contact a doctor if:  · You notice a change in the speed, rhythm, or strength of your heartbeat.  · You are taking a blood-thinning medicine and you see more bruising.  · You get tired more easily when you move or exercise.  · You have a sudden change in weight.  Get help right away if:    · You have pain in your chest or your belly (abdomen).  · You have trouble breathing.  · You have blood in your vomit, poop, or pee (urine).  · You have any signs of a stroke. \"BE FAST\" is an easy way to remember the main warning signs:  ? B - Balance. Signs are dizziness, sudden trouble walking, or loss of balance.  ? E - Eyes. Signs are trouble seeing or a change in how you see.  ? F - Face. Signs are sudden weakness or loss of feeling in the " face, or the face or eyelid drooping on one side.  ? A - Arms. Signs are weakness or loss of feeling in an arm. This happens suddenly and usually on one side of the body.  ? S - Speech. Signs are sudden trouble speaking, slurred speech, or trouble understanding what people say.  ? T - Time. Time to call emergency services. Write down what time symptoms started.  · You have other signs of a stroke, such as:  ? A sudden, very bad headache with no known cause.  ? Feeling sick to your stomach (nausea).  ? Throwing up (vomiting).  ? Jerky movements you cannot control (seizure).  These symptoms may be an emergency. Do not wait to see if the symptoms will go away. Get medical help right away. Call your local emergency services (911 in the [place] Do not drive yourself to the hospital.  Summary  · Atrial fibrillation is a type of heartbeat that is irregular or fast (rapid).  · You are at higher risk of this condition if you smoke, are older, have diabetes, or are overweight.  · Follow your doctor's instructions about medicines, diet, exercise, and follow-up visits.  · Get help right away if you think that you have signs of a stroke.  This information is not intended to replace advice given to you by your health care provider. Make sure you discuss any questions you have with your health care provider.  Document Released: 09/26/2009 Document Revised: 02/21/2019 Document Reviewed: 02/08/2019  [employer] Patient Education © 2020 [employer]    Discharge Instructions    Discharged to home by medical transportation with escort. Discharged via wheelchair, hospital escort: Yes.  Special equipment needed: Not Applicable    Be sure to schedule a follow-up appointment with your primary care doctor or any specialists as instructed.     Discharge Plan:   Diet Plan: Discussed  Activity Level: Discussed  Confirmed Follow up Appointment: Patient to Call and Schedule Appointment  Confirmed Symptoms Management: Discussed  Medication  Reconciliation Updated: Yes    I understand that a diet low in cholesterol, fat, and sodium is recommended for good health. Unless I have been given specific instructions below for another diet, I accept this instruction as my diet prescription.   Other diet: As Tolerated    Special Instructions: None    · Is patient discharged on Warfarin / Coumadin?   No     Depression / Suicide Risk    As you are discharged from this [facility] facility, it is important to learn how to keep safe from harming yourself.    Recognize the warning signs:  · Abrupt changes in personality, positive or negative- including increase in energy   · Giving away possessions  · Change in eating patterns- significant weight changes-  positive or negative  · Change in sleeping patterns- unable to sleep or sleeping all the time   · Unwillingness or inability to communicate  · Depression  · Unusual sadness, discouragement and loneliness  · Talk of wanting to die  · Neglect of personal appearance   · Rebelliousness- reckless behavior  · Withdrawal from people/activities they love  · Confusion- inability to concentrate     If you or a loved one observes any of these behaviors or has concerns about self-harm, here's what you can do:  · Talk about it- your feelings and reasons for harming yourself  · Remove any means that you might use to hurt yourself (examples: pills, rope, extension cords, firearm)  · Get professional help from the community (Mental Health, Substance Abuse, psychological counseling)  · Do not be alone:Call your Safe Contact- someone whom you trust who will be there for you.  · Call your local CRISIS HOTLINE 001-0727 or 697-807-8883  · Call your local Children's Mobile Crisis Response Team [place] (812) 251-9472 or www.VideoNot.es  · Call the toll free National Suicide Prevention Hotlines   · National Suicide Prevention Lifeline 194-870-UEFA (7505)  · National Hope Line Network 800-SUICIDE (926-0997)

## 2021-07-12 NOTE — FLOWSHEET NOTE
07/11/21 2200   Events/Summary/Plan   Events/Summary/Plan CPAP s/u, patient equipment   Skin Inspection Respiratory Device Intact   Vital Signs   Pulse 93   Respiration 17   Pulse Oximetry 93 %   $ Pulse Oximetry (Spot Check) Yes   Respiratory Assessment   Respiratory Pattern Within Normal Limits   Level of Consciousness Alert   Non-Invasive Ventilation [employer] [facility]   Nocturnal CPAP or BIPAP CPAP - Home Unit   $ System Evaluation Yes   Settings (If Known)   (home settings)   FiO2 or LPM 3

## 2021-07-12 NOTE — CARE PLAN
The patient is Stable - Low risk of patient condition declining or worsening    Shift Goals  Clinical Goals: keep O2 on  Patient Goals: rest  Family Goals: Receive update from care team    Progress made toward(s) clinical / shift goals:  attempt to wean O2 - continuous pulse ox on      Problem: Discharge Barriers/Planning  Goal: Patient's continuum of care needs are met  Outcome: Progressing  Note: Per Son, insurance company can take 7-10 days for insurance authorization. Still pending placement.

## 2021-07-12 NOTE — THERAPY
Physical Therapy   Daily Treatment     Patient Name: Guru Pena  Age:  89 y.o., Sex:  male  Medical Record #: [id number]  Today's Date: 7/12/2021     Precautions: Fall Risk    Assessment    Progressing slower than expected, however, able to tolerate increased activity today along with supine/standing therapeutic exercises. Pt continues to be limited due to poor gait mechanics and activity tolerance. Pt continues to require frequent cues for gait training and correction with upright posture along with standing mechanics. Pt and family provided with education correct mechanics with supine therapeutic exercises along with handout for intensity, duration, and frequency. Pt encouraged to ambulate with [facility] staff in hallway at least 3x/day and eat meals up in chair, to prevent further deconditioning. Recommend post-acute placement for continued physical therapy services prior to discharge home.       Plan    Continue current treatment plan.    DC Equipment Recommendations: (P) Unable to determine at this time  Discharge Recommendations: (P) Recommend post-acute placement for additional physical therapy services prior to discharge home    Objective       07/12/21 1145   Precautions   Precautions Fall Risk   Pain 0 - 10 Group   Therapist Pain Assessment Nurse Notified;0   Cognition    Cognition / Consciousness X   Orientation Level Not Oriented to Place;Not Oriented to Reason;Not Oriented to Time   Level of Consciousness Alert   Ability To Follow Commands 2 Step   Comments [place]; pleasant/cooperative; improved in abliity to follow and appears to be more alert today    Supine Lower Body Exercise   Supine Lower Body Exercises Yes   Bridges Two Legged;1 set of 10   Straight Leg Raises 1 set of 10;Bilateral   Knee to Chest 1 set of 10;Bilateral   Heel Slide 1 set of 10;Bilateral   Ankle Pumps Reciprocal;2 sets of 10   Sitting Lower Body Exercises   Sitting Lower Body Exercises Yes   Sit to Stand 1 set of 10   Standing Lower  Body Exercises   Standing Lower Body Exercises Yes   Marching 2 sets of 10   Home Exercise Program   Home Exercise Program Patient Requires Assistance / Guarding to Complete Home Program   Comments provided with supine exercise handout    Neuro-Muscular Treatments   Neuro-Muscular Treatments Anterior weight shift;Facilitation;Postural Facilitation;Weight Shift Right;Weight Shift Left   Other Treatments   Other Treatments Provided family provided with education on appropirate mechanics with supine exercises and provided with handout with appropriate intensity, duration/frequency    Balance   Sitting Balance (Static) Fair +   Sitting Balance (Dynamic) Fair   Standing Balance (Static) Fair   Standing Balance (Dynamic) Fair -   Weight Shift Sitting Fair   Weight Shift Standing Fair   Skilled Intervention Verbal Cuing;Postural Facilitation;Facilitation;Compensatory Strategies   Comments w/fww; posterior lean upon standing; requires manual faciliation upon standing for anterior lean    Gait Analysis   Gait Level Of Assist Minimal Assist   Assistive Device Front Wheel Walker   Distance (Feet) 115   # of Times Distance was Traveled 1   Deviation Shuffled Gait;Bradykinetic;Decreased Heel Strike;Decreased Toe Off   Weight Bearing Status fwb   Skilled Intervention Verbal Cuing;Postural Facilitation;Sequencing   Comments frequent cues for increasing foot clearence, upright posture, increasing stride length, heel strike, and inc johnnie    Bed Mobility    Supine to Sit Minimal Assist   Scooting Supervised   Skilled Intervention Verbal Cuing;Tactile Cuing;Sequencing   Comments cues to use BUE to scoot forward towards EOB; HOB flat and rails up; requires signficant use of railing on bed    Functional Mobility   Sit to Stand Minimal Assist   Bed, Chair, Wheelchair Transfer Minimal Assist   Transfer Method Stand Step   Mobility EOB, sit<>stand x 10, ambulation, transfer to chair    Skilled Intervention Verbal  Cuing;Sequencing;Facilitation   Comments cues for appropriate standing mechanics   How much difficulty does the patient currently have...   Turning over in bed (including adjusting bedclothes, sheets and blankets)? 3   Sitting down on and standing up from a chair with arms (e.g., wheelchair, bedside commode, etc.) 1   Moving from lying on back to sitting on the side of the bed? 3   How much help from another person does the patient currently need...   Moving to and from a bed to a chair (including a wheelchair)? 3   Need to walk in a hospital room? 3   Climbing 3-5 steps with a railing? 1   6 clicks Mobility Score 14   Activity Tolerance   Sitting in Chair 10 mins   Sitting Edge of Bed 10 mins   Standing 8 mins   Comments limited due to fatigue and weakness   Patient / Family Goals    Patient / Family Goal #1 to go home   Short Term Goals    Short Term Goal # 1 pt will go supine<>sit w/hob flat and rails down w/spv in 6tx    Goal Outcome # 1 goal not met   Short Term Goal # 2 pt will go sit<>stand w/fww w/spv in 6tx    Goal Outcome # 2 Goal not met   Short Term Goal # 3 pt will transfer bed<>chair w/fww w/spv in 6tx    Goal Outcome # 3 Goal not met   Short Term Goal # 4 pt will ambulate for 150ft w/fww w/spv in 6tx    Goal Outcome # 4 Goal not met   Education Group   Education Provided Role of Physical Therapist   Role of Physical Therapist Patient Response Patient;Acceptance;Explanation;Demonstration;Verbal Demonstration;Action Demonstration   Anticipated Discharge Equipment and Recommendations   DC Equipment Recommendations Unable to determine at this time   Discharge Recommendations Recommend post-acute placement for additional physical therapy services prior to discharge home   Interdisciplinary Plan of Care Collaboration   IDT Collaboration with  Nursing   Patient Position at End of Therapy Seated;Chair Alarm On;Call Light within Reach;Phone within Reach;Tray Table within Reach;Family / Friend in Room    Collaboration Comments aware of visit and recs    Session Information   Date / Session Number  7/12-4 (1/3, 7/18)

## 2021-07-27 ENCOUNTER — TELEPHONE (OUTPATIENT)
Dept: MEDICAL GROUP | Age: 86
End: 2021-07-27

## 2021-07-27 DIAGNOSIS — R53.1 GENERALIZED WEAKNESS: ICD-10-CM

## 2021-07-27 DIAGNOSIS — H54.3 IMPAIRED VISION IN BOTH EYES: ICD-10-CM

## 2021-07-27 NOTE — TELEPHONE ENCOUNTER
1. Caller Name: Guru Pena                          Call Back Number: [phone] (home)         How would the patient prefer to be contacted with a response: [place] message    2. SPECIFIC Action To Be Taken: Referral pending, please sign.    3. Diagnosis/Clinical Reason for Request: Weakness, decreased vision, need for PT and OT, aide for assistance bathing/dressing     4. Specialty & Provider Name/Lab/Imaging Location: [facility]    5. Is appointment scheduled for requested order/referral: no    Patient was not informed they will receive a return phone call from the office ONLY if there are any questions before processing their request. Advised to call back if they haven't received a call from the referral department in 5 days.

## 2021-07-29 DIAGNOSIS — G47.34 NOCTURNAL HYPOXEMIA: ICD-10-CM

## 2021-07-29 DIAGNOSIS — J84.10 PULMONARY FIBROSIS (HCC): ICD-10-CM

## 2021-07-29 PROBLEM — I35.0 MILD AORTIC STENOSIS BY PRIOR ECHOCARDIOGRAM: Status: ACTIVE | Noted: 2021-07-29

## 2021-07-29 PROBLEM — I34.0 MODERATE MITRAL REGURGITATION BY PRIOR ECHOCARDIOGRAM: Status: ACTIVE | Noted: 2021-07-29

## 2021-07-29 PROBLEM — I48.20 CHRONIC ATRIAL FIBRILLATION (HCC): Status: ACTIVE | Noted: 2021-07-29

## 2021-07-30 ENCOUNTER — TELEPHONE (OUTPATIENT)
Dept: MEDICAL GROUP | Age: 86
End: 2021-07-30

## 2021-07-30 DIAGNOSIS — J84.10 PULMONARY FIBROSIS (HCC): ICD-10-CM

## 2021-07-30 DIAGNOSIS — G47.34 NOCTURNAL HYPOXEMIA: ICD-10-CM

## 2021-07-30 NOTE — TELEPHONE ENCOUNTER
1. Caller Name: Guru Pena                          Call Back Number: [phone] (home)         How would the patient prefer to be contacted with a response: NewsPin[employer] message    2. SPECIFIC Action To Be Taken: Referral pending, please sign.    3. Diagnosis/Clinical Reason for Request: Needs new cpap    4. Specialty & Provider Name/Lab/Imaging Location: Pulmonary    5. Is appointment scheduled for requested order/referral: no    Patient was informed they will receive a return phone call from the office ONLY if there are any questions before processing their request. Advised to call back if they haven't received a call from the referral department in 5 days.

## 2021-08-10 ENCOUNTER — OFFICE VISIT (OUTPATIENT)
Dept: MEDICAL GROUP | Age: 86
End: 2021-08-10
Payer: MEDICARE

## 2021-08-10 VITALS
HEART RATE: 93 BPM | DIASTOLIC BLOOD PRESSURE: 60 MMHG | OXYGEN SATURATION: 94 % | HEIGHT: 69 IN | WEIGHT: 173 LBS | TEMPERATURE: 97.4 F | SYSTOLIC BLOOD PRESSURE: 92 MMHG | BODY MASS INDEX: 25.62 KG/M2

## 2021-08-10 DIAGNOSIS — J84.10 PULMONARY FIBROSIS (HCC): ICD-10-CM

## 2021-08-10 DIAGNOSIS — I27.20 PULMONARY HYPERTENSION (HCC): ICD-10-CM

## 2021-08-10 DIAGNOSIS — I34.0 MODERATE MITRAL REGURGITATION BY PRIOR ECHOCARDIOGRAM: ICD-10-CM

## 2021-08-10 DIAGNOSIS — Z79.01 ANTICOAGULATED: ICD-10-CM

## 2021-08-10 DIAGNOSIS — I48.20 CHRONIC ATRIAL FIBRILLATION (HCC): ICD-10-CM

## 2021-08-10 DIAGNOSIS — I48.91 ATRIAL FIBRILLATION WITH RAPID VENTRICULAR RESPONSE (HCC): ICD-10-CM

## 2021-08-10 DIAGNOSIS — I51.7 MILD CONCENTRIC LEFT VENTRICULAR HYPERTROPHY (LVH): ICD-10-CM

## 2021-08-10 DIAGNOSIS — N18.31 STAGE 3A CHRONIC KIDNEY DISEASE: ICD-10-CM

## 2021-08-10 DIAGNOSIS — H61.21 IMPACTED CERUMEN OF RIGHT EAR: ICD-10-CM

## 2021-08-10 DIAGNOSIS — G47.33 OSA ON CPAP: ICD-10-CM

## 2021-08-10 DIAGNOSIS — Z09 HOSPITAL DISCHARGE FOLLOW-UP: ICD-10-CM

## 2021-08-10 DIAGNOSIS — E55.9 VITAMIN D DEFICIENCY: ICD-10-CM

## 2021-08-10 DIAGNOSIS — R33.9 URINARY RETENTION: ICD-10-CM

## 2021-08-10 PROBLEM — R79.89 ELEVATED TROPONIN: Status: RESOLVED | Noted: 2021-06-30 | Resolved: 2021-08-10

## 2021-08-10 PROBLEM — R53.1 GENERALIZED WEAKNESS: Status: RESOLVED | Noted: 2021-06-22 | Resolved: 2021-08-10

## 2021-08-10 PROCEDURE — 99215 OFFICE O/P EST HI 40 MIN: CPT | Mod: 25 | Performed by: INTERNAL MEDICINE

## 2021-08-10 PROCEDURE — 69210 REMOVE IMPACTED EAR WAX UNI: CPT | Performed by: INTERNAL MEDICINE

## 2021-08-10 ASSESSMENT — ENCOUNTER SYMPTOMS
CONSTITUTIONAL NEGATIVE: 1
MUSCULOSKELETAL NEGATIVE: 1
GASTROINTESTINAL NEGATIVE: 1
EYES NEGATIVE: 1
NEUROLOGICAL NEGATIVE: 1
PSYCHIATRIC NEGATIVE: 1
RESPIRATORY NEGATIVE: 1

## 2021-08-10 ASSESSMENT — FIBROSIS 4 INDEX: FIB4 SCORE: 2.19

## 2021-08-10 NOTE — PROGRESS NOTES
Subjective:      Guru Pena is a 89 y.o. male who presents with Hospital Follow-up (6/30/21 Atrial Fibrillation) and Ear Fullness (right )  Patient is here for hospital follow-up visit from 630-7/12/2021 for acute rapid atrial fibrillation which was stabilized with change in medications and subsequent 3-week stent at residential rehab center.  Now doing well with no chest pain dyspnea palpitations PND or increased LUBIN.  He has had persistent pedal edema but much improved from previously.  He is wearing support hose.  His metoprolol was switched to carvedilol and digoxin discontinued (found to be dig toxic).    Since released from the rehab center following his hospitalization he has had marked improvement of swelling in his legs and no chest pain PND orthopnea dyspnea on exertion.  Feeling back to his baseline.  Here with his son today to review all his medications and changes made there to.      Outpatient Medications Prior to Visit   Medication Sig Dispense Refill   • Misc. Devices Misc Overnight pulse oximetry on room air to be done by any durable medical equipment company to document patient's oxygen needs for nocturnal hypoxemia and possible oxygen therapy. 1 Each 0   • levothyroxine (SYNTHROID) 175 MCG Tab Take 1 tablet by mouth every morning on an empty stomach for 102 days. Once a day, Sunday NIGHT take extra 1 Tablet- NEW  tablet 4   • apixaban (ELIQUIS) 2.5mg Tab Take 1 tablet by mouth 2 times a day. 60 tablet 0   • carvedilol (COREG) 6.25 MG Tab Take 1 tablet by mouth 2 times a day with meals. 60 tablet 0   • levothyroxine (SYNTHROID) 175 MCG Tab Take 87.5-175 mcg by mouth every morning on an empty stomach. Once a day, Sunday NIGHT take extra 1 Tablet- NEW RX Per pt has not been taking 2 tablets  175 mcg on Monday, Tuesday, Wednesday, Thursday , Friday, Saturday  87.5 mcg on Sunday     • ondansetron (ZOFRAN ODT) 4 MG TABLET DISPERSIBLE Take 1 tablet by mouth every 6 hours as needed for  "Nausea. 20 tablet 0   • vitamin D (CHOLECALCIFEROL) 1000 Unit (25 mcg) Tab Take 1,000 Units by mouth every day.     • Multiple Vitamins-Minerals (PRESERVISION AREDS) Tab Take 1 tablet by mouth 2 times a day.       No facility-administered medications prior to visit.     No Known Allergies  Patient Active Problem List    Diagnosis Date Noted   • JAMES on CPAP 08/10/2021   • Chronic atrial fibrillation (HCC) 07/29/2021   • Moderate mitral regurgitation by prior echocardiogram 07/29/2021   • Mild aortic stenosis by prior echocardiogram 07/29/2021   • Pulmonary fibrosis (HCC) 07/29/2021   • Anticoagulated- Eliquis for a. fib 06/30/2021   • Other specified hypothyroidism 06/01/2021   • Vitamin D deficiency 06/01/2021   • Impaired vision in both eyes 06/01/2021               HPI    Review of Systems   Constitutional: Negative.    HENT: Negative.    Eyes: Negative.    Respiratory: Negative.    Cardiovascular: Positive for leg swelling.   Gastrointestinal: Negative.    Genitourinary: Negative.    Musculoskeletal: Negative.    Skin: Negative.    Neurological: Negative.    Endo/Heme/Allergies: Negative.    Psychiatric/Behavioral: Negative.           Objective:     BP (!) 92/60 (BP Location: Left arm, Patient Position: Sitting, BP Cuff Size: Adult)   Pulse 93   Temp 36.3 °C (97.4 °F) (Temporal)   Ht 1.753 m (5' 9\")   Wt 78.5 kg (173 lb)   SpO2 94%   BMI 25.55 kg/m²      Physical Exam  Constitutional:       General: He is not in acute distress.     Appearance: He is well-developed. He is not diaphoretic.   HENT:      Head: Normocephalic and atraumatic.      Right Ear: External ear normal.      Left Ear: External ear normal.      Nose: Nose normal.      Mouth/Throat:      Pharynx: No oropharyngeal exudate.   Eyes:      General: No scleral icterus.        Right eye: No discharge.         Left eye: No discharge.      Conjunctiva/sclera: Conjunctivae normal.      Pupils: Pupils are equal, round, and reactive to light.   Neck: "      Thyroid: No thyromegaly.      Vascular: No JVD.      Trachea: No tracheal deviation.   Cardiovascular:      Rate and Rhythm: Normal rate. Rhythm irregular.      Heart sounds: Normal heart sounds. No murmur heard.   No friction rub. No gallop.       Comments: Rapid irregularly irregular rhythm with variable intensity of S2, rate 100.  Pulmonary:      Effort: Pulmonary effort is normal. No respiratory distress.      Breath sounds: Normal breath sounds. No stridor. No wheezing or rales.   Chest:      Chest wall: No tenderness.   Abdominal:      General: Bowel sounds are normal. There is no distension.      Palpations: Abdomen is soft. There is no mass.      Tenderness: There is no abdominal tenderness. There is no guarding or rebound.   Musculoskeletal:         General: Swelling present. No tenderness. Normal range of motion.      Cervical back: Normal range of motion and neck supple.      Comments: 2-3+ ankle edema bilaterally.  No calf tenderness   Lymphadenopathy:      Cervical: No cervical adenopathy.   Skin:     General: Skin is warm and dry.      Coloration: Skin is not pale.      Findings: No erythema or rash.   Neurological:      Mental Status: He is alert and oriented to person, place, and time.      Motor: No abnormal muscle tone.      Coordination: Coordination normal.      Deep Tendon Reflexes: Reflexes are normal and symmetric. Reflexes normal.   Psychiatric:         Behavior: Behavior normal.         Thought Content: Thought content normal.         Judgment: Judgment normal.                   Admission on 2021, Discharged on 2021   Component Date Value   • Report 2021                      Value:[facility]    Test Date:  2021  Pt Name:    JANICE HARRISON                  Department: [facility]  MRN:        [id number]                      Room:       -ROOM 7  Gender:     Male                         Technician: JEN  :        [id number]                    Requested By:ER TRIAGE PROTOCOL  Order #:    [id number]                    Reading MD: VELASQUEZ WALLER DO    Measurements  Intervals                                Axis  Rate:       139                          P:  MO:                                      QRS:        -84  QRSD:       160                          T:          38  QT:         360  QTc:        548    Interpretive Statements  ATRIAL FIBRILLATION, V-RATE    VENTRICULAR BIGEMINY  RBBB AND LAFB  Compared to ECG 06/21/2021 18:31:01  Ventricular premature complex(es) now present  Electronically Signed On 6- 12:06:06 PDT by VELASQUEZ WALLER DO     • WBC 06/30/2021 7.4    • RBC 06/30/2021 4.27*   • Hemoglobin 06/30/2021 14.1    • Hematocrit 06/30/2021 40.8*   • MCV 06/30/2021 95.6    • MCH 06/30/2021 33.0    • MCHC 06/30/2021 34.6    • RDW 06/30/2021 50.4*   • Platelet Count 06/30/2021 242    • MPV 06/30/2021 10.0    • Neutrophils-Polys 06/30/2021 71.70    • Lymphocytes 06/30/2021 14.20*   • Monocytes 06/30/2021 12.40    • Eosinophils 06/30/2021 0.50    • Basophils 06/30/2021 0.50    • Immature Granulocytes 06/30/2021 0.70    • Nucleated RBC 06/30/2021 0.00    • Neutrophils (Absolute) 06/30/2021 5.31    • Lymphs (Absolute) 06/30/2021 1.05    • Monos (Absolute) 06/30/2021 0.92*   • Eos (Absolute) 06/30/2021 0.04    • Baso (Absolute) 06/30/2021 0.04    • Immature Granulocytes (a* 06/30/2021 0.05    • NRBC (Absolute) 06/30/2021 0.00    • Sodium 06/30/2021 138    • Potassium 06/30/2021 4.0    • Chloride 06/30/2021 106    • Co2 06/30/2021 18*   • Anion Gap 06/30/2021 14.0    • Glucose 06/30/2021 128*   • Bun 06/30/2021 27*   • Creatinine 06/30/2021 1.54*   • Calcium 06/30/2021 8.2*   • AST(SGOT) 06/30/2021 25    • ALT(SGPT) 06/30/2021 18    • Alkaline Phosphatase 06/30/2021 84    • Total Bilirubin 06/30/2021 0.8    • Albumin 06/30/2021 3.7    • Total Protein 06/30/2021 6.9    • Globulin 06/30/2021 3.2    • A-G Ratio 06/30/2021 1.2    • Troponin T  06/30/2021 52*   • NT-proBNP 06/30/2021 6426*   • GFR If  06/30/2021 51*   • GFR If Non  [facility]meri* 06/30/2021 42*   • Color 06/30/2021 Yellow    • Character 06/30/2021 Clear    • Specific Gravity 06/30/2021 1.025    • Ph 06/30/2021 5.0    • Glucose 06/30/2021 Negative    • Ketones 06/30/2021 Trace*   • Protein 06/30/2021 100*   • Bilirubin 06/30/2021 Negative    • Nitrite 06/30/2021 Negative    • Leukocyte Esterase 06/30/2021 Negative    • Occult Blood 06/30/2021 Negative    • Micro Urine Req 06/30/2021 Microscopic    • WBC 06/30/2021 Rare*   • RBC 06/30/2021 Rare    • Bacteria 06/30/2021 Rare*   • Epithelial Cells 06/30/2021 Rare    • Mucous Threads 06/30/2021 Few    • Eject.Frac. MOD BP 07/01/2021 62.52    • Eject.Frac. MOD 4C 07/01/2021 65.18    • Eject.Frac. MOD 2C 07/01/2021 56.97    • Left Ventrical Ejection * 07/01/2021 60    • WBC 07/01/2021 7.3    • RBC 07/01/2021 4.31*   • Hemoglobin 07/01/2021 14.3    • Hematocrit 07/01/2021 41.4*   • MCV 07/01/2021 96.1    • MCH 07/01/2021 33.2*   • MCHC 07/01/2021 34.5    • RDW 07/01/2021 51.5*   • Platelet Count 07/01/2021 230    • MPV 07/01/2021 10.7    • Neutrophils-Polys 07/01/2021 65.00    • Lymphocytes 07/01/2021 19.30*   • Monocytes 07/01/2021 14.20*   • Eosinophils 07/01/2021 0.60    • Basophils 07/01/2021 0.30    • Immature Granulocytes 07/01/2021 0.60    • Nucleated RBC 07/01/2021 0.00    • Neutrophils (Absolute) 07/01/2021 4.72    • Lymphs (Absolute) 07/01/2021 1.40    • Monos (Absolute) 07/01/2021 1.03*   • Eos (Absolute) 07/01/2021 0.04    • Baso (Absolute) 07/01/2021 0.02    • Immature Granulocytes (a* 07/01/2021 0.04    • NRBC (Absolute) 07/01/2021 0.00    • Magnesium 07/01/2021 2.0    • Sodium 07/01/2021 141    • Potassium 07/01/2021 3.8    • Chloride 07/01/2021 104    • Co2 07/01/2021 22    • Glucose 07/01/2021 104*   • Creatinine 07/01/2021 1.68*   • Bun 07/01/2021 28*   • Calcium 07/01/2021 8.6    • Phosphorus 07/01/2021 3.6    •  Albumin 2021 3.4    • GFR If  2021 46*   • GFR If Non  [facility]meri* 2021 38*   • Sodium 2021 137    • Potassium 2021 3.3*   • Chloride 2021 101    • Co2 2021 23    • Glucose 2021 106*   • Bun 2021 29*   • Creatinine 2021 1.33    • Calcium 2021 8.5    • Anion Gap 2021 13.0    • WBC 2021 7.7    • RBC 2021 4.46*   • Hemoglobin 2021 14.7    • Hematocrit 2021 43.2    • MCV 2021 96.9    • MCH 2021 33.0    • MCHC 2021 34.0    • RDW 2021 52.2*   • Platelet Count 2021 221    • MPV 2021 10.7    • Magnesium 2021 1.9    • Phosphorus 2021 4.0    • GFR If  2021 >60    • GFR If Non  Ameri* 2021 50*   • Sodium 2021 138    • Potassium 2021 4.0    • Chloride 2021 102    • Co2 2021 27    • Glucose 2021 103*   • Bun 2021 30*   • Creatinine 2021 1.40    • Calcium 2021 8.5    • Anion Gap 2021 9.0    • WBC 2021 8.1    • RBC 2021 4.41*   • Hemoglobin 2021 14.5    • Hematocrit 2021 43.2    • MCV 2021 98.0*   • MCH 2021 32.9    • MCHC 2021 33.6*   • RDW 2021 54.1*   • Platelet Count 2021 258    • MPV 2021 10.9    • Magnesium 2021 2.0    • Phosphorus 2021 3.2    • GFR If  2021 57*   • GFR If Non  Ameri* 2021 47*   • Report 2021                      Value:[facility]    Test Date:  2021  Pt Name:    JANICE HARRISON                  Department: MED  MRN:        [id number]                      Room:       [facility]  Gender:     Male                         Technician: [id number]  :        [id number]                   Requested By:JOSE ARMANDO RAMOS  Order #:    [id number]                    Reading MD: Deshawn Corea MD    Measurements  Intervals                                Axis  Rate:        75                           P:  AR:                                      QRS:        -88  QRSD:       159                          T:          -29  QT:         396  QTc:        443    Interpretive Statements  Atrial fibrillation  Ventricular premature complex  LEFT ANTERIOR FASCICULAR BLOCK  RIGHT BUNDLE BRANCH BLOCK  Compared to ECG 06/30/2021 11:59:47  No significant changes  Electronically Signed On 7-3-2021 10:20:46 PDT by Deshawn Corea MD     • Sodium 07/09/2021 134*   • Potassium 07/09/2021 4.1    • Chloride 07/09/2021 98    • Co2 07/09/2021 23    • Glucose 07/09/2021 92    • Bun 07/09/2021 26*   • Creatinine 07/09/2021 1.27    • Calcium 07/09/2021 8.8    • Anion Gap 07/09/2021 13.0    • GFR If  07/09/2021 >60    • GFR If Non  Ameri* 07/09/2021 53*      No results found for: HBA1C  Lab Results   Component Value Date/Time    SODIUM 134 (L) 07/09/2021 02:20 AM    POTASSIUM 4.1 07/09/2021 02:20 AM    CHLORIDE 98 07/09/2021 02:20 AM    CO2 23 07/09/2021 02:20 AM    GLUCOSE 92 07/09/2021 02:20 AM    BUN 26 (H) 07/09/2021 02:20 AM    CREATININE 1.27 07/09/2021 02:20 AM    ALKPHOSPHAT 84 06/30/2021 12:04 PM    ASTSGOT 25 06/30/2021 12:04 PM    ALTSGPT 18 06/30/2021 12:04 PM    TBILIRUBIN 0.8 06/30/2021 12:04 PM     No results found for: INR  Lab Results   Component Value Date/Time    CHOLSTRLTOT 157 06/11/2021 06:42 AM    LDL 98 06/11/2021 06:42 AM    HDL 49 06/11/2021 06:42 AM    TRIGLYCERIDE 51 06/11/2021 06:42 AM       No results found for: TESTOSTERONE  No results found for: TSH  No results found for: FREET4  No results found for: URICACID  No components found for: VITB12  Lab Results   Component Value Date/Time    25HYDROXY 45 06/11/2021 06:42 AM          Assessment/Plan:        1. Hospital discharge follow-up- [facility] july 12, 2021; NEENA rapid VR  Patient doing much better but still remains in atrial fibrillation with a rate = 100 approximately.  Follow-up with cardiology.   Continue with beta-blocker.  Hesitant to increase carvedilol beyond current dosage of 6.25 mg twice daily because of low blood pressure.  He since patient is symptomatically doing well we will leave it exactly where he is now and defer to cardiology.  Referral placed.    2. Atrial fibrillation with rapid ventricular response (HCC)  As above  - REFERRAL TO CARDIOLOGY    3. JAMES on CPAP  Follow-up with pulmonary referral placed.  Has been on CPAP during his rehab.  Apparently does not need oxygen however.  Oxygen levels were monitored without oxygen in the rehab center and found sufficient.    4. Chronic atrial fibrillation (HCC)  As above  - REFERRAL TO CARDIOLOGY    5. Anticoagulated- Eliquis for a. fib  Continue anticoagulation for atrial flutter reduce stroke.  Long discussion regarding importance of this.    6. Pulmonary fibrosis (HCC)  New finding.  Refer to pulmonary for further evaluation.    7. Moderate mitral regurgitation by prior echocardiogram  Stable.  Well compensated.  No decompensation of heart failure.  Echo from few weeks ago showed LVH with good EF of 60%.  Mild concentric LVH.      8. Vitamin D deficiency   Good control continue current regimen    9. Impacted cerumen of right ear-new problem with loss of hearing in the in the right ear.  This was irrigated by the MA with maintenance of wax removed by me with cerumen spoon.  No perforation of the TM.  No excoriation of the canal.  Symptomatically better.  Hearing improved       10. Urinary retention-chronic but worsening problem.  Says he like the convenience of the external catheter that was used during rehab and hospitalization and would like an order for this.  Explained that this may not really help him with his BPH symptoms but will help with any overflow incontinence.  We can do this while we await urology referral which was ordered today.  Written order given to patient to fill at pharmacy or local medical supply house.          - REFERRAL TO  UROLOGY  - Misc. Devices Misc; Condom urinary  Catheter for urinary retention  Dispense: 1 Each; Refill: 1        40 minute face-to-face encounter took place today.  More than half of this time was spent in the coordination of care of the above problems, as well as counseling.  Much of this time was spent going over all the medications and detailing their purpose and how to take them in which medicines to discontinue.  Written out on paper for patient to take home to refer to.

## 2021-09-14 ENCOUNTER — PATIENT MESSAGE (OUTPATIENT)
Dept: MEDICAL GROUP | Age: 86
End: 2021-09-14

## 2021-09-14 RX ORDER — CARVEDILOL 6.25 MG/1
6.25 TABLET ORAL 2 TIMES DAILY WITH MEALS
Qty: 180 TABLET | Refills: 3 | Status: SHIPPED | OUTPATIENT
Start: 2021-09-14 | End: 2022-04-04 | Stop reason: SINTOL

## 2021-09-16 ENCOUNTER — HOSPITAL ENCOUNTER (OUTPATIENT)
Dept: LAB | Facility: MEDICAL CENTER | Age: 86
End: 2021-09-16
Attending: INTERNAL MEDICINE
Payer: MEDICARE

## 2021-09-16 DIAGNOSIS — N18.31 STAGE 3A CHRONIC KIDNEY DISEASE: ICD-10-CM

## 2021-09-16 DIAGNOSIS — E03.8 OTHER SPECIFIED HYPOTHYROIDISM: ICD-10-CM

## 2021-09-16 LAB
ALBUMIN SERPL BCP-MCNC: 3.8 G/DL (ref 3.2–4.9)
ALBUMIN/GLOB SERPL: 1.2 G/DL
ALP SERPL-CCNC: 108 U/L (ref 30–99)
ALT SERPL-CCNC: 26 U/L (ref 2–50)
ANION GAP SERPL CALC-SCNC: 9 MMOL/L (ref 7–16)
AST SERPL-CCNC: 29 U/L (ref 12–45)
BASOPHILS # BLD AUTO: 0.7 % (ref 0–1.8)
BASOPHILS # BLD: 0.03 K/UL (ref 0–0.12)
BILIRUB SERPL-MCNC: 0.6 MG/DL (ref 0.1–1.5)
BUN SERPL-MCNC: 31 MG/DL (ref 8–22)
CALCIUM SERPL-MCNC: 9.3 MG/DL (ref 8.5–10.5)
CHLORIDE SERPL-SCNC: 105 MMOL/L (ref 96–112)
CO2 SERPL-SCNC: 26 MMOL/L (ref 20–33)
CREAT SERPL-MCNC: 1.23 MG/DL (ref 0.5–1.4)
EOSINOPHIL # BLD AUTO: 0.05 K/UL (ref 0–0.51)
EOSINOPHIL NFR BLD: 1.1 % (ref 0–6.9)
ERYTHROCYTE [DISTWIDTH] IN BLOOD BY AUTOMATED COUNT: 56.7 FL (ref 35.9–50)
GLOBULIN SER CALC-MCNC: 3.3 G/DL (ref 1.9–3.5)
GLUCOSE SERPL-MCNC: 96 MG/DL (ref 65–99)
HCT VFR BLD AUTO: 40.5 % (ref 42–52)
HGB BLD-MCNC: 13.3 G/DL (ref 14–18)
IMM GRANULOCYTES # BLD AUTO: 0.02 K/UL (ref 0–0.11)
IMM GRANULOCYTES NFR BLD AUTO: 0.4 % (ref 0–0.9)
LYMPHOCYTES # BLD AUTO: 1.29 K/UL (ref 1–4.8)
LYMPHOCYTES NFR BLD: 28.7 % (ref 22–41)
MCH RBC QN AUTO: 32.8 PG (ref 27–33)
MCHC RBC AUTO-ENTMCNC: 32.8 G/DL (ref 33.7–35.3)
MCV RBC AUTO: 100 FL (ref 81.4–97.8)
MONOCYTES # BLD AUTO: 0.77 K/UL (ref 0–0.85)
MONOCYTES NFR BLD AUTO: 17.1 % (ref 0–13.4)
NEUTROPHILS # BLD AUTO: 2.34 K/UL (ref 1.82–7.42)
NEUTROPHILS NFR BLD: 52 % (ref 44–72)
NRBC # BLD AUTO: 0 K/UL
NRBC BLD-RTO: 0 /100 WBC
PLATELET # BLD AUTO: 163 K/UL (ref 164–446)
PMV BLD AUTO: 11.4 FL (ref 9–12.9)
POTASSIUM SERPL-SCNC: 4.6 MMOL/L (ref 3.6–5.5)
PROT SERPL-MCNC: 7.1 G/DL (ref 6–8.2)
RBC # BLD AUTO: 4.05 M/UL (ref 4.7–6.1)
SODIUM SERPL-SCNC: 140 MMOL/L (ref 135–145)
TSH SERPL DL<=0.005 MIU/L-ACNC: 0.09 UIU/ML (ref 0.38–5.33)
WBC # BLD AUTO: 4.5 K/UL (ref 4.8–10.8)

## 2021-09-16 PROCEDURE — 80053 COMPREHEN METABOLIC PANEL: CPT

## 2021-09-16 PROCEDURE — 85025 COMPLETE CBC W/AUTO DIFF WBC: CPT

## 2021-09-16 PROCEDURE — 36415 COLL VENOUS BLD VENIPUNCTURE: CPT

## 2021-09-16 PROCEDURE — 84443 ASSAY THYROID STIM HORMONE: CPT

## 2021-09-20 ENCOUNTER — HOSPITAL ENCOUNTER (OUTPATIENT)
Facility: MEDICAL CENTER | Age: 86
End: 2021-09-20
Attending: UROLOGY
Payer: MEDICARE

## 2021-09-21 ENCOUNTER — OFFICE VISIT (OUTPATIENT)
Dept: MEDICAL GROUP | Age: 86
End: 2021-09-21
Payer: MEDICARE

## 2021-09-21 VITALS
WEIGHT: 175.2 LBS | SYSTOLIC BLOOD PRESSURE: 92 MMHG | DIASTOLIC BLOOD PRESSURE: 60 MMHG | BODY MASS INDEX: 25.95 KG/M2 | HEART RATE: 75 BPM | OXYGEN SATURATION: 95 % | HEIGHT: 69 IN | TEMPERATURE: 97 F

## 2021-09-21 DIAGNOSIS — I48.91 ATRIAL FIBRILLATION WITH RAPID VENTRICULAR RESPONSE (HCC): ICD-10-CM

## 2021-09-21 DIAGNOSIS — G47.33 OSA ON CPAP: ICD-10-CM

## 2021-09-21 DIAGNOSIS — N18.31 STAGE 3A CHRONIC KIDNEY DISEASE: ICD-10-CM

## 2021-09-21 DIAGNOSIS — I51.7 MILD CONCENTRIC LEFT VENTRICULAR HYPERTROPHY (LVH): ICD-10-CM

## 2021-09-21 DIAGNOSIS — I27.20 PULMONARY HYPERTENSION (HCC): ICD-10-CM

## 2021-09-21 DIAGNOSIS — Z23 NEED FOR VACCINATION: ICD-10-CM

## 2021-09-21 DIAGNOSIS — J84.10 PULMONARY FIBROSIS (HCC): ICD-10-CM

## 2021-09-21 DIAGNOSIS — G47.34 NOCTURNAL HYPOXEMIA: ICD-10-CM

## 2021-09-21 DIAGNOSIS — Z79.01 ANTICOAGULATED: ICD-10-CM

## 2021-09-21 LAB
FORWARD REASON: SPWHY: NORMAL
FORWARDED TO LAB: SPWHR: NORMAL
SPECIMEN SENT: SPWT1: NORMAL

## 2021-09-21 PROCEDURE — 90662 IIV NO PRSV INCREASED AG IM: CPT | Performed by: INTERNAL MEDICINE

## 2021-09-21 PROCEDURE — G0008 ADMIN INFLUENZA VIRUS VAC: HCPCS | Performed by: INTERNAL MEDICINE

## 2021-09-21 PROCEDURE — 99214 OFFICE O/P EST MOD 30 MIN: CPT | Mod: 25 | Performed by: INTERNAL MEDICINE

## 2021-09-21 ASSESSMENT — ENCOUNTER SYMPTOMS
EYES NEGATIVE: 1
NEUROLOGICAL NEGATIVE: 1
PSYCHIATRIC NEGATIVE: 1
CONSTITUTIONAL NEGATIVE: 1
RESPIRATORY NEGATIVE: 1
MUSCULOSKELETAL NEGATIVE: 1
GASTROINTESTINAL NEGATIVE: 1
CARDIOVASCULAR NEGATIVE: 1

## 2021-09-21 ASSESSMENT — FIBROSIS 4 INDEX: FIB4 SCORE: 3.35

## 2021-09-22 NOTE — PROGRESS NOTES
Subjective     Guru Pena is a 89 y.o. male who presents with Lab Results  The patient is here for followup of chronic medical problems listed below. The patient is compliant with medications and having no side effects from them. Denies chest pain, abdominal pain, dyspnea, myalgias, or cough.   Patient Active Problem List    Diagnosis Date Noted   • JAMES on CPAP 08/10/2021   • Pulmonary hypertension (HCC)- RVSP= 30,  ECHO 7/2021 08/10/2021   • Mild concentric left ventricular hypertrophy (LVH) 08/10/2021   • Stage 3a chronic kidney disease (HCC) 08/10/2021   • Urinary retention 08/10/2021   • Chronic atrial fibrillation ([facility]) 07/29/2021   • Moderate mitral regurgitation by prior echocardiogram 07/29/2021   • Mild aortic stenosis by prior echocardiogram 07/29/2021   • Pulmonary fibrosis (HCC) 07/29/2021   • Anticoagulated- Eliquis for a. fib 06/30/2021   • Other specified hypothyroidism 06/01/2021   • Vitamin D deficiency 06/01/2021   • Impaired vision in both eyes 06/01/2021     Allergies   Allergen Reactions   • Digoxin      nausea     Outpatient Medications Prior to Visit   Medication Sig Dispense Refill   • carvedilol (COREG) 6.25 MG Tab Take 1 Tablet by mouth 2 times a day with meals. 180 Tablet 3   • Misc. Devices Misc Condom urinary  Catheter for urinary retention 1 Each 1   • Misc. Devices Misc Overnight pulse oximetry on room air to be done by any durable medical equipment company to document patient's oxygen needs for nocturnal hypoxemia and possible oxygen therapy. 1 Each 0   • levothyroxine (SYNTHROID) 175 MCG Tab Take 1 tablet by mouth every morning on an empty stomach for 102 days. Once a day, Sunday NIGHT take extra 1 Tablet- NEW  tablet 4   • apixaban (ELIQUIS) 2.5mg Tab Take 1 tablet by mouth 2 times a day. 60 tablet 0   • levothyroxine (SYNTHROID) 175 MCG Tab Take 87.5-175 mcg by mouth every morning on an empty stomach. Once a day, Sunday NIGHT take extra 1 Tablet- NEW RX Per pt has  "not been taking 2 tablets  175 mcg on Monday, Tuesday, Wednesday, Thursday , Friday, Saturday  87.5 mcg on Sunday     • ondansetron (ZOFRAN ODT) 4 MG TABLET DISPERSIBLE Take 1 tablet by mouth every 6 hours as needed for Nausea. 20 tablet 0   • vitamin D (CHOLECALCIFEROL) 1000 Unit (25 mcg) Tab Take 1,000 Units by mouth every day.     • Multiple Vitamins-Minerals (PRESERVISION AREDS) Tab Take 1 tablet by mouth 2 times a day.       No facility-administered medications prior to visit.       .encc          HPI    Review of Systems   Constitutional: Negative.    HENT: Negative.    Eyes: Negative.    Respiratory: Negative.    Cardiovascular: Negative.    Gastrointestinal: Negative.    Genitourinary: Negative.    Musculoskeletal: Negative.    Skin: Negative.    Neurological: Negative.    Endo/Heme/Allergies: Negative.    Psychiatric/Behavioral: Negative.               Objective     BP (!) 92/60 (BP Location: Left arm, Patient Position: Sitting, BP Cuff Size: Adult)   Pulse 75   Temp 36.1 °C (97 °F) (Temporal)   Ht 1.753 m (5' 9\")   Wt 79.5 kg (175 lb 3.2 oz)   SpO2 95%   BMI 25.87 kg/m²      Physical Exam  Constitutional:       General: He is not in acute distress.     Appearance: He is well-developed. He is not diaphoretic.   HENT:      Head: Normocephalic and atraumatic.      Right Ear: External ear normal.      Left Ear: External ear normal.      Nose: Nose normal.      Mouth/Throat:      Pharynx: No oropharyngeal exudate.   Eyes:      General: No scleral icterus.        Right eye: No discharge.         Left eye: No discharge.      Conjunctiva/sclera: Conjunctivae normal.      Pupils: Pupils are equal, round, and reactive to light.   Neck:      Thyroid: No thyromegaly.      Vascular: No JVD.      Trachea: No tracheal deviation.   Cardiovascular:      Rate and Rhythm: Normal rate and regular rhythm.      Heart sounds: Normal heart sounds. No murmur heard.   No friction rub. No gallop.    Pulmonary:      Effort: " Pulmonary effort is normal. No respiratory distress.      Breath sounds: Normal breath sounds. No stridor. No wheezing or rales.   Chest:      Chest wall: No tenderness.   Abdominal:      General: Bowel sounds are normal. There is no distension.      Palpations: Abdomen is soft. There is no mass.      Tenderness: There is no abdominal tenderness. There is no guarding or rebound.   Musculoskeletal:         General: No tenderness. Normal range of motion.      Cervical back: Normal range of motion and neck supple.   Lymphadenopathy:      Cervical: No cervical adenopathy.   Skin:     General: Skin is warm and dry.      Coloration: Skin is not pale.      Findings: No erythema or rash.   Neurological:      Mental Status: He is alert and oriented to person, place, and time.      Motor: No abnormal muscle tone.      Coordination: Coordination normal.      Deep Tendon Reflexes: Reflexes are normal and symmetric. Reflexes normal.   Psychiatric:         Behavior: Behavior normal.         Thought Content: Thought content normal.         Judgment: Judgment normal.                         Hospital Outpatient Visit on 09/20/2021   Component Date Value   • Forwarded to Lab: 09/21/2021 [facility]    • Forward Reason: 09/21/2021 Insurance    • Specimen Sent: 09/21/2021 Urine    Hospital Outpatient Visit on 09/16/2021   Component Date Value   • Sodium 09/16/2021 140    • Potassium 09/16/2021 4.6    • Chloride 09/16/2021 105    • Co2 09/16/2021 26    • Anion Gap 09/16/2021 9.0    • Glucose 09/16/2021 96    • Bun 09/16/2021 31*   • Creatinine 09/16/2021 1.23    • Calcium 09/16/2021 9.3    • AST(SGOT) 09/16/2021 29    • ALT(SGPT) 09/16/2021 26    • Alkaline Phosphatase 09/16/2021 108*   • Total Bilirubin 09/16/2021 0.6    • Albumin 09/16/2021 3.8    • Total Protein 09/16/2021 7.1    • Globulin 09/16/2021 3.3    • A-G Ratio 09/16/2021 1.2    • WBC 09/16/2021 4.5*   • RBC 09/16/2021 4.05*   • Hemoglobin 09/16/2021 13.3*   • Hematocrit  09/16/2021 40.5*   • MCV 09/16/2021 100.0*   • MCH 09/16/2021 32.8    • MCHC 09/16/2021 32.8*   • RDW 09/16/2021 56.7*   • Platelet Count 09/16/2021 163*   • MPV 09/16/2021 11.4    • Neutrophils-Polys 09/16/2021 52.00    • Lymphocytes 09/16/2021 28.70    • Monocytes 09/16/2021 17.10*   • Eosinophils 09/16/2021 1.10    • Basophils 09/16/2021 0.70    • Immature Granulocytes 09/16/2021 0.40    • Nucleated RBC 09/16/2021 0.00    • Neutrophils (Absolute) 09/16/2021 2.34    • Lymphs (Absolute) 09/16/2021 1.29    • Monos (Absolute) 09/16/2021 0.77    • Eos (Absolute) 09/16/2021 0.05    • Baso (Absolute) 09/16/2021 0.03    • Immature Granulocytes (a* 09/16/2021 0.02    • NRBC (Absolute) 09/16/2021 0.00    • TSH 09/16/2021 0.090*   • GFR If  09/16/2021 >60    • GFR If Non  [employer]* 09/16/2021 55*      A.lll  No results found for: HBA1C  Lab Results   Component Value Date/Time    SODIUM 140 09/16/2021 07:32 AM    POTASSIUM 4.6 09/16/2021 07:32 AM    CHLORIDE 105 09/16/2021 07:32 AM    CO2 26 09/16/2021 07:32 AM    GLUCOSE 96 09/16/2021 07:32 AM    BUN 31 (H) 09/16/2021 07:32 AM    CREATININE 1.23 09/16/2021 07:32 AM    ALKPHOSPHAT 108 (H) 09/16/2021 07:32 AM    ASTSGOT 29 09/16/2021 07:32 AM    ALTSGPT 26 09/16/2021 07:32 AM    TBILIRUBIN 0.6 09/16/2021 07:32 AM     No results found for: INR  Lab Results   Component Value Date/Time    CHOLSTRLTOT 157 06/11/2021 06:42 AM    LDL 98 06/11/2021 06:42 AM    HDL 49 06/11/2021 06:42 AM    TRIGLYCERIDE 51 06/11/2021 06:42 AM       No results found for: TESTOSTERONE  No results found for: TSH  No results found for: FREET4  No results found for: URICACID  No components found for: VITB12  Lab Results   Component Value Date/Time    25HYDROXY 45 06/11/2021 06:42 AM          Assessment & Plan        1. Need for vaccination     - INFLUENZA VACCINE, HIGH DOSE (65+ ONLY)    2. JAMES on CPAP   Under good control. Continue same regimen.      3. Pulmonary fibrosis (HCC)      Under good control. Continue same regimen.  - TSH; Future  - Comp Metabolic Panel; Future  - CBC WITH DIFFERENTIAL; Future    4. Pulmonary hypertension (HCC) Under good control. Continue same regimen.   - TSH; Future  - Comp Metabolic Panel; Future  - CBC WITH DIFFERENTIAL; Future    5. Stage 3a chronic kidney disease (HCC)     - TSH; Future  - Comp Metabolic Panel; Future  - CBC WITH DIFFERENTIAL; Future    6. Nocturnal hypoxemia       Under good control. Continue same regimen.  7. Atrial fibrillation with rapid ventricular response (HCC)     Under good control. Continue same regimen.    8. Anticoagulated- Eliquis for a. fib    Under good control. Continue same regimen.    9. Mild concentric left ventricular hypertrophy (LVH)    Under good control. Continue same regimen.  - TSH; Future  - Comp Metabolic Panel; Future  - CBC WITH DIFFERENTIAL; Future

## 2021-12-06 DIAGNOSIS — Z79.01 ANTICOAGULATED: ICD-10-CM

## 2021-12-07 RX ORDER — APIXABAN 5 MG/1
TABLET, FILM COATED ORAL
Qty: 180 TABLET | Refills: 1 | Status: SHIPPED | OUTPATIENT
Start: 2021-12-07 | End: 2022-02-01

## 2022-01-04 NOTE — ASSESSMENT & PLAN NOTE
Likely secondary to acute illness though complicated by generalized debility.  PT recommended home health for continued therapy.   6 = Severely ill - disruptive pathology, behavior and function are frequently influenced by symptoms, may require assistance from others

## 2022-01-27 ENCOUNTER — HOSPITAL ENCOUNTER (OUTPATIENT)
Dept: LAB | Facility: MEDICAL CENTER | Age: 87
End: 2022-01-27
Attending: INTERNAL MEDICINE
Payer: MEDICARE

## 2022-01-27 DIAGNOSIS — J84.10 PULMONARY FIBROSIS (HCC): ICD-10-CM

## 2022-01-27 DIAGNOSIS — I27.20 PULMONARY HYPERTENSION (HCC): ICD-10-CM

## 2022-01-27 DIAGNOSIS — I51.7 MILD CONCENTRIC LEFT VENTRICULAR HYPERTROPHY (LVH): ICD-10-CM

## 2022-01-27 DIAGNOSIS — N18.31 STAGE 3A CHRONIC KIDNEY DISEASE: ICD-10-CM

## 2022-01-27 LAB
ALBUMIN SERPL BCP-MCNC: 4.1 G/DL (ref 3.2–4.9)
ALBUMIN/GLOB SERPL: 1.3 G/DL
ALP SERPL-CCNC: 111 U/L (ref 30–99)
ALT SERPL-CCNC: 14 U/L (ref 2–50)
ANION GAP SERPL CALC-SCNC: 10 MMOL/L (ref 7–16)
AST SERPL-CCNC: 14 U/L (ref 12–45)
BASOPHILS # BLD AUTO: 0.7 % (ref 0–1.8)
BASOPHILS # BLD: 0.03 K/UL (ref 0–0.12)
BILIRUB SERPL-MCNC: 0.5 MG/DL (ref 0.1–1.5)
BUN SERPL-MCNC: 29 MG/DL (ref 8–22)
CALCIUM SERPL-MCNC: 9.2 MG/DL (ref 8.5–10.5)
CHLORIDE SERPL-SCNC: 104 MMOL/L (ref 96–112)
CO2 SERPL-SCNC: 24 MMOL/L (ref 20–33)
CREAT SERPL-MCNC: 1.26 MG/DL (ref 0.5–1.4)
EOSINOPHIL # BLD AUTO: 0.12 K/UL (ref 0–0.51)
EOSINOPHIL NFR BLD: 2.7 % (ref 0–6.9)
ERYTHROCYTE [DISTWIDTH] IN BLOOD BY AUTOMATED COUNT: 58.4 FL (ref 35.9–50)
GLOBULIN SER CALC-MCNC: 3.1 G/DL (ref 1.9–3.5)
GLUCOSE SERPL-MCNC: 98 MG/DL (ref 65–99)
HCT VFR BLD AUTO: 39.8 % (ref 42–52)
HGB BLD-MCNC: 13.3 G/DL (ref 14–18)
IMM GRANULOCYTES # BLD AUTO: 0.01 K/UL (ref 0–0.11)
IMM GRANULOCYTES NFR BLD AUTO: 0.2 % (ref 0–0.9)
LYMPHOCYTES # BLD AUTO: 1.25 K/UL (ref 1–4.8)
LYMPHOCYTES NFR BLD: 27.8 % (ref 22–41)
MCH RBC QN AUTO: 33 PG (ref 27–33)
MCHC RBC AUTO-ENTMCNC: 33.4 G/DL (ref 33.7–35.3)
MCV RBC AUTO: 98.8 FL (ref 81.4–97.8)
MONOCYTES # BLD AUTO: 0.64 K/UL (ref 0–0.85)
MONOCYTES NFR BLD AUTO: 14.3 % (ref 0–13.4)
NEUTROPHILS # BLD AUTO: 2.44 K/UL (ref 1.82–7.42)
NEUTROPHILS NFR BLD: 54.3 % (ref 44–72)
NRBC # BLD AUTO: 0 K/UL
NRBC BLD-RTO: 0 /100 WBC
PLATELET # BLD AUTO: 172 K/UL (ref 164–446)
PMV BLD AUTO: 11.3 FL (ref 9–12.9)
POTASSIUM SERPL-SCNC: 4.6 MMOL/L (ref 3.6–5.5)
PROT SERPL-MCNC: 7.2 G/DL (ref 6–8.2)
RBC # BLD AUTO: 4.03 M/UL (ref 4.7–6.1)
SODIUM SERPL-SCNC: 138 MMOL/L (ref 135–145)
TSH SERPL DL<=0.005 MIU/L-ACNC: 8.31 UIU/ML (ref 0.38–5.33)
WBC # BLD AUTO: 4.5 K/UL (ref 4.8–10.8)

## 2022-01-27 PROCEDURE — 36415 COLL VENOUS BLD VENIPUNCTURE: CPT

## 2022-01-27 PROCEDURE — 80053 COMPREHEN METABOLIC PANEL: CPT

## 2022-01-27 PROCEDURE — 85025 COMPLETE CBC W/AUTO DIFF WBC: CPT

## 2022-01-27 PROCEDURE — 84443 ASSAY THYROID STIM HORMONE: CPT

## 2022-02-01 ENCOUNTER — OFFICE VISIT (OUTPATIENT)
Dept: MEDICAL GROUP | Age: 87
End: 2022-02-01
Payer: MEDICARE

## 2022-02-01 VITALS
BODY MASS INDEX: 25.33 KG/M2 | HEIGHT: 69 IN | HEART RATE: 75 BPM | DIASTOLIC BLOOD PRESSURE: 76 MMHG | WEIGHT: 171 LBS | SYSTOLIC BLOOD PRESSURE: 110 MMHG | OXYGEN SATURATION: 95 % | TEMPERATURE: 97.4 F

## 2022-02-01 DIAGNOSIS — N18.31 STAGE 3A CHRONIC KIDNEY DISEASE: ICD-10-CM

## 2022-02-01 DIAGNOSIS — Z23 NEED FOR VACCINATION: ICD-10-CM

## 2022-02-01 DIAGNOSIS — Z79.01 ANTICOAGULATED: ICD-10-CM

## 2022-02-01 DIAGNOSIS — I48.20 CHRONIC ATRIAL FIBRILLATION (HCC): ICD-10-CM

## 2022-02-01 DIAGNOSIS — I27.20 PULMONARY HYPERTENSION (HCC): ICD-10-CM

## 2022-02-01 DIAGNOSIS — E03.4 HYPOTHYROIDISM DUE TO ACQUIRED ATROPHY OF THYROID: ICD-10-CM

## 2022-02-01 DIAGNOSIS — E78.5 DYSLIPIDEMIA: ICD-10-CM

## 2022-02-01 PROBLEM — E03.8 OTHER SPECIFIED HYPOTHYROIDISM: Status: RESOLVED | Noted: 2021-06-01 | Resolved: 2022-02-01

## 2022-02-01 PROCEDURE — 90715 TDAP VACCINE 7 YRS/> IM: CPT | Performed by: INTERNAL MEDICINE

## 2022-02-01 PROCEDURE — 90471 IMMUNIZATION ADMIN: CPT | Performed by: INTERNAL MEDICINE

## 2022-02-01 PROCEDURE — 99214 OFFICE O/P EST MOD 30 MIN: CPT | Mod: 25 | Performed by: INTERNAL MEDICINE

## 2022-02-01 RX ORDER — LEVOTHYROXINE SODIUM 175 UG/1
175 TABLET ORAL
Qty: 117 TABLET | Refills: 4 | Status: SHIPPED | OUTPATIENT
Start: 2022-02-01 | End: 2022-06-20 | Stop reason: SDUPTHER

## 2022-02-01 ASSESSMENT — ENCOUNTER SYMPTOMS
PSYCHIATRIC NEGATIVE: 1
EYES NEGATIVE: 1
MUSCULOSKELETAL NEGATIVE: 1
CARDIOVASCULAR NEGATIVE: 1
RESPIRATORY NEGATIVE: 1
GASTROINTESTINAL NEGATIVE: 1
CONSTITUTIONAL NEGATIVE: 1
NEUROLOGICAL NEGATIVE: 1

## 2022-02-01 ASSESSMENT — PATIENT HEALTH QUESTIONNAIRE - PHQ9: CLINICAL INTERPRETATION OF PHQ2 SCORE: 0

## 2022-02-01 ASSESSMENT — FIBROSIS 4 INDEX: FIB4 SCORE: 2.09

## 2022-02-02 NOTE — PROGRESS NOTES
Subjective     Guru Pena is a 89 y.o. male who presents with Follow-Up (3 month) and Lab Results  The patient is here for followup of chronic medical problems listed below. The patient is compliant with medications and having no side effects from them. Denies chest pain, abdominal pain, dyspnea, myalgias, or cough.   Patient Active Problem List    Diagnosis Date Noted   • Hypothyroidism due to acquired atrophy of thyroid 02/01/2022   • JAMES on CPAP 08/10/2021   • Pulmonary hypertension (HCC)- RVSP= 30,  ECHO 7/2021 08/10/2021   • Mild concentric left ventricular hypertrophy (LVH) 08/10/2021   • Stage 3a chronic kidney disease (HCC) 08/10/2021   • Urinary retention 08/10/2021   • Chronic atrial fibrillation (HCC) 07/29/2021   • Moderate mitral regurgitation by prior echocardiogram 07/29/2021   • Mild aortic stenosis by prior echocardiogram 07/29/2021   • Pulmonary fibrosis ([facility]) 07/29/2021   • Anticoagulated- Eliquis for a. fib 06/30/2021   • Vitamin D deficiency 06/01/2021   • Impaired vision in both eyes 06/01/2021     Allergies   Allergen Reactions   • Digoxin      nausea     Outpatient Medications Prior to Visit   Medication Sig Dispense Refill   • carvedilol (COREG) 6.25 MG Tab Take 1 Tablet by mouth 2 times a day with meals. 180 Tablet 3   • Misc. Devices Misc Overnight pulse oximetry on room air to be done by any durable medical equipment company to document patient's oxygen needs for nocturnal hypoxemia and possible oxygen therapy. 1 Each 0   • apixaban (ELIQUIS) 2.5mg Tab Take 1 tablet by mouth 2 times a day. 60 tablet 0   • vitamin D (CHOLECALCIFEROL) 1000 Unit (25 mcg) Tab Take 1,000 Units by mouth every day.     • Multiple Vitamins-Minerals (PRESERVISION AREDS) Tab Take 1 tablet by mouth 2 times a day.     • ELIQUIS 5 MG Tab TAKE 1 TABLET BY MOUTH EVERY 12 HOURS (Patient not taking: Reported on 2/1/2022) 180 Tablet 1   • Misc. Devices Misc Condom urinary  Catheter for urinary retention (Patient  not taking: Reported on 2/1/2022) 1 Each 1   • levothyroxine (SYNTHROID) 175 MCG Tab Take 87.5-175 mcg by mouth every morning on an empty stomach. Once a day, Sunday NIGHT take extra 1 Tablet- NEW RX Per pt has not been taking 2 tablets  175 mcg on Monday, Tuesday, Wednesday, Thursday , Friday, Saturday  87.5 mcg on Sunday     • ondansetron (ZOFRAN ODT) 4 MG TABLET DISPERSIBLE Take 1 tablet by mouth every 6 hours as needed for Nausea. (Patient not taking: Reported on 2/1/2022) 20 tablet 0     No facility-administered medications prior to visit.     Hospital Outpatient Visit on 01/27/2022   Component Date Value   • WBC 01/27/2022 4.5*   • RBC 01/27/2022 4.03*   • Hemoglobin 01/27/2022 13.3*   • Hematocrit 01/27/2022 39.8*   • MCV 01/27/2022 98.8*   • MCH 01/27/2022 33.0    • MCHC 01/27/2022 33.4*   • RDW 01/27/2022 58.4*   • Platelet Count 01/27/2022 172    • MPV 01/27/2022 11.3    • Neutrophils-Polys 01/27/2022 54.30    • Lymphocytes 01/27/2022 27.80    • Monocytes 01/27/2022 14.30*   • Eosinophils 01/27/2022 2.70    • Basophils 01/27/2022 0.70    • Immature Granulocytes 01/27/2022 0.20    • Nucleated RBC 01/27/2022 0.00    • Neutrophils (Absolute) 01/27/2022 2.44    • Lymphs (Absolute) 01/27/2022 1.25    • Monos (Absolute) 01/27/2022 0.64    • Eos (Absolute) 01/27/2022 0.12    • Baso (Absolute) 01/27/2022 0.03    • Immature Granulocytes (a* 01/27/2022 0.01    • NRBC (Absolute) 01/27/2022 0.00    • Sodium 01/27/2022 138    • Potassium 01/27/2022 4.6    • Chloride 01/27/2022 104    • Co2 01/27/2022 24    • Anion Gap 01/27/2022 10.0    • Glucose 01/27/2022 98    • Bun 01/27/2022 29*   • Creatinine 01/27/2022 1.26    • Calcium 01/27/2022 9.2    • AST(SGOT) 01/27/2022 14    • ALT(SGPT) 01/27/2022 14    • Alkaline Phosphatase 01/27/2022 111*   • Total Bilirubin 01/27/2022 0.5    • Albumin 01/27/2022 4.1    • Total Protein 01/27/2022 7.2    • Globulin 01/27/2022 3.1    • A-G Ratio 01/27/2022 1.3    • TSH 01/27/2022 8.310*  "  • GFR If  01/27/2022 >60    • GFR If Non  [employer]* 01/27/2022 53*      No results found for: HBA1C  Lab Results   Component Value Date/Time    SODIUM 138 01/27/2022 07:42 AM    POTASSIUM 4.6 01/27/2022 07:42 AM    CHLORIDE 104 01/27/2022 07:42 AM    CO2 24 01/27/2022 07:42 AM    GLUCOSE 98 01/27/2022 07:42 AM    BUN 29 (H) 01/27/2022 07:42 AM    CREATININE 1.26 01/27/2022 07:42 AM    ALKPHOSPHAT 111 (H) 01/27/2022 07:42 AM    ASTSGOT 14 01/27/2022 07:42 AM    ALTSGPT 14 01/27/2022 07:42 AM    TBILIRUBIN 0.5 01/27/2022 07:42 AM     No results found for: INR  Lab Results   Component Value Date/Time    CHOLSTRLTOT 157 06/11/2021 06:42 AM    LDL 98 06/11/2021 06:42 AM    HDL 49 06/11/2021 06:42 AM    TRIGLYCERIDE 51 06/11/2021 06:42 AM       No results found for: TESTOSTERONE  No results found for: TSH  No results found for: FREET4  No results found for: URICACID  No components found for: VITB12  Lab Results   Component Value Date/Time    25HYDROXY 45 06/11/2021 06:42 AM     \          HPI    Review of Systems   Constitutional: Negative.    HENT: Negative.    Eyes: Negative.    Respiratory: Negative.    Cardiovascular: Negative.    Gastrointestinal: Negative.    Genitourinary: Negative.    Musculoskeletal: Negative.    Skin: Negative.    Neurological: Negative.    Endo/Heme/Allergies: Negative.    Psychiatric/Behavioral: Negative.               Objective     /76 (BP Location: Left arm, Patient Position: Sitting, BP Cuff Size: Adult)   Pulse 75   Temp 36.3 °C (97.4 °F) (Temporal)   Ht 1.753 m (5' 9\")   Wt 77.6 kg (171 lb)   SpO2 95%   BMI 25.25 kg/m²      Physical Exam  Constitutional:       General: He is not in acute distress.     Appearance: He is well-developed. He is not diaphoretic.   HENT:      Head: Normocephalic and atraumatic.      Right Ear: External ear normal.      Left Ear: External ear normal.      Nose: Nose normal.      Mouth/Throat:      Pharynx: No oropharyngeal " exudate.   Eyes:      General: No scleral icterus.        Right eye: No discharge.         Left eye: No discharge.      Conjunctiva/sclera: Conjunctivae normal.      Pupils: Pupils are equal, round, and reactive to light.   Neck:      Thyroid: No thyromegaly.      Vascular: No JVD.      Trachea: No tracheal deviation.   Cardiovascular:      Rate and Rhythm: Normal rate and regular rhythm.      Heart sounds: Normal heart sounds. No murmur heard.  No friction rub. No gallop.    Pulmonary:      Effort: Pulmonary effort is normal. No respiratory distress.      Breath sounds: Normal breath sounds. No stridor. No wheezing or rales.   Chest:      Chest wall: No tenderness.   Abdominal:      General: Bowel sounds are normal. There is no distension.      Palpations: Abdomen is soft. There is no mass.      Tenderness: There is no abdominal tenderness. There is no guarding or rebound.   Musculoskeletal:         General: No tenderness. Normal range of motion.      Cervical back: Normal range of motion and neck supple.   Lymphadenopathy:      Cervical: No cervical adenopathy.   Skin:     General: Skin is warm and dry.      Coloration: Skin is not pale.      Findings: No erythema or rash.   Neurological:      Mental Status: He is alert and oriented to person, place, and time.      Motor: No abnormal muscle tone.      Coordination: Coordination normal.      Deep Tendon Reflexes: Reflexes are normal and symmetric. Reflexes normal.   Psychiatric:         Behavior: Behavior normal.         Thought Content: Thought content normal.         Judgment: Judgment normal.                             Assessment & Plan        1. Hypothyroidism due to acquired atrophy of thyroid  Not at goal. Increase Synthroid 175 mcg by continuing it once a day except taking 2 a day 2 days a week Sundays and Wednesdays.- levothyroxine (SYNTHROID) 175 MCG Tab; Take 1 Tablet by mouth every morning on an empty stomach. but take an additional pill on every sunday  and wednesday  Dispense: 117 Tablet; Refill: 4  - TSH; Future    2. Chronic atrial fibrillation (HCC)  Good control continue same regimen  3. Stage 3a chronic kidney disease (HCC)    Under good control. Continue same regimen.  4. Dyslipidemia   Under good control. Continue same regimen.- TSH; Future  - Comp Metabolic Panel; Future  - Lipid Profile; Future  - CBC WITH DIFFERENTIAL; Future    5. Anticoagulated- eliquis for a  fib      Under good control. Continue same regimen.-  6. Pulmonary hypertension (HCC)     Under good control. Continue same regimen.-7. Need for vaccination     - Tdap Vaccine =>8YO IM

## 2022-02-03 ENCOUNTER — PATIENT MESSAGE (OUTPATIENT)
Dept: MEDICAL GROUP | Age: 87
End: 2022-02-03

## 2022-02-03 DIAGNOSIS — D22.9 NUMEROUS SKIN MOLES: ICD-10-CM

## 2022-02-03 NOTE — PATIENT COMMUNICATION
1. Caller Name: Guru Mistry                          Call Back Number: [phone] (home)         How would the patient prefer to be contacted with a response: [employer] message    2. SPECIFIC Action To Be Taken: Referral pending, please sign.    3. Diagnosis/Clinical Reason for Request: Multiple moles.skin growths on back    4. Specialty & Provider Name/Lab/Imaging Location: Derm    5. Is appointment scheduled for requested order/referral: no    Patient was not informed they will receive a return phone call from the office ONLY if there are any questions before processing their request. Advised to call back if they haven't received a call from the referral department in 5 days.

## 2022-03-07 DIAGNOSIS — I48.20 CHRONIC ATRIAL FIBRILLATION (HCC): ICD-10-CM

## 2022-03-07 NOTE — TELEPHONE ENCOUNTER
Received request via: Patient    Was the patient seen in the last year in this department? Yes    Does the patient have an active prescription (recently filled or refills available) for medication(s) requested? No    
Alert-The patient is alert, awake and responds to voice. The patient is oriented to time, place, and person. The triage nurse is able to obtain subjective information.

## 2022-05-26 NOTE — LETTER
August 10, 2021        Guru Pena  [address] [place] [address]        Dear Guru:       Current Outpatient Medications   Medication Sig Dispense Refill   • Misc. Devices Misc Overnight pulse oximetry on room air to be done by any durable medical equipment company to document patient's oxygen needs for nocturnal hypoxemia and possible oxygen therapy. 1 Each 0   • levothyroxine (SYNTHROID) 175 MCG Tab Take 1 tablet by mouth every morning on an empty stomach for 102 days. Once a day, Sunday NIGHT take extra 1 Tablet- NEW  tablet 4   • apixaban (ELIQUIS) 2.5mg Tab Take 1 tablet by mouth 2 times a day. 60 tablet 0   • carvedilol (COREG) 6.25 MG Tab Take 1 tablet by mouth 2 times a day with meals. 60 tablet 0   • levothyroxine (SYNTHROID) 175 MCG Tab Take 87.5-175 mcg by mouth every morning on an empty stomach. Once a day, Sunday NIGHT take extra 1 Tablet- NEW RX Per pt has not been taking 2 tablets  175 mcg on Monday, Tuesday, Wednesday, Thursday , Friday, Saturday  87.5 mcg on Sunday     • ondansetron (ZOFRAN ODT) 4 MG TABLET DISPERSIBLE Take 1 tablet by mouth every 6 hours as needed for Nausea. 20 tablet 0   • vitamin D (CHOLECALCIFEROL) 1000 Unit (25 mcg) Tab Take 1,000 Units by mouth every day.     • Multiple Vitamins-Minerals (PRESERVISION AREDS) Tab Take 1 tablet by mouth 2 times a day.       No current facility-administered medications for this visit.         If you have any questions or concerns, please don't hesitate to call.        Sincerely,        Alex Smart M.D.    Electronically Signed      Recommendations:  -  EGD schedule for eval of anemia.   -  Labs every 6 months, starting around 11/1/22: CBC, CMP, HBV DNA quant, HBsAg, HBsAb.     -  Vemlidy stopped (Needs to be restarted if possibility he needs chemotherapy).   -  Oncologist retired, He now sees Dr. 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 If he should get Rituxan anytime in the future, he should be started on Vemlidy at least a week before starting rituxan.  -  Avoid alcohol, smoking, sedatives and meds with codeine.  -  Avoid high intake of Tylenol (more than 4 extra-strength pills in one day)  -  Call us if any bleeding, fevers, confusion, disorientation occur  -  Return in 6 months, video/audio visit will be OK. .

## 2022-06-01 DIAGNOSIS — E53.8 VITAMIN B 12 DEFICIENCY: ICD-10-CM

## 2022-06-01 DIAGNOSIS — E03.4 HYPOTHYROIDISM DUE TO ACQUIRED ATROPHY OF THYROID: ICD-10-CM

## 2022-06-01 DIAGNOSIS — I10 ESSENTIAL HYPERTENSION: ICD-10-CM

## 2022-06-01 DIAGNOSIS — N18.31 STAGE 3A CHRONIC KIDNEY DISEASE: ICD-10-CM

## 2022-06-01 DIAGNOSIS — I48.20 CHRONIC ATRIAL FIBRILLATION (HCC): ICD-10-CM

## 2022-06-01 DIAGNOSIS — J84.10 PULMONARY FIBROSIS (HCC): ICD-10-CM

## 2022-06-01 DIAGNOSIS — I35.0 MILD AORTIC STENOSIS BY PRIOR ECHOCARDIOGRAM: ICD-10-CM

## 2022-06-01 DIAGNOSIS — I27.20 PULMONARY HYPERTENSION (HCC): ICD-10-CM

## 2022-06-01 DIAGNOSIS — E55.9 VITAMIN D DEFICIENCY: ICD-10-CM

## 2022-06-17 ENCOUNTER — HOSPITAL ENCOUNTER (OUTPATIENT)
Dept: LAB | Facility: MEDICAL CENTER | Age: 87
End: 2022-06-17
Attending: INTERNAL MEDICINE
Payer: MEDICARE

## 2022-06-17 DIAGNOSIS — I27.20 PULMONARY HYPERTENSION (HCC): ICD-10-CM

## 2022-06-17 DIAGNOSIS — I10 ESSENTIAL HYPERTENSION: ICD-10-CM

## 2022-06-17 DIAGNOSIS — E78.5 DYSLIPIDEMIA: ICD-10-CM

## 2022-06-17 DIAGNOSIS — N18.31 STAGE 3A CHRONIC KIDNEY DISEASE: ICD-10-CM

## 2022-06-17 DIAGNOSIS — J84.10 PULMONARY FIBROSIS (HCC): ICD-10-CM

## 2022-06-17 DIAGNOSIS — E03.4 HYPOTHYROIDISM DUE TO ACQUIRED ATROPHY OF THYROID: ICD-10-CM

## 2022-06-17 LAB
ALBUMIN SERPL BCP-MCNC: 4 G/DL (ref 3.2–4.9)
ALBUMIN/GLOB SERPL: 1.3 G/DL
ALP SERPL-CCNC: 104 U/L (ref 30–99)
ALT SERPL-CCNC: 36 U/L (ref 2–50)
ANION GAP SERPL CALC-SCNC: 11 MMOL/L (ref 7–16)
AST SERPL-CCNC: 35 U/L (ref 12–45)
BASOPHILS # BLD AUTO: 0.5 % (ref 0–1.8)
BASOPHILS # BLD: 0.02 K/UL (ref 0–0.12)
BILIRUB SERPL-MCNC: 0.6 MG/DL (ref 0.1–1.5)
BUN SERPL-MCNC: 26 MG/DL (ref 8–22)
CALCIUM SERPL-MCNC: 9 MG/DL (ref 8.5–10.5)
CHLORIDE SERPL-SCNC: 106 MMOL/L (ref 96–112)
CHOLEST SERPL-MCNC: 151 MG/DL (ref 100–199)
CO2 SERPL-SCNC: 24 MMOL/L (ref 20–33)
CREAT SERPL-MCNC: 1.34 MG/DL (ref 0.5–1.4)
EOSINOPHIL # BLD AUTO: 0.07 K/UL (ref 0–0.51)
EOSINOPHIL NFR BLD: 1.7 % (ref 0–6.9)
ERYTHROCYTE [DISTWIDTH] IN BLOOD BY AUTOMATED COUNT: 56.1 FL (ref 35.9–50)
ERYTHROCYTE [SEDIMENTATION RATE] IN BLOOD BY WESTERGREN METHOD: 12 MM/HOUR (ref 0–20)
GFR SERPLBLD CREATININE-BSD FMLA CKD-EPI: 48 ML/MIN/1.73 M 2
GLOBULIN SER CALC-MCNC: 3.1 G/DL (ref 1.9–3.5)
GLUCOSE SERPL-MCNC: 97 MG/DL (ref 65–99)
HCT VFR BLD AUTO: 41.3 % (ref 42–52)
HDLC SERPL-MCNC: 48 MG/DL
HGB BLD-MCNC: 13.8 G/DL (ref 14–18)
IMM GRANULOCYTES # BLD AUTO: 0.02 K/UL (ref 0–0.11)
IMM GRANULOCYTES NFR BLD AUTO: 0.5 % (ref 0–0.9)
LDLC SERPL CALC-MCNC: 92 MG/DL
LYMPHOCYTES # BLD AUTO: 1.27 K/UL (ref 1–4.8)
LYMPHOCYTES NFR BLD: 30.4 % (ref 22–41)
MCH RBC QN AUTO: 33.5 PG (ref 27–33)
MCHC RBC AUTO-ENTMCNC: 33.4 G/DL (ref 33.7–35.3)
MCV RBC AUTO: 100.2 FL (ref 81.4–97.8)
MONOCYTES # BLD AUTO: 0.62 K/UL (ref 0–0.85)
MONOCYTES NFR BLD AUTO: 14.8 % (ref 0–13.4)
NEUTROPHILS # BLD AUTO: 2.18 K/UL (ref 1.82–7.42)
NEUTROPHILS NFR BLD: 52.1 % (ref 44–72)
NRBC # BLD AUTO: 0 K/UL
NRBC BLD-RTO: 0 /100 WBC
PLATELET # BLD AUTO: 154 K/UL (ref 164–446)
PMV BLD AUTO: 11.3 FL (ref 9–12.9)
POTASSIUM SERPL-SCNC: 4.9 MMOL/L (ref 3.6–5.5)
PROT SERPL-MCNC: 7.1 G/DL (ref 6–8.2)
RBC # BLD AUTO: 4.12 M/UL (ref 4.7–6.1)
SODIUM SERPL-SCNC: 141 MMOL/L (ref 135–145)
T4 FREE SERPL-MCNC: 1.41 NG/DL (ref 0.93–1.7)
TRIGL SERPL-MCNC: 56 MG/DL (ref 0–149)
TSH SERPL DL<=0.005 MIU/L-ACNC: 4.27 UIU/ML (ref 0.38–5.33)
WBC # BLD AUTO: 4.2 K/UL (ref 4.8–10.8)

## 2022-06-17 PROCEDURE — 84439 ASSAY OF FREE THYROXINE: CPT

## 2022-06-17 PROCEDURE — 85652 RBC SED RATE AUTOMATED: CPT

## 2022-06-17 PROCEDURE — 84443 ASSAY THYROID STIM HORMONE: CPT

## 2022-06-17 PROCEDURE — 80053 COMPREHEN METABOLIC PANEL: CPT

## 2022-06-17 PROCEDURE — 36415 COLL VENOUS BLD VENIPUNCTURE: CPT

## 2022-06-17 PROCEDURE — 85025 COMPLETE CBC W/AUTO DIFF WBC: CPT

## 2022-06-17 PROCEDURE — 80061 LIPID PANEL: CPT

## 2022-06-20 ENCOUNTER — OFFICE VISIT (OUTPATIENT)
Dept: MEDICAL GROUP | Age: 87
End: 2022-06-20
Payer: MEDICARE

## 2022-06-20 VITALS
RESPIRATION RATE: 20 BRPM | WEIGHT: 175 LBS | TEMPERATURE: 98.8 F | HEART RATE: 71 BPM | SYSTOLIC BLOOD PRESSURE: 112 MMHG | HEIGHT: 69 IN | DIASTOLIC BLOOD PRESSURE: 74 MMHG | OXYGEN SATURATION: 99 % | BODY MASS INDEX: 25.92 KG/M2

## 2022-06-20 DIAGNOSIS — E78.5 DYSLIPIDEMIA: ICD-10-CM

## 2022-06-20 DIAGNOSIS — E03.4 HYPOTHYROIDISM DUE TO ACQUIRED ATROPHY OF THYROID: ICD-10-CM

## 2022-06-20 DIAGNOSIS — I48.20 CHRONIC ATRIAL FIBRILLATION (HCC): ICD-10-CM

## 2022-06-20 PROCEDURE — 99214 OFFICE O/P EST MOD 30 MIN: CPT | Performed by: INTERNAL MEDICINE

## 2022-06-20 RX ORDER — METOPROLOL SUCCINATE 25 MG/1
25 TABLET, EXTENDED RELEASE ORAL
Qty: 45 TABLET | Refills: 3 | Status: SHIPPED | OUTPATIENT
Start: 2022-06-20 | End: 2022-06-20 | Stop reason: SDUPTHER

## 2022-06-20 RX ORDER — METOPROLOL SUCCINATE 25 MG/1
25 TABLET, EXTENDED RELEASE ORAL
Qty: 45 TABLET | Refills: 3 | Status: SHIPPED
Start: 2022-06-20 | End: 2022-06-20 | Stop reason: SDUPTHER

## 2022-06-20 RX ORDER — LEVOTHYROXINE SODIUM 175 UG/1
175 TABLET ORAL
Qty: 117 TABLET | Refills: 4 | Status: SHIPPED | OUTPATIENT
Start: 2022-06-20 | End: 2023-08-07

## 2022-06-20 RX ORDER — METOPROLOL SUCCINATE 25 MG/1
25 TABLET, EXTENDED RELEASE ORAL
Qty: 45 TABLET | Refills: 3 | Status: ON HOLD | OUTPATIENT
Start: 2022-06-20 | End: 2023-07-28

## 2022-06-20 RX ORDER — METOPROLOL SUCCINATE 25 MG/1
25 TABLET, EXTENDED RELEASE ORAL
Qty: 45 TABLET | Refills: 3 | Status: SHIPPED
Start: 2022-06-20 | End: 2022-06-20

## 2022-06-20 ASSESSMENT — FIBROSIS 4 INDEX: FIB4 SCORE: 3.64

## 2022-06-25 ASSESSMENT — ENCOUNTER SYMPTOMS
RESPIRATORY NEGATIVE: 1
EYES NEGATIVE: 1
CONSTITUTIONAL NEGATIVE: 1
CARDIOVASCULAR NEGATIVE: 1
MUSCULOSKELETAL NEGATIVE: 1
GASTROINTESTINAL NEGATIVE: 1
NEUROLOGICAL NEGATIVE: 1
PSYCHIATRIC NEGATIVE: 1

## 2022-06-25 NOTE — PROGRESS NOTES
Subjective     Guru Pena is a 89 y.o. male who presents with Follow-Up (Lab review / complaining of fatigue / question on beta blockers / dizziness )  The patient is here for followup of chronic medical problems listed below. The patient is compliant with medications and having no side effects from them. Denies chest pain, abdominal pain, dyspnea, myalgias, or cough.   Patient Active Problem List    Diagnosis Date Noted   • Hypothyroidism due to acquired atrophy of thyroid 02/01/2022   • JAMES on CPAP 08/10/2021   • Pulmonary hypertension (HCC)- RVSP= 30,  ECHO 7/2021 08/10/2021   • Mild concentric left ventricular hypertrophy (LVH) 08/10/2021   • Stage 3a chronic kidney disease (HCC) 08/10/2021   • Urinary retention 08/10/2021   • Chronic atrial fibrillation (HCC) 07/29/2021   • Moderate mitral regurgitation by prior echocardiogram 07/29/2021   • Mild aortic stenosis by prior echocardiogram 07/29/2021   • Pulmonary fibrosis ([facility]) 07/29/2021   • Anticoagulated- Eliquis for a. fib 06/30/2021   • Vitamin D deficiency 06/01/2021   • Impaired vision in both eyes 06/01/2021     Allergies   Allergen Reactions   • Digoxin      nausea     Outpatient Medications Prior to Visit   Medication Sig Dispense Refill   • apixaban (ELIQUIS) 2.5mg Tab Take 1 Tablet by mouth 2 times a day. 180 Tablet 3   • Misc. Devices Misc Overnight pulse oximetry on room air to be done by any durable medical equipment company to document patient's oxygen needs for nocturnal hypoxemia and possible oxygen therapy. 1 Each 0   • vitamin D (CHOLECALCIFEROL) 1000 Unit (25 mcg) Tab Take 1,000 Units by mouth every day.     • Multiple Vitamins-Minerals (PRESERVISION AREDS) Tab Take 1 tablet by mouth 2 times a day.     • metoprolol tartrate (LOPRESSOR) 25 MG Tab Take 0.5 Tablets by mouth 2 times a day. 90 Tablet 3   • levothyroxine (SYNTHROID) 175 MCG Tab Take 1 Tablet by mouth every morning on an empty stomach. but take an additional pill on every  "sunday and wednesday 117 Tablet 4     No facility-administered medications prior to visit.               HPI    Review of Systems   Constitutional: Negative.    HENT: Negative.    Eyes: Negative.    Respiratory: Negative.    Cardiovascular: Negative.    Gastrointestinal: Negative.    Genitourinary: Negative.    Musculoskeletal: Negative.    Skin: Negative.    Neurological: Negative.    Endo/Heme/Allergies: Negative.    Psychiatric/Behavioral: Negative.               Objective     /74 (BP Location: Right arm, Patient Position: Sitting, BP Cuff Size: Adult)   Pulse 71   Temp 37.1 °C (98.8 °F) (Temporal)   Resp 20   Ht 1.753 m (5' 9\")   Wt 79.4 kg (175 lb)   SpO2 99%   BMI 25.84 kg/m²      Physical Exam  Constitutional:       General: He is not in acute distress.     Appearance: He is well-developed. He is not diaphoretic.   HENT:      Head: Normocephalic and atraumatic.      Right Ear: External ear normal.      Left Ear: External ear normal.      Nose: Nose normal.      Mouth/Throat:      Pharynx: No oropharyngeal exudate.   Eyes:      General: No scleral icterus.        Right eye: No discharge.         Left eye: No discharge.      Conjunctiva/sclera: Conjunctivae normal.      Pupils: Pupils are equal, round, and reactive to light.   Neck:      Thyroid: No thyromegaly.      Vascular: No JVD.      Trachea: No tracheal deviation.   Cardiovascular:      Rate and Rhythm: Normal rate and regular rhythm.      Heart sounds: Normal heart sounds. No murmur heard.    No friction rub. No gallop.   Pulmonary:      Effort: Pulmonary effort is normal. No respiratory distress.      Breath sounds: Normal breath sounds. No stridor. No wheezing or rales.   Chest:      Chest wall: No tenderness.   Abdominal:      General: Bowel sounds are normal. There is no distension.      Palpations: Abdomen is soft. There is no mass.      Tenderness: There is no abdominal tenderness. There is no guarding or rebound.   Musculoskeletal:   "       General: No tenderness. Normal range of motion.      Cervical back: Normal range of motion and neck supple.   Lymphadenopathy:      Cervical: No cervical adenopathy.   Skin:     General: Skin is warm and dry.      Coloration: Skin is not pale.      Findings: No erythema or rash.   Neurological:      Mental Status: He is alert and oriented to person, place, and time.      Motor: No abnormal muscle tone.      Coordination: Coordination normal.      Deep Tendon Reflexes: Reflexes are normal and symmetric. Reflexes normal.   Psychiatric:         Behavior: Behavior normal.         Thought Content: Thought content normal.         Judgment: Judgment normal.       Hospital Outpatient Visit on 06/17/2022   Component Date Value   • Sed Rate Noahren 06/17/2022 12    • WBC 06/17/2022 4.2 (A)   • RBC 06/17/2022 4.12 (A)   • Hemoglobin 06/17/2022 13.8 (A)   • Hematocrit 06/17/2022 41.3 (A)   • MCV 06/17/2022 100.2 (A)   • MCH 06/17/2022 33.5 (A)   • MCHC 06/17/2022 33.4 (A)   • RDW 06/17/2022 56.1 (A)   • Platelet Count 06/17/2022 154 (A)   • MPV 06/17/2022 11.3    • Neutrophils-Polys 06/17/2022 52.10    • Lymphocytes 06/17/2022 30.40    • Monocytes 06/17/2022 14.80 (A)   • Eosinophils 06/17/2022 1.70    • Basophils 06/17/2022 0.50    • Immature Granulocytes 06/17/2022 0.50    • Nucleated RBC 06/17/2022 0.00    • Neutrophils (Absolute) 06/17/2022 2.18    • Lymphs (Absolute) 06/17/2022 1.27    • Monos (Absolute) 06/17/2022 0.62    • Eos (Absolute) 06/17/2022 0.07    • Baso (Absolute) 06/17/2022 0.02    • Immature Granulocytes (a* 06/17/2022 0.02    • NRBC (Absolute) 06/17/2022 0.00    • Sodium 06/17/2022 141    • Potassium 06/17/2022 4.9    • Chloride 06/17/2022 106    • Co2 06/17/2022 24    • Anion Gap 06/17/2022 11.0    • Glucose 06/17/2022 97    • Bun 06/17/2022 26 (A)   • Creatinine 06/17/2022 1.34    • Calcium 06/17/2022 9.0    • AST(SGOT) 06/17/2022 35    • ALT(SGPT) 06/17/2022 36    • Alkaline Phosphatase  06/17/2022 104 (A)   • Total Bilirubin 06/17/2022 0.6    • Albumin 06/17/2022 4.0    • Total Protein 06/17/2022 7.1    • Globulin 06/17/2022 3.1    • A-G Ratio 06/17/2022 1.3    • Free T-4 06/17/2022 1.41    • TSH 06/17/2022 4.270    • Cholesterol,Tot 06/17/2022 151    • Triglycerides 06/17/2022 56    • HDL 06/17/2022 48    • LDL 06/17/2022 92    • GFR (CKD-EPI) 06/17/2022 48 (A)      No results found for: HBA1C  Lab Results   Component Value Date/Time    SODIUM 141 06/17/2022 07:38 AM    POTASSIUM 4.9 06/17/2022 07:38 AM    CHLORIDE 106 06/17/2022 07:38 AM    CO2 24 06/17/2022 07:38 AM    GLUCOSE 97 06/17/2022 07:38 AM    BUN 26 (H) 06/17/2022 07:38 AM    CREATININE 1.34 06/17/2022 07:38 AM    ALKPHOSPHAT 104 (H) 06/17/2022 07:38 AM    ASTSGOT 35 06/17/2022 07:38 AM    ALTSGPT 36 06/17/2022 07:38 AM    TBILIRUBIN 0.6 06/17/2022 07:38 AM     No results found for: INR  Lab Results   Component Value Date/Time    CHOLSTRLTOT 151 06/17/2022 07:38 AM    LDL 92 06/17/2022 07:38 AM    HDL 48 06/17/2022 07:38 AM    TRIGLYCERIDE 56 06/17/2022 07:38 AM       No results found for: TESTOSTERONE  No results found for: TSH  Lab Results   Component Value Date/Time    FREET4 1.41 06/17/2022 07:38 AM     No results found for: URICACID  No components found for: VITB12  Lab Results   Component Value Date/Time    25HYDROXY 45 06/11/2021 06:42 AM   '                       Assessment & Plan        1. Chronic atrial fibrillation (HCC)  Under good control.  Continue current regimen below.  - TSH; Future  - FREE THYROXINE; Future  - Comp Metabolic Panel; Future  - Lipid Profile; Future  - CBC WITH DIFFERENTIAL; Future  - metoprolol SR (TOPROL XL) 25 MG TABLET SR 24 HR; Take 1 Tablet by mouth every 48 hours. (In other words every other day)  Dispense: 45 Tablet; Refill: 3    2. Hypothyroidism due to acquired atrophy of thyroid  Continue current regimen as below  - TSH; Future  - FREE THYROXINE; Future  - Comp Metabolic Panel; Future  - Lipid  Profile; Future  - CBC WITH DIFFERENTIAL; Future  - levothyroxine (SYNTHROID) 175 MCG Tab; Take 1 Tablet by mouth every morning on an empty stomach. but take an additional pill on every sunday and wednesday  Dispense: 117 Tablet; Refill: 4  - metoprolol SR (TOPROL XL) 25 MG TABLET SR 24 HR; Take 1 Tablet by mouth every 48 hours. (In other words every other day)  Dispense: 45 Tablet; Refill: 3    3. Dyslipidemia        stable.  Continue current regimen as below.  - TSH; Future  - FREE THYROXINE; Future  - Comp Metabolic Panel; Future  - Lipid Profile; Future  - CBC WITH DIFFERENTIAL; Future  - metoprolol SR (TOPROL XL) 25 MG TABLET SR 24 HR; Take 1 Tablet by mouth every 48 hours. (In other words every other day)  Dispense: 45 Tablet; Refill: 3

## 2022-09-30 ENCOUNTER — HOSPITAL ENCOUNTER (OUTPATIENT)
Dept: LAB | Facility: MEDICAL CENTER | Age: 87
End: 2022-09-30
Attending: INTERNAL MEDICINE
Payer: MEDICARE

## 2022-09-30 DIAGNOSIS — E78.5 DYSLIPIDEMIA: ICD-10-CM

## 2022-09-30 DIAGNOSIS — I48.20 CHRONIC ATRIAL FIBRILLATION (HCC): ICD-10-CM

## 2022-09-30 DIAGNOSIS — E03.4 HYPOTHYROIDISM DUE TO ACQUIRED ATROPHY OF THYROID: ICD-10-CM

## 2022-09-30 LAB
ALBUMIN SERPL BCP-MCNC: 4.1 G/DL (ref 3.2–4.9)
ALBUMIN/GLOB SERPL: 1.4 G/DL
ALP SERPL-CCNC: 99 U/L (ref 30–99)
ALT SERPL-CCNC: 27 U/L (ref 2–50)
ANION GAP SERPL CALC-SCNC: 11 MMOL/L (ref 7–16)
AST SERPL-CCNC: 28 U/L (ref 12–45)
BASOPHILS # BLD AUTO: 0.6 % (ref 0–1.8)
BASOPHILS # BLD: 0.03 K/UL (ref 0–0.12)
BILIRUB SERPL-MCNC: 0.7 MG/DL (ref 0.1–1.5)
BUN SERPL-MCNC: 29 MG/DL (ref 8–22)
CALCIUM SERPL-MCNC: 9.1 MG/DL (ref 8.5–10.5)
CHLORIDE SERPL-SCNC: 104 MMOL/L (ref 96–112)
CHOLEST SERPL-MCNC: 139 MG/DL (ref 100–199)
CO2 SERPL-SCNC: 24 MMOL/L (ref 20–33)
CREAT SERPL-MCNC: 1.24 MG/DL (ref 0.5–1.4)
EOSINOPHIL # BLD AUTO: 0.1 K/UL (ref 0–0.51)
EOSINOPHIL NFR BLD: 2.2 % (ref 0–6.9)
ERYTHROCYTE [DISTWIDTH] IN BLOOD BY AUTOMATED COUNT: 55.4 FL (ref 35.9–50)
FASTING STATUS PATIENT QL REPORTED: NORMAL
GFR SERPLBLD CREATININE-BSD FMLA CKD-EPI: 53 ML/MIN/1.73 M 2
GLOBULIN SER CALC-MCNC: 2.9 G/DL (ref 1.9–3.5)
GLUCOSE SERPL-MCNC: 95 MG/DL (ref 65–99)
HCT VFR BLD AUTO: 40 % (ref 42–52)
HDLC SERPL-MCNC: 49 MG/DL
HGB BLD-MCNC: 13.5 G/DL (ref 14–18)
IMM GRANULOCYTES # BLD AUTO: 0.01 K/UL (ref 0–0.11)
IMM GRANULOCYTES NFR BLD AUTO: 0.2 % (ref 0–0.9)
LDLC SERPL CALC-MCNC: 80 MG/DL
LYMPHOCYTES # BLD AUTO: 1.45 K/UL (ref 1–4.8)
LYMPHOCYTES NFR BLD: 31.3 % (ref 22–41)
MCH RBC QN AUTO: 33.8 PG (ref 27–33)
MCHC RBC AUTO-ENTMCNC: 33.8 G/DL (ref 33.7–35.3)
MCV RBC AUTO: 100.3 FL (ref 81.4–97.8)
MONOCYTES # BLD AUTO: 0.7 K/UL (ref 0–0.85)
MONOCYTES NFR BLD AUTO: 15.1 % (ref 0–13.4)
NEUTROPHILS # BLD AUTO: 2.35 K/UL (ref 1.82–7.42)
NEUTROPHILS NFR BLD: 50.6 % (ref 44–72)
NRBC # BLD AUTO: 0 K/UL
NRBC BLD-RTO: 0 /100 WBC
PLATELET # BLD AUTO: 142 K/UL (ref 164–446)
PMV BLD AUTO: 11.9 FL (ref 9–12.9)
POTASSIUM SERPL-SCNC: 4.5 MMOL/L (ref 3.6–5.5)
PROT SERPL-MCNC: 7 G/DL (ref 6–8.2)
RBC # BLD AUTO: 3.99 M/UL (ref 4.7–6.1)
SODIUM SERPL-SCNC: 139 MMOL/L (ref 135–145)
T4 FREE SERPL-MCNC: 1.47 NG/DL (ref 0.93–1.7)
TRIGL SERPL-MCNC: 52 MG/DL (ref 0–149)
TSH SERPL DL<=0.005 MIU/L-ACNC: 1.46 UIU/ML (ref 0.38–5.33)
WBC # BLD AUTO: 4.6 K/UL (ref 4.8–10.8)

## 2022-09-30 PROCEDURE — 36415 COLL VENOUS BLD VENIPUNCTURE: CPT

## 2022-09-30 PROCEDURE — 80053 COMPREHEN METABOLIC PANEL: CPT

## 2022-09-30 PROCEDURE — 80061 LIPID PANEL: CPT

## 2022-09-30 PROCEDURE — 85025 COMPLETE CBC W/AUTO DIFF WBC: CPT

## 2022-09-30 PROCEDURE — 84443 ASSAY THYROID STIM HORMONE: CPT

## 2022-09-30 PROCEDURE — 84439 ASSAY OF FREE THYROXINE: CPT

## 2022-10-04 ENCOUNTER — OFFICE VISIT (OUTPATIENT)
Dept: MEDICAL GROUP | Age: 87
End: 2022-10-04
Payer: MEDICARE

## 2022-10-04 VITALS
TEMPERATURE: 98.5 F | HEART RATE: 91 BPM | SYSTOLIC BLOOD PRESSURE: 110 MMHG | DIASTOLIC BLOOD PRESSURE: 80 MMHG | HEIGHT: 69 IN | WEIGHT: 171 LBS | RESPIRATION RATE: 16 BRPM | OXYGEN SATURATION: 94 % | BODY MASS INDEX: 25.33 KG/M2

## 2022-10-04 DIAGNOSIS — E78.5 DYSLIPIDEMIA: ICD-10-CM

## 2022-10-04 DIAGNOSIS — Z23 NEED FOR VACCINATION: ICD-10-CM

## 2022-10-04 DIAGNOSIS — I48.91 ATRIAL FIBRILLATION WITH RAPID VENTRICULAR RESPONSE (HCC): ICD-10-CM

## 2022-10-04 DIAGNOSIS — I10 ESSENTIAL HYPERTENSION: ICD-10-CM

## 2022-10-04 DIAGNOSIS — E55.9 VITAMIN D DEFICIENCY: ICD-10-CM

## 2022-10-04 PROCEDURE — 99214 OFFICE O/P EST MOD 30 MIN: CPT | Mod: 25 | Performed by: INTERNAL MEDICINE

## 2022-10-04 PROCEDURE — 90662 IIV NO PRSV INCREASED AG IM: CPT | Performed by: INTERNAL MEDICINE

## 2022-10-04 PROCEDURE — G0008 ADMIN INFLUENZA VIRUS VAC: HCPCS | Performed by: INTERNAL MEDICINE

## 2022-10-04 ASSESSMENT — ENCOUNTER SYMPTOMS
PSYCHIATRIC NEGATIVE: 1
EYES NEGATIVE: 1
CARDIOVASCULAR NEGATIVE: 1
MUSCULOSKELETAL NEGATIVE: 1
NEUROLOGICAL NEGATIVE: 1
CONSTITUTIONAL NEGATIVE: 1
RESPIRATORY NEGATIVE: 1
GASTROINTESTINAL NEGATIVE: 1

## 2022-10-04 ASSESSMENT — FIBROSIS 4 INDEX: FIB4 SCORE: 3.68

## 2022-10-04 NOTE — PROGRESS NOTES
Subjective     Guru Pena is a 89 y.o. male who presents with Follow-Up (Lab review to see if medication is working )  Since last visit the patient is done much better with decreasing the metoprolol to every other day dosing the 25 mg pill which was difficult to break in half and take on a daily basis.  This seems to work quite well for him.  Vital signs remained stable.  Remains in chronic atrial fibrillation on anticoagulation therapy.  No new complaints no chest pain PND orthopnea or pedal edema.          The patient is here for followup of chronic medical problems listed below. The patient is compliant with medications and having no side effects from them. Denies chest pain, abdominal pain, dyspnea, myalgias, or cough.   Patient Active Problem List    Diagnosis Date Noted    Hypothyroidism due to acquired atrophy of thyroid 02/01/2022    JAMES on CPAP 08/10/2021    Pulmonary hypertension (HCC)- RVSP= 30,  ECHO 7/2021 08/10/2021    Mild concentric left ventricular hypertrophy (LVH) 08/10/2021    Stage 3a chronic kidney disease (HCC) 08/10/2021    Urinary retention 08/10/2021    Chronic atrial fibrillation (HCC) 07/29/2021    Moderate mitral regurgitation by prior echocardiogram 07/29/2021    Mild aortic stenosis by prior echocardiogram 07/29/2021    Pulmonary fibrosis (HCC) 07/29/2021    Anticoagulated- Eliquis for a. fib 06/30/2021    Vitamin D deficiency 06/01/2021    Impaired vision in both eyes 06/01/2021     Outpatient Medications Prior to Visit   Medication Sig Dispense Refill    levothyroxine (SYNTHROID) 175 MCG Tab Take 1 Tablet by mouth every morning on an empty stomach. but take an additional pill on every sunday and wednesday 117 Tablet 4    metoprolol SR (TOPROL XL) 25 MG TABLET SR 24 HR Take 1 Tablet by mouth every 48 hours. (In other words every other day) 45 Tablet 3    apixaban (ELIQUIS) 2.5mg Tab Take 1 Tablet by mouth 2 times a day. 180 Tablet 3    Misc. Devices Misc Overnight pulse  "oximetry on room air to be done by any durable medical equipment company to document patient's oxygen needs for nocturnal hypoxemia and possible oxygen therapy. 1 Each 0    vitamin D (CHOLECALCIFEROL) 1000 Unit (25 mcg) Tab Take 1,000 Units by mouth every day.      Multiple Vitamins-Minerals (PRESERVISION AREDS) Tab Take 1 tablet by mouth 2 times a day.       No facility-administered medications prior to visit.     Allergies   Allergen Reactions    Digoxin      nausea               HPI    Review of Systems   Constitutional: Negative.    HENT: Negative.     Eyes: Negative.    Respiratory: Negative.     Cardiovascular: Negative.    Gastrointestinal: Negative.    Genitourinary: Negative.    Musculoskeletal: Negative.    Skin: Negative.    Neurological: Negative.    Endo/Heme/Allergies: Negative.    Psychiatric/Behavioral: Negative.              Objective      /80 (BP Location: Left arm, Patient Position: Sitting, BP Cuff Size: Adult)   Pulse 91   Temp 36.9 °C (98.5 °F) (Temporal)   Resp 16   Ht 1.753 m (5' 9\")   Wt 77.6 kg (171 lb)   SpO2 94%   BMI 25.25 kg/m²      Physical Exam  Constitutional:       General: He is not in acute distress.     Appearance: He is well-developed. He is not diaphoretic.   HENT:      Head: Normocephalic and atraumatic.      Right Ear: External ear normal.      Left Ear: External ear normal.      Nose: Nose normal.      Mouth/Throat:      Pharynx: No oropharyngeal exudate.   Eyes:      General: No scleral icterus.        Right eye: No discharge.         Left eye: No discharge.      Conjunctiva/sclera: Conjunctivae normal.      Pupils: Pupils are equal, round, and reactive to light.   Neck:      Thyroid: No thyromegaly.      Vascular: No JVD.      Trachea: No tracheal deviation.   Cardiovascular:      Rate and Rhythm: Normal rate and regular rhythm.      Heart sounds: Normal heart sounds. No murmur heard.    No friction rub. No gallop.   Pulmonary:      Effort: Pulmonary effort is " normal. No respiratory distress.      Breath sounds: Normal breath sounds. No stridor. No wheezing or rales.   Chest:      Chest wall: No tenderness.   Abdominal:      General: Bowel sounds are normal. There is no distension.      Palpations: Abdomen is soft. There is no mass.      Tenderness: There is no abdominal tenderness. There is no guarding or rebound.   Musculoskeletal:         General: No tenderness. Normal range of motion.      Cervical back: Normal range of motion and neck supple.   Lymphadenopathy:      Cervical: No cervical adenopathy.   Skin:     General: Skin is warm and dry.      Coloration: Skin is not pale.      Findings: No erythema or rash.   Neurological:      Mental Status: He is alert and oriented to person, place, and time.      Motor: No abnormal muscle tone.      Coordination: Coordination normal.      Deep Tendon Reflexes: Reflexes are normal and symmetric. Reflexes normal.   Psychiatric:         Behavior: Behavior normal.         Thought Content: Thought content normal.         Judgment: Judgment normal.                           Assessment & Plan        1. Dyslipidemia   Under good control. Continue same regimen  - CBC WITH DIFFERENTIAL; Future  - TSH; Future  - Comp Metabolic Panel; Future  - Lipid Profile; Future  - CBC WITH DIFFERENTIAL; Future    2. Essential hypertension    Under good control. Continue same regimen    3. Atrial fibrillation with rapid ventricular response (HCC)    Under good control. Continue same regimen    4. Vitamin D deficiency    Under good control. Continue same regimen  - VITAMIN D,25 HYDROXY (DEFICIENCY); Future    5. Need for vaccination        - Influenza Vaccine, High Dose (65+ Only)

## 2022-11-08 ENCOUNTER — PATIENT MESSAGE (OUTPATIENT)
Dept: HEALTH INFORMATION MANAGEMENT | Facility: OTHER | Age: 87
End: 2022-11-08

## 2022-11-27 DIAGNOSIS — I48.20 CHRONIC ATRIAL FIBRILLATION (HCC): ICD-10-CM

## 2022-11-30 NOTE — TELEPHONE ENCOUNTER
Received request via: Pharmacy    Was the patient seen in the last year in this department? Yes    Does the patient have an active prescription (recently filled or refills available) for medication(s) requested? No    Does the patient have residential Plus and need 100 day supply (blood pressure, diabetes and cholesterol meds only)? Patient does not have SCP

## 2023-01-18 ENCOUNTER — APPOINTMENT (OUTPATIENT)
Dept: MEDICAL GROUP | Age: 88
End: 2023-01-18
Payer: MEDICARE

## 2023-01-23 ENCOUNTER — HOSPITAL ENCOUNTER (OUTPATIENT)
Dept: LAB | Facility: MEDICAL CENTER | Age: 88
End: 2023-01-23
Attending: INTERNAL MEDICINE
Payer: MEDICARE

## 2023-01-23 DIAGNOSIS — E55.9 VITAMIN D DEFICIENCY: ICD-10-CM

## 2023-01-23 DIAGNOSIS — E78.5 DYSLIPIDEMIA: ICD-10-CM

## 2023-01-23 LAB
25(OH)D3 SERPL-MCNC: 41 NG/ML (ref 30–100)
ALBUMIN SERPL BCP-MCNC: 4 G/DL (ref 3.2–4.9)
ALBUMIN/GLOB SERPL: 1.3 G/DL
ALP SERPL-CCNC: 105 U/L (ref 30–99)
ALT SERPL-CCNC: 26 U/L (ref 2–50)
ANION GAP SERPL CALC-SCNC: 9 MMOL/L (ref 7–16)
AST SERPL-CCNC: 28 U/L (ref 12–45)
BASOPHILS # BLD AUTO: 0.4 % (ref 0–1.8)
BASOPHILS # BLD: 0.02 K/UL (ref 0–0.12)
BILIRUB SERPL-MCNC: 0.5 MG/DL (ref 0.1–1.5)
BUN SERPL-MCNC: 29 MG/DL (ref 8–22)
CALCIUM ALBUM COR SERPL-MCNC: 9 MG/DL (ref 8.5–10.5)
CALCIUM SERPL-MCNC: 9 MG/DL (ref 8.5–10.5)
CHLORIDE SERPL-SCNC: 104 MMOL/L (ref 96–112)
CHOLEST SERPL-MCNC: 136 MG/DL (ref 100–199)
CO2 SERPL-SCNC: 26 MMOL/L (ref 20–33)
CREAT SERPL-MCNC: 1.18 MG/DL (ref 0.5–1.4)
EOSINOPHIL # BLD AUTO: 0.09 K/UL (ref 0–0.51)
EOSINOPHIL NFR BLD: 2 % (ref 0–6.9)
ERYTHROCYTE [DISTWIDTH] IN BLOOD BY AUTOMATED COUNT: 56.6 FL (ref 35.9–50)
FASTING STATUS PATIENT QL REPORTED: NORMAL
GFR SERPLBLD CREATININE-BSD FMLA CKD-EPI: 56 ML/MIN/1.73 M 2
GLOBULIN SER CALC-MCNC: 3.2 G/DL (ref 1.9–3.5)
GLUCOSE SERPL-MCNC: 95 MG/DL (ref 65–99)
HCT VFR BLD AUTO: 39.4 % (ref 42–52)
HDLC SERPL-MCNC: 44 MG/DL
HGB BLD-MCNC: 13 G/DL (ref 14–18)
IMM GRANULOCYTES # BLD AUTO: 0.01 K/UL (ref 0–0.11)
IMM GRANULOCYTES NFR BLD AUTO: 0.2 % (ref 0–0.9)
LDLC SERPL CALC-MCNC: 81 MG/DL
LYMPHOCYTES # BLD AUTO: 1.15 K/UL (ref 1–4.8)
LYMPHOCYTES NFR BLD: 25.6 % (ref 22–41)
MCH RBC QN AUTO: 33.7 PG (ref 27–33)
MCHC RBC AUTO-ENTMCNC: 33 G/DL (ref 33.7–35.3)
MCV RBC AUTO: 102.1 FL (ref 81.4–97.8)
MONOCYTES # BLD AUTO: 0.72 K/UL (ref 0–0.85)
MONOCYTES NFR BLD AUTO: 16 % (ref 0–13.4)
NEUTROPHILS # BLD AUTO: 2.5 K/UL (ref 1.82–7.42)
NEUTROPHILS NFR BLD: 55.8 % (ref 44–72)
NRBC # BLD AUTO: 0 K/UL
NRBC BLD-RTO: 0 /100 WBC
PLATELET # BLD AUTO: 154 K/UL (ref 164–446)
PMV BLD AUTO: 11.7 FL (ref 9–12.9)
POTASSIUM SERPL-SCNC: 4.8 MMOL/L (ref 3.6–5.5)
PROT SERPL-MCNC: 7.2 G/DL (ref 6–8.2)
RBC # BLD AUTO: 3.86 M/UL (ref 4.7–6.1)
SODIUM SERPL-SCNC: 139 MMOL/L (ref 135–145)
TRIGL SERPL-MCNC: 54 MG/DL (ref 0–149)
TSH SERPL DL<=0.005 MIU/L-ACNC: 4.94 UIU/ML (ref 0.38–5.33)
WBC # BLD AUTO: 4.5 K/UL (ref 4.8–10.8)

## 2023-01-23 PROCEDURE — 82306 VITAMIN D 25 HYDROXY: CPT

## 2023-01-23 PROCEDURE — 84443 ASSAY THYROID STIM HORMONE: CPT

## 2023-01-23 PROCEDURE — 85025 COMPLETE CBC W/AUTO DIFF WBC: CPT

## 2023-01-23 PROCEDURE — 80053 COMPREHEN METABOLIC PANEL: CPT

## 2023-01-23 PROCEDURE — 36415 COLL VENOUS BLD VENIPUNCTURE: CPT

## 2023-01-23 PROCEDURE — 80061 LIPID PANEL: CPT

## 2023-01-30 ENCOUNTER — APPOINTMENT (OUTPATIENT)
Dept: MEDICAL GROUP | Age: 88
End: 2023-01-30
Payer: MEDICARE

## 2023-03-22 ENCOUNTER — APPOINTMENT (OUTPATIENT)
Dept: MEDICAL GROUP | Age: 88
End: 2023-03-22
Payer: MEDICARE

## 2023-03-23 DIAGNOSIS — E78.5 DYSLIPIDEMIA: ICD-10-CM

## 2023-03-23 DIAGNOSIS — R73.01 IFG (IMPAIRED FASTING GLUCOSE): ICD-10-CM

## 2023-03-23 DIAGNOSIS — E53.8 VITAMIN B 12 DEFICIENCY: ICD-10-CM

## 2023-03-23 DIAGNOSIS — E55.9 VITAMIN D DEFICIENCY: ICD-10-CM

## 2023-03-23 DIAGNOSIS — N40.0 BPH WITHOUT URINARY OBSTRUCTION: ICD-10-CM

## 2023-07-23 ENCOUNTER — APPOINTMENT (OUTPATIENT)
Dept: RADIOLOGY | Facility: MEDICAL CENTER | Age: 88
DRG: 177 | End: 2023-07-23
Attending: EMERGENCY MEDICINE
Payer: MEDICARE

## 2023-07-23 ENCOUNTER — HOSPITAL ENCOUNTER (INPATIENT)
Facility: MEDICAL CENTER | Age: 88
LOS: 5 days | DRG: 177 | End: 2023-07-28
Attending: EMERGENCY MEDICINE | Admitting: HOSPITALIST
Payer: MEDICARE

## 2023-07-23 DIAGNOSIS — R33.9 URINARY RETENTION: ICD-10-CM

## 2023-07-23 DIAGNOSIS — R09.02 HYPOXIA: ICD-10-CM

## 2023-07-23 DIAGNOSIS — U07.1 COVID-19: ICD-10-CM

## 2023-07-23 DIAGNOSIS — R53.1 WEAKNESS: ICD-10-CM

## 2023-07-23 PROBLEM — J96.01 ACUTE HYPOXEMIC RESPIRATORY FAILURE (HCC): Status: ACTIVE | Noted: 2023-07-23

## 2023-07-23 PROBLEM — D69.6 THROMBOCYTOPENIA (HCC): Status: ACTIVE | Noted: 2023-07-23

## 2023-07-23 LAB
ALBUMIN SERPL BCP-MCNC: 3.5 G/DL (ref 3.2–4.9)
ALBUMIN/GLOB SERPL: 1.1 G/DL
ALP SERPL-CCNC: 81 U/L (ref 30–99)
ALT SERPL-CCNC: 28 U/L (ref 2–50)
ANION GAP SERPL CALC-SCNC: 10 MMOL/L (ref 7–16)
APPEARANCE UR: CLEAR
AST SERPL-CCNC: 41 U/L (ref 12–45)
BACTERIA #/AREA URNS HPF: NEGATIVE /HPF
BASOPHILS # BLD AUTO: 0.4 % (ref 0–1.8)
BASOPHILS # BLD: 0.02 K/UL (ref 0–0.12)
BILIRUB SERPL-MCNC: 0.5 MG/DL (ref 0.1–1.5)
BILIRUB UR QL STRIP.AUTO: NEGATIVE
BUN SERPL-MCNC: 27 MG/DL (ref 8–22)
CALCIUM ALBUM COR SERPL-MCNC: 8.9 MG/DL (ref 8.5–10.5)
CALCIUM SERPL-MCNC: 8.5 MG/DL (ref 8.5–10.5)
CHLORIDE SERPL-SCNC: 105 MMOL/L (ref 96–112)
CO2 SERPL-SCNC: 23 MMOL/L (ref 20–33)
COLOR UR: YELLOW
COMMENT 1642: NORMAL
CREAT SERPL-MCNC: 1.21 MG/DL (ref 0.5–1.4)
EKG IMPRESSION: NORMAL
EOSINOPHIL # BLD AUTO: 0 K/UL (ref 0–0.51)
EOSINOPHIL NFR BLD: 0 % (ref 0–6.9)
EPI CELLS #/AREA URNS HPF: NEGATIVE /HPF
ERYTHROCYTE [DISTWIDTH] IN BLOOD BY AUTOMATED COUNT: 52 FL (ref 35.9–50)
FLUAV RNA SPEC QL NAA+PROBE: NEGATIVE
FLUBV RNA SPEC QL NAA+PROBE: NEGATIVE
GFR SERPLBLD CREATININE-BSD FMLA CKD-EPI: 54 ML/MIN/1.73 M 2
GLOBULIN SER CALC-MCNC: 3.1 G/DL (ref 1.9–3.5)
GLUCOSE SERPL-MCNC: 119 MG/DL (ref 65–99)
GLUCOSE UR STRIP.AUTO-MCNC: NEGATIVE MG/DL
HCT VFR BLD AUTO: 36.3 % (ref 42–52)
HGB BLD-MCNC: 12.4 G/DL (ref 14–18)
HYALINE CASTS #/AREA URNS LPF: ABNORMAL /LPF
IMM GRANULOCYTES # BLD AUTO: 0.03 K/UL (ref 0–0.11)
IMM GRANULOCYTES NFR BLD AUTO: 0.6 % (ref 0–0.9)
KETONES UR STRIP.AUTO-MCNC: ABNORMAL MG/DL
LEUKOCYTE ESTERASE UR QL STRIP.AUTO: ABNORMAL
LYMPHOCYTES # BLD AUTO: 0.88 K/UL (ref 1–4.8)
LYMPHOCYTES NFR BLD: 16.7 % (ref 22–41)
MACROCYTES BLD QL SMEAR: ABNORMAL
MCH RBC QN AUTO: 33.5 PG (ref 27–33)
MCHC RBC AUTO-ENTMCNC: 34.2 G/DL (ref 32.3–36.5)
MCV RBC AUTO: 98.1 FL (ref 81.4–97.8)
MICRO URNS: ABNORMAL
MONOCYTES # BLD AUTO: 1.43 K/UL (ref 0–0.85)
MONOCYTES NFR BLD AUTO: 27.1 % (ref 0–13.4)
MORPHOLOGY BLD-IMP: NORMAL
NEUTROPHILS # BLD AUTO: 2.91 K/UL (ref 1.82–7.42)
NEUTROPHILS NFR BLD: 55.2 % (ref 44–72)
NITRITE UR QL STRIP.AUTO: NEGATIVE
NRBC # BLD AUTO: 0 K/UL
NRBC BLD-RTO: 0 /100 WBC (ref 0–0.2)
NT-PROBNP SERPL IA-MCNC: 3488 PG/ML (ref 0–125)
PH UR STRIP.AUTO: 7 [PH] (ref 5–8)
PLATELET # BLD AUTO: 138 K/UL (ref 164–446)
PLATELET BLD QL SMEAR: NORMAL
PMV BLD AUTO: 10.9 FL (ref 9–12.9)
POIKILOCYTOSIS BLD QL SMEAR: NORMAL
POTASSIUM SERPL-SCNC: 4.4 MMOL/L (ref 3.6–5.5)
PROT SERPL-MCNC: 6.6 G/DL (ref 6–8.2)
PROT UR QL STRIP: 30 MG/DL
RBC # BLD AUTO: 3.7 M/UL (ref 4.7–6.1)
RBC # URNS HPF: ABNORMAL /HPF
RBC BLD AUTO: PRESENT
RBC UR QL AUTO: ABNORMAL
RSV RNA SPEC QL NAA+PROBE: NEGATIVE
SARS-COV-2 RNA RESP QL NAA+PROBE: DETECTED
SODIUM SERPL-SCNC: 138 MMOL/L (ref 135–145)
SP GR UR STRIP.AUTO: 1.02
SPECIMEN SOURCE: ABNORMAL
TARGETS BLD QL SMEAR: NORMAL
UROBILINOGEN UR STRIP.AUTO-MCNC: 1 MG/DL
WBC # BLD AUTO: 5.3 K/UL (ref 4.8–10.8)
WBC #/AREA URNS HPF: ABNORMAL /HPF

## 2023-07-23 PROCEDURE — 99285 EMERGENCY DEPT VISIT HI MDM: CPT

## 2023-07-23 PROCEDURE — 99223 1ST HOSP IP/OBS HIGH 75: CPT | Mod: AI | Performed by: HOSPITALIST

## 2023-07-23 PROCEDURE — 8E0ZXY6 ISOLATION: ICD-10-PCS | Performed by: HOSPITALIST

## 2023-07-23 PROCEDURE — 36415 COLL VENOUS BLD VENIPUNCTURE: CPT

## 2023-07-23 PROCEDURE — 93005 ELECTROCARDIOGRAM TRACING: CPT | Performed by: EMERGENCY MEDICINE

## 2023-07-23 PROCEDURE — 770001 HCHG ROOM/CARE - MED/SURG/GYN PRIV*

## 2023-07-23 PROCEDURE — 80053 COMPREHEN METABOLIC PANEL: CPT

## 2023-07-23 PROCEDURE — 85025 COMPLETE CBC W/AUTO DIFF WBC: CPT

## 2023-07-23 PROCEDURE — C9803 HOPD COVID-19 SPEC COLLECT: HCPCS | Performed by: EMERGENCY MEDICINE

## 2023-07-23 PROCEDURE — 81001 URINALYSIS AUTO W/SCOPE: CPT

## 2023-07-23 PROCEDURE — 83880 ASSAY OF NATRIURETIC PEPTIDE: CPT

## 2023-07-23 PROCEDURE — A9270 NON-COVERED ITEM OR SERVICE: HCPCS | Performed by: HOSPITALIST

## 2023-07-23 PROCEDURE — 0241U HCHG SARS-COV-2 COVID-19 NFCT DS RESP RNA 4 TRGT MIC: CPT

## 2023-07-23 PROCEDURE — 700102 HCHG RX REV CODE 250 W/ 637 OVERRIDE(OP): Performed by: HOSPITALIST

## 2023-07-23 PROCEDURE — 71045 X-RAY EXAM CHEST 1 VIEW: CPT

## 2023-07-23 PROCEDURE — 93005 ELECTROCARDIOGRAM TRACING: CPT

## 2023-07-23 RX ORDER — VITAMIN B COMPLEX
1000 TABLET ORAL DAILY
Status: DISCONTINUED | OUTPATIENT
Start: 2023-07-23 | End: 2023-07-28 | Stop reason: HOSPADM

## 2023-07-23 RX ORDER — METOPROLOL SUCCINATE 25 MG/1
25 TABLET, EXTENDED RELEASE ORAL
Status: DISCONTINUED | OUTPATIENT
Start: 2023-07-23 | End: 2023-07-27

## 2023-07-23 RX ORDER — LEVOTHYROXINE SODIUM 175 UG/1
175 TABLET ORAL
Status: DISCONTINUED | OUTPATIENT
Start: 2023-07-23 | End: 2023-07-28 | Stop reason: HOSPADM

## 2023-07-23 RX ORDER — POLYETHYLENE GLYCOL 3350 17 G/17G
1 POWDER, FOR SOLUTION ORAL
Status: DISCONTINUED | OUTPATIENT
Start: 2023-07-23 | End: 2023-07-28 | Stop reason: HOSPADM

## 2023-07-23 RX ORDER — ACETAMINOPHEN 325 MG/1
650 TABLET ORAL EVERY 6 HOURS PRN
Status: DISCONTINUED | OUTPATIENT
Start: 2023-07-23 | End: 2023-07-28 | Stop reason: HOSPADM

## 2023-07-23 RX ORDER — FAMOTIDINE 20 MG/1
20 TABLET, FILM COATED ORAL DAILY
Status: DISCONTINUED | OUTPATIENT
Start: 2023-07-23 | End: 2023-07-28 | Stop reason: HOSPADM

## 2023-07-23 RX ORDER — AMOXICILLIN 250 MG
2 CAPSULE ORAL 2 TIMES DAILY
Status: DISCONTINUED | OUTPATIENT
Start: 2023-07-23 | End: 2023-07-28 | Stop reason: HOSPADM

## 2023-07-23 RX ORDER — M-VIT,TX,IRON,MINS/CALC/FOLIC 27MG-0.4MG
1 TABLET ORAL EVERY EVENING
Status: DISCONTINUED | OUTPATIENT
Start: 2023-07-23 | End: 2023-07-28 | Stop reason: HOSPADM

## 2023-07-23 RX ORDER — BISACODYL 10 MG
10 SUPPOSITORY, RECTAL RECTAL
Status: DISCONTINUED | OUTPATIENT
Start: 2023-07-23 | End: 2023-07-28 | Stop reason: HOSPADM

## 2023-07-23 RX ORDER — DEXAMETHASONE 6 MG/1
6 TABLET ORAL DAILY
Status: DISCONTINUED | OUTPATIENT
Start: 2023-07-23 | End: 2023-07-28 | Stop reason: HOSPADM

## 2023-07-23 RX ORDER — DEXAMETHASONE 4 MG/1
6 TABLET ORAL ONCE
Status: DISCONTINUED | OUTPATIENT
Start: 2023-07-23 | End: 2023-07-23

## 2023-07-23 RX ADMIN — APIXABAN 2.5 MG: 5 TABLET, FILM COATED ORAL at 18:36

## 2023-07-23 RX ADMIN — MULTIPLE VITAMINS W/ MINERALS TAB 1 TABLET: TAB at 18:36

## 2023-07-23 RX ADMIN — SENNOSIDES AND DOCUSATE SODIUM 2 TABLET: 50; 8.6 TABLET ORAL at 18:35

## 2023-07-23 RX ADMIN — Medication 1000 UNITS: at 12:21

## 2023-07-23 RX ADMIN — APIXABAN 2.5 MG: 5 TABLET, FILM COATED ORAL at 12:18

## 2023-07-23 RX ADMIN — FAMOTIDINE 20 MG: 20 TABLET, FILM COATED ORAL at 12:20

## 2023-07-23 RX ADMIN — METOPROLOL SUCCINATE 25 MG: 25 TABLET, EXTENDED RELEASE ORAL at 18:36

## 2023-07-23 RX ADMIN — LEVOTHYROXINE SODIUM 175 MCG: 0.17 TABLET ORAL at 12:20

## 2023-07-23 RX ADMIN — DEXAMETHASONE 6 MG: 4 TABLET ORAL at 12:19

## 2023-07-23 ASSESSMENT — LIFESTYLE VARIABLES
TOTAL SCORE: 0
HOW MANY TIMES IN THE PAST YEAR HAVE YOU HAD 5 OR MORE DRINKS IN A DAY: 0
EVER HAD A DRINK FIRST THING IN THE MORNING TO STEADY YOUR NERVES TO GET RID OF A HANGOVER: NO
EVER FELT BAD OR GUILTY ABOUT YOUR DRINKING: NO
TOTAL SCORE: 0
EVER HAD A DRINK FIRST THING IN THE MORNING TO STEADY YOUR NERVES TO GET RID OF A HANGOVER: NO
CONSUMPTION TOTAL: NEGATIVE
HAVE YOU EVER FELT YOU SHOULD CUT DOWN ON YOUR DRINKING: NO
EVER FELT BAD OR GUILTY ABOUT YOUR DRINKING: NO
TOTAL SCORE: 0
AVERAGE NUMBER OF DAYS PER WEEK YOU HAVE A DRINK CONTAINING ALCOHOL: 0
CONSUMPTION TOTAL: INCOMPLETE
TOTAL SCORE: 0
ALCOHOL_USE: NO
TOTAL SCORE: 0
ALCOHOL_USE: NO
HAVE PEOPLE ANNOYED YOU BY CRITICIZING YOUR DRINKING: NO
ON A TYPICAL DAY WHEN YOU DRINK ALCOHOL HOW MANY DRINKS DO YOU HAVE: 0
SUBSTANCE_ABUSE: 0
HAVE PEOPLE ANNOYED YOU BY CRITICIZING YOUR DRINKING: NO
HAVE YOU EVER FELT YOU SHOULD CUT DOWN ON YOUR DRINKING: NO
TOTAL SCORE: 0

## 2023-07-23 ASSESSMENT — COPD QUESTIONNAIRES
HAVE YOU SMOKED AT LEAST 100 CIGARETTES IN YOUR ENTIRE LIFE: NO/DON'T KNOW
COPD SCREENING SCORE: 4
DURING THE PAST 4 WEEKS HOW MUCH DID YOU FEEL SHORT OF BREATH: SOME OF THE TIME
DO YOU EVER COUGH UP ANY MUCUS OR PHLEGM?: YES, A FEW DAYS A WEEK OR MONTH

## 2023-07-23 ASSESSMENT — ENCOUNTER SYMPTOMS
CHILLS: 0
CARDIOVASCULAR NEGATIVE: 1
COUGH: 1
FEVER: 0
GASTROINTESTINAL NEGATIVE: 1
NEUROLOGICAL NEGATIVE: 1
BRUISES/BLEEDS EASILY: 0
NERVOUS/ANXIOUS: 0
WEAKNESS: 0
VOMITING: 0
NAUSEA: 0
FALLS: 0
PALPITATIONS: 0
FOCAL WEAKNESS: 0
WHEEZING: 0
HEADACHES: 0
BLURRED VISION: 0
ABDOMINAL PAIN: 0
SPUTUM PRODUCTION: 0
DIAPHORESIS: 0
HEMOPTYSIS: 0
DEPRESSION: 0
SHORTNESS OF BREATH: 0
MEMORY LOSS: 1
EYES NEGATIVE: 1
WEIGHT LOSS: 0
MYALGIAS: 0
SORE THROAT: 0
DIZZINESS: 0

## 2023-07-23 ASSESSMENT — FIBROSIS 4 INDEX: FIB4 SCORE: 3.49

## 2023-07-23 ASSESSMENT — CHA2DS2 SCORE
AGE 75 OR GREATER: YES
CHA2DS2 VASC SCORE: 3
AGE 65 TO 74: NO
HYPERTENSION: YES
CHF OR LEFT VENTRICULAR DYSFUNCTION: NO
DIABETES: NO
PRIOR STROKE OR TIA OR THROMBOEMBOLISM: NO
SEX: MALE

## 2023-07-23 NOTE — ED TRIAGE NOTES
Chief Complaint   Patient presents with    Weakness     Since 1 week     BIBA from [facility] with above complaint associated with coughing.    /69   Pulse 74   Temp 36 °C (96.8 °F) (Temporal)   Resp (!) 22   Ht 1.829 m (6')   Wt 77.6 kg (171 lb 1.2 oz)   SpO2 88%   BMI 23.20 kg/m²

## 2023-07-23 NOTE — ASSESSMENT & PLAN NOTE
Mild  Consistent with acute covid infection  No s/o secondary infection but will follow clinically and will also check procalcitonin  RT  PE unlikely as he has been on chronic anticoagulation - will continue this

## 2023-07-23 NOTE — H&P
"Hospital Medicine History & Physical Note    Date of Service  7/23/2023    Primary Care Physician  Alex Smart M.D.    Consultants    Code Status  Full Code    Chief Complaint  Chief Complaint   Patient presents with    Weakness     Since 1 week       History of Presenting Illness  Guru Pena is a 89 y.o. male with a history of afib, JAMES and hypothryoidism. He presented to [facility] 7/23/2023 with weekness and cough for one week.   In the ER he was found to have covid and acute hypoxic respiratory failure with a room air saturation 87-88%.  He has a mild cough, he denies sob. He is currently axox2, poor recall and a poor historian. He knows he is at [facility] but when asked why he replied \"for a medication check I believe.\"  He reports mild chronic L hip pain, denies numbness or tingling, he denies dizziness, no lightheadedness, denies chest pain. He denies any GI symptoms.   His son is at bedside and reports that patient has had a mild cough for the past week and became increasingly weak to the point where he was unable to get out of his bed. Patient lives with his wife locally. His son feels his mentation is near his baseline.    We discussed code status, patient is a full code and his son has a copy of his advanced directive.     I discussed the plan of care with patient, his son and er physician    Review of Systems  Review of Systems   Constitutional:  Positive for malaise/fatigue. Negative for chills, diaphoresis, fever and weight loss.   HENT: Negative.  Negative for sore throat.    Eyes: Negative.  Negative for blurred vision.   Respiratory:  Positive for cough. Negative for hemoptysis, sputum production, shortness of breath and wheezing.    Cardiovascular: Negative.  Negative for chest pain, palpitations and leg swelling.   Gastrointestinal: Negative.  Negative for abdominal pain, nausea and vomiting.   Genitourinary: Negative.  Negative for dysuria.   Musculoskeletal:  Positive for joint pain. " Negative for falls and myalgias.   Skin: Negative.  Negative for itching and rash.   Neurological: Negative.  Negative for dizziness, focal weakness, weakness and headaches.   Endo/Heme/Allergies: Negative.  Does not bruise/bleed easily.   Psychiatric/Behavioral:  Positive for memory loss. Negative for depression, substance abuse and suicidal ideas. The patient is not nervous/anxious.    All other systems reviewed and are negative.      Past Medical History   has a past medical history of A-fib (HCC), Elevated troponin (6/30/2021), Generalized weakness (6/22/2021), Hypothyroid, and Other specified hypothyroidism (6/1/2021).    Surgical History   has no past surgical history on file.     Family History  family history is not on file.   Family history reviewed with patient. There is no family history that is pertinent to the chief complaint.     Social History   reports that he has never smoked. He has never used smokeless tobacco. He reports that he does not drink alcohol and does not use drugs.    Allergies  Allergies   Allergen Reactions    Digoxin      nausea       Medications  Prior to Admission Medications   Prescriptions Last Dose Informant Patient Reported? Taking?   Multiple Vitamins-Minerals (PRESERVISION AREDS) Tab 7/21/2023 at PM Patient Yes No   Sig: Take 1 tablet by mouth 2 times a day.   apixaban (ELIQUIS) 2.5mg Tab 7/21/2023 at PM Patient No No   Sig: Take 1 Tablet by mouth 2 times a day.   levothyroxine (SYNTHROID) 175 MCG Tab 7/21/2023 at AM Patient No No   Sig: Take 1 Tablet by mouth every morning on an empty stomach. but take an additional pill on every sunday and wednesday   metoprolol SR (TOPROL XL) 25 MG TABLET SR 24 HR 7/21/2023 at AM Patient No No   Sig: Take 1 Tablet by mouth every 48 hours. (In other words every other day)   vitamin D (CHOLECALCIFEROL) 1000 Unit (25 mcg) Tab 7/21/2023 at AM Patient Yes No   Sig: Take 1,000 Units by mouth every day.      Facility-Administered Medications:  None       Physical Exam  Temp:  [36 °C (96.8 °F)] 36 °C (96.8 °F)  Pulse:  [71-91] 71  Resp:  [18-22] 18  BP: (107-123)/(63-77) 109/77  SpO2:  [88 %-96 %] 93 %  Blood Pressure : 121/63   Temperature: 36 °C (96.8 °F)   Pulse: 91   Respiration: 18   Pulse Oximetry: 96 %       Physical Exam  Vitals and nursing note reviewed. Exam conducted with a chaperone present.   Constitutional:       General: He is not in acute distress.     Appearance: Normal appearance. He is not diaphoretic.   HENT:      Head: Normocephalic.      Nose: Nose normal.      Mouth/Throat:      Mouth: Mucous membranes are moist.   Eyes:      Pupils: Pupils are equal, round, and reactive to light.   Cardiovascular:      Rate and Rhythm: Normal rate. Rhythm irregular.      Pulses: Normal pulses.      Heart sounds: Normal heart sounds.   Pulmonary:      Effort: Pulmonary effort is normal.      Breath sounds: Normal breath sounds.   Abdominal:      General: Abdomen is flat. Bowel sounds are normal.      Palpations: Abdomen is soft.   Musculoskeletal:         General: No swelling or deformity. Normal range of motion.   Skin:     General: Skin is warm and dry.      Capillary Refill: Capillary refill takes less than 2 seconds.   Neurological:      General: No focal deficit present.      Mental Status: He is alert and oriented to person, place, and time.      Cranial Nerves: No cranial nerve deficit.      Motor: Weakness (generalized, no focal weakness) present.   Psychiatric:         Mood and Affect: Mood normal.         Behavior: Behavior normal.         Laboratory:  Recent Labs     07/23/23 0717   WBC 5.3   RBC 3.70*   HEMOGLOBIN 12.4*   HEMATOCRIT 36.3*   MCV 98.1*   MCH 33.5*   MCHC 34.2   RDW 52.0*   PLATELETCT 138*   MPV 10.9     Recent Labs     07/23/23  0717   SODIUM 138   POTASSIUM 4.4   CHLORIDE 105   CO2 23   GLUCOSE 119*   BUN 27*   CREATININE 1.21   CALCIUM 8.5     Recent Labs     07/23/23 0717   ALTSGPT 28   ASTSGOT 41   ALKPHOSPHAT 81    TBILIRUBIN 0.5   GLUCOSE 119*         Recent Labs     07/23/23  0717   NTPROBNP 3488*         No results for input(s): TROPONINT in the last 72 hours.    Imaging:  DX-CHEST-PORTABLE (1 VIEW)   Final Result         Diffuse interstitial prominence could relate to mild fluid overload.      Elevation of the right hemidiaphragm.      Stable cardiomegaly.          EKG:  My impression is: afib at a rate of 77 with RBBB and LAFB which were present on previous ekg's, no acute findings    Assessment/Plan:  Justification for Admission Status  I anticipate this patient will require at least two midnights for appropriate medical management, necessitating inpatient admission because hypoxia and weakness    Patient will need a Med/Surg bed on MEDICAL service .  The need is secondary to covid.    * COVID-19- (present on admission)  Assessment & Plan  With mild hypoxia  Will start dexamethasone  Following closely   Covid isolation   Supportive care    Acute hypoxemic respiratory failure (HCC)  Assessment & Plan  Mild  Consistent with acute covid infection  No s/o secondary infection but will follow clinically and will also check procalcitonin  RT  PE unlikely as he has been on chronic anticoagulation - will continue this    Thrombocytopenia (HCC)  Assessment & Plan  Mild  Likely related to covid  Following  Will repeat cbc in am    Stage 3a chronic kidney disease (HCC)- (present on admission)  Assessment & Plan  At baseline    JAMES on CPAP- (present on admission)  Assessment & Plan  Continue CPAP  RT  following    Chronic atrial fibrillation (HCC)- (present on admission)  Assessment & Plan  Rate controlled  Continue metoprolol and eliquis as tolerated  following    Anticoagulated- Eliquis for a. fib- (present on admission)  Assessment & Plan  Will continue his home eliquis    Weakness  Assessment & Plan  Acute on chronic  Exacerbation likely due to acute covid infection  - supportive care  PT and OT    Vitamin D deficiency-  (present on admission)  Assessment & Plan  Will continue home dose and check currently levels to see if a dose adjustment is needed        VTE prophylaxis: eliquis

## 2023-07-23 NOTE — ED PROVIDER NOTES
ED Provider Note    CHIEF COMPLAINT  Chief Complaint   Patient presents with    Weakness     Since 1 week       EXTERNAL RECORDS REVIEWED  Prior echocardiogram 7/1/2021, ejection fraction of 60%.    HPI/ROS    Guru Pena is a 89 y.o. male who presents with chief complaint of diffuse weakness.  Patient with history of atrial fibrillation, hypertension, dyslipidemia.  Patient is on eliquis.  Patient denies any recent falls, patient's son corroborates this history.  He is on metoprolol .  Patient has had some diffuse weakness for approximately last 24 hours.  He is having difficulty ambulating secondary to his diffuse weakness.  He has not had any fevers or chills.  He has had a cough, some mild body aches, and some low oxygen per his son.  Patient had a normal oxygen level last night, we will recheck this morning it was lower.  Patient without any history of heart failure, last echo with an ejection fraction of 60%.  Patient denies any black or bloody stool.  Denies any diarrhea or changes in bowel movements.  He had some nausea this morning but denies any vomiting.  Patient denies any new neck or back pain.    PAST MEDICAL HISTORY   has a past medical history of A-fib (HCC), Elevated troponin (6/30/2021), Generalized weakness (6/22/2021), Hypothyroid, and Other specified hypothyroidism (6/1/2021).    SURGICAL HISTORY  patient denies any surgical history    FAMILY HISTORY  History reviewed. No pertinent family history.    SOCIAL HISTORY  Social History     Tobacco Use    Smoking status: Never    Smokeless tobacco: Never   Vaping Use    Vaping Use: Never used   Substance and Sexual Activity    Alcohol use: Never    Drug use: Never    Sexual activity: Not on file       CURRENT MEDICATIONS  Home Medications       Reviewed by Mabel Hess R.N. (Registered Nurse) on 07/23/23 at 0707  Med List Status: Partial     Medication Last Dose Status   apixaban (ELIQUIS) 2.5mg Tab  Active   levothyroxine (SYNTHROID) 175  MCG Tab  Active   metoprolol SR (TOPROL XL) 25 MG TABLET SR 24 HR  Active   Misc. Devices Misc  Active   Multiple Vitamins-Minerals (PRESERVISION AREDS) Tab  Active   vitamin D (CHOLECALCIFEROL) 1000 Unit (25 mcg) Tab  Active                    ALLERGIES  Allergies   Allergen Reactions    Digoxin      nausea       PHYSICAL EXAM  VITAL SIGNS: /69   Pulse 74   Temp 36 °C (96.8 °F) (Temporal)   Resp (!) 22   Ht 1.829 m (6')   Wt 77.6 kg (171 lb 1.2 oz)   SpO2 88%   BMI 23.20 kg/m²    Physical Exam  Constitutional:       Appearance: Normal appearance.   HENT:      Head: Normocephalic and atraumatic.      Right Ear: Tympanic membrane normal.      Left Ear: Tympanic membrane normal.      Nose: Nose normal.      Mouth/Throat:      Mouth: Mucous membranes are moist.   Eyes:      Extraocular Movements: Extraocular movements intact.      Pupils: Pupils are equal, round, and reactive to light.   Cardiovascular:      Rate and Rhythm: Normal rate and regular rhythm.   Pulmonary:      Effort: Pulmonary effort is normal. No respiratory distress.      Breath sounds: Normal breath sounds. No stridor. No wheezing or rales.   Chest:      Chest wall: No tenderness.   Abdominal:      General: Abdomen is flat. There is no distension.      Palpations: Abdomen is soft. There is no mass.      Tenderness: There is no abdominal tenderness.   Musculoskeletal:      Cervical back: Normal range of motion.      Comments: Cervical, thoracic, lumbar spine clear of any tenderness to palpation   Skin:     General: Skin is warm.      Capillary Refill: Capillary refill takes less than 2 seconds.   Neurological:      General: No focal deficit present.      Mental Status: He is alert and oriented to person, place, and time.      Comments: No associated numbness.  Patient strength is diffusely 4 out of 5 without any focal asymmetry.  No associated tremor.  No visual field deficit.  Speech and language is normal.   Psychiatric:         Mood and  Affect: Mood normal.           DIAGNOSTIC STUDIES / PROCEDURES      LABS  Results for orders placed or performed during the hospital encounter of 07/23/23   CoV-2, FLU A/B, and RSV by PCR (2-4 Hours [employer]) : Collect NP swab in VTM    Specimen: Respirate   Result Value Ref Range    Influenza virus A RNA Negative Negative    Influenza virus B, PCR Negative Negative    RSV, PCR Negative Negative    SARS-CoV-2 by PCR DETECTED (AA)     SARS-CoV-2 Source NP Swab    CBC WITH DIFFERENTIAL   Result Value Ref Range    WBC 5.3 4.8 - 10.8 K/uL    RBC 3.70 (L) 4.70 - 6.10 M/uL    Hemoglobin 12.4 (L) 14.0 - 18.0 g/dL    Hematocrit 36.3 (L) 42.0 - 52.0 %    MCV 98.1 (H) 81.4 - 97.8 fL    MCH 33.5 (H) 27.0 - 33.0 pg    MCHC 34.2 32.3 - 36.5 g/dL    RDW 52.0 (H) 35.9 - 50.0 fL    Platelet Count 138 (L) 164 - 446 K/uL    MPV 10.9 9.0 - 12.9 fL    Neutrophils-Polys 55.20 44.00 - 72.00 %    Lymphocytes 16.70 (L) 22.00 - 41.00 %    Monocytes 27.10 (H) 0.00 - 13.40 %    Eosinophils 0.00 0.00 - 6.90 %    Basophils 0.40 0.00 - 1.80 %    Immature Granulocytes 0.60 0.00 - 0.90 %    Nucleated RBC 0.00 0.00 - 0.20 /100 WBC    Neutrophils (Absolute) 2.91 1.82 - 7.42 K/uL    Lymphs (Absolute) 0.88 (L) 1.00 - 4.80 K/uL    Monos (Absolute) 1.43 (H) 0.00 - 0.85 K/uL    Eos (Absolute) 0.00 0.00 - 0.51 K/uL    Baso (Absolute) 0.02 0.00 - 0.12 K/uL    Immature Granulocytes (abs) 0.03 0.00 - 0.11 K/uL    NRBC (Absolute) 0.00 K/uL    Macrocytosis 1+    CMP   Result Value Ref Range    Sodium 138 135 - 145 mmol/L    Potassium 4.4 3.6 - 5.5 mmol/L    Chloride 105 96 - 112 mmol/L    Co2 23 20 - 33 mmol/L    Anion Gap 10.0 7.0 - 16.0    Glucose 119 (H) 65 - 99 mg/dL    Bun 27 (H) 8 - 22 mg/dL    Creatinine 1.21 0.50 - 1.40 mg/dL    Calcium 8.5 8.5 - 10.5 mg/dL    Correct Calcium 8.9 8.5 - 10.5 mg/dL    AST(SGOT) 41 12 - 45 U/L    ALT(SGPT) 28 2 - 50 U/L    Alkaline Phosphatase 81 30 - 99 U/L    Total Bilirubin 0.5 0.1 - 1.5 mg/dL    Albumin 3.5 3.2 - 4.9  g/dL    Total Protein 6.6 6.0 - 8.2 g/dL    Globulin 3.1 1.9 - 3.5 g/dL    A-G Ratio 1.1 g/dL   URINALYSIS    Specimen: Urine   Result Value Ref Range    Color Yellow     Character Clear     Specific Gravity 1.020 <1.035    Ph 7.0 5.0 - 8.0    Glucose Negative Negative mg/dL    Ketones Trace (A) Negative mg/dL    Protein 30 (A) Negative mg/dL    Bilirubin Negative Negative    Urobilinogen, Urine 1.0 Negative    Nitrite Negative Negative    Leukocyte Esterase Trace (A) Negative    Occult Blood Small (A) Negative    Micro Urine Req Microscopic    ESTIMATED GFR   Result Value Ref Range    GFR (CKD-EPI) 54 (A) >60 mL/min/1.73 m 2   proBrain Natriuretic Peptide, NT   Result Value Ref Range    NT-proBNP 3488 (H) 0 - 125 pg/mL   PLATELET ESTIMATE   Result Value Ref Range    Plt Estimation Decreased    MORPHOLOGY   Result Value Ref Range    RBC Morphology Present     Poikilocytosis 1+     Target Cells 1+    PERIPHERAL SMEAR REVIEW   Result Value Ref Range    Peripheral Smear Review see below    URINE MICROSCOPIC (W/UA)   Result Value Ref Range    WBC 0-2 (A) /hpf    RBC 2-5 (A) /hpf    Bacteria Negative None /hpf    Epithelial Cells Negative /hpf    Hyaline Cast 0-2 /lpf   DIFFERENTIAL COMMENT   Result Value Ref Range    Comments-Diff see below    EKG   Result Value Ref Range    Report       [facility] Emergency Dept.    Test Date:  2023  Pt Name:    JANICE HARRISON                  Department: ER  MRN:        [id number]                      Room:       [facility]  Gender:     Male                         Technician: [id number]  :        [id number]                   Requested By:ER TRIAGE PROTOCOL  Order #:    [id number]                    Reading MD:    Measurements  Intervals                                Axis  Rate:       77                           P:          0  CT:         0                            QRS:        -83  QRSD:       181                          T:          -10  QT:         425  QTc:         482    Interpretive Statements  Atrial fibrillation  RBBB and LAFB  Compared to ECG 07/03/2021 09:41:25  Ventricular premature complex(es) no longer present           RADIOLOGY  I have independently interpreted the diagnostic imaging associated with this visit and am waiting the final reading from the radiologist.   My preliminary interpretation is as follows: Evidence of multifocal pneumonia versus pulmonary edema  Radiologist interpretation:   DX-CHEST-PORTABLE (1 VIEW)   Final Result         Diffuse interstitial prominence could relate to mild fluid overload.      Elevation of the right hemidiaphragm.      Stable cardiomegaly.            COURSE & MEDICAL DECISION MAKING      INITIAL ASSESSMENT, COURSE AND PLAN  Care Narrative: Patient here with diffuse weakness.  Bilateral upper and lower extremities are affected equally.  He denies any associated neck or back pain.  Patient symptoms were mostly metabolic in etiology, he does have some associated hypoxia which certainly could be contributing.  We have seen an increase in COVID in our community and this could be contributing to patient's symptoms.  We will check COVID test.  Check chest x-ray for possible lobar pneumonia.  Urinalysis to evaluate for possible urinary tract infection.  Basic labs for further restratification.  Patient without any evidence of head trauma on exam, he denies any recent falls.  My suspicion of spontaneous ICH is very low in this patient without any associated headache or lateralizing neurologic findings.  Chest x-ray reveals findings possibly consistent with multifocal pneumonia versus pulmonary edema.  We will check BNP.  Patient's BNP is mildly elevated.  Patient's COVID test is positive.  Given patient's x-ray findings and positive COVID I believe this is likely the cause of his symptoms.  Can trend BNP to ensure it is not worsening significantly could also see if patient's hypoxia resolves with management of his COVID.  Patient  remains hypoxic and profoundly weak, likely secondary to his COVID-19.  Patient will require admission for ongoing management of his acute respiratory failure.        DISPOSITION AND DISCUSSIONS  I have discussed management of the patient with the following physicians and LILI's:  hospitalist    FINAL DIAGNOSIS  1. Hypoxia    2. COVID-19

## 2023-07-23 NOTE — ASSESSMENT & PLAN NOTE
With mild hypoxia in ED, currently 94% on RA  Dexamethasone x10 days  Covid isolation   Supportive care    Patient has been hypotensive. Will try fludrocortisone

## 2023-07-24 LAB
ANION GAP SERPL CALC-SCNC: 7 MMOL/L (ref 7–16)
BUN SERPL-MCNC: 32 MG/DL (ref 8–22)
CALCIUM SERPL-MCNC: 8.6 MG/DL (ref 8.5–10.5)
CHLORIDE SERPL-SCNC: 102 MMOL/L (ref 96–112)
CO2 SERPL-SCNC: 27 MMOL/L (ref 20–33)
CREAT SERPL-MCNC: 1.19 MG/DL (ref 0.5–1.4)
ERYTHROCYTE [DISTWIDTH] IN BLOOD BY AUTOMATED COUNT: 53.8 FL (ref 35.9–50)
GFR SERPLBLD CREATININE-BSD FMLA CKD-EPI: 55 ML/MIN/1.73 M 2
GLUCOSE SERPL-MCNC: 121 MG/DL (ref 65–99)
HCT VFR BLD AUTO: 39.3 % (ref 42–52)
HGB BLD-MCNC: 13 G/DL (ref 14–18)
MCH RBC QN AUTO: 33.2 PG (ref 27–33)
MCHC RBC AUTO-ENTMCNC: 33.1 G/DL (ref 32.3–36.5)
MCV RBC AUTO: 100.3 FL (ref 81.4–97.8)
PLATELET # BLD AUTO: 131 K/UL (ref 164–446)
PMV BLD AUTO: 11.1 FL (ref 9–12.9)
POTASSIUM SERPL-SCNC: 4.5 MMOL/L (ref 3.6–5.5)
PROCALCITONIN SERPL-MCNC: 0.27 NG/ML
RBC # BLD AUTO: 3.92 M/UL (ref 4.7–6.1)
SODIUM SERPL-SCNC: 136 MMOL/L (ref 135–145)
WBC # BLD AUTO: 5.1 K/UL (ref 4.8–10.8)

## 2023-07-24 PROCEDURE — 80048 BASIC METABOLIC PNL TOTAL CA: CPT

## 2023-07-24 PROCEDURE — 84145 PROCALCITONIN (PCT): CPT

## 2023-07-24 PROCEDURE — 85027 COMPLETE CBC AUTOMATED: CPT

## 2023-07-24 PROCEDURE — A9270 NON-COVERED ITEM OR SERVICE: HCPCS | Performed by: HOSPITALIST

## 2023-07-24 PROCEDURE — 770001 HCHG ROOM/CARE - MED/SURG/GYN PRIV*

## 2023-07-24 PROCEDURE — 82652 VIT D 1 25-DIHYDROXY: CPT

## 2023-07-24 PROCEDURE — 36415 COLL VENOUS BLD VENIPUNCTURE: CPT

## 2023-07-24 PROCEDURE — 700102 HCHG RX REV CODE 250 W/ 637 OVERRIDE(OP): Performed by: HOSPITALIST

## 2023-07-24 PROCEDURE — 99233 SBSQ HOSP IP/OBS HIGH 50: CPT | Performed by: HOSPITALIST

## 2023-07-24 RX ADMIN — SENNOSIDES AND DOCUSATE SODIUM 2 TABLET: 50; 8.6 TABLET ORAL at 17:47

## 2023-07-24 RX ADMIN — APIXABAN 2.5 MG: 5 TABLET, FILM COATED ORAL at 06:17

## 2023-07-24 RX ADMIN — APIXABAN 2.5 MG: 5 TABLET, FILM COATED ORAL at 17:47

## 2023-07-24 RX ADMIN — Medication 1000 UNITS: at 06:17

## 2023-07-24 RX ADMIN — MULTIPLE VITAMINS W/ MINERALS TAB 1 TABLET: TAB at 17:46

## 2023-07-24 RX ADMIN — DEXAMETHASONE 6 MG: 4 TABLET ORAL at 06:17

## 2023-07-24 RX ADMIN — LEVOTHYROXINE SODIUM 175 MCG: 0.17 TABLET ORAL at 06:17

## 2023-07-24 RX ADMIN — FAMOTIDINE 20 MG: 20 TABLET, FILM COATED ORAL at 06:17

## 2023-07-24 ASSESSMENT — COGNITIVE AND FUNCTIONAL STATUS - GENERAL
TOILETING: A LOT
DRESSING REGULAR LOWER BODY CLOTHING: A LOT
WALKING IN HOSPITAL ROOM: A LOT
MOVING FROM LYING ON BACK TO SITTING ON SIDE OF FLAT BED: A LOT
SUGGESTED CMS G CODE MODIFIER DAILY ACTIVITY: CK
DRESSING REGULAR UPPER BODY CLOTHING: A LITTLE
HELP NEEDED FOR BATHING: A LOT
DAILY ACTIVITIY SCORE: 17
SUGGESTED CMS G CODE MODIFIER MOBILITY: CL
STANDING UP FROM CHAIR USING ARMS: A LOT
MOBILITY SCORE: 13
CLIMB 3 TO 5 STEPS WITH RAILING: A LOT
MOVING TO AND FROM BED TO CHAIR: A LOT
TURNING FROM BACK TO SIDE WHILE IN FLAT BAD: A LITTLE

## 2023-07-24 ASSESSMENT — PATIENT HEALTH QUESTIONNAIRE - PHQ9
SUM OF ALL RESPONSES TO PHQ9 QUESTIONS 1 AND 2: 0
1. LITTLE INTEREST OR PLEASURE IN DOING THINGS: NOT AT ALL
2. FEELING DOWN, DEPRESSED, IRRITABLE, OR HOPELESS: NOT AT ALL

## 2023-07-24 NOTE — CARE PLAN
The patient is Stable - Low risk of patient condition declining or worsening    Shift Goals  Clinical Goals: infection control  Patient Goals: rest, comfort  Family Goals: RYAN    Progress made toward(s) clinical / shift goals:     Problem: Knowledge Deficit - Standard  Goal: Patient and family/care givers will demonstrate understanding of plan of care, disease process/condition, diagnostic tests and medications  Outcome: Progressing     Problem: Skin Integrity  Goal: Skin integrity is maintained or improved  Outcome: Progressing    Patient A&Ox3, often forgetful, bed alarm on. Denies chest pain, SOB. Weaned off O2, continues on RA. Incontinent at times. Denies pain. Resting in bed, denies needs at this time, call bell within reach.

## 2023-07-24 NOTE — CARE PLAN
The patient is Stable - Low risk of patient condition declining or worsening    Shift Goals  Clinical Goals: Infection control  Patient Goals: Rest    Progress made toward(s) clinical / shift goals:    Problem: Knowledge Deficit - Standard  Goal: Patient and family/care givers will demonstrate understanding of plan of care, disease process/condition, diagnostic tests and medications  Outcome: Not Progressing  Note: Pt not aware that he has COVID. No signs of respiratory distress. CPAP on around 2200. Pt oriented but can have disorganized thinking        Patient is not progressing towards the following goals:      Problem: Knowledge Deficit - Standard  Goal: Patient and family/care givers will demonstrate understanding of plan of care, disease process/condition, diagnostic tests and medications  Outcome: Not Progressing  Note: Pt not aware that he has COVID. No signs of respiratory distress. CPAP on around 2200. Pt oriented but can have disorganized thinking

## 2023-07-24 NOTE — PROGRESS NOTES
Daily Progress Note    Date of Service: 7/24/2023  Primary Team: [facility] IM Blue Team   Attending: JAH Abebe M.D.   Senior Resident: Dr. Galo  Intern: Dr. Sood  Contact:  [phone]      ID  Guru Pena is a 89 y.o. year old male admitted for COVID19+ on 7/23/2023    Subjective  Patient complains of 1 week history of cough, generalized weakness. He reports one dose of covid-vaccination. He does not complan of GI symptoms. He is not sure why he is in the hospital, he thought it was a general checkup.    In the ED he was found to be saturating at 87-88%. This morning he appears comfortable on RA.    I have discussed this patient's plan of care and discharge plan at IDT rounds today with Case Management, Nursing, Nursing leadership, and other members of the IDT team.    Hospital Course:  In the ED he was found to be saturating at 87-88%. He was started on dexamethasone.    Consultants  none    Objective    Vitals:   Temp:  [36.3 °C (97.4 °F)-36.9 °C (98.5 °F)] 36.9 °C (98.5 °F)  Pulse:  [] 100  Resp:  [16-20] 20  BP: (106-123)/(58-87) 123/87  SpO2:  [92 %-97 %] 94 %     Physical Exam  Constitutional:       General: He is not in acute distress.     Appearance: He is not diaphoretic.   HENT:      Head: Normocephalic.   Eyes:      Extraocular Movements: Extraocular movements intact.   Cardiovascular:      Rate and Rhythm: Normal rate. Rhythm irregular.   Pulmonary:      Effort: Pulmonary effort is normal. No respiratory distress.      Breath sounds: Normal breath sounds. No wheezing or rhonchi.   Abdominal:      General: Abdomen is flat.      Palpations: Abdomen is soft.   Musculoskeletal:      Right hand: Normal strength.      Left hand: Normal strength.   Neurological:      General: No focal deficit present.      Mental Status: He is alert.         Labs    Lab Results   Component Value Date/Time    SODIUM 136 07/24/2023 06:59 AM    POTASSIUM 4.5 07/24/2023 06:59 AM    CHLORIDE 102 07/24/2023 06:59  AM    CO2 27 07/24/2023 06:59 AM    GLUCOSE 121 (H) 07/24/2023 06:59 AM    BUN 32 (H) 07/24/2023 06:59 AM    CREATININE 1.19 07/24/2023 06:59 AM       Lab Results   Component Value Date/Time    WBC 5.1 07/24/2023 06:59 AM    RBC 3.92 (L) 07/24/2023 06:59 AM    HEMOGLOBIN 13.0 (L) 07/24/2023 06:59 AM    HEMATOCRIT 39.3 (L) 07/24/2023 06:59 AM    .3 (H) 07/24/2023 06:59 AM    MCH 33.2 (H) 07/24/2023 06:59 AM    MCHC 33.1 07/24/2023 06:59 AM    MPV 11.1 07/24/2023 06:59 AM    NEUTSPOLYS 55.20 07/23/2023 07:17 AM    LYMPHOCYTES 16.70 (L) 07/23/2023 07:17 AM    MONOCYTES 27.10 (H) 07/23/2023 07:17 AM    EOSINOPHILS 0.00 07/23/2023 07:17 AM    BASOPHILS 0.40 07/23/2023 07:17 AM       No results found for the last 90 days.       DX-CHEST-PORTABLE (1 VIEW)   Final Result         Diffuse interstitial prominence could relate to mild fluid overload.      Elevation of the right hemidiaphragm.      Stable cardiomegaly.            Data:    Intake/Output Summary (Last 24 hours) at 7/24/2023 0853  Last data filed at 7/24/2023 0826  Gross per 24 hour   Intake --   Output 375 ml   Net -375 ml        Assessment/Plan  Guru Pena is a 89 y.o. year old male with h/o a-fib, hypothyroidism admitted for covid pneumonia.       * COVID-19- (present on admission)  Assessment & Plan  With mild hypoxia  Will start dexamethasone  Following closely   Covid isolation   Supportive care    Thrombocytopenia (HCC)  Assessment & Plan  Mild  Likely related to covid  Following  Will repeat cbc in am    Acute hypoxemic respiratory failure (HCC)  Assessment & Plan  Mild  Consistent with acute covid infection  No s/o secondary infection but will follow clinically and will also check procalcitonin  RT  PE unlikely as he has been on chronic anticoagulation - will continue this    Stage 3a chronic kidney disease (HCC)- (present on admission)  Assessment & Plan  At baseline    JAMES on CPAP- (present on admission)  Assessment & Plan  Continue  CPAP  RT  following    Chronic atrial fibrillation (HCC)- (present on admission)  Assessment & Plan  Rate controlled  Continue metoprolol and eliquis as tolerated  following    Anticoagulated- Eliquis for a. fib- (present on admission)  Assessment & Plan  Will continue his home eliquis    Weakness  Assessment & Plan  Acute on chronic  Exacerbation likely due to acute covid infection  - supportive care  PT and OT    Vitamin D deficiency- (present on admission)  Assessment & Plan  Will continue home dose and check currently levels to see if a dose adjustment is needed      #FEN/GI  -regular       -DVT ppx = apixaban  -Code Status = full     Dispo = patient condition and oxygen needs     Miguel Sood DO PGY1  Internal Medicine Residency [facility]     Plan has been discussed with Attending Physician.

## 2023-07-24 NOTE — PROGRESS NOTES
4 Eyes Skin Assessment Completed by KAELYN Jain and KAELYN Chi.    Head WDL  Ears WDL  Nose WDL  Mouth WDL  Neck WDL  Breast/Chest WDL  Shoulder Blades WDL  Spine WDL  (R) Arm/Elbow/Hand WDL  (L) Arm/Elbow/Hand WDL  Abdomen WDL  Groin WDL  Scrotum/Coccyx/Buttocks Redness and Blanching  (R) Leg WDL  (L) Leg WDL  (R) Heel/Foot/Toe WDL  (L) Heel/Foot/Toe WDL          Devices In Places Blood Pressure Cuff and Nasal Cannula      Interventions In Place Waffle Overlay, Q2 Turns, and Heels Loaded W/Pillows    Possible Skin Injury No    Pictures Uploaded Into Epic N/A  Wound Consult Placed N/A  RN Wound Prevention Protocol Ordered No

## 2023-07-25 ENCOUNTER — APPOINTMENT (OUTPATIENT)
Dept: CARDIOLOGY | Facility: MEDICAL CENTER | Age: 88
DRG: 177 | End: 2023-07-25
Payer: MEDICARE

## 2023-07-25 LAB
1,25(OH)2D3 SERPL-MCNC: 71.1 PG/ML (ref 19.9–79.3)
ALBUMIN SERPL BCP-MCNC: 3.7 G/DL (ref 3.2–4.9)
ALBUMIN/GLOB SERPL: 1.1 G/DL
ALP SERPL-CCNC: 83 U/L (ref 30–99)
ALT SERPL-CCNC: 40 U/L (ref 2–50)
ANION GAP SERPL CALC-SCNC: 8 MMOL/L (ref 7–16)
AST SERPL-CCNC: 80 U/L (ref 12–45)
BASOPHILS # BLD AUTO: 0.2 % (ref 0–1.8)
BASOPHILS # BLD: 0.01 K/UL (ref 0–0.12)
BILIRUB SERPL-MCNC: 0.2 MG/DL (ref 0.1–1.5)
BUN SERPL-MCNC: 31 MG/DL (ref 8–22)
CALCIUM ALBUM COR SERPL-MCNC: 8.9 MG/DL (ref 8.5–10.5)
CALCIUM SERPL-MCNC: 8.7 MG/DL (ref 8.5–10.5)
CHLORIDE SERPL-SCNC: 103 MMOL/L (ref 96–112)
CO2 SERPL-SCNC: 26 MMOL/L (ref 20–33)
CREAT SERPL-MCNC: 1.34 MG/DL (ref 0.5–1.4)
EOSINOPHIL # BLD AUTO: 0 K/UL (ref 0–0.51)
EOSINOPHIL NFR BLD: 0 % (ref 0–6.9)
ERYTHROCYTE [DISTWIDTH] IN BLOOD BY AUTOMATED COUNT: 53.1 FL (ref 35.9–50)
GFR SERPLBLD CREATININE-BSD FMLA CKD-EPI: 48 ML/MIN/1.73 M 2
GLOBULIN SER CALC-MCNC: 3.3 G/DL (ref 1.9–3.5)
GLUCOSE SERPL-MCNC: 109 MG/DL (ref 65–99)
HCT VFR BLD AUTO: 43.9 % (ref 42–52)
HGB BLD-MCNC: 14.9 G/DL (ref 14–18)
IMM GRANULOCYTES # BLD AUTO: 0.03 K/UL (ref 0–0.11)
IMM GRANULOCYTES NFR BLD AUTO: 0.5 % (ref 0–0.9)
LV EJECT FRACT  99904: 60
LV EJECT FRACT MOD 2C 99903: 43.86
LV EJECT FRACT MOD 4C 99902: 67.38
LV EJECT FRACT MOD BP 99901: 57.36
LYMPHOCYTES # BLD AUTO: 1.44 K/UL (ref 1–4.8)
LYMPHOCYTES NFR BLD: 25.7 % (ref 22–41)
MAGNESIUM SERPL-MCNC: 2.1 MG/DL (ref 1.5–2.5)
MCH RBC QN AUTO: 33.6 PG (ref 27–33)
MCHC RBC AUTO-ENTMCNC: 33.9 G/DL (ref 32.3–36.5)
MCV RBC AUTO: 99.1 FL (ref 81.4–97.8)
MONOCYTES # BLD AUTO: 0.87 K/UL (ref 0–0.85)
MONOCYTES NFR BLD AUTO: 15.5 % (ref 0–13.4)
NEUTROPHILS # BLD AUTO: 3.26 K/UL (ref 1.82–7.42)
NEUTROPHILS NFR BLD: 58.1 % (ref 44–72)
NRBC # BLD AUTO: 0 K/UL
NRBC BLD-RTO: 0 /100 WBC (ref 0–0.2)
PHOSPHATE SERPL-MCNC: 3 MG/DL (ref 2.5–4.5)
PLATELET # BLD AUTO: 126 K/UL (ref 164–446)
PMV BLD AUTO: 11.2 FL (ref 9–12.9)
POTASSIUM SERPL-SCNC: 4.5 MMOL/L (ref 3.6–5.5)
PROT SERPL-MCNC: 7 G/DL (ref 6–8.2)
RBC # BLD AUTO: 4.43 M/UL (ref 4.7–6.1)
SODIUM SERPL-SCNC: 137 MMOL/L (ref 135–145)
TSH SERPL DL<=0.005 MIU/L-ACNC: 0.61 UIU/ML (ref 0.38–5.33)
WBC # BLD AUTO: 5.6 K/UL (ref 4.8–10.8)

## 2023-07-25 PROCEDURE — 97535 SELF CARE MNGMENT TRAINING: CPT

## 2023-07-25 PROCEDURE — 83735 ASSAY OF MAGNESIUM: CPT

## 2023-07-25 PROCEDURE — 97162 PT EVAL MOD COMPLEX 30 MIN: CPT

## 2023-07-25 PROCEDURE — A9270 NON-COVERED ITEM OR SERVICE: HCPCS

## 2023-07-25 PROCEDURE — 93306 TTE W/DOPPLER COMPLETE: CPT | Mod: 26 | Performed by: INTERNAL MEDICINE

## 2023-07-25 PROCEDURE — 700102 HCHG RX REV CODE 250 W/ 637 OVERRIDE(OP)

## 2023-07-25 PROCEDURE — A9270 NON-COVERED ITEM OR SERVICE: HCPCS | Performed by: HOSPITALIST

## 2023-07-25 PROCEDURE — 770001 HCHG ROOM/CARE - MED/SURG/GYN PRIV*

## 2023-07-25 PROCEDURE — 93306 TTE W/DOPPLER COMPLETE: CPT

## 2023-07-25 PROCEDURE — 80053 COMPREHEN METABOLIC PANEL: CPT

## 2023-07-25 PROCEDURE — 84443 ASSAY THYROID STIM HORMONE: CPT

## 2023-07-25 PROCEDURE — 36415 COLL VENOUS BLD VENIPUNCTURE: CPT

## 2023-07-25 PROCEDURE — 700102 HCHG RX REV CODE 250 W/ 637 OVERRIDE(OP): Performed by: HOSPITALIST

## 2023-07-25 PROCEDURE — 84100 ASSAY OF PHOSPHORUS: CPT

## 2023-07-25 PROCEDURE — 97166 OT EVAL MOD COMPLEX 45 MIN: CPT

## 2023-07-25 PROCEDURE — 99232 SBSQ HOSP IP/OBS MODERATE 35: CPT | Mod: GC | Performed by: HOSPITALIST

## 2023-07-25 PROCEDURE — 51798 US URINE CAPACITY MEASURE: CPT

## 2023-07-25 PROCEDURE — 85025 COMPLETE CBC W/AUTO DIFF WBC: CPT

## 2023-07-25 RX ORDER — TAMSULOSIN HYDROCHLORIDE 0.4 MG/1
0.4 CAPSULE ORAL
Status: DISCONTINUED | OUTPATIENT
Start: 2023-07-25 | End: 2023-07-25

## 2023-07-25 RX ORDER — TAMSULOSIN HYDROCHLORIDE 0.4 MG/1
0.4 CAPSULE ORAL
Status: DISCONTINUED | OUTPATIENT
Start: 2023-07-25 | End: 2023-07-28 | Stop reason: HOSPADM

## 2023-07-25 RX ADMIN — METOPROLOL SUCCINATE 25 MG: 25 TABLET, EXTENDED RELEASE ORAL at 17:46

## 2023-07-25 RX ADMIN — SENNOSIDES AND DOCUSATE SODIUM 2 TABLET: 50; 8.6 TABLET ORAL at 17:46

## 2023-07-25 RX ADMIN — MULTIPLE VITAMINS W/ MINERALS TAB 1 TABLET: TAB at 17:46

## 2023-07-25 RX ADMIN — LEVOTHYROXINE SODIUM 175 MCG: 0.17 TABLET ORAL at 06:28

## 2023-07-25 RX ADMIN — SENNOSIDES AND DOCUSATE SODIUM 2 TABLET: 50; 8.6 TABLET ORAL at 06:28

## 2023-07-25 RX ADMIN — APIXABAN 2.5 MG: 5 TABLET, FILM COATED ORAL at 17:46

## 2023-07-25 RX ADMIN — FAMOTIDINE 20 MG: 20 TABLET, FILM COATED ORAL at 06:28

## 2023-07-25 RX ADMIN — APIXABAN 2.5 MG: 5 TABLET, FILM COATED ORAL at 06:28

## 2023-07-25 RX ADMIN — TAMSULOSIN HYDROCHLORIDE 0.4 MG: 0.4 CAPSULE ORAL at 14:50

## 2023-07-25 RX ADMIN — DEXAMETHASONE 6 MG: 4 TABLET ORAL at 06:28

## 2023-07-25 RX ADMIN — Medication 1000 UNITS: at 06:28

## 2023-07-25 ASSESSMENT — GAIT ASSESSMENTS
ASSISTIVE DEVICE: FRONT WHEEL WALKER
GAIT LEVEL OF ASSIST: MODERATE ASSIST
DEVIATION: SHUFFLED GAIT;DECREASED HEEL STRIKE;DECREASED TOE OFF
DISTANCE (FEET): 10

## 2023-07-25 ASSESSMENT — PAIN DESCRIPTION - PAIN TYPE: TYPE: ACUTE PAIN

## 2023-07-25 ASSESSMENT — ACTIVITIES OF DAILY LIVING (ADL): TOILETING: INDEPENDENT

## 2023-07-25 ASSESSMENT — COGNITIVE AND FUNCTIONAL STATUS - GENERAL
MOVING TO AND FROM BED TO CHAIR: UNABLE
TURNING FROM BACK TO SIDE WHILE IN FLAT BAD: A LITTLE
TOILETING: A LITTLE
WALKING IN HOSPITAL ROOM: A LOT
CLIMB 3 TO 5 STEPS WITH RAILING: A LOT
STANDING UP FROM CHAIR USING ARMS: A LITTLE
SUGGESTED CMS G CODE MODIFIER MOBILITY: CL
DRESSING REGULAR LOWER BODY CLOTHING: A LOT
MOVING FROM LYING ON BACK TO SITTING ON SIDE OF FLAT BED: UNABLE
MOBILITY SCORE: 12
SUGGESTED CMS G CODE MODIFIER DAILY ACTIVITY: CK
DAILY ACTIVITIY SCORE: 19
HELP NEEDED FOR BATHING: A LITTLE
DRESSING REGULAR UPPER BODY CLOTHING: A LITTLE

## 2023-07-25 ASSESSMENT — PATIENT HEALTH QUESTIONNAIRE - PHQ9
SUM OF ALL RESPONSES TO PHQ9 QUESTIONS 1 AND 2: 0
2. FEELING DOWN, DEPRESSED, IRRITABLE, OR HOPELESS: NOT AT ALL
1. LITTLE INTEREST OR PLEASURE IN DOING THINGS: NOT AT ALL

## 2023-07-25 NOTE — PROGRESS NOTES
Rn received report from night shift, patient is stable with no complains of pain or discomfort reported overnight. Patient is alert and oriented x 2-3 with no cough this am, Patient Bladder scan completed and patient had 458 ml of urine and 48 ml recorded post straight cath.

## 2023-07-25 NOTE — THERAPY
"Occupational Therapy   Initial Evaluation     Patient Name: Guru Pena  Age:  89 y.o., Sex:  male  Medical Record #: [id number]  Today's Date: 7/25/2023     Precautions: (P) Fall Risk    Assessment  Patient is a very pleasant 89 y.o. male admitted with COVID seen for OT evaluation. Pt demonstrated decreased balance (with 2x LOB able to self correct) and decreased activity tolerance compared to baseline. Son present at time of eval and reported pt appeared close to baseline. Provided education on equipment recommendations, assistance recommendations (if plan to DC home), and energy conservation techniques. Son and pt receptive and appreciative of ed. Per son, they prefer to DC home with HH vs rehab. Recommend home with assist for morning daily activities (currently lives [facility]) and HHOT vs intensive rehab if agreeable. Will follow to maximize function in acute setting.    Plan    Occupational Therapy Initial Treatment Plan   Treatment Interventions: (P) Self Care / Activities of Daily Living, Neuro Re-Education / Balance, Therapeutic Exercises, Therapeutic Activity, Adaptive Equipment  Treatment Frequency: (P) 4 Times per Week  Duration: (P) Until Therapy Goals Met    DC Equipment Recommendations: (P) Unable to determine at this time, Sock Aide  Discharge Recommendations: (P)  (home with home health OT and assist with morning ADLs vs intensive rehab)        07/25/23 1051   Prior Living Situation   Prior Services Home-Independent   Housing / Facility 1 Story House;Long Term Care Facility  ([facility] independent living side)   Bathroom Set up   (sponge baths)   Equipment Owned 4-Wheel Walker;Wheelchair;Dressing Stick;Raised Toilet Seat With Arms  (raised toilet seat engineered to the toilet to prevent \"wobbling\")   Lives with - Patient's Self Care Capacity Spouse   Comments Pt's son at bedside, and reported pt and his spouse are independent at baseline, however considering moving to the " assisted living side of [facility]. The son checks up daily. Pt is active and walks part way to the dinning [place] with his spouse, and completes ADLs independently with AD.   Prior Level of ADL Function   Self Feeding Independent   Grooming / Hygiene Independent   Bathing Independent   Dressing Independent   Toileting Independent   Prior Level of IADL Function   Medication Management Independent   Laundry Independent   Kitchen Mobility Independent   Finances Independent   Home Management Independent   Precautions   Precautions Fall Risk   Cognition    Cognition / Consciousness WDL   Comments very pleasant and receptive   Active ROM Upper Body   Active ROM Upper Body  WDL   Strength Upper Body   Upper Body Strength  WDL   Balance Assessment   Sitting Balance (Static) Fair   Sitting Balance (Dynamic) Fair -   Standing Balance (Static) Fair -   Standing Balance (Dynamic) Fair -   Weight Shift Sitting Fair   Weight Shift Standing Fair   Comments with FWW   Bed Mobility    Supine to Sit Minimal Assist   Sit to Supine Minimal Assist   ADL Assessment   Grooming Contact Guard Assist;Seated   Upper Body Dressing Supervision   Lower Body Dressing Moderate Assist  (uses a dressing stick to don socks, and LB clothing)   Toileting Contact Guard Assist   How much help from another person does the patient currently need...   6 Clicks Daily Activity Score 19   Functional Mobility   Sit to Stand Contact Guard Assist   Toilet Transfers Minimal Assist  (from low toilet, has high toilet at home)   Patient / Family Goals   Patient / Family Goal #1 to go home   Short Term Goals   Short Term Goal # 1 Pt will demo toileting hygiene SPV   Short Term Goal # 2 Pt will don/doff socks using AD PRN SPV   Short Term Goal # 3 Pt will tolerate 5 min standing ADLs SPV   Education Group   Education Provided Adaptive Equipment;Energy Conservation   Role of Occupational Therapist Patient Response Patient;Family;Acceptance;Explanation   Energy Conservation  Patient Response Patient;Family;Acceptance;Explanation   Adaptive Equipment Patient Response Patient;Family;Acceptance;Explanation   Additional Comments provided ed on recommended equipment, pt very interested in sock aid   Occupational Therapy Initial Treatment Plan    Treatment Interventions Self Care / Activities of Daily Living;Neuro Re-Education / Balance;Therapeutic Exercises;Therapeutic Activity;Adaptive Equipment   Treatment Frequency 4 Times per Week   Duration Until Therapy Goals Met   Problem List   Problem List Decreased Active Daily Living Skills;Decreased Functional Mobility;Decreased Activity Tolerance;Impaired Postural Control / Balance;Safety Awareness Deficits / Cognition   Anticipated Discharge Equipment and Recommendations   DC Equipment Recommendations Unable to determine at this time;Sock Aide   Discharge Recommendations   (home with home health OT and assist with morning ADLs vs intensive rehab)

## 2023-07-25 NOTE — CARE PLAN
The patient is Stable - Low risk of patient condition declining or worsening    Shift Goals  Clinical Goals: Manage coughing, rest  Patient Goals: Rest  Family Goals: RYAN    Progress made toward(s) clinical / shift goals:    Problem: Skin Integrity  Goal: Skin integrity is maintained or improved  Outcome: Progressing   Patient turns self in bed and is able to ambulate with walker in room. Patient has been up sitting in chair and encouraged to do so to keep skin from PU. Patient verbalized understanding.  Patient 's bed is in the lowest position with call light close to patient . Patient will call nurse prior to ambulating at this time.

## 2023-07-25 NOTE — THERAPY
Physical Therapy   Initial Evaluation     Patient Name: Guru Pena  Age:  89 y.o., Sex:  male  Medical Record #: [id number]  Today's Date: 7/25/2023     Precautions  Precautions: Fall Risk    Assessment  Patient is 89 y.o. male presenting to the ED with weakness and cough, found to be COVID (+). PMH includes afib, JAMES and hypothryoidism. Pt presents to physical therapy with impairments in strength, balance and activity tolerance which impact pt's ability to complete basic functional transfers and ambulate independently and safely. Pt reports he has been using a 4WW for some time which he feels has lead to the progressive kyphotic posture he has now. Pt demonstrated intermittent posterior lean upon standing and during ambulation with FWW (particularly in static standing or during turns), with pt requiring Mod to Min A to maintain standing balance. Demonstrated labored steps during gait and tolerated ambulation x10 feet. Discussed use of WC or 4WW (to allow pt to sit) when ambulating to dining aiken and improving balance and activity tolerance with FWW when PT is present at home. Pt would prefer to DC home when medically cleared and reports good support from Spouse. Pt will benefit from acute PT interventions to address impairments noted below.     Plan    Physical Therapy Initial Treatment Plan   Treatment Plan : Bed Mobility, Equipment, Gait Training, Neuro Re-Education / Balance, Self Care / Home Evaluation, Therapeutic Exercise, Therapeutic Activities  Treatment Frequency: 4 Times per Week  Duration: Until Therapy Goals Met    DC Equipment Recommendations: Front-Wheel Walker    Discharge Recommendations:  Recommend post acute placement at IRF level if pt deemed appropriate by PM&R. Should pt not qualify for intensive post acute therapy (at IRF level only) or pt/family decline, recommend home with family support and close HH therapy follow up.     07/25/23 1219   Precautions   Precautions Fall Risk   Vitals    O2 Delivery Device None - Room Air   Pain 0 - 10 Group   Therapist Pain Assessment During Activity;Nurse Notified  (no pain reported)   Prior Living Situation   Prior Services Home-Independent   Housing / Facility 1 Story Apartment / Condo;Independent Living Facility  ([facility], independent living side)   Steps Into Home 0   Equipment Owned 4-Wheel Walker;Wheelchair   Lives with - Patient's Self Care Capacity Spouse   Prior Level of Functional Mobility   Bed Mobility Independent   Transfer Status Independent   Ambulation Independent   Ambulation Distance household   Assistive Devices Used 4-Wheel Walker   Cognition    Cognition / Consciousness WDL   Level of Consciousness Alert   Comments pt reports he walks daily in hallways and is concerned with progressively kyphotic posture   Strength Lower Body   Lower Body Strength  X   Gross Strength Generalized Weakness, Equal Bilaterally   Comments however, appropraite for age   Neurological Concerns   Neurological Concerns Yes   Standing Posture During ADL's Posterior Lean   Comments intermittent posterior LOB when not supported in static standing   Balance Assessment   Sitting Balance (Static) Fair +   Sitting Balance (Dynamic) Fair +   Standing Balance (Static) Fair -   Standing Balance (Dynamic) Poor   Weight Shift Sitting Fair   Weight Shift Standing Fair   Comments w/ FWW   Bed Mobility    Supine to Sit Minimal Assist   Sit to Supine Minimal Assist   Scooting Contact Guard Assist   Gait Analysis   Gait Level Of Assist Moderate Assist  (to Min A)   Assistive Device Front Wheel Walker   Distance (Feet) 10   # of Times Distance was Traveled 1   Deviation Shuffled Gait;Decreased Heel Strike;Decreased Toe Off  (dec step length)   # of Stairs Climbed 0   Weight Bearing Status no restrictions   Comments intermittent posterior lean, particularly paused in static standing. increased support during turns with assist to manage FWW   Functional Mobility   Sit to Stand  Minimal Assist  (cuy)   Bed, Chair, Wheelchair Transfer Minimal Assist   Transfer Method Stand Step   How much difficulty does the patient currently have...   Turning over in bed (including adjusting bedclothes, sheets and blankets)? 3   Sitting down on and standing up from a chair with arms (e.g., wheelchair, bedside commode, etc.) 1   Moving from lying on back to sitting on the side of the bed? 1   How much help from another person does the patient currently need...   Moving to and from a bed to a chair (including a wheelchair)? 3   Need to walk in a hospital room? 2   Climbing 3-5 steps with a railing? 2   6 clicks Mobility Score 12   Activity Tolerance   Sitting in Chair up to chair at end of session   Sitting Edge of Bed 7 min   Standing 8-10 min total   Patient / Family Goals    Patient / Family Goal #1 to return home   Short Term Goals    Short Term Goal # 1 pt will perform supine <> sit with SBA in 6 visit   Short Term Goal # 2 pt will perform sit <> stand and functional transfers with FWW and SBA in 6 visits   Short Term Goal # 3 pt will ambuate > 100 ft with SBA and FWW to access residence in 6 visits   Education Group   Education Provided Role of Physical Therapist;Use of Assistive Device   Role of Physical Therapist Patient Response Patient;Acceptance;Explanation;Verbal Demonstration   Use of Assistive Device Patient Response Patient;Acceptance;Explanation;Verbal Demonstration   Additional Comments encouraged pt to use 4WW or WC to access dining aiken due to intermittent posterior lean and allow for energy conservation. Use FWW in home and with HH PT to optimze function and overall balance/endurance   Physical Therapy Initial Treatment Plan    Treatment Plan  Bed Mobility;Equipment;Gait Training;Neuro Re-Education / Balance;Self Care / Home Evaluation;Therapeutic Exercise;Therapeutic Activities   Treatment Frequency 4 Times per Week   Duration Until Therapy Goals Met   Problem List    Problems Impaired  Transfers;Impaired Ambulation;Functional Strength Deficit;Impaired Balance;Decreased Activity Tolerance   Anticipated Discharge Equipment and Recommendations   DC Equipment Recommendations Front-Wheel Walker   Discharge Recommendations   (Recommend post acute placement at IRF level if pt deemed appropriate by PM&R. Should pt not qualify for intensive post acute therapy (at IRF level only) or pt/family decline, recommend home with family support and close HH therapy follow up.)   Interdisciplinary Plan of Care Collaboration   IDT Collaboration with  Nursing;Occupational Therapist   Patient Position at End of Therapy Seated;Call Light within Reach;Tray Table within Reach;Phone within Reach   Collaboration Comments aware of PT eval and pt position at end of session   Session Information   Date / Session Number  7/25-1 (1/4, 7/31)

## 2023-07-25 NOTE — CARE PLAN
The patient is Stable - Low risk of patient condition declining or worsening    Shift Goals  Clinical Goals: Less coughing,rest  Patient Goals: Sleep  Family Goals: Less congestion    Progress made toward(s) clinical / shift goals:    Problem: Knowledge Deficit - Standard  Goal: Patient and family/care givers will demonstrate understanding of plan of care, disease process/condition, diagnostic tests and medications  Outcome: Progressing  Note: Pt more aware of situation tonight. He knows he has COVID. Still congested, refused CPAP.Cleaned CPAP due to mold growing in nostril pillows as well as reservoir container. Told son and patient how to clean      Problem: Skin Integrity  Goal: Skin integrity is maintained or improved  Outcome: Progressing       Patient is not progressing towards the following goals:

## 2023-07-26 LAB
ALBUMIN SERPL BCP-MCNC: 3.4 G/DL (ref 3.2–4.9)
ALBUMIN/GLOB SERPL: 1.1 G/DL
ALP SERPL-CCNC: 81 U/L (ref 30–99)
ALT SERPL-CCNC: 40 U/L (ref 2–50)
ANION GAP SERPL CALC-SCNC: 13 MMOL/L (ref 7–16)
AST SERPL-CCNC: 65 U/L (ref 12–45)
BILIRUB SERPL-MCNC: 0.3 MG/DL (ref 0.1–1.5)
BUN SERPL-MCNC: 32 MG/DL (ref 8–22)
CALCIUM ALBUM COR SERPL-MCNC: 8.9 MG/DL (ref 8.5–10.5)
CALCIUM SERPL-MCNC: 8.4 MG/DL (ref 8.5–10.5)
CHLORIDE SERPL-SCNC: 100 MMOL/L (ref 96–112)
CO2 SERPL-SCNC: 21 MMOL/L (ref 20–33)
CREAT SERPL-MCNC: 1.24 MG/DL (ref 0.5–1.4)
GFR SERPLBLD CREATININE-BSD FMLA CKD-EPI: 53 ML/MIN/1.73 M 2
GLOBULIN SER CALC-MCNC: 3.1 G/DL (ref 1.9–3.5)
GLUCOSE SERPL-MCNC: 104 MG/DL (ref 65–99)
MAGNESIUM SERPL-MCNC: 1.9 MG/DL (ref 1.5–2.5)
PHOSPHATE SERPL-MCNC: 2.6 MG/DL (ref 2.5–4.5)
POTASSIUM SERPL-SCNC: 4.3 MMOL/L (ref 3.6–5.5)
PROT SERPL-MCNC: 6.5 G/DL (ref 6–8.2)
SODIUM SERPL-SCNC: 134 MMOL/L (ref 135–145)

## 2023-07-26 PROCEDURE — 36415 COLL VENOUS BLD VENIPUNCTURE: CPT

## 2023-07-26 PROCEDURE — 83735 ASSAY OF MAGNESIUM: CPT

## 2023-07-26 PROCEDURE — A9270 NON-COVERED ITEM OR SERVICE: HCPCS

## 2023-07-26 PROCEDURE — 99232 SBSQ HOSP IP/OBS MODERATE 35: CPT | Mod: GC | Performed by: HOSPITALIST

## 2023-07-26 PROCEDURE — 770001 HCHG ROOM/CARE - MED/SURG/GYN PRIV*

## 2023-07-26 PROCEDURE — 700105 HCHG RX REV CODE 258: Mod: JZ | Performed by: STUDENT IN AN ORGANIZED HEALTH CARE EDUCATION/TRAINING PROGRAM

## 2023-07-26 PROCEDURE — A9270 NON-COVERED ITEM OR SERVICE: HCPCS | Performed by: HOSPITALIST

## 2023-07-26 PROCEDURE — 80053 COMPREHEN METABOLIC PANEL: CPT

## 2023-07-26 PROCEDURE — 84100 ASSAY OF PHOSPHORUS: CPT

## 2023-07-26 PROCEDURE — 94760 N-INVAS EAR/PLS OXIMETRY 1: CPT

## 2023-07-26 PROCEDURE — 700102 HCHG RX REV CODE 250 W/ 637 OVERRIDE(OP): Performed by: HOSPITALIST

## 2023-07-26 PROCEDURE — 51798 US URINE CAPACITY MEASURE: CPT

## 2023-07-26 PROCEDURE — 700102 HCHG RX REV CODE 250 W/ 637 OVERRIDE(OP)

## 2023-07-26 RX ORDER — SODIUM CHLORIDE, SODIUM LACTATE, POTASSIUM CHLORIDE, AND CALCIUM CHLORIDE .6; .31; .03; .02 G/100ML; G/100ML; G/100ML; G/100ML
500 INJECTION, SOLUTION INTRAVENOUS ONCE
Status: COMPLETED | OUTPATIENT
Start: 2023-07-26 | End: 2023-07-26

## 2023-07-26 RX ORDER — FLUDROCORTISONE ACETATE 0.1 MG/1
0.1 TABLET ORAL EVERY MORNING
Status: DISCONTINUED | OUTPATIENT
Start: 2023-07-26 | End: 2023-07-28 | Stop reason: HOSPADM

## 2023-07-26 RX ADMIN — TAMSULOSIN HYDROCHLORIDE 0.4 MG: 0.4 CAPSULE ORAL at 08:49

## 2023-07-26 RX ADMIN — Medication 1000 UNITS: at 05:59

## 2023-07-26 RX ADMIN — LEVOTHYROXINE SODIUM 175 MCG: 0.17 TABLET ORAL at 05:59

## 2023-07-26 RX ADMIN — DEXAMETHASONE 6 MG: 4 TABLET ORAL at 05:59

## 2023-07-26 RX ADMIN — APIXABAN 2.5 MG: 5 TABLET, FILM COATED ORAL at 17:27

## 2023-07-26 RX ADMIN — SODIUM CHLORIDE, POTASSIUM CHLORIDE, SODIUM LACTATE AND CALCIUM CHLORIDE 500 ML: 600; 310; 30; 20 INJECTION, SOLUTION INTRAVENOUS at 06:00

## 2023-07-26 RX ADMIN — FLUDROCORTISONE ACETATE 0.1 MG: 0.1 TABLET ORAL at 12:41

## 2023-07-26 RX ADMIN — FAMOTIDINE 20 MG: 20 TABLET, FILM COATED ORAL at 05:59

## 2023-07-26 RX ADMIN — MULTIPLE VITAMINS W/ MINERALS TAB 1 TABLET: TAB at 17:27

## 2023-07-26 RX ADMIN — APIXABAN 2.5 MG: 5 TABLET, FILM COATED ORAL at 05:59

## 2023-07-26 ASSESSMENT — PATIENT HEALTH QUESTIONNAIRE - PHQ9
2. FEELING DOWN, DEPRESSED, IRRITABLE, OR HOPELESS: NOT AT ALL
SUM OF ALL RESPONSES TO PHQ9 QUESTIONS 1 AND 2: 0
1. LITTLE INTEREST OR PLEASURE IN DOING THINGS: NOT AT ALL
2. FEELING DOWN, DEPRESSED, IRRITABLE, OR HOPELESS: NOT AT ALL
1. LITTLE INTEREST OR PLEASURE IN DOING THINGS: NOT AT ALL
SUM OF ALL RESPONSES TO PHQ9 QUESTIONS 1 AND 2: 0

## 2023-07-26 NOTE — CARE PLAN
The patient is Stable - Low risk of patient condition declining or worsening    Problem: Skin Integrity  Goal: Skin integrity is maintained or improved  Outcome: Met  Note: Patients skin remains intact.  He is able to turn on his own.  Waffle mattress in use.       Problem: Fall Risk  Goal: Patient will remain free from falls  Outcome: Met  Note: Patient remains safe in bed with call light and personal belongings within reach.  Patient uses call light appropriately for assistance.      Shift Goals  Clinical Goals: manage cough  Patient Goals: rest  Family Goals: RYAN    Progress made toward(s) clinical / shift goals:  Patients cough was intermittent and did not need intervention.  Patient appeared to be able to rest with no signs or symptoms of acute distress.

## 2023-07-26 NOTE — PROGRESS NOTES
Daily Progress Note    Date of Service: 7/26/2023  Primary Team: [facility] IM Blue Team   Attending: JAH Abebe M.D.   Senior Resident: Dr. Galo  Intern: Dr. Sood  Contact:  [phone]      ZAIN Pena is a 89 y.o. year old male admitted for COVID19+ on 7/23/2023    Subjective  Patient resting comfortably in his room. No pain, states coughing has improved.    I have discussed this patient's plan of care and discharge plan at IDT rounds today with Case Management, Nursing, Nursing leadership, and other members of the IDT team.    Hospital Course:  In the ED he was found to be saturating at 87-88%. He was started on dexamethasone. He continues to be on room air.    Consultants  none    Objective    Vitals:   Temp:  [36.1 °C (97 °F)-36.6 °C (97.9 °F)] 36.3 °C (97.3 °F)  Pulse:  [69-89] 72  Resp:  [17-18] 18  BP: ()/(66-67) 84/66  SpO2:  [93 %-95 %] 94 %     Physical Exam  Constitutional:       General: He is not in acute distress.     Appearance: He is not diaphoretic.   HENT:      Head: Normocephalic.   Eyes:      Extraocular Movements: Extraocular movements intact.   Cardiovascular:      Rate and Rhythm: Normal rate. Rhythm irregular.   Pulmonary:      Effort: Pulmonary effort is normal. No respiratory distress.      Breath sounds: Normal breath sounds. No wheezing or rhonchi.   Abdominal:      General: Abdomen is flat.      Palpations: Abdomen is soft.   Musculoskeletal:      Right hand: Normal strength.      Left hand: Normal strength.   Neurological:      General: No focal deficit present.      Mental Status: He is alert.           Labs    Lab Results   Component Value Date/Time    SODIUM 137 07/25/2023 04:10 AM    POTASSIUM 4.5 07/25/2023 04:10 AM    CHLORIDE 103 07/25/2023 04:10 AM    CO2 26 07/25/2023 04:10 AM    GLUCOSE 109 (H) 07/25/2023 04:10 AM    BUN 31 (H) 07/25/2023 04:10 AM    CREATININE 1.34 07/25/2023 04:10 AM       Lab Results   Component Value Date/Time    WBC 5.6 07/25/2023  04:10 AM    RBC 4.43 (L) 07/25/2023 04:10 AM    HEMOGLOBIN 14.9 07/25/2023 04:10 AM    HEMATOCRIT 43.9 07/25/2023 04:10 AM    MCV 99.1 (H) 07/25/2023 04:10 AM    MCH 33.6 (H) 07/25/2023 04:10 AM    MCHC 33.9 07/25/2023 04:10 AM    MPV 11.2 07/25/2023 04:10 AM    NEUTSPOLYS 58.10 07/25/2023 04:10 AM    LYMPHOCYTES 25.70 07/25/2023 04:10 AM    MONOCYTES 15.50 (H) 07/25/2023 04:10 AM    EOSINOPHILS 0.00 07/25/2023 04:10 AM    BASOPHILS 0.20 07/25/2023 04:10 AM       No results found for the last 90 days.       EC-ECHOCARDIOGRAM COMPLETE W/O CONT   Final Result      DX-CHEST-PORTABLE (1 VIEW)   Final Result         Diffuse interstitial prominence could relate to mild fluid overload.      Elevation of the right hemidiaphragm.      Stable cardiomegaly.        Data:    Intake/Output Summary (Last 24 hours) at 7/24/2023 0853  Last data filed at 7/24/2023 0826  Gross per 24 hour   Intake --   Output 375 ml   Net -375 ml        Assessment/Plan  Guru Pena is a 89 y.o. year old male with h/o a-fib, hypothyroidism admitted for covid pneumonia.       * COVID-19- (present on admission)  Assessment & Plan  With mild hypoxia in ED, currently 94% on RA  Dexamethasone   Covid isolation   Supportive care    Thrombocytopenia (HCC)  Assessment & Plan  Mild  Likely related to covid  Following  Will repeat cbc in am    Acute hypoxemic respiratory failure (HCC)  Assessment & Plan  Mild  Consistent with acute covid infection  No s/o secondary infection but will follow clinically and will also check procalcitonin  RT  PE unlikely as he has been on chronic anticoagulation - will continue this    Urinary retention- (present on admission)  Assessment & Plan  Serial bladder scan. Straight cath if >400. On tamsulosin    Stage 3a chronic kidney disease (HCC)- (present on admission)  Assessment & Plan  At baseline    JAMES on CPAP- (present on admission)  Assessment & Plan  Continue CPAP  RT  following    Pulmonary fibrosis (HCC)- (present  on admission)  Assessment & Plan  Consider non-con CT. Given patient's age, will defer for now. Patient is asymptomatic    Chronic atrial fibrillation (HCC)- (present on admission)  Assessment & Plan  Rate controlled  Continue metoprolol and eliquis as tolerated  following    Anticoagulated- Eliquis for a. fib- (present on admission)  Assessment & Plan  Will continue his home eliquis    Weakness  Assessment & Plan  Improving on dexamethasone    Vitamin D deficiency- (present on admission)  Assessment & Plan  Will continue home dose and check currently levels to see if a dose adjustment is needed      #FEN/GI  -regular       -DVT ppx = apixaban  -Code Status = full     Dispo = patient condition and oxygen needs     Miguel Sood DO PGY1  Internal Medicine Residency [facility]     Plan has been discussed with Attending Physician.

## 2023-07-26 NOTE — DISCHARGE PLANNING
[facility] Transitional Care Coordination    Referral from:  Dr. Rogelio Galo  Insurance Provider on Facesheet:  [employer] HMO  Potential Rehab diagnosis:  COVID debility    Chart review indicates patient has ongoing medical management and therapy needs     D/C Support:  Spouse/Adult son/Independent Living Facility    No Physiatry consult per protocol.  Current documentation reflects COVID positive 7/23 - initially requiring oxygen, receiving decadron.   Due to COVID positive status, patient would not be candidate for potential admission RR until 8/02 at the earliest.  TCC will not follow.  Please reach out if PMR consult requested for medical management.       Last Covid test:  7/23/23 - COVID detected    Thank you for the referral.

## 2023-07-26 NOTE — PROGRESS NOTES
Notified Dr. May of patients bp of 84/66.  Pt is asymptomatic.  Patient to receive 500 ml bolus LR.

## 2023-07-26 NOTE — PROGRESS NOTES
Daily Progress Note    Date of Service: 7/26/2023  Primary Team: [facility] IM Blue Team   Attending: JAH Abebe M.D.   Senior Resident: Dr. Galo  Intern: Dr. Sood  Contact:  [phone]      ZAIN Pena is a 89 y.o. year old male admitted for COVID19+ on 7/23/2023    Subjective  Patient resting comfortably in his room. Still comfortable on room air.    I have discussed this patient's plan of care and discharge plan at IDT rounds today with Case Management, Nursing, Nursing leadership, and other members of the IDT team.    Hospital Course:  In the ED he was found to be saturating at 87-88%. He was started on dexamethasone. He continues to be on room air.    Consultants  none    Objective    Vitals:   Temp:  [36.3 °C (97.3 °F)-36.6 °C (97.9 °F)] 36.3 °C (97.4 °F)  Pulse:  [] 102  Resp:  [18] 18  BP: (84-95)/(66-68) 95/68  SpO2:  [91 %-94 %] 91 %     Physical Exam  Constitutional:       General: He is not in acute distress.     Appearance: He is not diaphoretic.   HENT:      Head: Normocephalic.   Eyes:      Extraocular Movements: Extraocular movements intact.   Cardiovascular:      Rate and Rhythm: Normal rate. Rhythm irregular.   Pulmonary:      Effort: Pulmonary effort is normal. No respiratory distress.      Breath sounds: Normal breath sounds. No wheezing or rhonchi.   Abdominal:      General: Abdomen is flat.      Palpations: Abdomen is soft.   Musculoskeletal:      Right hand: Normal strength.      Left hand: Normal strength.   Neurological:      General: No focal deficit present.      Mental Status: He is alert.           Labs    Lab Results   Component Value Date/Time    SODIUM 134 (L) 07/26/2023 04:56 AM    POTASSIUM 4.3 07/26/2023 04:56 AM    CHLORIDE 100 07/26/2023 04:56 AM    CO2 21 07/26/2023 04:56 AM    GLUCOSE 104 (H) 07/26/2023 04:56 AM    BUN 32 (H) 07/26/2023 04:56 AM    CREATININE 1.24 07/26/2023 04:56 AM       Lab Results   Component Value Date/Time    WBC 5.6 07/25/2023 04:10  AM    RBC 4.43 (L) 07/25/2023 04:10 AM    HEMOGLOBIN 14.9 07/25/2023 04:10 AM    HEMATOCRIT 43.9 07/25/2023 04:10 AM    MCV 99.1 (H) 07/25/2023 04:10 AM    MCH 33.6 (H) 07/25/2023 04:10 AM    MCHC 33.9 07/25/2023 04:10 AM    MPV 11.2 07/25/2023 04:10 AM    NEUTSPOLYS 58.10 07/25/2023 04:10 AM    LYMPHOCYTES 25.70 07/25/2023 04:10 AM    MONOCYTES 15.50 (H) 07/25/2023 04:10 AM    EOSINOPHILS 0.00 07/25/2023 04:10 AM    BASOPHILS 0.20 07/25/2023 04:10 AM       No results found for the last 90 days.       EC-ECHOCARDIOGRAM COMPLETE W/O CONT   Final Result      DX-CHEST-PORTABLE (1 VIEW)   Final Result         Diffuse interstitial prominence could relate to mild fluid overload.      Elevation of the right hemidiaphragm.      Stable cardiomegaly.        Data:    Intake/Output Summary (Last 24 hours) at 7/24/2023 0853  Last data filed at 7/24/2023 0826  Gross per 24 hour   Intake --   Output 375 ml   Net -375 ml        Assessment/Plan  Guru Pena is a 89 y.o. year old male with h/o a-fib, hypothyroidism, urinary retention admitted for covid pneumonia.       * COVID-19- (present on admission)  Assessment & Plan  With mild hypoxia in ED, currently 94% on RA  Dexamethasone   Covid isolation   Supportive care    Patient has been hypotensive. Will try fludrocortisone    Acute hypoxemic respiratory failure (HCC)  Assessment & Plan  Mild  Consistent with acute covid infection  No s/o secondary infection but will follow clinically and will also check procalcitonin  RT  PE unlikely as he has been on chronic anticoagulation - will continue this    Urinary retention- (present on admission)  Assessment & Plan  Serial bladder scan. Straight cath if >400. On tamsulosin    Weakness  Assessment & Plan  Improving on dexamethasone    Thrombocytopenia (HCC)  Assessment & Plan  Mild  Likely related to covid  Following  Will repeat cbc in am    Stage 3a chronic kidney disease (HCC)- (present on admission)  Assessment & Plan  At  baseline    JAMES on CPAP- (present on admission)  Assessment & Plan  Continue CPAP  RT  following    Pulmonary fibrosis (HCC)- (present on admission)  Assessment & Plan  Consider non-con CT. Given patient's age, will defer for now. Patient is asymptomatic    Chronic atrial fibrillation (HCC)- (present on admission)  Assessment & Plan  Rate controlled  Continue metoprolol and eliquis as tolerated  following    Anticoagulated- Eliquis for a. fib- (present on admission)  Assessment & Plan  Will continue his home eliquis    Vitamin D deficiency- (present on admission)  Assessment & Plan  Will continue home dose and check currently levels to see if a dose adjustment is needed      #FEN/GI  -regular       -DVT ppx = apixaban  -Code Status = full     Dispo = patient condition and oxygen needs     Miguel Sood DO PGY1  Internal Medicine Residency [facility]     Plan has been discussed with Attending Physician.

## 2023-07-26 NOTE — DISCHARGE PLANNING
Case Management Discharge Planning    Admission Date: 7/23/2023  GMLOS: 5.2  ALOS: 3    6-Clicks ADL Score: 19  6-Clicks Mobility Score: 12  PT and/or OT Eval ordered: Yes  Post-acute Referrals Ordered: No  Post-acute Choice Obtained: NA  Has referral(s) been sent to post-acute provider:  JANIS      Anticipated Discharge Dispo: Discharge Disposition: Discharged to home/self care (01)    DME Needed: No    Action(s) Taken: Patient discussed at ADT rounds with medical team. Has COVID pneumonia. PT recommends post-acute placement. Having difficulty voiding, monitoring with bladder scans.    Escalations Completed: None    Medically Clear: No    Next Steps: Monitor for discharge needs and possible placement if ordered.    Barriers to Discharge: Medical clearance    Is the patient up for discharge tomorrow: No

## 2023-07-27 LAB
ANION GAP SERPL CALC-SCNC: 11 MMOL/L (ref 7–16)
BASOPHILS # BLD AUTO: 0 % (ref 0–1.8)
BASOPHILS # BLD: 0 K/UL (ref 0–0.12)
BUN SERPL-MCNC: 29 MG/DL (ref 8–22)
CALCIUM SERPL-MCNC: 8.1 MG/DL (ref 8.5–10.5)
CHLORIDE SERPL-SCNC: 103 MMOL/L (ref 96–112)
CO2 SERPL-SCNC: 21 MMOL/L (ref 20–33)
CREAT SERPL-MCNC: 1.05 MG/DL (ref 0.5–1.4)
EOSINOPHIL # BLD AUTO: 0 K/UL (ref 0–0.51)
EOSINOPHIL NFR BLD: 0 % (ref 0–6.9)
ERYTHROCYTE [DISTWIDTH] IN BLOOD BY AUTOMATED COUNT: 51.5 FL (ref 35.9–50)
GFR SERPLBLD CREATININE-BSD FMLA CKD-EPI: 64 ML/MIN/1.73 M 2
GLUCOSE SERPL-MCNC: 122 MG/DL (ref 65–99)
HCT VFR BLD AUTO: 37.9 % (ref 42–52)
HGB BLD-MCNC: 13 G/DL (ref 14–18)
IMM GRANULOCYTES # BLD AUTO: 0.02 K/UL (ref 0–0.11)
IMM GRANULOCYTES NFR BLD AUTO: 0.5 % (ref 0–0.9)
LYMPHOCYTES # BLD AUTO: 0.99 K/UL (ref 1–4.8)
LYMPHOCYTES NFR BLD: 25.4 % (ref 22–41)
MCH RBC QN AUTO: 33.2 PG (ref 27–33)
MCHC RBC AUTO-ENTMCNC: 34.3 G/DL (ref 32.3–36.5)
MCV RBC AUTO: 96.7 FL (ref 81.4–97.8)
MONOCYTES # BLD AUTO: 0.73 K/UL (ref 0–0.85)
MONOCYTES NFR BLD AUTO: 18.8 % (ref 0–13.4)
NEUTROPHILS # BLD AUTO: 2.15 K/UL (ref 1.82–7.42)
NEUTROPHILS NFR BLD: 55.3 % (ref 44–72)
NRBC # BLD AUTO: 0 K/UL
NRBC BLD-RTO: 0 /100 WBC (ref 0–0.2)
PLATELET # BLD AUTO: 114 K/UL (ref 164–446)
PMV BLD AUTO: 11.1 FL (ref 9–12.9)
POTASSIUM SERPL-SCNC: 4.2 MMOL/L (ref 3.6–5.5)
RBC # BLD AUTO: 3.92 M/UL (ref 4.7–6.1)
SODIUM SERPL-SCNC: 135 MMOL/L (ref 135–145)
WBC # BLD AUTO: 3.9 K/UL (ref 4.8–10.8)

## 2023-07-27 PROCEDURE — 700102 HCHG RX REV CODE 250 W/ 637 OVERRIDE(OP)

## 2023-07-27 PROCEDURE — 770001 HCHG ROOM/CARE - MED/SURG/GYN PRIV*

## 2023-07-27 PROCEDURE — 700102 HCHG RX REV CODE 250 W/ 637 OVERRIDE(OP): Performed by: HOSPITALIST

## 2023-07-27 PROCEDURE — A9270 NON-COVERED ITEM OR SERVICE: HCPCS | Performed by: HOSPITALIST

## 2023-07-27 PROCEDURE — 36415 COLL VENOUS BLD VENIPUNCTURE: CPT

## 2023-07-27 PROCEDURE — A9270 NON-COVERED ITEM OR SERVICE: HCPCS

## 2023-07-27 PROCEDURE — 97116 GAIT TRAINING THERAPY: CPT

## 2023-07-27 PROCEDURE — 80048 BASIC METABOLIC PNL TOTAL CA: CPT

## 2023-07-27 PROCEDURE — 99232 SBSQ HOSP IP/OBS MODERATE 35: CPT | Mod: GC,CS | Performed by: HOSPITALIST

## 2023-07-27 PROCEDURE — 97530 THERAPEUTIC ACTIVITIES: CPT

## 2023-07-27 PROCEDURE — 51798 US URINE CAPACITY MEASURE: CPT

## 2023-07-27 PROCEDURE — 97535 SELF CARE MNGMENT TRAINING: CPT | Mod: CO

## 2023-07-27 PROCEDURE — 85025 COMPLETE CBC W/AUTO DIFF WBC: CPT

## 2023-07-27 RX ADMIN — APIXABAN 2.5 MG: 5 TABLET, FILM COATED ORAL at 17:32

## 2023-07-27 RX ADMIN — FAMOTIDINE 20 MG: 20 TABLET, FILM COATED ORAL at 06:45

## 2023-07-27 RX ADMIN — LEVOTHYROXINE SODIUM 175 MCG: 0.17 TABLET ORAL at 06:45

## 2023-07-27 RX ADMIN — MULTIPLE VITAMINS W/ MINERALS TAB 1 TABLET: TAB at 17:32

## 2023-07-27 RX ADMIN — Medication 1000 UNITS: at 06:45

## 2023-07-27 RX ADMIN — FLUDROCORTISONE ACETATE 0.1 MG: 0.1 TABLET ORAL at 06:45

## 2023-07-27 RX ADMIN — DEXAMETHASONE 6 MG: 4 TABLET ORAL at 06:45

## 2023-07-27 RX ADMIN — TAMSULOSIN HYDROCHLORIDE 0.4 MG: 0.4 CAPSULE ORAL at 09:42

## 2023-07-27 RX ADMIN — APIXABAN 2.5 MG: 5 TABLET, FILM COATED ORAL at 06:46

## 2023-07-27 ASSESSMENT — COGNITIVE AND FUNCTIONAL STATUS - GENERAL
CLIMB 3 TO 5 STEPS WITH RAILING: A LOT
STANDING UP FROM CHAIR USING ARMS: A LITTLE
MOVING TO AND FROM BED TO CHAIR: A LITTLE
WALKING IN HOSPITAL ROOM: A LITTLE
HELP NEEDED FOR BATHING: A LITTLE
SUGGESTED CMS G CODE MODIFIER MOBILITY: CK
DAILY ACTIVITIY SCORE: 21
MOBILITY SCORE: 17
TOILETING: A LITTLE
DRESSING REGULAR LOWER BODY CLOTHING: A LITTLE
SUGGESTED CMS G CODE MODIFIER DAILY ACTIVITY: CJ
TURNING FROM BACK TO SIDE WHILE IN FLAT BAD: A LITTLE
MOVING FROM LYING ON BACK TO SITTING ON SIDE OF FLAT BED: A LITTLE

## 2023-07-27 ASSESSMENT — GAIT ASSESSMENTS
DISTANCE (FEET): 10
ASSISTIVE DEVICE: FRONT WHEEL WALKER
GAIT LEVEL OF ASSIST: MINIMAL ASSIST
DEVIATION: BRADYKINETIC;DECREASED HEEL STRIKE;DECREASED TOE OFF;DECREASED BASE OF SUPPORT

## 2023-07-27 NOTE — CARE PLAN
The patient is Stable - Low risk of patient condition declining or worsening    Shift Goals  Clinical Goals: Infection control  Patient Goals: rest  Family Goals: RYAN    Progress made toward(s) clinical / shift goals:    Problem: Knowledge Deficit - Standard  Goal: Patient and family/care givers will demonstrate understanding of plan of care, disease process/condition, diagnostic tests and medications  Outcome: Progressing  Note: Pt still has confusion ,but teaching done with family present       Patient is not progressing towards the following goals:

## 2023-07-27 NOTE — THERAPY
Occupational Therapy  Daily Treatment     Patient Name: Guru Pena  Age:  89 y.o., Sex:  male  Medical Record #: [id number]  Today's Date: 7/27/2023       Precautions: Fall Risk    Assessment    Pt seen for OT tx. Continues to be limited by decreased activity tolerance and balance deficits impacting ability to complete ADLs and ADL transfers independently. Min A supine > sit, min A sit > stand, amb w/ FWW and min A for balance and safety. Pt required extensive time to amb > BR. Completed toilet transfer heavily using grab bars. Mod A toileting for pericare post BM. Encouraged pt to mobilize w/ nrsg staff as tolerated. Will continue to follow while in house.     Plan    Treatment Plan Status: Continue Current Treatment Plan    DC Equipment Recommendations: Unable to determine at this time  Discharge Recommendations: Recommend post-acute placement for additional occupational therapy services prior to discharge home       07/27/23 0931   Balance   Sitting Balance (Static) Fair +   Sitting Balance (Dynamic) Fair +   Standing Balance (Static) Fair -   Standing Balance (Dynamic) Poor +   Weight Shift Sitting Fair   Weight Shift Standing Fair   Bed Mobility    Supine to Sit Minimal Assist   Sit to Supine Minimal Assist   Activities of Daily Living   Grooming Contact Guard Assist;Standing   Upper Body Dressing Supervision   Lower Body Dressing Moderate Assist   Toileting Minimal Assist   Functional Mobility   Sit to Stand Minimal Assist   Bed, Chair, Wheelchair Transfer Minimal Assist   Toilet Transfers Minimal Assist   Short Term Goals   Short Term Goal # 1 Pt will demo toileting hygiene SPV   Goal Outcome # 1 Goal not met   Short Term Goal # 2 Pt will don/doff socks using AD PRN SPV   Goal Outcome # 2 Goal not met   Short Term Goal # 3 Pt will tolerate 5 min standing ADLs SPV   Goal Outcome # 3 Progressing as expected   Anticipated Discharge Equipment and Recommendations   DC Equipment Recommendations Unable to  determine at this time   Discharge Recommendations Recommend post-acute placement for additional occupational therapy services prior to discharge home

## 2023-07-27 NOTE — DISCHARGE PLANNING
Received Choice form @: 1250  Agency/Facility Name: [facility]  Referral sent per Choice form @: 2283    Received Choice form @: 7627  Agency/Facility Name: [facility] [place]  Referral sent per Choice form @: 6703    Agency/Facility Name: [facility]  Spoke To: Jose  Outcome: DPA called facility to have them start insurance authorization.

## 2023-07-27 NOTE — THERAPY
Physical Therapy   Daily Treatment     Patient Name: Guru Pena  Age:  89 y.o., Sex:  male  Medical Record #: [id number]  Today's Date: 7/27/2023     Precautions  Precautions: Fall Risk    Assessment    Pt seen for PT treatment session. Today, demonstrated improved functional mobility with transition supine to sit and learning carryover of safety with sit to stand transfers. Pt completed ambulation 2x10 ft with FWW support, min - cga and considerable extra time. Pt is highly motivated to work with PT, vitals taken throughout session and stable while on RA. Overall progressing well and would benefit from being OOB for all meals and ambulating to rest room with nursing staff when needed. PT will continue to follow while in house.     Plan    Treatment Plan Status: Continue Current Treatment Plan  Type of Treatment: Bed Mobility, Equipment, Gait Training, Neuro Re-Education / Balance, Self Care / Home Evaluation, Therapeutic Exercise, Therapeutic Activities  Treatment Frequency: 4 Times per Week  Treatment Duration: Until Therapy Goals Met    DC Equipment Recommendations: Front-Wheel Walker  Discharge Recommendations:  (Recommend post acute placement at IRF level if pt deemed appropriate by PM&R. Should pt not qualify for intensive post acute therapy (at IRF level only) or pt/family decline, recommend home with family support and close HH therapy follow up.)       07/27/23 1440   Precautions   Precautions Fall Risk   Vitals   O2 Delivery Device None - Room Air   Pain 0 - 10 Group   Therapist Pain Assessment During Activity;Nurse Notified  (no pain reported)   Cognition    Cognition / Consciousness WDL   Level of Consciousness Alert   Comments very pleasant and motivated to work with PT   Sitting Lower Body Exercises   Sitting Lower Body Exercises Yes   Long Arc Quad 1 set of 10  (emphasis on full knee extension)   Neurological Concerns   Standing Posture During ADL's Kyphotic   Comments improved standing  balance/posture with minimal posterior lean noted today   Balance   Sitting Balance (Static) Fair +   Sitting Balance (Dynamic) Fair +   Standing Balance (Static) Fair   Standing Balance (Dynamic) Fair -   Weight Shift Sitting Fair   Weight Shift Standing Fair   Skilled Intervention Compensatory Strategies;Tactile Cuing;Verbal Cuing   Comments w/ FWW   Bed Mobility    Supine to Sit Standby Assist   Sit to Supine Standby Assist   Scooting Standby Assist   Skilled Intervention Verbal Cuing;Compensatory Strategies   Comments HOB flat, light use of bedrails. Required extra time to complete transitions   Gait Analysis   Gait Level Of Assist Minimal Assist  (to CGA)   Assistive Device Front Wheel Walker   Distance (Feet) 10   # of Times Distance was Traveled 2  (seated rest between ambulation efforts)   Deviation Bradykinetic;Decreased Heel Strike;Decreased Toe Off;Decreased Base Of Support  (labored steps requiring extra time, due to kyphotic posture)   # of Stairs Climbed 0   Weight Bearing Status no restrictions   Skilled Intervention Verbal Cuing   Comments intermittent cues for postural correction, anterior lean and support through UEs in order to clear feet. requires considerable extra time to complete short distance ambulation   Functional Mobility   Sit to Stand Minimal Assist  (progressing to SBA with repetition)   Bed, Chair, Wheelchair Transfer Contact Guard Assist   Transfer Method Stand Step   Skilled Intervention Verbal Cuing;Sequencing;Compensatory Strategies   Comments initially pt required Mod cues for hand placement to safely complete sit to stand with FWW. Performed x6 repetitions with improved sequencing and initation with each stand. pt able to demonstrate carryover learning well from inital instruction   How much difficulty does the patient currently have...   Turning over in bed (including adjusting bedclothes, sheets and blankets)? 3   Sitting down on and standing up from a chair with arms (e.g.,  wheelchair, bedside commode, etc.) 3   Moving from lying on back to sitting on the side of the bed? 3   How much help from another person does the patient currently need...   Moving to and from a bed to a chair (including a wheelchair)? 3   Need to walk in a hospital room? 3   Climbing 3-5 steps with a railing? 2   6 clicks Mobility Score 17   Activity Tolerance   Sitting Edge of Bed 10-12 min total   Standing 15-20 min total   Short Term Goals    Short Term Goal # 1 pt will perform supine <> sit with SBA in 6 visit   Goal Outcome # 1 Goal met   Short Term Goal # 2 pt will perform sit <> stand and functional transfers with FWW and SBA in 6 visits   Goal Outcome # 2 Progressing as expected   Short Term Goal # 3 pt will ambuate > 100 ft with SBA and FWW to access residence in 6 visits   Goal Outcome # 3 Progressing as expected   Physical Therapy Treatment Plan   Physical Therapy Treatment Plan Continue Current Treatment Plan   Anticipated Discharge Equipment and Recommendations   DC Equipment Recommendations Front-Wheel Walker   Interdisciplinary Plan of Care Collaboration   IDT Collaboration with  Nursing   Patient Position at End of Therapy In Bed;Call Light within Reach;Tray Table within Reach;Phone within Reach   Collaboration Comments set up for nap at end of PT session with CPAP donned by pt   Session Information   Date / Session Number  7/27- 2 (2/4, 7/31)

## 2023-07-27 NOTE — FACE TO FACE
Face to Face Supporting Documentation - Home Health    The encounter with this patient was in whole or in part the primary reason for home health admission.    Date of encounter:   Patient:                    MRN:                       YOB: 2023  Guru Pena  [id number]  7/20/1925     Home health to see patient for:  Physical Therapy evaluation and treatment    Skilled need for:  Comment: COVID and urinary retention    Skilled nursing interventions to include:  Comment: ambulation and emanuel care    Homebound status evidenced by:  Need the aid of supportive devices such as crutches, canes, wheelchairs or walkers or Have a condition such that leaving his or her home is medically contraindicated. Leaving home requires a considerable and taxing effort. There is a normal inability to leave the home.    Community Physician to provide follow up care: Alex Smart M.D.     Optional Interventions? No      I certify the face to face encounter for this home health care referral meets the CMS requirements and the encounter/clinical assessment with the patient was, in whole, or in part, for the medical condition(s) listed above, which is the primary reason for home health care. Based on my clinical findings: the service(s) are medically necessary, support the need for home health care, and the homebound criteria are met.  I certify that this patient has had a face to face encounter by myself.  Rogelio Galo M.D. - NPI: [id number]

## 2023-07-27 NOTE — CARE PLAN
The patient is Stable - Low risk of patient condition declining or worsening    Shift Goals  Clinical Goals: urinary output, infection control  Patient Goals: rest  Family Goals: RYAN    Progress made toward(s) clinical / shift goals:    Problem: Self Care  Goal: Patient will have the ability to perform ADLs independently or with assistance (bathe, groom, dress, toilet and feed)  Outcome: Progressing  Note: Risk for activity intolerance will decrease. Patient will be assessed, monitored and educated for signs of activity intolerance. Provided rest periods during activity and encouraged patient to increase activity level in collaboration with interdisciplinary team.       Problem: Infection - Standard  Goal: Patient will remain free from infection  Outcome: Progressing  Note: Proper use of PPE at all times  hand hygiene implemented upon entry and exit of patients room.  Education provided on keeping hands clean.

## 2023-07-27 NOTE — DISCHARGE PLANNING
Case Management Discharge Planning    Admission Date: 7/23/2023  GMLOS: 5.2  ALOS: 4    6-Clicks ADL Score: 19  6-Clicks Mobility Score: 12  PT and/or OT Eval ordered: Yes  Post-acute Referrals Ordered: Yes  Post-acute Choice Obtained: No  Has referral(s) been sent to post-acute provider:  Yes      Anticipated Discharge Dispo: Discharge Disposition: Discharged to home/self care (01)    DME Needed: No    Action(s) Taken: Spoke with patient,  was living at [facility]. Called patients son, Damon Hunter states the [facility] facility is unable to move his dad to an assisted part of the facility. Discussed Home Health options, Read choices to Damon who wrote them down. He chose four companies, obtained verbal consent form Damon to make his selections on the form, faxed to [facility].    Escalations Completed: Pending Discharge Destination    Medically Clear: No    Next Steps: Follow up with accepting Home Health agencies.    Barriers to Discharge: Medical clearance    Is the patient up for discharge tomorrow: Yes    Is transport arranged for discharge disposition: No      7/27/2023, 1400    MD feels patient will not be safe at home with Home Health and spouse. Requesting SNF placement. SNF choices discussed with shelli Jose over the phone, four choices selected, form signed at Damon's verbal request, Faxed to [facility]. Ride Line information for transportation sent to Ride Line. Expecting discharge tomorrow.

## 2023-07-27 NOTE — PROGRESS NOTES
Daily Progress Note    Date of Service: 7/27/2023  Primary Team: [facility] IM Blue Team   Attending: JAH Abebe M.D.   Senior Resident: Dr. Galo  Intern: Dr. Sood  Contact:  [phone]      ID  Guru Pena is a 89 y.o. year old male admitted for COVID19+ on 7/23/2023    Subjective  Pt denies SOB or cough at this time.     -PVR = 7 today  -Metoprolol was stopped due to pt's bradycardia  -we were unable to set up assisted living, so a SNF referral was placed   -ordered AM Cortisol to w/u pt's bradycardia/hypotension    I have discussed this patient's plan of care and discharge plan at IDT rounds today with Case Management, Nursing, Nursing leadership, and other members of the IDT team.    Hospital Course:  In the ED he was found to be saturating at 87-88%. He was started on dexamethasone. He continues to be on room air.    Consultants  none    Objective    Vitals:   Temp:  [36.1 °C (97 °F)-37.1 °C (98.7 °F)] 36.8 °C (98.2 °F)  Pulse:  [49-98] 98  Resp:  [17-20] 18  BP: (101-133)/(60-77) 116/72  SpO2:  [90 %-95 %] 92 %     -General: NAD, converses well  -Cardio: no murmurs or gallops  -Resp: lungs CTAB, symmetric expansion  -Abd: soft and nontender, no guarding or rebound        Labs    Lab Results   Component Value Date/Time    SODIUM 135 07/27/2023 02:45 AM    POTASSIUM 4.2 07/27/2023 02:45 AM    CHLORIDE 103 07/27/2023 02:45 AM    CO2 21 07/27/2023 02:45 AM    GLUCOSE 122 (H) 07/27/2023 02:45 AM    BUN 29 (H) 07/27/2023 02:45 AM    CREATININE 1.05 07/27/2023 02:45 AM       Lab Results   Component Value Date/Time    WBC 3.9 (L) 07/27/2023 02:45 AM    RBC 3.92 (L) 07/27/2023 02:45 AM    HEMOGLOBIN 13.0 (L) 07/27/2023 02:45 AM    HEMATOCRIT 37.9 (L) 07/27/2023 02:45 AM    MCV 96.7 07/27/2023 02:45 AM    MCH 33.2 (H) 07/27/2023 02:45 AM    MCHC 34.3 07/27/2023 02:45 AM    MPV 11.1 07/27/2023 02:45 AM    NEUTSPOLYS 55.30 07/27/2023 02:45 AM    LYMPHOCYTES 25.40 07/27/2023 02:45 AM    MONOCYTES 18.80 (H)  07/27/2023 02:45 AM    EOSINOPHILS 0.00 07/27/2023 02:45 AM    BASOPHILS 0.00 07/27/2023 02:45 AM       No results found for the last 90 days.       EC-ECHOCARDIOGRAM COMPLETE W/O CONT   Final Result      DX-CHEST-PORTABLE (1 VIEW)   Final Result         Diffuse interstitial prominence could relate to mild fluid overload.      Elevation of the right hemidiaphragm.      Stable cardiomegaly.        Data:    Intake/Output Summary (Last 24 hours) at 7/24/2023 0853  Last data filed at 7/24/2023 0826  Gross per 24 hour   Intake --   Output 375 ml   Net -375 ml        Assessment/Plan  Guru Pena is a 89 y.o. year old male with h/o a-fib, hypothyroidism, urinary retention admitted for covid pneumonia. -pend SNF placement. Pt is not able to do acute rehab due to COVID, and Assisted Living cannot be set up in time. AM Cortisol = pend.       * COVID-19- (present on admission)  Assessment & Plan  With mild hypoxia in ED, currently 94% on RA  Dexamethasone   Covid isolation   Supportive care    Patient has been hypotensive. Will try fludrocortisone    Thrombocytopenia (HCC)  Assessment & Plan  Mild  Likely related to covid  Following  Will repeat cbc in am    Acute hypoxemic respiratory failure (HCC)  Assessment & Plan  Mild  Consistent with acute covid infection  No s/o secondary infection but will follow clinically and will also check procalcitonin  RT  PE unlikely as he has been on chronic anticoagulation - will continue this    Urinary retention- (present on admission)  Assessment & Plan  Serial bladder scan. Straight cath if >400. On tamsulosin    Stage 3a chronic kidney disease (HCC)- (present on admission)  Assessment & Plan  At baseline    JAMES on CPAP- (present on admission)  Assessment & Plan  Continue CPAP  RT  following    Pulmonary fibrosis (HCC)- (present on admission)  Assessment & Plan  Consider non-con CT. Given patient's age, will defer for now. Patient is asymptomatic    Chronic atrial fibrillation  (HCC)- (present on admission)  Assessment & Plan  Rate controlled  Continue metoprolol and eliquis as tolerated  following    Anticoagulated- Eliquis for a. fib- (present on admission)  Assessment & Plan  Will continue his home eliquis    Weakness  Assessment & Plan  Improving on dexamethasone    Vitamin D deficiency- (present on admission)  Assessment & Plan  Will continue home dose and check currently levels to see if a dose adjustment is needed      #FEN/GI  -regular       -DVT ppx = apixaban  -Code Status = full     Dispo = patient condition and oxygen needs     Miguel Sood DO PGY1  Internal Medicine Residency [employer]     Plan has been discussed with Attending Physician.

## 2023-07-28 ENCOUNTER — PATIENT OUTREACH (OUTPATIENT)
Dept: SCHEDULING | Facility: IMAGING CENTER | Age: 88
End: 2023-07-28
Payer: MEDICARE

## 2023-07-28 VITALS
SYSTOLIC BLOOD PRESSURE: 143 MMHG | HEART RATE: 97 BPM | OXYGEN SATURATION: 93 % | BODY MASS INDEX: 23.17 KG/M2 | TEMPERATURE: 97.4 F | DIASTOLIC BLOOD PRESSURE: 80 MMHG | RESPIRATION RATE: 18 BRPM | WEIGHT: 171.08 LBS | HEIGHT: 72 IN

## 2023-07-28 LAB
ALBUMIN SERPL BCP-MCNC: 3.6 G/DL (ref 3.2–4.9)
ALBUMIN/GLOB SERPL: 1.1 G/DL
ALP SERPL-CCNC: 79 U/L (ref 30–99)
ALT SERPL-CCNC: 39 U/L (ref 2–50)
ANION GAP SERPL CALC-SCNC: 12 MMOL/L (ref 7–16)
AST SERPL-CCNC: 47 U/L (ref 12–45)
BASOPHILS # BLD AUTO: 0.3 % (ref 0–1.8)
BASOPHILS # BLD: 0.01 K/UL (ref 0–0.12)
BILIRUB SERPL-MCNC: 0.4 MG/DL (ref 0.1–1.5)
BUN SERPL-MCNC: 27 MG/DL (ref 8–22)
CALCIUM ALBUM COR SERPL-MCNC: 9.2 MG/DL (ref 8.5–10.5)
CALCIUM SERPL-MCNC: 8.9 MG/DL (ref 8.5–10.5)
CHLORIDE SERPL-SCNC: 99 MMOL/L (ref 96–112)
CO2 SERPL-SCNC: 24 MMOL/L (ref 20–33)
CORTIS SERPL-MCNC: 1.2 UG/DL (ref 0–23)
CORTIS SERPL-MCNC: 1.2 UG/DL (ref 0–23)
CREAT SERPL-MCNC: 1.08 MG/DL (ref 0.5–1.4)
EOSINOPHIL # BLD AUTO: 0 K/UL (ref 0–0.51)
EOSINOPHIL NFR BLD: 0 % (ref 0–6.9)
ERYTHROCYTE [DISTWIDTH] IN BLOOD BY AUTOMATED COUNT: 51 FL (ref 35.9–50)
GFR SERPLBLD CREATININE-BSD FMLA CKD-EPI: 62 ML/MIN/1.73 M 2
GLOBULIN SER CALC-MCNC: 3.3 G/DL (ref 1.9–3.5)
GLUCOSE SERPL-MCNC: 108 MG/DL (ref 65–99)
HCT VFR BLD AUTO: 41.4 % (ref 42–52)
HGB BLD-MCNC: 14 G/DL (ref 14–18)
IMM GRANULOCYTES # BLD AUTO: 0.02 K/UL (ref 0–0.11)
IMM GRANULOCYTES NFR BLD AUTO: 0.5 % (ref 0–0.9)
LYMPHOCYTES # BLD AUTO: 1.2 K/UL (ref 1–4.8)
LYMPHOCYTES NFR BLD: 30.1 % (ref 22–41)
MAGNESIUM SERPL-MCNC: 2 MG/DL (ref 1.5–2.5)
MCH RBC QN AUTO: 32.8 PG (ref 27–33)
MCHC RBC AUTO-ENTMCNC: 33.8 G/DL (ref 32.3–36.5)
MCV RBC AUTO: 97 FL (ref 81.4–97.8)
MONOCYTES # BLD AUTO: 0.62 K/UL (ref 0–0.85)
MONOCYTES NFR BLD AUTO: 15.5 % (ref 0–13.4)
NEUTROPHILS # BLD AUTO: 2.14 K/UL (ref 1.82–7.42)
NEUTROPHILS NFR BLD: 53.6 % (ref 44–72)
NRBC # BLD AUTO: 0 K/UL
NRBC BLD-RTO: 0 /100 WBC (ref 0–0.2)
PHOSPHATE SERPL-MCNC: 2.6 MG/DL (ref 2.5–4.5)
PLATELET # BLD AUTO: 123 K/UL (ref 164–446)
PMV BLD AUTO: 11.2 FL (ref 9–12.9)
POTASSIUM SERPL-SCNC: 4.2 MMOL/L (ref 3.6–5.5)
PROT SERPL-MCNC: 6.9 G/DL (ref 6–8.2)
RBC # BLD AUTO: 4.27 M/UL (ref 4.7–6.1)
SODIUM SERPL-SCNC: 135 MMOL/L (ref 135–145)
WBC # BLD AUTO: 4 K/UL (ref 4.8–10.8)

## 2023-07-28 PROCEDURE — 99239 HOSP IP/OBS DSCHRG MGMT >30: CPT | Performed by: HOSPITALIST

## 2023-07-28 PROCEDURE — 80053 COMPREHEN METABOLIC PANEL: CPT

## 2023-07-28 PROCEDURE — A9270 NON-COVERED ITEM OR SERVICE: HCPCS

## 2023-07-28 PROCEDURE — 84100 ASSAY OF PHOSPHORUS: CPT

## 2023-07-28 PROCEDURE — 700102 HCHG RX REV CODE 250 W/ 637 OVERRIDE(OP): Performed by: HOSPITALIST

## 2023-07-28 PROCEDURE — 83735 ASSAY OF MAGNESIUM: CPT

## 2023-07-28 PROCEDURE — A9270 NON-COVERED ITEM OR SERVICE: HCPCS | Performed by: HOSPITALIST

## 2023-07-28 PROCEDURE — 36415 COLL VENOUS BLD VENIPUNCTURE: CPT

## 2023-07-28 PROCEDURE — 700102 HCHG RX REV CODE 250 W/ 637 OVERRIDE(OP)

## 2023-07-28 PROCEDURE — 85025 COMPLETE CBC W/AUTO DIFF WBC: CPT

## 2023-07-28 PROCEDURE — 82533 TOTAL CORTISOL: CPT

## 2023-07-28 RX ORDER — FLUDROCORTISONE ACETATE 0.1 MG/1
0.1 TABLET ORAL EVERY MORNING
Qty: 30 TABLET | Refills: 1 | Status: SHIPPED | OUTPATIENT
Start: 2023-07-29

## 2023-07-28 RX ORDER — DEXAMETHASONE 6 MG/1
6 TABLET ORAL DAILY
Qty: 5 TABLET | Refills: 0 | Status: SHIPPED | OUTPATIENT
Start: 2023-07-29 | End: 2023-08-03

## 2023-07-28 RX ORDER — TAMSULOSIN HYDROCHLORIDE 0.4 MG/1
0.4 CAPSULE ORAL
Qty: 30 CAPSULE | Refills: 0 | Status: ON HOLD | OUTPATIENT
Start: 2023-07-29 | End: 2023-08-13

## 2023-07-28 RX ADMIN — FAMOTIDINE 20 MG: 20 TABLET, FILM COATED ORAL at 06:29

## 2023-07-28 RX ADMIN — DEXAMETHASONE 6 MG: 4 TABLET ORAL at 06:29

## 2023-07-28 RX ADMIN — LEVOTHYROXINE SODIUM 175 MCG: 0.17 TABLET ORAL at 04:39

## 2023-07-28 RX ADMIN — SENNOSIDES AND DOCUSATE SODIUM 2 TABLET: 50; 8.6 TABLET ORAL at 06:29

## 2023-07-28 RX ADMIN — FLUDROCORTISONE ACETATE 0.1 MG: 0.1 TABLET ORAL at 06:29

## 2023-07-28 RX ADMIN — TAMSULOSIN HYDROCHLORIDE 0.4 MG: 0.4 CAPSULE ORAL at 09:05

## 2023-07-28 RX ADMIN — APIXABAN 2.5 MG: 5 TABLET, FILM COATED ORAL at 06:29

## 2023-07-28 RX ADMIN — Medication 1000 UNITS: at 06:29

## 2023-07-28 NOTE — DISCHARGE PLANNING
Agency/Facility Name: [facility]   Outcome: [facility] received v-mail, per Luba ballard auth has been accepted and can take patient in today.

## 2023-07-28 NOTE — DISCHARGE INSTRUCTIONS
Follow up with pcp about urinary retention and resolution of COVID, fludrocortisone long term, follow up regarding d/c of metoprolol (for bradycardia).

## 2023-07-28 NOTE — PROGRESS NOTES
Bedside report received from cyril RN, assumed care at 1900.  Assessment complete.  A&O x 4. Patient calls appropriately.  Patient ambulates with x1 assist with FWW.   Patient denied pain at this time  Denies N&V. Tolerating regular diet.  + void, + flatus, + BM.  Patient denies SOB. On room air.  Patient calm, pleasant, and cooperative.  Review plan with of care with patient. Call light and personal belongings within reach. Hourly rounding in place. All needs met at this time.

## 2023-07-28 NOTE — DISCHARGE SUMMARY
Discharge Summary    CHIEF COMPLAINT ON ADMISSION  Chief Complaint   Patient presents with    Weakness     Since 1 week       Reason for Admission  Weakness, COVID19    Admission Date  7/23/2023    CODE STATUS  Full Code    HPI & HOSPITAL COURSE  Guru Pena is a 89 y.o. male with a history of afib, JAMES on CPAP, urinary retention, and hypothryoidism, possible pulmonary fibrosis, walker use at baseline. He presented to [facility] 7/23/2023 with weekness and cough for one week. In the ER he was found to have covid and acute hypoxic respiratory failure with a room air saturation 87-88%. He was admitted and treated with dexamethasone. Throughout hospital admission patient did not require additional oxygen therapy other than his home cpap. He had urinary retention that was treated with tamsulosin and intermittent straight cath. Because his blood pressure dropped with tamsulosin we started him on fludrocortisone. His final bladder scan showed residual volume of 7 ml. Due to his bradycardia, his metoprolol was discontinued.      Physical Exam:  Constitutional:       General: He is not in acute distress.     Appearance: He is not diaphoretic.   HENT:      Head: Normocephalic.   Eyes:      Extraocular Movements: Extraocular movements intact.   Cardiovascular:      Rate and Rhythm: Normal rate. Rhythm irregular.   Pulmonary:      Effort: Pulmonary effort is normal. No respiratory distress.      Breath sounds: Normal breath sounds. No wheezing or rhonchi.   Abdominal:      General: Abdomen is flat.      Palpations: Abdomen is soft.   Musculoskeletal:      Right hand: Normal strength.      Left hand: Normal strength.   Neurological:      General: No focal deficit present.      Mental Status: He is alert.         Therefore, he is discharged in good and stable condition [facility].    The patient met 2-midnight criteria for an inpatient stay at the time of discharge.    Discharge Date  7/28/2023    FOLLOW UP ITEMS POST  DISCHARGE  Primary care follow up regarding urinary retention, discontinuing metoprolol, long term use of fludrocortisone    DISCHARGE DIAGNOSES  Principal Problem:    COVID-19 (POA: Yes)  Active Problems:    Urinary retention (POA: Yes)    Acute hypoxemic respiratory failure (HCC) (POA: Unknown)    Weakness (POA: Unknown)    Vitamin D deficiency (POA: Yes)    Anticoagulated- Eliquis for a. fib (POA: Yes)    Chronic atrial fibrillation (HCC) (POA: Yes)    Pulmonary fibrosis (HCC) (POA: Yes)    JAMES on CPAP (POA: Yes)    Stage 3a chronic kidney disease (HCC) (POA: Yes)    Thrombocytopenia (HCC) (POA: Unknown)  Resolved Problems:    * No resolved hospital problems. *      FOLLOW UP  Future Appointments   Date Time Provider Department Center   8/24/2023 11:00 AM Alex Smart M.D. [address] [address]  [phone]          MEDICATIONS ON DISCHARGE     Medication List        START taking these medications        Instructions   dexamethasone 6 MG Tabs  Start taking on: July 29, 2023  Commonly known as: Decadron   Take 1 Tablet by mouth every day for 5 days.  Dose: 6 mg     fludrocortisone 0.1 MG Tabs  Start taking on: July 29, 2023  Commonly known as: Florinef   Take 1 Tablet by mouth every morning.  Dose: 0.1 mg     tamsulosin 0.4 MG capsule  Start taking on: July 29, 2023  Commonly known as: Flomax   Take 1 Capsule by mouth 1/2 hour after breakfast.  Dose: 0.4 mg            CONTINUE taking these medications        Instructions   apixaban 2.5mg Tabs  Commonly known as: Eliquis   Take 1 Tablet by mouth 2 times a day.  Dose: 2.5 mg     levothyroxine 175 MCG Tabs  Commonly known as: Synthroid   Take 1 Tablet by mouth every morning on an empty stomach. but take an additional pill on every sunday and wednesday  Dose: 175 mcg     PreserVision AREDS Tabs   Take 1 tablet by mouth 2 times a day.  Dose: 1 Tablet     vitamin D3 1000 Unit (25 mcg) Tabs  Commonly  known as: Cholecalciferol   Take 1,000 Units by mouth every day.  Dose: 1,000 Units            STOP taking these medications      metoprolol SR 25 MG Tb24  Commonly known as: Toprol XL              Allergies  Allergies   Allergen Reactions    Digoxin      nausea       DIET  Orders Placed This Encounter   Procedures    Diet Order Diet: Regular (banana rice apple toast)     Standing Status:   Standing     Number of Occurrences:   1     Order Specific Question:   Diet:     Answer:   Regular [1]     Comments:   banana rice apple toast       ACTIVITY  As tolerated and directed by skilled nursing.  Weight bearing as tolerated    CONSULTATIONS  none    PROCEDURES  none    LABORATORY  Lab Results   Component Value Date    SODIUM 135 07/28/2023    POTASSIUM 4.2 07/28/2023    CHLORIDE 99 07/28/2023    CO2 24 07/28/2023    GLUCOSE 108 (H) 07/28/2023    BUN 27 (H) 07/28/2023    CREATININE 1.08 07/28/2023        Lab Results   Component Value Date    WBC 4.0 (L) 07/28/2023    HEMOGLOBIN 14.0 07/28/2023    HEMATOCRIT 41.4 (L) 07/28/2023    PLATELETCT 123 (L) 07/28/2023        Total time of the discharge process was 65 minutes.

## 2023-07-28 NOTE — CARE PLAN
The patient is Stable - Low risk of patient condition declining or worsening    Shift Goals  Clinical Goals: pulmonary hygiene  Patient Goals: Rest  Family Goals: RYAN    Progress made toward(s) clinical / shift goals:  TCDB education, patient able to cough strong. Patient maintained O2 saturation >90% on room air throughout the night    Patient is not progressing towards the following goals:

## 2023-07-28 NOTE — DISCHARGE PLANNING
DC Transport Scheduled    Received request at: 7/28/2023 at 1204    Transport Company Scheduled:  [employer]  Spoke with Roya at [facility] to schedule transport.    Scheduled Date: 7/28/2023   Scheduled Time: 1330    [facility] [address] [facility] [address] [place]     Notified care team of scheduled transport via Voalte.     If there are any changes needed to the DC transportation scheduled, please contact [facility] at ext. [phone] between the hours of 1378-1122 Mon-Fri. If outside those hours, contact the ED Case Manager at ext. [phone].

## 2023-08-07 DIAGNOSIS — E03.4 HYPOTHYROIDISM DUE TO ACQUIRED ATROPHY OF THYROID: ICD-10-CM

## 2023-08-07 RX ORDER — LEVOTHYROXINE SODIUM 175 UG/1
TABLET ORAL
Qty: 117 TABLET | Refills: 4 | Status: ON HOLD | OUTPATIENT
Start: 2023-08-07 | End: 2023-08-13

## 2023-08-12 ENCOUNTER — APPOINTMENT (OUTPATIENT)
Dept: RADIOLOGY | Facility: MEDICAL CENTER | Age: 88
DRG: 061 | End: 2023-08-12
Attending: EMERGENCY MEDICINE
Payer: MEDICARE

## 2023-08-12 ENCOUNTER — HOSPITAL ENCOUNTER (INPATIENT)
Facility: MEDICAL CENTER | Age: 88
LOS: 6 days | DRG: 061 | End: 2023-08-18
Attending: EMERGENCY MEDICINE | Admitting: INTERNAL MEDICINE
Payer: MEDICARE

## 2023-08-12 DIAGNOSIS — I63.9 ACUTE CVA (CEREBROVASCULAR ACCIDENT) (HCC): ICD-10-CM

## 2023-08-12 DIAGNOSIS — I63.9 CEREBROVASCULAR ACCIDENT (CVA), UNSPECIFIED MECHANISM (HCC): ICD-10-CM

## 2023-08-12 DIAGNOSIS — R53.1 LEFT-SIDED WEAKNESS: ICD-10-CM

## 2023-08-12 DIAGNOSIS — R53.1 WEAKNESS: ICD-10-CM

## 2023-08-12 PROBLEM — I95.0 IDIOPATHIC HYPOTENSION: Status: ACTIVE | Noted: 2023-08-12

## 2023-08-12 LAB
ABO + RH BLD: NORMAL
ABO GROUP BLD: NORMAL
ALBUMIN SERPL BCP-MCNC: 3.1 G/DL (ref 3.2–4.9)
ALBUMIN/GLOB SERPL: 0.9 G/DL
ALP SERPL-CCNC: 89 U/L (ref 30–99)
ALT SERPL-CCNC: 18 U/L (ref 2–50)
AMPHET UR QL SCN: NEGATIVE
ANION GAP SERPL CALC-SCNC: 13 MMOL/L (ref 7–16)
APTT PPP: 41.5 SEC (ref 24.7–36)
AST SERPL-CCNC: 17 U/L (ref 12–45)
BARBITURATES UR QL SCN: NEGATIVE
BASOPHILS # BLD AUTO: 0.3 % (ref 0–1.8)
BASOPHILS # BLD: 0.02 K/UL (ref 0–0.12)
BENZODIAZ UR QL SCN: NEGATIVE
BILIRUB SERPL-MCNC: 0.8 MG/DL (ref 0.1–1.5)
BLD GP AB SCN SERPL QL: NORMAL
BUN SERPL-MCNC: 19 MG/DL (ref 8–22)
BZE UR QL SCN: NEGATIVE
CALCIUM ALBUM COR SERPL-MCNC: 9.4 MG/DL (ref 8.5–10.5)
CALCIUM SERPL-MCNC: 8.7 MG/DL (ref 8.5–10.5)
CANNABINOIDS UR QL SCN: NEGATIVE
CHLORIDE SERPL-SCNC: 102 MMOL/L (ref 96–112)
CO2 SERPL-SCNC: 19 MMOL/L (ref 20–33)
CREAT SERPL-MCNC: 1.03 MG/DL (ref 0.5–1.4)
EKG IMPRESSION: NORMAL
EOSINOPHIL # BLD AUTO: 0.04 K/UL (ref 0–0.51)
EOSINOPHIL NFR BLD: 0.6 % (ref 0–6.9)
ERYTHROCYTE [DISTWIDTH] IN BLOOD BY AUTOMATED COUNT: 53.4 FL (ref 35.9–50)
ETHANOL BLD-MCNC: <10.1 MG/DL
FENTANYL UR QL: NEGATIVE
GFR SERPLBLD CREATININE-BSD FMLA CKD-EPI: 66 ML/MIN/1.73 M 2
GLOBULIN SER CALC-MCNC: 3.3 G/DL (ref 1.9–3.5)
GLUCOSE BLD STRIP.AUTO-MCNC: 103 MG/DL (ref 65–99)
GLUCOSE BLD STRIP.AUTO-MCNC: 116 MG/DL (ref 65–99)
GLUCOSE SERPL-MCNC: 98 MG/DL (ref 65–99)
HCT VFR BLD AUTO: 40.4 % (ref 42–52)
HGB BLD-MCNC: 13.9 G/DL (ref 14–18)
IMM GRANULOCYTES # BLD AUTO: 0.03 K/UL (ref 0–0.11)
IMM GRANULOCYTES NFR BLD AUTO: 0.4 % (ref 0–0.9)
INR PPP: 1.41 (ref 0.87–1.13)
LYMPHOCYTES # BLD AUTO: 0.72 K/UL (ref 1–4.8)
LYMPHOCYTES NFR BLD: 10.1 % (ref 22–41)
MCH RBC QN AUTO: 33.1 PG (ref 27–33)
MCHC RBC AUTO-ENTMCNC: 34.4 G/DL (ref 32.3–36.5)
MCV RBC AUTO: 96.2 FL (ref 81.4–97.8)
METHADONE UR QL SCN: NEGATIVE
MONOCYTES # BLD AUTO: 0.97 K/UL (ref 0–0.85)
MONOCYTES NFR BLD AUTO: 13.5 % (ref 0–13.4)
NEUTROPHILS # BLD AUTO: 5.38 K/UL (ref 1.82–7.42)
NEUTROPHILS NFR BLD: 75.1 % (ref 44–72)
NRBC # BLD AUTO: 0 K/UL
NRBC BLD-RTO: 0 /100 WBC (ref 0–0.2)
OPIATES UR QL SCN: NEGATIVE
OXYCODONE UR QL SCN: NEGATIVE
PCP UR QL SCN: NEGATIVE
PLATELET # BLD AUTO: 139 K/UL (ref 164–446)
PMV BLD AUTO: 10.8 FL (ref 9–12.9)
POTASSIUM SERPL-SCNC: 4 MMOL/L (ref 3.6–5.5)
PROPOXYPH UR QL SCN: NEGATIVE
PROT SERPL-MCNC: 6.4 G/DL (ref 6–8.2)
PROTHROMBIN TIME: 17 SEC (ref 12–14.6)
RBC # BLD AUTO: 4.2 M/UL (ref 4.7–6.1)
RH BLD: NORMAL
SODIUM SERPL-SCNC: 134 MMOL/L (ref 135–145)
TROPONIN T SERPL-MCNC: 43 NG/L (ref 6–19)
TROPONIN T SERPL-MCNC: 46 NG/L (ref 6–19)
TSH SERPL DL<=0.005 MIU/L-ACNC: 3.89 UIU/ML (ref 0.38–5.33)
WBC # BLD AUTO: 7.2 K/UL (ref 4.8–10.8)

## 2023-08-12 PROCEDURE — 700105 HCHG RX REV CODE 258: Performed by: INTERNAL MEDICINE

## 2023-08-12 PROCEDURE — 3E03317 INTRODUCTION OF OTHER THROMBOLYTIC INTO PERIPHERAL VEIN, PERCUTANEOUS APPROACH: ICD-10-PCS | Performed by: PSYCHIATRY & NEUROLOGY

## 2023-08-12 PROCEDURE — 85610 PROTHROMBIN TIME: CPT

## 2023-08-12 PROCEDURE — 93005 ELECTROCARDIOGRAM TRACING: CPT | Performed by: EMERGENCY MEDICINE

## 2023-08-12 PROCEDURE — 80307 DRUG TEST PRSMV CHEM ANLYZR: CPT

## 2023-08-12 PROCEDURE — 70496 CT ANGIOGRAPHY HEAD: CPT

## 2023-08-12 PROCEDURE — 85025 COMPLETE CBC W/AUTO DIFF WBC: CPT

## 2023-08-12 PROCEDURE — 82962 GLUCOSE BLOOD TEST: CPT

## 2023-08-12 PROCEDURE — 80053 COMPREHEN METABOLIC PANEL: CPT

## 2023-08-12 PROCEDURE — 70498 CT ANGIOGRAPHY NECK: CPT

## 2023-08-12 PROCEDURE — 700117 HCHG RX CONTRAST REV CODE 255: Performed by: EMERGENCY MEDICINE

## 2023-08-12 PROCEDURE — 86850 RBC ANTIBODY SCREEN: CPT

## 2023-08-12 PROCEDURE — 99291 CRITICAL CARE FIRST HOUR: CPT | Mod: GC | Performed by: INTERNAL MEDICINE

## 2023-08-12 PROCEDURE — 70450 CT HEAD/BRAIN W/O DYE: CPT

## 2023-08-12 PROCEDURE — 96374 THER/PROPH/DIAG INJ IV PUSH: CPT

## 2023-08-12 PROCEDURE — 36415 COLL VENOUS BLD VENIPUNCTURE: CPT

## 2023-08-12 PROCEDURE — 83036 HEMOGLOBIN GLYCOSYLATED A1C: CPT

## 2023-08-12 PROCEDURE — 82077 ASSAY SPEC XCP UR&BREATH IA: CPT

## 2023-08-12 PROCEDURE — 700111 HCHG RX REV CODE 636 W/ 250 OVERRIDE (IP): Mod: JZ,JG | Performed by: NURSE PRACTITIONER

## 2023-08-12 PROCEDURE — 84484 ASSAY OF TROPONIN QUANT: CPT

## 2023-08-12 PROCEDURE — 99291 CRITICAL CARE FIRST HOUR: CPT

## 2023-08-12 PROCEDURE — 99223 1ST HOSP IP/OBS HIGH 75: CPT | Mod: FS | Performed by: NURSE PRACTITIONER

## 2023-08-12 PROCEDURE — 86901 BLOOD TYPING SEROLOGIC RH(D): CPT

## 2023-08-12 PROCEDURE — 85730 THROMBOPLASTIN TIME PARTIAL: CPT

## 2023-08-12 PROCEDURE — 84443 ASSAY THYROID STIM HORMONE: CPT

## 2023-08-12 PROCEDURE — 71045 X-RAY EXAM CHEST 1 VIEW: CPT

## 2023-08-12 PROCEDURE — 770022 HCHG ROOM/CARE - ICU (200)

## 2023-08-12 PROCEDURE — 86900 BLOOD TYPING SEROLOGIC ABO: CPT

## 2023-08-12 RX ORDER — DEXTROSE MONOHYDRATE 25 G/50ML
25 INJECTION, SOLUTION INTRAVENOUS
Status: DISCONTINUED | OUTPATIENT
Start: 2023-08-12 | End: 2023-08-18 | Stop reason: HOSPADM

## 2023-08-12 RX ORDER — AMOXICILLIN AND CLAVULANATE POTASSIUM 875; 125 MG/1; MG/1
1 TABLET, FILM COATED ORAL EVERY 12 HOURS
Status: ON HOLD | COMMUNITY
End: 2023-08-18

## 2023-08-12 RX ORDER — ONDANSETRON 4 MG/1
4 TABLET, ORALLY DISINTEGRATING ORAL EVERY 4 HOURS PRN
Status: DISCONTINUED | OUTPATIENT
Start: 2023-08-12 | End: 2023-08-18 | Stop reason: HOSPADM

## 2023-08-12 RX ORDER — POLYETHYLENE GLYCOL 3350 17 G/17G
1 POWDER, FOR SOLUTION ORAL
Status: DISCONTINUED | OUTPATIENT
Start: 2023-08-12 | End: 2023-08-18 | Stop reason: HOSPADM

## 2023-08-12 RX ORDER — LEVOTHYROXINE SODIUM 175 UG/1
175 TABLET ORAL
Status: DISCONTINUED | OUTPATIENT
Start: 2023-08-13 | End: 2023-08-18 | Stop reason: HOSPADM

## 2023-08-12 RX ORDER — SUCRALFATE ORAL 1 G/10ML
1 SUSPENSION ORAL
Status: ON HOLD | COMMUNITY
End: 2023-08-18

## 2023-08-12 RX ORDER — ONDANSETRON 2 MG/ML
4 INJECTION INTRAMUSCULAR; INTRAVENOUS EVERY 4 HOURS PRN
Status: DISCONTINUED | OUTPATIENT
Start: 2023-08-12 | End: 2023-08-18 | Stop reason: HOSPADM

## 2023-08-12 RX ORDER — AMOXICILLIN 250 MG
2 CAPSULE ORAL 2 TIMES DAILY
Status: DISCONTINUED | OUTPATIENT
Start: 2023-08-12 | End: 2023-08-18 | Stop reason: HOSPADM

## 2023-08-12 RX ORDER — BISACODYL 10 MG
10 SUPPOSITORY, RECTAL RECTAL
Status: DISCONTINUED | OUTPATIENT
Start: 2023-08-12 | End: 2023-08-18 | Stop reason: HOSPADM

## 2023-08-12 RX ORDER — LABETALOL HYDROCHLORIDE 5 MG/ML
10 INJECTION, SOLUTION INTRAVENOUS
Status: DISCONTINUED | OUTPATIENT
Start: 2023-08-12 | End: 2023-08-14

## 2023-08-12 RX ORDER — ATORVASTATIN CALCIUM 40 MG/1
40 TABLET, FILM COATED ORAL EVERY EVENING
Status: DISCONTINUED | OUTPATIENT
Start: 2023-08-12 | End: 2023-08-14

## 2023-08-12 RX ORDER — ACETAMINOPHEN 325 MG/1
650 TABLET ORAL EVERY 6 HOURS PRN
Status: DISCONTINUED | OUTPATIENT
Start: 2023-08-12 | End: 2023-08-18 | Stop reason: HOSPADM

## 2023-08-12 RX ORDER — DEXAMETHASONE 6 MG/1
6 TABLET ORAL DAILY
Status: ON HOLD | COMMUNITY
End: 2023-08-18

## 2023-08-12 RX ORDER — SODIUM CHLORIDE 9 MG/ML
INJECTION, SOLUTION INTRAVENOUS CONTINUOUS
Status: DISCONTINUED | OUTPATIENT
Start: 2023-08-12 | End: 2023-08-13

## 2023-08-12 RX ORDER — SULFAMETHOXAZOLE AND TRIMETHOPRIM 800; 160 MG/1; MG/1
1 TABLET ORAL EVERY 12 HOURS
Status: ON HOLD | COMMUNITY
End: 2023-08-18

## 2023-08-12 RX ORDER — FLUDROCORTISONE ACETATE 0.1 MG/1
0.1 TABLET ORAL EVERY MORNING
Status: DISCONTINUED | OUTPATIENT
Start: 2023-08-12 | End: 2023-08-18 | Stop reason: HOSPADM

## 2023-08-12 RX ORDER — HYDRALAZINE HYDROCHLORIDE 20 MG/ML
20 INJECTION INTRAMUSCULAR; INTRAVENOUS EVERY 4 HOURS PRN
Status: DISCONTINUED | OUTPATIENT
Start: 2023-08-12 | End: 2023-08-14

## 2023-08-12 RX ADMIN — IOHEXOL 80 ML: 350 INJECTION, SOLUTION INTRAVENOUS at 15:00

## 2023-08-12 RX ADMIN — SODIUM CHLORIDE: 9 INJECTION, SOLUTION INTRAVENOUS at 17:08

## 2023-08-12 RX ADMIN — TENECTEPLASE 19 MG: KIT at 15:00

## 2023-08-12 ASSESSMENT — ENCOUNTER SYMPTOMS
EYE PAIN: 0
SPUTUM PRODUCTION: 0
HEADACHES: 0
SINUS PAIN: 0
FOCAL WEAKNESS: 1
HEARTBURN: 0
DIZZINESS: 0
DEPRESSION: 0
SHORTNESS OF BREATH: 0
NAUSEA: 0
NECK PAIN: 0
SPEECH CHANGE: 0
HALLUCINATIONS: 0
HEMOPTYSIS: 0
PALPITATIONS: 0
SEIZURES: 0
VOMITING: 0
BLURRED VISION: 0
SENSORY CHANGE: 0
SORE THROAT: 0
LOSS OF CONSCIOUSNESS: 0
ABDOMINAL PAIN: 0
PHOTOPHOBIA: 0
ORTHOPNEA: 0
MYALGIAS: 0
WEIGHT LOSS: 0
CHILLS: 0
DOUBLE VISION: 0
TREMORS: 0
INSOMNIA: 0
NERVOUS/ANXIOUS: 0
STRIDOR: 0
FEVER: 0
TINGLING: 0
WEAKNESS: 0
COUGH: 0

## 2023-08-12 ASSESSMENT — LIFESTYLE VARIABLES: SUBSTANCE_ABUSE: 0

## 2023-08-12 ASSESSMENT — FIBROSIS 4 INDEX
FIB4 SCORE: 6
FIB4 SCORE: 2.83

## 2023-08-12 ASSESSMENT — PAIN DESCRIPTION - PAIN TYPE
TYPE: ACUTE PAIN

## 2023-08-12 NOTE — ASSESSMENT & PLAN NOTE
History of urinary retention previous history of straight caths  - Okay to place Catherine, otherwise bladder scans for retention

## 2023-08-12 NOTE — ED NOTES
ICU RN @ bedside to take pt to floor. NAD. VSS. Respirations equal and unlabored. All belongings with pt.

## 2023-08-12 NOTE — ASSESSMENT & PLAN NOTE
Questionable history of pulmonary fibrosis, on previous admissions not hypoxic, previously had COVID on July admission, no real risk factors when talking from patient, continue monitor clinically, has hypoxia at nighttime on JAMES

## 2023-08-12 NOTE — H&P
"Critical Care/Pulmonary Consultation    Date of Service: 8/12/2023    Date of Admission:  8/12/2023  1:46 PM    Consulting Physician: Alex Lopez M.D.    Chief Complaint:  Possible Stroke (From SNF, LKW 09:30 while at breakfast with staff, pt was then found at 12:30 today by staff with weakness, pt arrives with L sided weakness, R sided facial droop, and inability to look left. GCS 15, VSS on RA. Hx A-fib on eliquis. )      History of Present Illness: Guru Pena is a very pleasant 89 y.o. male with a past medical history of hypertension hyperlipidemia, A-fib (rate controlled off of metoprolol on reduced dose to AC, vitamin D deficiency, hypothyroidism, JAMES on CPAP, pulmonary hypertension, mild aortic stenosis, moderate mitral regurg, urinary retention, and a questionable history of pulmonary fibrosis?  Who presents today for a 3-hour history of left-sided weakness, and facial droop.  Patient states that he was in his usual health this morning, patient noticed that he ate breakfast and then his \"stomach did not feel quite right,\" patient stated he was sitting cannot move his left side very well and was noted by the nursing patient staff.  Patient does not notice weakness, is laying in bed favoring his right side.  Patient denies any other acute complaints denies any recent illness states that he had been feeling \"like I am been getting better in the nursing home, stating that he used to live at [facility].  Patient denies any dizziness, lightheadedness, headaches, vision changes, hearing changes, sensation changes, endorses left-sided weakness which \"I hope will get better after the clot medication, I am hopeful overall.\"  Patient denies any recent illnesses any fevers, chills, night sweats, chest pain, palpitations, shortness of breath, cough, abdominal pain, nausea/vomiting/diarrhea/constipation any recent trauma, melena, hematochezia, bleeding history, easy bruising, dysuria, urgency.    In the " emergency department patient was afebrile, pulse of 110, respiratory rate of 16, blood pressure of 153/116 initially down to 132/86, saturating at 94% on room air.  CBC remarkable for hemoglobin 13.9 chronic normocytic anemia mild likely given age, platelets of 139 also likely given age, CMP remarkable for sodium of 134, potassium of 4.0 chloride of 102 bicarb of 19 without gap, creatinine of 1.03 near baseline, liver function tests were within normal limits, troponin mildly elevated at 43 however this is noted to be chronic, INR 1.41, chest x-ray did showing mildly enlarged cardiac silhouette with probable vascular congestion, CT head without any acute intracranial hemorrhage or infarct White matter lucencies consistent with small vessel ischemic change versus demyelination and gliosis and cerebral atrophy most likely secondary to age, CTA of the head and neck without any significant stenosis or occlusion, CTA the [place] of  without large vessel occlusion.  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  After consideration patient and ED provider discussed risks and benefits of TNK, patient ultimately decided for TNK, admit to ICU for post TNK monitoring and further work-up of suspected CVA.    Review of Systems   Constitutional:  Negative for chills, fever and weight loss.   HENT:  Negative for ear discharge, hearing loss, sinus pain and sore throat.    Eyes:  Negative for blurred vision, double vision, photophobia and pain.   Respiratory:  Negative for cough, hemoptysis, sputum production, shortness of breath and stridor.    Cardiovascular:  Negative for chest pain, palpitations, orthopnea and leg swelling.   Gastrointestinal:  Negative for abdominal pain, heartburn, nausea and vomiting.   Genitourinary:  Negative for dysuria, frequency, hematuria and urgency.   Musculoskeletal:  Negative for myalgias and neck pain.   Skin:  Negative for  itching and rash.   Neurological:  Positive for focal weakness. Negative for dizziness, tingling, tremors, sensory change, speech change, seizures, loss of consciousness, weakness and headaches.   Psychiatric/Behavioral:  Negative for depression, hallucinations, substance abuse and suicidal ideas. The patient is not nervous/anxious and does not have insomnia.        Home Medications       Reviewed by Eliot Baez R.N. (Registered Nurse) on 08/12/23 at 1404  Med List Status: <None>     Medication Last Dose Status   apixaban (ELIQUIS) 2.5mg Tab  Active   fludrocortisone (FLORINEF) 0.1 MG Tab  Active   levothyroxine (SYNTHROID) 175 MCG Tab  Active   Multiple Vitamins-Minerals (PRESERVISION AREDS) Tab  Active   tamsulosin (FLOMAX) 0.4 MG capsule  Active   vitamin D (CHOLECALCIFEROL) 1000 Unit (25 mcg) Tab  Active                    Social History     Tobacco Use    Smoking status: Never    Smokeless tobacco: Never   Vaping Use    Vaping Use: Never used   Substance Use Topics    Alcohol use: Never    Drug use: Never   Retired [employer] employee formally worked and lived in [place]    Past Medical History:   Diagnosis Date    A-fib (HCC)     Elevated troponin 6/30/2021    Generalized weakness 6/22/2021    Hypothyroid     Other specified hypothyroidism 6/1/2021       History reviewed. No pertinent surgical history.    Allergies: Digoxin    Patient denies any pertinent past medical history of MI, CVA, or malignancies.    Vitals:    08/12/23 1400 08/12/23 1403   Height: 1.829 m (6')    Weight: 77.1 kg (170 lb)    Weight % change since last entry.: 0 %    BP:  (!) 153/116   Pulse:  (!) 116   BMI (Calculated): 23.06    Resp:  16       Physical Examination  General: Older male appears stated age, sitting crooked towards right side of bed looking towards the right side, in no apparent distress.   HEENT: NC/AT, PERRL, will not follow gaze past midline, no scleral icterus or conjunctival pallor, fair dentition/denture in, no  nasal discharge or oral erythema or exudates.   Neck: Supple, No cervical or supraclavicular LAD  CV:RRR, no murmurs gallops or rubs, no JVD  Pulm: LCAB, no crackles, rales, rhonchi, or wheezing  GI: Normal bowel sounds, abdomen soft, nontender, nondistended to deep or light palpation in all 4 quadrants, no HSM.  MSK: Radial and dorsalis pedis pulses 2+ and equal bilaterally, respectively.  Strength 5 out of 5 in right upper and lower extremities both proximally and distally strength 2 out of 5 in left upper and lower extremities both proximally and distally.  Neuro: Patient is alert and oriented x to person, place, and year, has history of sundowning per chart, very mild dysarthria, fluent able to repeat commands, visual fields intact, left-sided facial droop, bilateral sensation to the face and upper and lower extremities intact, unable to perform pronator drift or Romberg, toes downgoing bilaterally, left upper extremity ataxia.  Psych: Appropriate mood and affect       No intake or output data in the 24 hours ending 08/12/23 1451    Recent Labs     08/12/23  1351   WBC 7.2   NEUTSPOLYS 75.10*   LYMPHOCYTES 10.10*   MONOCYTES 13.50*   EOSINOPHILS 0.60   BASOPHILS 0.30   ASTSGOT 17   ALTSGPT 18   ALKPHOSPHAT 89   TBILIRUBIN 0.8     Recent Labs     08/12/23  1351   SODIUM 134*   POTASSIUM 4.0   CHLORIDE 102   CO2 19*   BUN 19   CREATININE 1.03   CALCIUM 8.7     Recent Labs     08/12/23  1351   ALTSGPT 18   ASTSGOT 17   ALKPHOSPHAT 89   TBILIRUBIN 0.8   GLUCOSE 98         CT-CTA HEAD WITH & W/O-POST PROCESS   Final Result      1.  CT angiogram of the [place] of  demonstrating no evidence of large vessel occlusion.      CT-HEAD W/O   Final Result      1.  There is no acute intracranial hemorrhage or infarct.      2.  White matter lucencies most consistent with small vessel ischemic change versus demyelination or gliosis.      3.  There is cerebral atrophy.         DX-CHEST-PORTABLE (1 VIEW)    (Results Pending)    CT-CTA NECK WITH & W/O-POST PROCESSING    (Results Pending)       Patient Active Problem List   Diagnosis    Vitamin D deficiency    Impaired vision in both eyes    Weakness    Anticoagulated- Eliquis for a. fib    Chronic atrial fibrillation (HCC)    Moderate mitral regurgitation by prior echocardiogram    Mild aortic stenosis by prior echocardiogram    Pulmonary fibrosis (HCC)    JAMES on CPAP    Pulmonary hypertension (HCC)- RVSP= 30,  ECHO 7/2021    Mild concentric left ventricular hypertrophy (LVH)    Stage 3a chronic kidney disease (HCC)    Urinary retention    Hypothyroidism due to acquired atrophy of thyroid    COVID-19    Acute hypoxemic respiratory failure (HCC)    Thrombocytopenia (HCC)       Assessment and Plan:    CVA (cerebral vascular accident) ([facility])  Assessment & Plan  Patient presents with less than 6-hour history of left-sided weakness, dysarthria and facial droop, evaluated by ED and neurology given TNK at approximately 1421, still with left-sided weakness status post administration dysarthria and facial droop, as well as left-sided hemineglect, patient denies any other signs or symptoms denies previous history of CVA, history of A-fib on anticoagulation, non-smoker, denies family history.  Neurology consulted thinks most likely small vessel stroke is not seen on CTA, will monitor post TNK, examined patient at approximately 1510, without any red flag signs or symptoms for hemorrhagic conversion or bleeding.  -Admit to ICU for postthrombolytic protocol  - Neurochecks per protocol  - SBP goal 100-1 80, antihypertensive ordered  - Labs: CBC, CMP, A1c, lipid profile  - Imaging: MRI brain plus CT head (in 24 hours)  - Therapeutics: Hold AC for 24 hours status post thrombolytics  - Atorvastatin  - PT, OT, SLP  SCDs for DVT prophylaxis  - Given age and rope score no need to check echo had recent echo on 723  -Telemetry  - Blood glucose goal 80-1 80 avoid hypoglycemia  -Neurology consulted appreciate  recommendations       Idiopathic hypotension  Assessment & Plan  History of hypotension, bradycardia during previous admission stopped metoprolol, was started on fludrocortisone, work-up was a.m. cortisol at approximately 5 AM of 1.1, systolic blood pressure of 1 30-1 50 in the emergency department  -will continue home fludrocortisone now to avoid hypotension in the setting of CVA.  - will require further work-up    Thrombocytopenia (HCC)- (present on admission)  Assessment & Plan  Mild chronic thrombocytopenia likely secondary to age  - Continue to monitor on CBC    Hypothyroidism due to acquired atrophy of thyroid- (present on admission)  Assessment & Plan  History of hypothyroidism, most recent TSH 2 weeks ago within normal limits  - We will recheck TSH  - Continue home levothyroxine    Urinary retention- (present on admission)  Assessment & Plan  History of urinary retention previous history of straight caths  - Okay to place Catherine, otherwise bladder scans for retention    Stage 3a chronic kidney disease (HCC)- (present on admission)  Assessment & Plan  History of CKD stage IIIa, however last several creatinines with GFR's greater than 60 possible CKD stage II patient currently at baseline  - Continue to monitor closely on BMP and renally dose all medications as appropriate.    Pulmonary hypertension (HCC)- RVSP= 30,  ECHO 7/2021- (present on admission)  Assessment & Plan  History of likely secondary to JAMES    JAMES on CPAP- (present on admission)  Assessment & Plan  Supplemental O2 as needed, will hold off on ordering CPAP overnight as well to make sure patient is able to protect his airway sufficiently with new history of CVA.  -Supplemental O2 as needed  -Order RT CPAP overnight when patient is cleared from swallow study is able to protect airway.    Pulmonary fibrosis (HCC)- (present on admission)  Assessment & Plan  Questionable history of pulmonary fibrosis, on previous admissions not hypoxic, previously had  COVID on July admission, no real risk factors when talking from patient, continue monitor clinically, has hypoxia at nighttime on JAMES    Moderate mitral regurgitation by prior echocardiogram- (present on admission)  Assessment & Plan  History of    Chronic atrial fibrillation (HCC)- (present on admission)  Assessment & Plan  History of A-fib on AC dose reduced given age, currently in atrial fibrillation rate 110, previous history of beta-blocker usage however given hypotension and during past admission was stopped, not currently in RVR, currently asymptomatic,  - Continue monitor rate on telemetry  - Hold AC in the setting of Antithrombin lytic as per CVA    Vitamin D deficiency- (present on admission)  Assessment & Plan  History of vitamin D deficiency  - Hold home med until passes swallow eval by SLP.         FEN: SLP to evaluate speech, replete lites as needed, as SLP to guide diet  GI: Not indicated  DVT:SCDs   Code: Full code, discussed with patient, has capacity even despite CVA, still wishes to remain full code, even though outcome is likely poor given his advanced age and medical comorbidities.      Yumi Barrera M.D.  R ICU Gold team  Patient is critically ill.   The patient continues to have: 6 possible CVA status post TNK, patient needs strict monitoring of neuro status.  The vital organ system that is affected is the: Neurological  If untreated there is a high chance of deterioration into: Hemorrhagic infarct, coma, multiorgan failure and eventually death.   The critical care that I am providing today is: Close monitoring in the ICU in the setting of status postthrombolytic therapy  The critical that has been undertaken is medically complex.   There has been no overlap in critical care time.

## 2023-08-12 NOTE — CONSULTS
Neurology STROKE CODE H&P  Neurohospitalist Service, [facility]    Referring Physician: Luis Hanna M.D.    STROKE CODE:   Chief Complaint   Patient presents with    Possible Stroke     From [facility], LKW 09:30 while at breakfast with staff, pt was then found at 12:30 today by staff with weakness, pt arrives with L sided weakness, R sided facial droop, and inability to look left. GCS 15, VSS on RA. Hx A-fib on eliquis.        To obtain the most accurate data regarding the time called, and time patient seen, refer to the stroke run-sheet and chart.  For time of CT, refer to the radiology report. See A&P below for TPA Decision and door to needle time if and when applicable.    HPI: Guru Pena is a 89 y.o. male with a past medical history of atrial fibrillation on Eliquis 2.5mg BID, and hypothyroidism who presented via EMS from his SNF with left sided weakness, left facial droop, right gaze deviation and dysarthria. Per EMS the patient awoke in his normal state of health and ate breakfast and was last seen normal at 0930. The SNF staff noticed the patient with the above mentioned deficits when they gathered for lunch at 1230. In the ED the patient was normotensive with systolics in the 130s. Noncontrast CT head negative for acute findings. CTA head/neck negative for identifiable LVO or flow limiting stenoses. Technically difficult imaging study secondary to patient's kyphosis. Neurology was consulted for further evaluation of the above.     Review of systems: In addition to what is detailed in the HPI above, all other systems reviewed and are negative.    Past Medical History:    has a past medical history of A-fib (HCC), Elevated troponin (6/30/2021), Generalized weakness (6/22/2021), Hypothyroid, and Other specified hypothyroidism (6/1/2021).    FHx:  family history is not on file.    SHx:   reports that he has never smoked. He has never used smokeless tobacco. He reports that he  does not drink alcohol and does not use drugs.    Allergies:  Allergies   Allergen Reactions    Digoxin      nausea       Medications:    Current Facility-Administered Medications:     [COMPLETED] tenecteplase (Tnkase) injection (stroke) 19 mg, 0.25 mg/kg, Intravenous, Once, ZULEIKA Aceves, 19 mg at 08/12/23 1500    [COMPLETED] iohexol (OMNIPAQUE) 350 mg/mL (IV), 80 mL, Intravenous, Once, Luis Hanna M.D., 80 mL at 08/12/23 1500    Current Outpatient Medications:     levothyroxine (SYNTHROID) 175 MCG Tab, TAKE 1TAB BY MOUTH EVERY AM ON AN EMPTY STOMACH. BUT TAKE AN ADDITIONAL TAB ON EVERY SUN. & WED., Disp: 117 Tablet, Rfl: 4    fludrocortisone (FLORINEF) 0.1 MG Tab, Take 1 Tablet by mouth every morning., Disp: 30 Tablet, Rfl: 1    tamsulosin (FLOMAX) 0.4 MG capsule, Take 1 Capsule by mouth 1/2 hour after breakfast., Disp: 30 Capsule, Rfl: 0    apixaban (ELIQUIS) 2.5mg Tab, Take 1 Tablet by mouth 2 times a day., Disp: 180 Tablet, Rfl: 3    vitamin D (CHOLECALCIFEROL) 1000 Unit (25 mcg) Tab, Take 1,000 Units by mouth every day., Disp: , Rfl:     Multiple Vitamins-Minerals (PRESERVISION AREDS) Tab, Take 1 tablet by mouth 2 times a day., Disp: , Rfl:     Physical Examination:    Vitals:    08/12/23 1400 08/12/23 1403   BP:  (!) 153/116   Pulse:  (!) 116   Resp:  16   SpO2:  92%   Weight: 77.1 kg (170 lb)    Height: 1.829 m (6')          General: Patient is awake and in no acute distress  Eye: Examination of optic disks not indicated at this time given acuity of consult  Neck: There is normal range of motion  CV: Regular rate   Extremities:  Clear, dry, intact, without peripheral edema    NEUROLOGICAL EXAM:     Mental status: Awake, alert and fully oriented  Speech and language: Speech is mildly dysarthric and fluent. The patient is able to name and repeat, and follow commands  Cranial nerve exam: Pupils are equal, round and reactive to light bilaterally. Visual fields are full. There is no  nystagmus. Right gaze deviation- cannot cross midline. Moderate left facial droop. Sensation in the face is intact to light touch. Palate elevates symmetrically. Tongue is midline.  Motor exam: 5/5 strength in the RUE and RLE extremities. 4/5 in LUE and LLE with drift appreciated  Sensory exam:  Diminished sensation on left side  Deep tendon reflexes:  2+ throughout. Toes down-going bilaterally.  Coordination: LUE ataxia  Gait: Deferred due to patient acuity    NIHSS: National Institutes of Health Stroke Scale    [0] 1a:Level of Consciousness    0-alert 1-drowsy   2-stupor   3-coma  [0] 1b:LOC Questions                  0-both  1-one      2-neither  [0] 1c:LOC Commands                   0-both  1-one      2-neither  [1] 2: Best Gaze                     0-nl    1-partial  2-forced  [1] 3: Visual Fields                   0-nl    1-partial  2-complete 3-bilat  [2] 4: Facial Paresis                0-nl    1-minor    2-partial  3-full  MOTOR                       0-nl  [0] 5: Right Arm           1-drift  [1] 6: Left Arm             2-some effort vs gravity  [0] 7: Right Leg           3-no effort vs gravity  [1] 8: Left Leg             4-no movement                             x-untestable  [1] 9: Limb Ataxia                    0-abs   1-1_limb   2-2+_limbs       x-untestable  [1] 10:Sensory                        0-nl    1-partial  2-dense  [0] 11:Best Language/Aphasia         0-nl    1-mild/mod 2-severe   3-mute  [1] 12:Dysarthria                     0-nl    1-mild/mod 2-severe       x-untestable  [1] 13:Neglect/Inattention            0-none  1-partial  2-complete  [10] TOTAL    Baseline Modified [place] Scale (MRS): 4 = Moderately severe disability; unable to walk without assistance and unable to attend to own bodily needs without assistance    Objective Data:    Labs:  Lab Results   Component Value Date/Time    PROTHROMBTM 17.0 (H) 08/12/2023 01:51 PM    INR 1.41 (H) 08/12/2023 01:51 PM      Lab Results   Component  Value Date/Time    WBC 7.2 08/12/2023 01:51 PM    RBC 4.20 (L) 08/12/2023 01:51 PM    HEMOGLOBIN 13.9 (L) 08/12/2023 01:51 PM    HEMATOCRIT 40.4 (L) 08/12/2023 01:51 PM    MCV 96.2 08/12/2023 01:51 PM    MCH 33.1 (H) 08/12/2023 01:51 PM    MCHC 34.4 08/12/2023 01:51 PM    MPV 10.8 08/12/2023 01:51 PM    NEUTSPOLYS 75.10 (H) 08/12/2023 01:51 PM    LYMPHOCYTES 10.10 (L) 08/12/2023 01:51 PM    MONOCYTES 13.50 (H) 08/12/2023 01:51 PM    EOSINOPHILS 0.60 08/12/2023 01:51 PM    BASOPHILS 0.30 08/12/2023 01:51 PM      Lab Results   Component Value Date/Time    SODIUM 134 (L) 08/12/2023 01:51 PM    POTASSIUM 4.0 08/12/2023 01:51 PM    CHLORIDE 102 08/12/2023 01:51 PM    CO2 19 (L) 08/12/2023 01:51 PM    GLUCOSE 98 08/12/2023 01:51 PM    BUN 19 08/12/2023 01:51 PM    CREATININE 1.03 08/12/2023 01:51 PM      Lab Results   Component Value Date/Time    CHOLSTRLTOT 136 01/23/2023 06:39 AM    LDL 81 01/23/2023 06:39 AM    HDL 44 01/23/2023 06:39 AM    TRIGLYCERIDE 54 01/23/2023 06:39 AM       Lab Results   Component Value Date/Time    ALKPHOSPHAT 89 08/12/2023 01:51 PM    ASTSGOT 17 08/12/2023 01:51 PM    ALTSGPT 18 08/12/2023 01:51 PM    TBILIRUBIN 0.8 08/12/2023 01:51 PM        Imaging/Testing:    I interpreted and/or reviewed the patient's neuroimaging    CT-HEAD W/O   Final Result      1.  There is no acute intracranial hemorrhage or infarct.      2.  White matter lucencies most consistent with small vessel ischemic change versus demyelination or gliosis.      3.  There is cerebral atrophy.         DX-CHEST-PORTABLE (1 VIEW)    (Results Pending)   CT-CTA HEAD WITH & W/O-POST PROCESS    (Results Pending)   CT-CTA NECK WITH & W/O-POST PROCESSING    (Results Pending)       Assessment and Plan:    89 y.o. male with PMHx of afib on Eliquis 2.5mg BID presenting from SNF with left sided weakness and facial droop and right gaze deviation. Stroke protocol CT head negative for acute intracranial abnormalities. CTA head/neck negative  for LVO or flow limiting stenoses. This is likely a small vessel stroke.    Lengthy conversation held with patient to discuss the use of off label thrombolytic medications secondary to NOAC dose this morning. I reviewed the risks (including possible bleeding complications and death), benefits, and alternatives with the patient to include forgoing TNK and pursuing therapies. The patient voiced understanding of the risks and benefits of thrombolytic therapy and wished to proceed with the administration of TNK.    TNK bolus Time: 1436    Problem list:  Stroke    Recommendations:  -Admit to ICU per post thrombolytic protocol.   -Neuro assessment and VS per post thrombolytic protocol.   --180 s/p TNK. Antihypertensives per ICU team.   -Obtain MRI Brain wo contrast- No need to wait 24 hours  -No anticoagulation/anti thrombotics x 24 hours post thrombolytic  -Telemetry; currently afib. No need for TTE from neurology perspective as patient has known history of afib and is on NOAC  -Check Stroke Labs: Lipid Panel, HgbA1c  -Atorvastatin 40 mg PO q HS. Titrate to long term LDL goal < 70  -BG management per primary team. BG goal .   -PT/OT/SLP eval and treat.   -DVT PPX: SCDs.      The plan of care above has been discussed with Dr. Bharath Wylie, Stroke Neurology. Please call with any questions.          MARILEE Mena  Stroke Neurology, Acute Care Services

## 2023-08-12 NOTE — ASSESSMENT & PLAN NOTE
-Currently rate controlled without intervention/medication  -Resume Eliquis per neurology recommendations  -Telemetry monitoring  -Optimize electrolytes

## 2023-08-12 NOTE — ED PROVIDER NOTES
ED Provider Note    CHIEF COMPLAINT  Chief Complaint   Patient presents with    Possible Stroke     From SNF, LKW 09:30 while at breakfast with staff, pt was then found at 12:30 today by staff with weakness, pt arrives with L sided weakness, R sided facial droop, and inability to look left. GCS 15, VSS on RA. Hx A-fib on eliquis.        EXTERNAL RECORDS REVIEWED  Discharge summary 7/28/2023, weakness, Covid-19    HPI/ROS  LIMITATION TO HISTORY   Select: Dysarthria    Guru Pena is a 89 y.o. male who presents as a stroke alert.  Last known well at 930 this morning, history of A-fib anticoagulated.  Found by nursing home staff to have left-sided weakness with right-sided gaze and left-sided facial drooping.  EMS was called.  At noon they report that he was tired and did not want to eat but apparently had no obvious unilateral deficits at that point.  The patient is able to answer simple questions, denies having a headache, denies any chest pain, he offers no other complaints at this time    PAST MEDICAL HISTORY   has a past medical history of A-fib (HCC), Elevated troponin (6/30/2021), Generalized weakness (6/22/2021), Hypothyroid, and Other specified hypothyroidism (6/1/2021).    SURGICAL HISTORY  patient denies any surgical history    FAMILY HISTORY  No family history on file.    SOCIAL HISTORY  Social History     Tobacco Use    Smoking status: Never    Smokeless tobacco: Never   Vaping Use    Vaping Use: Never used   Substance and Sexual Activity    Alcohol use: Never    Drug use: Never    Sexual activity: Not on file       CURRENT MEDICATIONS  Home Medications    **Home medications have not yet been reviewed for this encounter**         ALLERGIES  Allergies   Allergen Reactions    Digoxin      nausea       PHYSICAL EXAM  VITAL SIGNS: BP (!) 164/99   Pulse (!) 114   Temp 35.8 °C (96.5 °F) (Temporal)   Resp (!) 54   Ht 1.829 m (6')   Wt 73.3 kg (161 lb 9.6 oz)   SpO2 93%   BMI 21.92 kg/m²     Constitutional: Ill-appearing, seems awake, rightward gaze  HENT: Normocephalic, no obvious evidence of acute trauma.  Eyes: No scleral icterus. Normal conjunctiva   Thorax & Lungs: Normal nonlabored respirations. I appreciate no wheezing, rhonchi or rales. There is normal air movement.  Upon cardiac ascultation I appreciate a irregularly irregular rhythm, normal rate  Abdomen: The abdomen is not visibly distended. Upon palpation, I find it to be without tenderness.  No mass appreciated.  Skin: The exposed portions of skin reveal no obvious rash or other abnormalities.  Extremities/Musculoskeletal: No obvious sign of acute trauma. No asymmetric calf tenderness or edema.   Neurologic: Alert, answers question but difficult to understand speech.  Left-sided facial droop.  Left-sided upper and lower extremity weakness with effort against gravity.    DIAGNOSTIC STUDIES / PROCEDURES    LABS  Results for orders placed or performed during the hospital encounter of 08/12/23   CBC WITH DIFFERENTIAL   Result Value Ref Range    WBC 7.2 4.8 - 10.8 K/uL    RBC 4.20 (L) 4.70 - 6.10 M/uL    Hemoglobin 13.9 (L) 14.0 - 18.0 g/dL    Hematocrit 40.4 (L) 42.0 - 52.0 %    MCV 96.2 81.4 - 97.8 fL    MCH 33.1 (H) 27.0 - 33.0 pg    MCHC 34.4 32.3 - 36.5 g/dL    RDW 53.4 (H) 35.9 - 50.0 fL    Platelet Count 139 (L) 164 - 446 K/uL    MPV 10.8 9.0 - 12.9 fL    Neutrophils-Polys 75.10 (H) 44.00 - 72.00 %    Lymphocytes 10.10 (L) 22.00 - 41.00 %    Monocytes 13.50 (H) 0.00 - 13.40 %    Eosinophils 0.60 0.00 - 6.90 %    Basophils 0.30 0.00 - 1.80 %    Immature Granulocytes 0.40 0.00 - 0.90 %    Nucleated RBC 0.00 0.00 - 0.20 /100 WBC    Neutrophils (Absolute) 5.38 1.82 - 7.42 K/uL    Lymphs (Absolute) 0.72 (L) 1.00 - 4.80 K/uL    Monos (Absolute) 0.97 (H) 0.00 - 0.85 K/uL    Eos (Absolute) 0.04 0.00 - 0.51 K/uL    Baso (Absolute) 0.02 0.00 - 0.12 K/uL    Immature Granulocytes (abs) 0.03 0.00 - 0.11 K/uL    NRBC (Absolute) 0.00 K/uL   COMP  METABOLIC PANEL   Result Value Ref Range    Sodium 134 (L) 135 - 145 mmol/L    Potassium 4.0 3.6 - 5.5 mmol/L    Chloride 102 96 - 112 mmol/L    Co2 19 (L) 20 - 33 mmol/L    Anion Gap 13.0 7.0 - 16.0    Glucose 98 65 - 99 mg/dL    Bun 19 8 - 22 mg/dL    Creatinine 1.03 0.50 - 1.40 mg/dL    Calcium 8.7 8.5 - 10.5 mg/dL    Correct Calcium 9.4 8.5 - 10.5 mg/dL    AST(SGOT) 17 12 - 45 U/L    ALT(SGPT) 18 2 - 50 U/L    Alkaline Phosphatase 89 30 - 99 U/L    Total Bilirubin 0.8 0.1 - 1.5 mg/dL    Albumin 3.1 (L) 3.2 - 4.9 g/dL    Total Protein 6.4 6.0 - 8.2 g/dL    Globulin 3.3 1.9 - 3.5 g/dL    A-G Ratio 0.9 g/dL   PROTHROMBIN TIME   Result Value Ref Range    PT 17.0 (H) 12.0 - 14.6 sec    INR 1.41 (H) 0.87 - 1.13   APTT   Result Value Ref Range    APTT 41.5 (H) 24.7 - 36.0 sec   COD (ADULT)   Result Value Ref Range    ABO Grouping Only O     Rh Grouping Only POS     Antibody Screen-Cod NEG    TROPONIN   Result Value Ref Range    Troponin T 43 (H) 6 - 19 ng/L   ESTIMATED GFR   Result Value Ref Range    GFR (CKD-EPI) 66 >60 mL/min/1.73 m 2   Urine Drug Screen   Result Value Ref Range    Amphetamines Urine Negative Negative    Barbiturates Negative Negative    Benzodiazepines Negative Negative    Cocaine Metabolite Negative Negative    Fentanyl, Urine Negative Negative    Methadone Negative Negative    Opiates Negative Negative    Oxycodone Negative Negative    Phencyclidine -Pcp Negative Negative    Propoxyphene Negative Negative    Cannabinoid Metab Negative Negative   Diagnostic  Alcohol   Result Value Ref Range    Diagnostic Alcohol <10.1 <10.1 mg/dL   ABO Rh Confirm   Result Value Ref Range    ABO Rh Confirm O POS    EKG (NOW)   Result Value Ref Range    Report       [facility] of [facility] Emergency Dept[facility]    Test Date:  2023  Pt Name:    JANICE HARRISON                  Department: ER  MRN:        [id number]                      Room:        06  Gender:     Male                         Technician: [id number]  :         1925-07-20                   Requested By:ER TRIAGE PROTOCOL  Order #:    [id number]                    Reading MD:    Measurements  Intervals                                Axis  Rate:       112                          P:          237  IN:         131                          QRS:        -81  QRSD:       170                          T:          32  QT:         387  QTc:        529    Interpretive Statements  Sinus tachycardia with irregular rate  RBBB and LAFB  Compared to ECG 07/23/2023 07:16:52  Atrial fibrillation no longer present     POCT glucose device results   Result Value Ref Range    POC Glucose, Blood 103 (H) 65 - 99 mg/dL        RADIOLOGY  I have independently interpreted the diagnostic imaging associated with this visit and am waiting the final reading from the radiologist.   My preliminary interpretation is as follows: No intracranial hemorrhage  Radiologist interpretation:   DX-CHEST-PORTABLE (1 VIEW)   Final Result      1.  Mildly enlarged cardiac silhouette with probable vascular congestion.      CT-CTA NECK WITH & W/O-POST PROCESSING   Final Result      No significant stenosis or occlusion      CT-CTA HEAD WITH & W/O-POST PROCESS   Final Result      1.  CT angiogram of the [place] of  demonstrating no evidence of large vessel occlusion.      CT-HEAD W/O   Final Result      1.  There is no acute intracranial hemorrhage or infarct.      2.  White matter lucencies most consistent with small vessel ischemic change versus demyelination or gliosis.      3.  There is cerebral atrophy.         EC-ECHOCARDIOGRAM COMPLETE W/O CONT    (Results Pending)   MR-BRAIN-W/O    (Results Pending)         COURSE & MEDICAL DECISION MAKING    ED Observation Status? No; Patient does not meet criteria for ED Observation.     INITIAL ASSESSMENT, COURSE AND PLAN  Care Narrative: This is a 89-year-old anticoagulant male with dense left-sided weakness and rightward gaze, onset was sometime this morning, it seems  likely that his last known well was at 9:30 AM which is about 4-1/2 hours prior to arrival.  He is anticoagulated with A-fib.  Evaluated emergently at the charge desk alongside the stroke team, will be brought directly to CT.  He likely has an LVO in the right M2 distribution, ICH is also possible.  Imaging will be obtained immediately.  Based on the findings, radiology and neuro interventional will be consulted    No obvious LVO appreciated on the CT scans, no intracranial hemorrhage.  Dr. Wylie, neurologist, had a long discussion with the patient regarding off label use of thrombolytics, he thoroughly explained the risk versus benefits and the patient was agreeable to receive thrombolytic treatment.  I discussed the case with Dr. Lopez the intensivist and the patient is admitted to the ICU in guarded condition    CRITICAL CARE  The very real possibilty of a deterioration of this patient's condition required the highest level of my preparedness for sudden, emergent intervention.  I provided critical care services, which included medication orders, frequent reevaluations of the patient's condition and response to treatment, ordering and reviewing test results, and discussing the case with various consultants.  The critical care time associated with the care of the patient was 39 minutes. Review chart for interventions. This time is exclusive of any other billable procedures.        FINAL DIAGNOSIS  1. Acute CVA (cerebrovascular accident) (HCC)    2. Left-sided weakness           Electronically signed by: Lusi Hanna M.D., 8/12/2023 1:53 PM

## 2023-08-12 NOTE — ASSESSMENT & PLAN NOTE
-Patient received off-label TNKase for left-sided weakness, dysarthria and facial droop  -Serial neurologic exams  -Goal normothermia, normal natremia, normoglycemia  -SBP goal less than 180 -as needed's available  -Neurology following  -PT OT SLP  -Fall precautions  -Aspiration precautions  -Unable to get MRI due to kyphosis.  Follow-up CT pending.  Neurology recommending full dose Eliquis pending imaging

## 2023-08-12 NOTE — ED TRIAGE NOTES
Chief Complaint   Patient presents with    Possible Stroke     From SNF, LKW 09:30 while at breakfast with staff, pt was then found at 12:30 today by staff with weakness, pt arrives with L sided weakness, R sided facial droop, and inability to look left. GCS 15, VSS on RA. Hx A-fib on eliquis.      Pt BIB [facility] for above complaints, VSS on RA, GCS 15, NAD.    Pt started at charge desk as stroke protocol.    BP (!) 153/116   Pulse (!) 116   Resp 16   Ht 1.829 m (6')   Wt 77.1 kg (170 lb)   SpO2 92%   BMI 23.06 kg/m²

## 2023-08-12 NOTE — ASSESSMENT & PLAN NOTE
History of hypotension, bradycardia during previous admission stopped metoprolol, was started on fludrocortisone, work-up was a.m. cortisol at approximately 5 AM of 1.1, systolic blood pressure of 1 30-1 50 in the emergency department  -will continue home fludrocortisone now to avoid hypotension in the setting of CVA.  - will require further work-up

## 2023-08-12 NOTE — PROGRESS NOTES
4 Eyes Skin Assessment Completed by KAELYN Roche and KAELYN Woods.    Head WDL  Ears WDL  Nose Redness and Blanching  Mouth WDL  Neck WDL  Breast/Chest WDL  Shoulder Blades WDL  Spine WDL  (R) Arm/Elbow/Hand Redness and Abrasion  (L) Arm/Elbow/Hand Discoloration, red elbow but blanching, scattered bruising  Abdomen WDL  Groin WDL  Scrotum/Coccyx/Buttocks Redness and Non-Blanching left buttock  (R) Leg WDLdry flaky  (L) Leg Scab and Abrasiondry flaky, scab  (R) Heel/Foot/Toe WDL  (L) Heel/Foot/Toe Redness and Blanching          Devices In Places ECG, Blood Pressure Cuff, Pulse Ox, SCD's, and Condom Cath      Interventions In Place Heel Mepilex and Sacral Mepilex    Possible Skin Injury Yes    Pictures Uploaded Into Epic Yes  Wound Consult Placed Yes  RN Wound Prevention Protocol Ordered Yes

## 2023-08-13 ENCOUNTER — APPOINTMENT (OUTPATIENT)
Dept: RADIOLOGY | Facility: MEDICAL CENTER | Age: 88
DRG: 061 | End: 2023-08-13
Attending: STUDENT IN AN ORGANIZED HEALTH CARE EDUCATION/TRAINING PROGRAM
Payer: MEDICARE

## 2023-08-13 ENCOUNTER — APPOINTMENT (OUTPATIENT)
Dept: CARDIOLOGY | Facility: MEDICAL CENTER | Age: 88
DRG: 061 | End: 2023-08-13
Attending: INTERNAL MEDICINE
Payer: MEDICARE

## 2023-08-13 PROBLEM — I27.20 PULMONARY HYPERTENSION (HCC): Chronic | Status: ACTIVE | Noted: 2021-08-10

## 2023-08-13 PROBLEM — I34.0 MODERATE MITRAL REGURGITATION BY PRIOR ECHOCARDIOGRAM: Chronic | Status: ACTIVE | Noted: 2021-07-29

## 2023-08-13 PROBLEM — I48.20 CHRONIC ATRIAL FIBRILLATION (HCC): Chronic | Status: ACTIVE | Noted: 2021-07-29

## 2023-08-13 PROBLEM — E03.4 HYPOTHYROIDISM DUE TO ACQUIRED ATROPHY OF THYROID: Chronic | Status: ACTIVE | Noted: 2022-02-01

## 2023-08-13 PROBLEM — J84.10 PULMONARY FIBROSIS (HCC): Chronic | Status: ACTIVE | Noted: 2021-07-29

## 2023-08-13 PROBLEM — E55.9 VITAMIN D DEFICIENCY: Chronic | Status: ACTIVE | Noted: 2021-06-01

## 2023-08-13 PROBLEM — D69.6 THROMBOCYTOPENIA (HCC): Chronic | Status: ACTIVE | Noted: 2023-07-23

## 2023-08-13 PROBLEM — G47.33 OSA ON CPAP: Chronic | Status: ACTIVE | Noted: 2021-08-10

## 2023-08-13 PROBLEM — R33.9 URINARY RETENTION: Chronic | Status: ACTIVE | Noted: 2021-08-10

## 2023-08-13 PROBLEM — N18.31 STAGE 3A CHRONIC KIDNEY DISEASE: Chronic | Status: ACTIVE | Noted: 2021-08-10

## 2023-08-13 LAB
ALBUMIN SERPL BCP-MCNC: 3 G/DL (ref 3.2–4.9)
ALBUMIN/GLOB SERPL: 1 G/DL
ALP SERPL-CCNC: 82 U/L (ref 30–99)
ALT SERPL-CCNC: 12 U/L (ref 2–50)
ANION GAP SERPL CALC-SCNC: 11 MMOL/L (ref 7–16)
AST SERPL-CCNC: 21 U/L (ref 12–45)
BASOPHILS # BLD AUTO: 0.3 % (ref 0–1.8)
BASOPHILS # BLD: 0.02 K/UL (ref 0–0.12)
BILIRUB SERPL-MCNC: 0.8 MG/DL (ref 0.1–1.5)
BUN SERPL-MCNC: 17 MG/DL (ref 8–22)
CALCIUM ALBUM COR SERPL-MCNC: 9.1 MG/DL (ref 8.5–10.5)
CALCIUM SERPL-MCNC: 8.3 MG/DL (ref 8.5–10.5)
CHLORIDE SERPL-SCNC: 104 MMOL/L (ref 96–112)
CHOLEST SERPL-MCNC: 142 MG/DL (ref 100–199)
CO2 SERPL-SCNC: 19 MMOL/L (ref 20–33)
CREAT SERPL-MCNC: 0.99 MG/DL (ref 0.5–1.4)
EOSINOPHIL # BLD AUTO: 0.03 K/UL (ref 0–0.51)
EOSINOPHIL NFR BLD: 0.5 % (ref 0–6.9)
ERYTHROCYTE [DISTWIDTH] IN BLOOD BY AUTOMATED COUNT: 51.7 FL (ref 35.9–50)
GFR SERPLBLD CREATININE-BSD FMLA CKD-EPI: 69 ML/MIN/1.73 M 2
GLOBULIN SER CALC-MCNC: 3 G/DL (ref 1.9–3.5)
GLUCOSE BLD STRIP.AUTO-MCNC: 83 MG/DL (ref 65–99)
GLUCOSE BLD STRIP.AUTO-MCNC: 88 MG/DL (ref 65–99)
GLUCOSE BLD STRIP.AUTO-MCNC: 90 MG/DL (ref 65–99)
GLUCOSE SERPL-MCNC: 91 MG/DL (ref 65–99)
HCT VFR BLD AUTO: 37.9 % (ref 42–52)
HDLC SERPL-MCNC: 43 MG/DL
HGB BLD-MCNC: 13.3 G/DL (ref 14–18)
IMM GRANULOCYTES # BLD AUTO: 0.03 K/UL (ref 0–0.11)
IMM GRANULOCYTES NFR BLD AUTO: 0.5 % (ref 0–0.9)
LDLC SERPL CALC-MCNC: 86 MG/DL
LV EJECT FRACT  99904: 55
LYMPHOCYTES # BLD AUTO: 0.8 K/UL (ref 1–4.8)
LYMPHOCYTES NFR BLD: 13 % (ref 22–41)
MAGNESIUM SERPL-MCNC: 1.9 MG/DL (ref 1.5–2.5)
MCH RBC QN AUTO: 33.3 PG (ref 27–33)
MCHC RBC AUTO-ENTMCNC: 35.1 G/DL (ref 32.3–36.5)
MCV RBC AUTO: 94.8 FL (ref 81.4–97.8)
MONOCYTES # BLD AUTO: 0.78 K/UL (ref 0–0.85)
MONOCYTES NFR BLD AUTO: 12.7 % (ref 0–13.4)
NEUTROPHILS # BLD AUTO: 4.49 K/UL (ref 1.82–7.42)
NEUTROPHILS NFR BLD: 73 % (ref 44–72)
NRBC # BLD AUTO: 0 K/UL
NRBC BLD-RTO: 0 /100 WBC (ref 0–0.2)
PHOSPHATE SERPL-MCNC: 2.7 MG/DL (ref 2.5–4.5)
PLATELET # BLD AUTO: 149 K/UL (ref 164–446)
PMV BLD AUTO: 10.8 FL (ref 9–12.9)
POTASSIUM SERPL-SCNC: 4.2 MMOL/L (ref 3.6–5.5)
PROT SERPL-MCNC: 6 G/DL (ref 6–8.2)
RBC # BLD AUTO: 4 M/UL (ref 4.7–6.1)
SODIUM SERPL-SCNC: 134 MMOL/L (ref 135–145)
TRIGL SERPL-MCNC: 67 MG/DL (ref 0–149)
WBC # BLD AUTO: 6.2 K/UL (ref 4.8–10.8)

## 2023-08-13 PROCEDURE — 83735 ASSAY OF MAGNESIUM: CPT

## 2023-08-13 PROCEDURE — 99233 SBSQ HOSP IP/OBS HIGH 50: CPT | Mod: FS | Performed by: NURSE PRACTITIONER

## 2023-08-13 PROCEDURE — A9270 NON-COVERED ITEM OR SERVICE: HCPCS | Performed by: STUDENT IN AN ORGANIZED HEALTH CARE EDUCATION/TRAINING PROGRAM

## 2023-08-13 PROCEDURE — 85025 COMPLETE CBC W/AUTO DIFF WBC: CPT

## 2023-08-13 PROCEDURE — 92612 ENDOSCOPY SWALLOW (FEES) VID: CPT

## 2023-08-13 PROCEDURE — 700102 HCHG RX REV CODE 250 W/ 637 OVERRIDE(OP): Performed by: NURSE PRACTITIONER

## 2023-08-13 PROCEDURE — 700102 HCHG RX REV CODE 250 W/ 637 OVERRIDE(OP): Performed by: STUDENT IN AN ORGANIZED HEALTH CARE EDUCATION/TRAINING PROGRAM

## 2023-08-13 PROCEDURE — 84100 ASSAY OF PHOSPHORUS: CPT

## 2023-08-13 PROCEDURE — 80061 LIPID PANEL: CPT

## 2023-08-13 PROCEDURE — 99232 SBSQ HOSP IP/OBS MODERATE 35: CPT | Performed by: NURSE PRACTITIONER

## 2023-08-13 PROCEDURE — 99223 1ST HOSP IP/OBS HIGH 75: CPT | Performed by: HOSPITALIST

## 2023-08-13 PROCEDURE — 70450 CT HEAD/BRAIN W/O DYE: CPT

## 2023-08-13 PROCEDURE — A9270 NON-COVERED ITEM OR SERVICE: HCPCS | Performed by: INTERNAL MEDICINE

## 2023-08-13 PROCEDURE — 770020 HCHG ROOM/CARE - TELE (206)

## 2023-08-13 PROCEDURE — 700102 HCHG RX REV CODE 250 W/ 637 OVERRIDE(OP): Performed by: INTERNAL MEDICINE

## 2023-08-13 PROCEDURE — 700111 HCHG RX REV CODE 636 W/ 250 OVERRIDE (IP): Mod: JZ | Performed by: INTERNAL MEDICINE

## 2023-08-13 PROCEDURE — A9270 NON-COVERED ITEM OR SERVICE: HCPCS | Performed by: NURSE PRACTITIONER

## 2023-08-13 PROCEDURE — 80053 COMPREHEN METABOLIC PANEL: CPT

## 2023-08-13 PROCEDURE — 92610 EVALUATE SWALLOWING FUNCTION: CPT

## 2023-08-13 PROCEDURE — 93308 TTE F-UP OR LMTD: CPT

## 2023-08-13 PROCEDURE — 93308 TTE F-UP OR LMTD: CPT | Mod: 26 | Performed by: INTERNAL MEDICINE

## 2023-08-13 PROCEDURE — 82962 GLUCOSE BLOOD TEST: CPT | Mod: 91

## 2023-08-13 RX ORDER — ACETAMINOPHEN 325 MG/1
650 TABLET ORAL EVERY 4 HOURS PRN
COMMUNITY

## 2023-08-13 RX ORDER — LEVOTHYROXINE SODIUM 175 UG/1
175 TABLET ORAL
COMMUNITY

## 2023-08-13 RX ORDER — MAGNESIUM SULFATE HEPTAHYDRATE 40 MG/ML
2 INJECTION, SOLUTION INTRAVENOUS ONCE
Status: COMPLETED | OUTPATIENT
Start: 2023-08-13 | End: 2023-08-13

## 2023-08-13 RX ORDER — IPRATROPIUM BROMIDE AND ALBUTEROL SULFATE 2.5; .5 MG/3ML; MG/3ML
3 SOLUTION RESPIRATORY (INHALATION) EVERY 6 HOURS PRN
COMMUNITY

## 2023-08-13 RX ADMIN — ATORVASTATIN CALCIUM 40 MG: 40 TABLET, FILM COATED ORAL at 17:28

## 2023-08-13 RX ADMIN — APIXABAN 5 MG: 5 TABLET, FILM COATED ORAL at 17:27

## 2023-08-13 RX ADMIN — SENNOSIDES AND DOCUSATE SODIUM 2 TABLET: 50; 8.6 TABLET ORAL at 17:27

## 2023-08-13 RX ADMIN — MAGNESIUM SULFATE HEPTAHYDRATE 2 G: 2 INJECTION, SOLUTION INTRAVENOUS at 10:03

## 2023-08-13 ASSESSMENT — CHA2DS2 SCORE
PRIOR STROKE OR TIA OR THROMBOEMBOLISM: YES
VASCULAR DISEASE: NO
CHF OR LEFT VENTRICULAR DYSFUNCTION: NO
CHA2DS2 VASC SCORE: 4
DIABETES: NO
HYPERTENSION: NO
AGE 65 TO 74: NO
AGE 75 OR GREATER: YES
SEX: MALE

## 2023-08-13 ASSESSMENT — PAIN DESCRIPTION - PAIN TYPE
TYPE: ACUTE PAIN

## 2023-08-13 ASSESSMENT — ENCOUNTER SYMPTOMS
ABDOMINAL PAIN: 0
BLURRED VISION: 0
SHORTNESS OF BREATH: 0
DIARRHEA: 0
NAUSEA: 0
LOSS OF CONSCIOUSNESS: 0
DIZZINESS: 0
PALPITATIONS: 0
HEADACHES: 0
DOUBLE VISION: 0
COUGH: 0
VOMITING: 0
FEVER: 0
FOCAL WEAKNESS: 1
CHILLS: 0
SORE THROAT: 0
BACK PAIN: 1

## 2023-08-13 NOTE — CARE PLAN
The patient is Watcher - Medium risk of patient condition declining or worsening    Shift Goals  Clinical Goals: SBP <180    Progress made toward(s) clinical / shift goals:        Problem: Optimal Care of the Stroke Patient  Goal: Optimal emergency care for the stroke patient  Description: Target End Date:  End of day 1    Time of Onset    1.  Time of last known well obtained  2.  Patient and family/caregiver verbalize understanding of diagnosis, medications and testing  3.  NIHSS performed and documented, including date and time, for ischemic stroke patients prior to any acute recanalization therapy (thrombolytics or mechanical) or within 12 hours of arrival if no intervention is warranted  4.  Consults and referrals placed to appropriate departments    Medications Administration as Ordered:    1.  Implement appropriate reversal agents for INR greater than 1.5  2.  Pre-alteplase administration of antihypertensives for SBP >185 DBP >110  3.  Post-alteplase administration of antihypertensives for SBP >185, DBP >105  4.  Thrombolytic Therapy for qualifying ischemic stroke patients who arrive within 4.5 hours of time of Last Known Well. Thrombolytic therapy administered within 30 minutes or a documented reason for delay  Outcome: Progressing     Problem: Neuro Status  Goal: Neuro status will remain stable or improve  Description: Target End Date:  Prior to discharge or change in level of care    Document on Neuro assessment in the Assessment flowsheet    1.  Assess and monitor neurologic status per provider order/protocol/unit policy  2.  Assess level of consciousness and orientation  3.  Assess for speech, dysarthria, dysphagia, facial symmetry  4.  Assess visual field, eye movements, gaze preference, pupil reaction and size  5.  Assess muscle strength and motor response in all four extremities  6.  Assess for sensation (numbness and tingling)  7.  Assess basic neuro reflexes (cough, gag, corneal)  8.  Identify  changes in neuro status and report to provider for testing/treatment orders  Outcome: Progressing     Problem: Hemodynamic Monitoring  Goal: Patient's hemodynamics, fluid balance and neurologic status will be stable or improve  Description: Target End Date:  Prior to discharge or change in level of care    1.  Vital signs, pulse oximetry and cardiac monitor per provider order and/or policy  2.  Frequent pulse checks performed post thrombectomy  3.  Frequent monitoring for signs of bleeding post TPA administration  4.  Proper management of IV infusions  5.  Intake and output monitored per provider order  6.  Daily weight obtained per unit policy or provider order  7.  Peripheral pulses and capillary refill assessed as needed  8.  Monitor for signs/symptoms of excessive bleeding  9.  Body temperature assessed and fevers treated  10. Patient positioned for maximum circulation/cardiac output  Outcome: Progressing     Problem: Respiratory - Stroke Patient  Goal: Patient will achieve/maintain optimum respiratory rate/effort  Description: Target End Date:  Prior to discharge or change in level of care    Document on Assessment flowsheet    1.  Assess and monitor respiratory rate, rhythm, depth and effort of respiration  2.  Oxygenation assessed throughout shift (recommendation of >94% for new stroke patients)  3.  Oxygen administered and/or titrated per order  4.  Collaboration with RT to administer medication/treatments per order  5.  Patient educated on importance of turning, coughing, and deep breathing  6.  Patient positioned for maximum ventilatory efficiency  7.  Airway suctioning provided as needed  8.  Incentive spirometry encouraged 5-10 times every hour or while awake  Outcome: Progressing     Problem: Dysphagia  Goal: Dysphagia will improve  Description: Target End Date:  Prior to discharge or change in level of care    1.  Assess and monitor ability to swallow  2.  Collaborate with Speech Therapy to determine  appropriate adaptation for safe administration of medications and oral nutrition  3.  Elevate head of bed to 90 degrees during feedings and for 30 minutes after each feeding  4.  Encourage proper swallowing techniques  5.  Screening on admission or as soon as possible  Outcome: Progressing       Patient is not progressing towards the following goals:

## 2023-08-13 NOTE — ASSESSMENT & PLAN NOTE
Patient's troponin is running in the 40s however on review of previous records going back more than a year, his troponins are always in the 40s so this would appear to be at his baseline and not indicative of an acute ischemic event  No acute complaints of chest pain  Continue monitor on telemetry.

## 2023-08-13 NOTE — PROGRESS NOTES
Critical Care Progress Note    Date of admission  8/12/2023    Chief Complaint  Left-sided weakness, left facial droop, right gaze deviation and dysarthria.    Hospital Course  Guru Pena is a 89-year-old patient with PMH significant for A-fib on chronic AC with apixaban, CKD 3, pulmonary hypertension, pulmonary fibrosis, JAMES on CPAP, hypothyroidism, kyphosis, and recent COVID infection 7/23/2023 who presented from SNF 8[facility]12 with left-sided weakness, left facial droop, right gaze deviation and dysarthria.  CT head negative for acute findings; CTA head neck negative for LVO or stenosis.  Unable to get CT PE due to patient unable to lie flat due to kyphosis.  After lengthy discussion with patient he received off label TNKase at 1436 on 8/12 and was admitted to the ICU for serial neurologic exams and close hemodynamic monitoring.  Per neurology, okay to defer brain MRI as patient is unable to lie flat.  Recommending repeat head CT in 24 hours and if no significant stroke or hemorrhagic burden would restart apixaban at 5 mg twice daily dosing (previously on 2.5 mg twice daily dosing).    Interval Problem Update  Reviewed last 24 hour events:  Afebrile  AFIB 's  SBP 110s to 130s, no drips  Neuro: alert. Slightly slurred speech with facial droops. Oriented x 4, following commands.  Room air  NPO pending SLP  I/O: 300/525  Mobility 3B    No longer requires ICU care.  Transfer to Neurology floor with telemetry.  Discussed with Hospitalist, Dr. Khan, who will assume care.  Critical care service is available for any questions or concerns.    Review of Systems  Review of Systems   Constitutional:  Negative for chills and fever.   HENT:  Negative for nosebleeds and sore throat.    Eyes:  Negative for blurred vision and double vision.   Respiratory:  Negative for cough and shortness of breath.    Cardiovascular:  Negative for chest pain, palpitations and leg swelling.   Gastrointestinal:  Negative for abdominal pain,  diarrhea, nausea and vomiting.   Genitourinary:  Negative for dysuria and urgency.   Musculoskeletal:  Positive for back pain.   Skin:  Negative for rash.   Neurological:  Positive for focal weakness. Negative for dizziness, loss of consciousness and headaches.   All other systems reviewed and are negative.       Vital Signs for last 24 hours   Temp:  [35.6 °C (96.1 °F)-36.2 °C (97.1 °F)] 35.8 °C (96.5 °F)  Pulse:  [] 98  Resp:  [8-54] 33  BP: (106-164)/() 139/87  SpO2:  [92 %-96 %] 95 %    Hemodynamic parameters for last 24 hours       Respiratory Information for the last 24 hours       Physical Exam   Physical Exam  Constitutional:       General: He is not in acute distress.     Appearance: He is well-developed. He is not diaphoretic.   HENT:      Head: Normocephalic and atraumatic.   Eyes:      Conjunctiva/sclera: Conjunctivae normal.   Neck:      Vascular: No JVD.   Cardiovascular:      Rate and Rhythm: Normal rate.      Heart sounds: Murmur heard.      No gallop.   Pulmonary:      Effort: Pulmonary effort is normal. No respiratory distress.      Breath sounds: No stridor. No wheezing or rales.   Abdominal:      Palpations: Abdomen is soft.      Tenderness: There is no abdominal tenderness. There is no guarding or rebound.   Musculoskeletal:         General: No tenderness.      Right lower leg: No edema.      Left lower leg: No edema.   Skin:     General: Skin is warm and dry.      Findings: No rash.   Neurological:      Mental Status: He is alert and oriented to person, place, and time.      GCS: GCS eye subscore is 4. GCS verbal subscore is 5. GCS motor subscore is 6.      Cranial Nerves: Facial asymmetry present.      Motor: Weakness present.      Comments: LUE/LLE 3/5  RUE/RLE 5/5   Psychiatric:         Mood and Affect: Mood normal.         Thought Content: Thought content normal.         Cognition and Memory: Cognition normal.         Medications  Current Facility-Administered Medications    Medication Dose Route Frequency Provider Last Rate Last Admin    MD Alert...ICU Electrolyte Replacement per Pharmacy   Other PHARMACY TO DOSE Dayday Fletcher M.D.        magnesium sulfate IVPB premix 2 g  2 g Intravenous Once Dayday Fletcher M.D. 25 mL/hr at 08/13/23 1003 2 g at 08/13/23 1003    acetaminophen (Tylenol) tablet 650 mg  650 mg Oral Q6HRS PRN Alex Lopez M.D.        ondansetron (Zofran) syringe/vial injection 4 mg  4 mg Intravenous Q4HRS PRN Alex Lopez M.D.        ondansetron (Zofran ODT) dispertab 4 mg  4 mg Oral Q4HRS PRN Alex Lopez M.D.        senna-docusate (Pericolace Or Senokot S) 8.6-50 MG per tablet 2 Tablet  2 Tablet Oral BID Alex Lopez M.D.        And    polyethylene glycol/lytes (Miralax) PACKET 1 Packet  1 Packet Oral QDAY PRN Alex Lopez M.D.        And    magnesium hydroxide (Milk Of Magnesia) suspension 30 mL  30 mL Oral QDAY PRN Alex Lopez M.D.        And    bisacodyl (Dulcolax) suppository 10 mg  10 mg Rectal QDAY PRN Alex Lopez M.D.        insulin regular (HumuLIN R,NovoLIN R) injection  1-6 Units Subcutaneous Q6HRS Alex Lopez M.D.        And    dextrose 50% (D50W) injection 25 g  25 g Intravenous Q15 MIN PRN Alex Lopez M.D.        labetalol (Normodyne/Trandate) injection 10 mg  10 mg Intravenous Q HOUR PRN Alex Lopez M.D.        hydrALAZINE (Apresoline) injection 20 mg  20 mg Intravenous Q4HRS PRN Alex Lopez M.D.        fludrocortisone (Florinef) tablet 0.1 mg  0.1 mg Oral QAM Yumi Barrera M.D.        levothyroxine (Synthroid) tablet 175 mcg  175 mcg Oral AM ES Yumi Barrera M.D.        atorvastatin (Lipitor) tablet 40 mg  40 mg Oral Q EVENING Yumi Barrera M.D.           Fluids    Intake/Output Summary (Last 24 hours) at 8/13/2023 1101  Last data filed at 8/13/2023 1003  Gross per 24 hour   Intake 821.44 ml   Output 1175 ml   Net -353.56 ml       Laboratory          Recent Labs      08/12/23  1351 08/13/23  0535   SODIUM 134* 134*   POTASSIUM 4.0 4.2   CHLORIDE 102 104   CO2 19* 19*   BUN 19 17   CREATININE 1.03 0.99   MAGNESIUM  --  1.9   PHOSPHORUS  --  2.7   CALCIUM 8.7 8.3*     Recent Labs     08/12/23  1351 08/13/23  0535   ALTSGPT 18 12   ASTSGOT 17 21   ALKPHOSPHAT 89 82   TBILIRUBIN 0.8 0.8   GLUCOSE 98 91     Recent Labs     08/12/23  1351 08/13/23  0535   WBC 7.2 6.2   NEUTSPOLYS 75.10* 73.00*   LYMPHOCYTES 10.10* 13.00*   MONOCYTES 13.50* 12.70   EOSINOPHILS 0.60 0.50   BASOPHILS 0.30 0.30   ASTSGOT 17 21   ALTSGPT 18 12   ALKPHOSPHAT 89 82   TBILIRUBIN 0.8 0.8     Recent Labs     08/12/23  1351 08/13/23  0535   RBC 4.20* 4.00*   HEMOGLOBIN 13.9* 13.3*   HEMATOCRIT 40.4* 37.9*   PLATELETCT 139* 149*   PROTHROMBTM 17.0*  --    APTT 41.5*  --    INR 1.41*  --        Imaging  EKG:  I have personally reviewed the images and compared with prior images.  CT:    Reviewed    Assessment/Plan  CVA (cerebral vascular accident) (HCC)  Assessment & Plan  -Patient received off-label TNKase for left-sided weakness, dysarthria and facial droop  -Serial neurologic exams  -Goal normothermia, normal natremia, normoglycemia  -SBP goal less than 180 -as needed's available  -Neurology following  -PT OT SLP  -Fall precautions  -Aspiration precautions  -Unable to get MRI due to kyphosis.  Follow-up CT pending.  Neurology recommending full dose Eliquis pending imaging        Thrombocytopenia (HCC)- (present on admission)  Assessment & Plan  -chronic and stable at his baseline  -monitor for bleeding  -follow CBC    Hypothyroidism due to acquired atrophy of thyroid- (present on admission)  Assessment & Plan  -resume home levothyroxine    Stage 3a chronic kidney disease (HCC)- (present on admission)  Assessment & Plan  -chronic and stable at his baseline  -avoid nephrotoxins  -renally dose medications as indicated    Pulmonary hypertension (HCC)- RVSP= 30,  ECHO 7/2021- (present on admission)  Assessment &  Plan  -noted on ECHO  -not currently on medications    JAMES on CPAP- (present on admission)  Assessment & Plan  -nocturnal CPAP    Chronic atrial fibrillation (HCC)- (present on admission)  Assessment & Plan  -Currently rate controlled without intervention/medication  -Resume Eliquis per neurology recommendations  -Telemetry monitoring  -Optimize electrolytes    Elevated troponin  Assessment & Plan  -suspect demand ischemia  -denies chest pain  -no ST segment changes on EKG         VTE:  Contraindicated  Ulcer: Not Indicated  Lines: None    I have performed a physical exam and reviewed and updated ROS and Plan today (8/13/2023). In review of yesterday's note (8/12/2023), there are no changes except as documented above.     Discussed patient condition and risk of morbidity and/or mortality with Hospitalist, RN, Patient, neurology, and my attending Dr. Fletcher  Please note that this dictation was created using voice recognition software. I have made every reasonable attempt to correct obvious errors, but there may be errors of grammar and possibly content that I did not discover before finalizing the note.    JAH Hobson.

## 2023-08-13 NOTE — PROGRESS NOTES
Unable to address [facility] rec at this time. Patient is from [facility] ([phone]). MAR not received or not scanned into media. Contacted facility to request copy of patient's MAR at 17:54.

## 2023-08-13 NOTE — ASSESSMENT & PLAN NOTE
Patient was diagnosed with this on a previous recent admission  At that time he was taking off of metoprolol and started on Florinef which we are continuing  Continue telemetry monitoring

## 2023-08-13 NOTE — CONSULTS
Hospital Medicine Consultation    Date of Service  8/13/2023    Referring Physician  Ranjan Reed    Consulting Physician  Jose Miguel London D.O.    Reason for Consultation      History of Presenting Illness  89 y.o. male who presented 8/12/2023 with with history of atrial fibrillation on dose adjusted apixaban, hypothyroidism.  He is a resident at a skilled nursing facility    Patient presented on August 12 with left-sided deficits.  CTA was negative for LVO, CT noncontrast was negative for other acute pathologies.  Unable to tolerate positioning for perfusion study.  After review with neurology, and based on the severity of his symptoms, the decision was given to give him TNK, the patient was admitted to the ICU.  On the monitor the patient has been in rate controlled A-fib/flutter generally.    During my exam the patient reports he is feeling a little better today.  He thinks his speech is stronger, but he still feels weak on the left side.  He normally is able to ambulate with a walker, but feels that he would not be able to do so now.  He is not having any pain, and has no other complaints.    Review of Systems  Review of Systems   Constitutional:  Negative for chills and fever.   HENT:  Negative for nosebleeds and sore throat.    Eyes:  Negative for blurred vision and double vision.   Respiratory:  Negative for cough and shortness of breath.    Cardiovascular:  Negative for chest pain, palpitations and leg swelling.   Gastrointestinal:  Negative for abdominal pain, diarrhea, nausea and vomiting.   Genitourinary:  Negative for dysuria and urgency.   Musculoskeletal:  Positive for back pain.   Skin:  Negative for rash.   Neurological:  Positive for focal weakness. Negative for dizziness, loss of consciousness and headaches.       Past Medical History   has a past medical history of A-fib (HCC), Elevated troponin (6/30/2021), Generalized weakness (6/22/2021), Hypothyroid, and Other specified hypothyroidism  (6/1/2021).    Surgical History   has no past surgical history on file.    Family History  family history is not on file.    Social History   reports that he has never smoked. He has never used smokeless tobacco. He reports that he does not drink alcohol and does not use drugs.    Medications  Prior to Admission Medications   Prescriptions Last Dose Informant Patient Reported? Taking?   Multiple Vitamins-Minerals (PRESERVISION AREDS) Tab 8/12/2023 at 0800 Patient Yes No   Sig: Take 1 tablet by mouth 2 times a day.   acetaminophen (TYLENOL) 325 MG Tab 8/6/2023 at 0130  Yes Yes   Sig: Take 650 mg by mouth every four hours as needed.   amoxicillin-clavulanate (AUGMENTIN) 875-125 MG Tab 8/12/2023 at 0800  Yes Yes   Sig: Take 1 Tablet by mouth every 12 hours. For 7 day starting 8/11/23   apixaban (ELIQUIS) 2.5mg Tab 8/12/2023 at 0800 Patient No No   Sig: Take 1 Tablet by mouth 2 times a day.   dexamethasone (DECADRON) 6 MG Tab 8/2/2023 at COMPLETED  Yes Yes   Sig: Take 6 mg by mouth every day. 7 day course started 7/28/23   fludrocortisone (FLORINEF) 0.1 MG Tab 8/12/2023 at 0800  No No   Sig: Take 1 Tablet by mouth every morning.   ipratropium-albuterol (DUONEB) 0.5-2.5 (3) MG/3ML nebulizer solution 8/12/2023 at 0600  Yes Yes   Sig: Take 3 mL by nebulization every 6 hours as needed for Shortness of Breath.   levothyroxine (SYNTHROID) 175 MCG Tab 8/12/2023 at 0600  Yes Yes   Sig: Take 175 mcg by mouth every morning on an empty stomach.   magnesium hydroxide (MILK OF MAGNESIA) 400 MG/5ML Suspension 8/5/2023 at 1328  Yes Yes   Sig: Take 30 mL by mouth 1 time a day as needed.   sucralfate (CARAFATE) 1 GM/10ML Suspension 8/12/2023 at 1130  Yes Yes   Sig: Take 1 g by mouth 4 Times a Day,Before Meals and at Bedtime. For 7 days starting 8/11/23   sulfamethoxazole-trimethoprim (BACTRIM DS) 800-160 MG tablet 8/11/2023 at COMPLETED  Yes Yes   Sig: Take 1 Tablet by mouth every 12 hours. 5 day course started 8/6/23   vitamin D  (CHOLECALCIFEROL) 1000 Unit (25 mcg) Tab 8/12/2023 at 0800 Patient Yes No   Sig: Take 1,000 Units by mouth every day.      Facility-Administered Medications: None       Allergies  Allergies   Allergen Reactions    Digoxin      nausea       Physical Exam  Temp:  [35.6 °C (96.1 °F)-36.1 °C (97 °F)] 36.1 °C (97 °F)  Pulse:  [] 115  Resp:  [8-54] 26  BP: (113-164)/() 121/74  SpO2:  [92 %-95 %] 94 %    Physical Exam  Constitutional:       General: He is not in acute distress.     Appearance: He is well-developed. He is not diaphoretic.   HENT:      Head: Normocephalic and atraumatic.   Eyes:      Conjunctiva/sclera: Conjunctivae normal.   Neck:      Vascular: No JVD.   Cardiovascular:      Rate and Rhythm: Normal rate.      Heart sounds: Murmur heard.      No gallop.   Pulmonary:      Effort: Pulmonary effort is normal. No respiratory distress.      Breath sounds: No stridor. No wheezing or rales.   Abdominal:      Palpations: Abdomen is soft.      Tenderness: There is no abdominal tenderness. There is no guarding or rebound.   Musculoskeletal:         General: No tenderness.      Right lower leg: No edema.      Left lower leg: No edema.   Skin:     General: Skin is warm and dry.      Findings: No rash.   Neurological:      Mental Status: He is alert and oriented to person, place, and time.      Comments: Patient is alert and oriented  Speech is very slightly dysarthric however content is logical  No expressive or receptive deficits  Left facial droop  Tongue midline  EOMI  3+/5 left arm and leg, normal strength otherwise.  Gait not tested pending PT eval   Psychiatric:         Thought Content: Thought content normal.         Fluids  Date 08/13/23 0700 - 08/14/23 0659   Shift 3470-5769 3798-2991 2490-4968 24 Hour Total   INTAKE   I.V. 400   400   Shift Total 400   400   OUTPUT   Urine 100   100   Shift Total 100   100   Weight (kg) 73.3 73.3 73.3 73.3       Laboratory  Recent Labs     08/12/23  1351  08/13/23  0535   WBC 7.2 6.2   RBC 4.20* 4.00*   HEMOGLOBIN 13.9* 13.3*   HEMATOCRIT 40.4* 37.9*   MCV 96.2 94.8   MCH 33.1* 33.3*   MCHC 34.4 35.1   RDW 53.4* 51.7*   PLATELETCT 139* 149*   MPV 10.8 10.8     Recent Labs     08/12/23  1351 08/13/23  0535   SODIUM 134* 134*   POTASSIUM 4.0 4.2   CHLORIDE 102 104   CO2 19* 19*   GLUCOSE 98 91   BUN 19 17   CREATININE 1.03 0.99   CALCIUM 8.7 8.3*     Recent Labs     08/12/23  1351   APTT 41.5*   INR 1.41*          Recent Labs     08/13/23  0535   TRIGLYCERIDE 67   HDL 43   LDL 86        Imaging  DX-CHEST-PORTABLE (1 VIEW)   Final Result      1.  Mildly enlarged cardiac silhouette with probable vascular congestion.      CT-CTA NECK WITH & W/O-POST PROCESSING   Final Result      No significant stenosis or occlusion      CT-CTA HEAD WITH & W/O-POST PROCESS   Final Result      1.  CT angiogram of the [place] of  demonstrating no evidence of large vessel occlusion.      CT-HEAD W/O   Final Result      1.  There is no acute intracranial hemorrhage or infarct.      2.  White matter lucencies most consistent with small vessel ischemic change versus demyelination or gliosis.      3.  There is cerebral atrophy.         EC-ECHOCARDIOGRAM COMPLETE W/O CONT    (Results Pending)   CT-HEAD W/O    (Results Pending)       Assessment/Plan  Idiopathic hypotension  Assessment & Plan  Patient was diagnosed with this on a previous recent admission  At that time he was taking off of metoprolol and started on Florinef which we are continuing  Continue telemetry monitoring    CVA (cerebral vascular accident) (HCC)  Assessment & Plan  Patient presents acutely with left-sided deficits  Now s/p TNK  MRI pending  TTE pending  Rhythm on monitor has been A-fib consistent with known history.  He is maintained on apixaban 2.5 mg twice daily  Neurology consulted  Lipid panel and A1c pending  PT OT and speech consulted  Patient is a resident of a skilled nursing facility however he is able to  ambulate normally with front wheeled walker independently  Pending completed work-up anticipate discharge on full dose anticoagulation with apixaban 5 mg twice daily  Probable rehab discharge  Continue with telemetry    Thrombocytopenia (HCC)- (present on admission)  Assessment & Plan  Patient runs chronically between 120 and 115  Continue to monitor    Hypothyroidism due to acquired atrophy of thyroid- (present on admission)  Assessment & Plan  TSH done on this admission 3.89.  Continue current dose of replacement    Urinary retention- (present on admission)  Assessment & Plan  Monitor for evidence of retention    Stage 3a chronic kidney disease (HCC)- (present on admission)  Assessment & Plan  Baseline creatinine around 1 which is where he is currently  Renally dose medications as appropriate  Daily BMP    JAMES on CPAP- (present on admission)  Assessment & Plan  Nightly CPAP    Pulmonary fibrosis (HCC)- (present on admission)  Assessment & Plan  O2 RT protocols    Moderate mitral regurgitation by prior echocardiogram- (present on admission)  Assessment & Plan  Checking cardiac echo    Chronic atrial fibrillation (HCC)- (present on admission)  Assessment & Plan  Patient is anticoagulated on dose adjusted apixaban 2.5 mg twice daily.  He is not on any rate or rhythm control medications as an outpatient and has not required them in house    Elevated troponin  Assessment & Plan  Patient's troponin is running in the 40s however on review of previous records going back more than a year, his troponins are always in the 40s so this would appear to be at his baseline and not indicative of an acute ischemic event    Vitamin D deficiency- (present on admission)  Assessment & Plan  Continue replacement

## 2023-08-13 NOTE — HOSPITAL COURSE
Guru Pena is a 89-year-old patient with PMH significant for A-fib on chronic AC with apixaban, CKD 3, pulmonary hypertension, pulmonary fibrosis, JAMES on CPAP, hypothyroidism, kyphosis, and recent COVID infection 7/23/2023 who presented from SNF 8/12 with left-sided weakness, left facial droop, right gaze deviation and dysarthria.  CT head negative for acute findings; CTA head neck negative for LVO or stenosis.  Unable to get CT PE due to patient unable to lie flat due to kyphosis.  After lengthy discussion with patient he received off label TNKase at 1436 on 8/12 and was admitted to the ICU for serial neurologic exams and close hemodynamic monitoring.  Per neurology, okay to defer brain MRI as patient is unable to lie flat.  Recommending repeat head CT in 24 hours and if no significant stroke or hemorrhagic burden would restart apixaban at 5 mg twice daily dosing (previously on 2.5 mg twice daily dosing).

## 2023-08-13 NOTE — CARE PLAN
The patient is Stable - Low risk of patient condition declining or worsening    Shift Goals  Clinical Goals: HR <130  Patient Goals: Improvement of Stroke Symptons  Family Goals: RYAN    Progress made toward(s) clinical / shift goals:    Problem: Optimal Care of the Stroke Patient  Goal: Optimal emergency care for the stroke patient  Outcome: Progressing     Problem: Knowledge Deficit - Stroke Education  Goal: Patient's knowledge of stroke and risk factors will improve  Outcome: Progressing     Problem: Psychosocial - Patient Condition  Goal: Patient's ability to verbalize feelings about condition will improve  Outcome: Progressing     Problem: Psychosocial  Goal: Patient's ability to verbalize feelings about condition will improve  Outcome: Progressing       Patient is not progressing towards the following goals:

## 2023-08-13 NOTE — ASSESSMENT & PLAN NOTE
Patient is anticoagulated on dose adjusted apixaban 2.5 mg twice daily.  Held due to hemorrhagic conversion.  Plan is to resume anticoagulation after 7 days.  He is not on any rate or rhythm control medications as an outpatient and has not required them in house

## 2023-08-13 NOTE — CARE PLAN
The patient is Stable - Low risk of patient condition declining or worsening    Shift Goals  Clinical Goals: SBP <180  Patient Goals: Eat something Today  Family Goals: RYAN          Progress made toward(s) clinical / shift goals:    Problem: Risk for Aspiration  Goal: Patient's risk for aspiration will be absent or decrease  Outcome: Progressing     Problem: Venous Thromboembolism (VTE) Prevention  Goal: The patient will remain free from venous thromboembolism (VTE)  Outcome: Progressing     Problem: Hemodynamics  Goal: Patient's hemodynamics, fluid balance and neurologic status will be stable or improve  Outcome: Progressing     Problem: Fall Risk  Goal: Patient will remain free from falls  Outcome: Progressing     Problem: Skin Integrity  Goal: Skin integrity is maintained or improved  Outcome: Progressing       Patient is not progressing towards the following goals:    Problem: Mobility - Stroke  Goal: Patient's capacity to carry out activities will improve  Outcome: Progressing

## 2023-08-13 NOTE — ASSESSMENT & PLAN NOTE
08/15/23    Patient runs chronically between 120 and 115  Continue to monitor  I ordered CBC for tomorrow.

## 2023-08-13 NOTE — ASSESSMENT & PLAN NOTE
Patient presents acutely with left-sided deficits  Now s/p TNK  MRI pending  TTE pending  Rhythm on monitor has been A-fib consistent with known history.  He is maintained on apixaban 2.5 mg twice daily  Neurology consulted  Lipid panel and A1c pending  PT OT and speech consulted  Patient is a resident of a skilled nursing facility however he is able to ambulate normally with front wheeled walker independently  Pending completed work-up anticipate discharge on full dose anticoagulation with apixaban 5 mg twice daily  Probable rehab discharge  Continue with telemetry    I reviewed CT scan  Plan is to hold Eliquis for 7 days as recommended by neurology.  I discussed plan of care with bedside RN.  I am transitioning his neurochecks from every 2 hourly to every 4 hourly.  Plan is to transfer him to neuro floor with telemetry.

## 2023-08-13 NOTE — PROGRESS NOTES
Neurology Progress Note  Neurohospitalist Service, [facility]    Referring Physician: Luis Hanna M.D.    STROKE CODE:   Chief Complaint   Patient presents with    Possible Stroke     From [facility], LKW 09:30 while at breakfast with staff, pt was then found at 12:30 today by staff with weakness, pt arrives with L sided weakness, R sided facial droop, and inability to look left. GCS 15, VSS on RA. Hx A-fib on eliquis.        To obtain the most accurate data regarding the time called, and time patient seen, refer to the stroke run-sheet and chart.  For time of CT, refer to the radiology report. See A&P below for TPA Decision and door to needle time if and when applicable.    HPI: Guru Pena is a 89 y.o. male with a past medical history of atrial fibrillation on Eliquis 2.5mg BID, and hypothyroidism who presented via EMS from his SNF with left sided weakness, left facial droop, right gaze deviation and dysarthria. Per EMS the patient awoke in his normal state of health and ate breakfast and was last seen normal at 0930. The SNF staff noticed the patient with the above mentioned deficits when they gathered for lunch at 1230. In the ED the patient was normotensive with systolics in the 130s. Noncontrast CT head negative for acute findings. CTA head/neck negative for identifiable LVO or flow limiting stenoses. Technically difficult imaging study secondary to patient's kyphosis. Neurology was consulted for further evaluation of the above.     Interval Update 08/13/2023: Improvement in neuro exam following administration of thrombolytics. No acute events overnight. Repeat CT head does not show hemorrhagic conversion.    Review of systems: In addition to what is detailed in the HPI above, all other systems reviewed and are negative.    Past Medical History:    has a past medical history of A-fib (HCC), Elevated troponin (6/30/2021), Generalized weakness (6/22/2021), Hypothyroid, and Other  specified hypothyroidism (6/1/2021).    FHx:  family history is not on file.    SHx:   reports that he has never smoked. He has never used smokeless tobacco. He reports that he does not drink alcohol and does not use drugs.    Allergies:  Allergies   Allergen Reactions    Digoxin      nausea       Medications:    Current Facility-Administered Medications:     MD Alert...ICU Electrolyte Replacement per Pharmacy, , Other, PHARMACY TO DOSE, Dayday Fletcher M.D.    acetaminophen (Tylenol) tablet 650 mg, 650 mg, Oral, Q6HRS PRN, Alex Lopez M.D.    ondansetron (Zofran) syringe/vial injection 4 mg, 4 mg, Intravenous, Q4HRS PRN, Alex Lopez M.D.    ondansetron (Zofran ODT) dispertab 4 mg, 4 mg, Oral, Q4HRS PRN, Alex Loepz M.D.    senna-docusate (Pericolace Or Senokot S) 8.6-50 MG per tablet 2 Tablet, 2 Tablet, Oral, BID **AND** polyethylene glycol/lytes (Miralax) PACKET 1 Packet, 1 Packet, Oral, QDAY PRN **AND** magnesium hydroxide (Milk Of Magnesia) suspension 30 mL, 30 mL, Oral, QDAY PRN **AND** bisacodyl (Dulcolax) suppository 10 mg, 10 mg, Rectal, QDAY PRN, Alex Lopez M.D.    insulin regular (HumuLIN R,NovoLIN R) injection, 1-6 Units, Subcutaneous, Q6HRS **AND** POC blood glucose manual result, , , Q6H **AND** NOTIFY MD and PharmD, , , Once **AND** Administer 20 grams of glucose (approximately 8 ounces of fruit juice) every 15 minutes PRN FSBG less than 70 mg/dL, , , PRN **AND** dextrose 50% (D50W) injection 25 g, 25 g, Intravenous, Q15 MIN PRN, Alex Lopez M.D.    NS infusion, , Intravenous, Continuous, Alex Lopez M.D., Last Rate: 50 mL/hr at 08/12/23 1708, New Bag at 08/12/23 1708    labetalol (Normodyne/Trandate) injection 10 mg, 10 mg, Intravenous, Q HOUR PRN, Alex Lopez M.D.    hydrALAZINE (Apresoline) injection 20 mg, 20 mg, Intravenous, Q4HRS PRN, Alex Lopez M.D.    niCARdipine (Cardene) 25 mg in  mL Standard Infusion, 2.5-15 mg/hr, Intravenous,  Continuous, Alex Lopez M.D., Dose not Required at 08/12/23 1545    fludrocortisone (Florinef) tablet 0.1 mg, 0.1 mg, Oral, QAM, Yumi Barrera M.D.    levothyroxine (Synthroid) tablet 175 mcg, 175 mcg, Oral, AM ES, Yumi Barrera M.D.    atorvastatin (Lipitor) tablet 40 mg, 40 mg, Oral, Q EVENING, Yumi Barrera M.D.    Physical Examination:    Vitals:    08/12/23 2300 08/13/23 0000 08/13/23 0100 08/13/23 0700   BP: 124/69 113/79 (!) 136/90 119/81   Pulse: (!) 111 (!) 120 (!) 113 (!) 130   Resp: (!) 22 (!) 42 13 (!) 11   Temp: (!) 35.6 °C (96.1 °F) (!) 35.6 °C (96.1 °F)     TempSrc: Temporal Temporal     SpO2: 93% 94% 95% 92%   Weight:       Height:             General: Patient is awake and in no acute distress  Eye: Examination of optic disks not indicated at this time given acuity of consult  Neck: There is normal range of motion  CV: Regular rate   Extremities:  Clear, dry, intact, without peripheral edema    NEUROLOGICAL EXAM:     Mental status: Awake, alert and fully oriented  Speech and language: Speech is mildly dysarthric and fluent. The patient is able to name and repeat, and follow commands  Cranial nerve exam: Pupils are equal, round and reactive to light bilaterally. Visual fields are full. There is no nystagmus. Midline gaze. Slight right facial droop Sensation in the face is intact to light touch. Palate elevates symmetrically. Tongue is midline.  Motor exam: 5/5 strength in the RUE and RLE extremities. 4/5 in LUE and LLE with drift appreciated  Sensory exam:  Diminished sensation on left side  Deep tendon reflexes:  2+ throughout. Toes down-going bilaterally.  Coordination: LUE ataxia  Gait: Deferred due to patient acuity    NIHSS: National Institutes of Health Stroke Scale    [0] 1a:Level of Consciousness    0-alert 1-drowsy   2-stupor   3-coma  [0] 1b:LOC Questions                  0-both  1-one      2-neither  [0] 1c:LOC Commands                   0-both  1-one      2-neither  [0]  2: Best Gaze                     0-nl    1-partial  2-forced  [0] 3: Visual Fields                   0-nl    1-partial  2-complete 3-bilat  [1] 4: Facial Paresis                0-nl    1-minor    2-partial  3-full  MOTOR                       0-nl  [0] 5: Right Arm           1-drift  [1] 6: Left Arm             2-some effort vs gravity  [0] 7: Right Leg           3-no effort vs gravity  [0] 8: Left Leg             4-no movement                             x-untestable  [1] 9: Limb Ataxia                    0-abs   1-1_limb   2-2+_limbs       x-untestable  [1] 10:Sensory                        0-nl    1-partial  2-dense  [0] 11:Best Language/Aphasia         0-nl    1-mild/mod 2-severe   3-mute  [0] 12:Dysarthria                     0-nl    1-mild/mod 2-severe       x-untestable  [1] 13:Neglect/Inattention            0-none  1-partial  2-complete  [5] TOTAL    Baseline Modified Kia Scale (MRS): 4 = Moderately severe disability; unable to walk without assistance and unable to attend to own bodily needs without assistance    Objective Data:    Labs:  Lab Results   Component Value Date/Time    PROTHROMBTM 17.0 (H) 08/12/2023 01:51 PM    INR 1.41 (H) 08/12/2023 01:51 PM      Lab Results   Component Value Date/Time    WBC 6.2 08/13/2023 05:35 AM    RBC 4.00 (L) 08/13/2023 05:35 AM    HEMOGLOBIN 13.3 (L) 08/13/2023 05:35 AM    HEMATOCRIT 37.9 (L) 08/13/2023 05:35 AM    MCV 94.8 08/13/2023 05:35 AM    MCH 33.3 (H) 08/13/2023 05:35 AM    MCHC 35.1 08/13/2023 05:35 AM    MPV 10.8 08/13/2023 05:35 AM    NEUTSPOLYS 73.00 (H) 08/13/2023 05:35 AM    LYMPHOCYTES 13.00 (L) 08/13/2023 05:35 AM    MONOCYTES 12.70 08/13/2023 05:35 AM    EOSINOPHILS 0.50 08/13/2023 05:35 AM    BASOPHILS 0.30 08/13/2023 05:35 AM      Lab Results   Component Value Date/Time    SODIUM 134 (L) 08/13/2023 05:35 AM    POTASSIUM 4.2 08/13/2023 05:35 AM    CHLORIDE 104 08/13/2023 05:35 AM    CO2 19 (L) 08/13/2023 05:35 AM    GLUCOSE 91 08/13/2023 05:35 AM     BUN 17 08/13/2023 05:35 AM    CREATININE 0.99 08/13/2023 05:35 AM      Lab Results   Component Value Date/Time    CHOLSTRLTOT 142 08/13/2023 05:35 AM    LDL 86 08/13/2023 05:35 AM    HDL 43 08/13/2023 05:35 AM    TRIGLYCERIDE 67 08/13/2023 05:35 AM       Lab Results   Component Value Date/Time    ALKPHOSPHAT 82 08/13/2023 05:35 AM    ASTSGOT 21 08/13/2023 05:35 AM    ALTSGPT 12 08/13/2023 05:35 AM    TBILIRUBIN 0.8 08/13/2023 05:35 AM        Imaging/Testing:    I interpreted and/or reviewed the patient's neuroimaging    DX-CHEST-PORTABLE (1 VIEW)   Final Result      1.  Mildly enlarged cardiac silhouette with probable vascular congestion.      CT-CTA NECK WITH & W/O-POST PROCESSING   Final Result      No significant stenosis or occlusion      CT-CTA HEAD WITH & W/O-POST PROCESS   Final Result      1.  CT angiogram of the [place] of  demonstrating no evidence of large vessel occlusion.      CT-HEAD W/O   Final Result      1.  There is no acute intracranial hemorrhage or infarct.      2.  White matter lucencies most consistent with small vessel ischemic change versus demyelination or gliosis.      3.  There is cerebral atrophy.         EC-ECHOCARDIOGRAM COMPLETE W/O CONT    (Results Pending)   MR-BRAIN-W/O    (Results Pending)   CT-HEAD W/O    (Results Pending)       Assessment and Plan:    89 y.o. male with PMHx of afib on Eliquis 2.5mg BID presenting from SNF with left sided weakness and facial droop and right gaze deviation. Stroke protocol CT head negative for acute intracranial abnormalities. CTA head/neck negative for LVO or flow limiting stenoses. This is likely a small vessel stroke. Improvement in symptoms following administration of TNK. Repeat CT head negative for hemorrhagic transformation. Will resume Eliquis at 5mg BID.      Problem list:  Stroke    Recommendations:  -Q4H neuro exams and can transfer to the floor once outside the post thrombolytic protocol window this afternoon  -Blood pressure goal  -140, normalize to long term goal of 110-130/60-80 in coming days. Antihypertensives per primary team '  -Start Eliquis 5mg BID  -Telemetry; currently afib. No need for TTE from neurology perspective as patient has known history of afib and is on NOAC  -Check Stroke Labs: LDL 86, check HgbA1c  -Atorvastatin 40 mg PO q HS. Titrate to long term LDL goal < 70  -BG management per primary team. BG goal .   -PT/OT/SLP eval and treat.   -DVT PPX: SCDs.      The plan of care above has been discussed with Dr. Bharath Wylie, Stroke Neurology. Please call with any questions.          MARILEE Mena  Stroke Neurology, Acute Care Services

## 2023-08-13 NOTE — THERAPY
Speech Language Pathology   Flexible Endoscopic Evaluation of Swallowing (FEES)        Patient Name: Guru Pnea  AGE:  89 y.o., SEX:  male  Medical Record #: [id number]  Date of Service: 8/13/2023      History of Present Illness  89 y/o male presented 8/12 with left-sided deficits. CTA was negative for LVO, CT noncontrast was negative for other acute pathologies.  Unable to tolerate positioning for perfusion study. S/p TNK.    Pertinent Information  Current Method of Nutrition: NPO until cleared by speech pathology  Patient Behaviors: Confused   Dentition: Fair, Lower partial, Upper partial   Feeding Tube: None   Tracheostomy: No   Factor(s) Affecting Performance: Impaired mental status, Impaired command following, Impaired endurance     Discussed the risks, benefits, and alternatives of the FEES procedure. Patient/family acknowledged and agreed to proceed.      Assessment  Flexible Endoscopic Evaluation of Swallowing (FEES) completed at bedside today. The endoscope was passed transnasally via Right nare to evaluate the anatomy and physiology of swallowing. Pt did not tolerate the procedure; he repeatedly tried to remove scope and complained of discomfort. Required consistent cues and verbal/tactile redirection from SLP and tech to continue with study. Advanced trials of solids not completed because pt declined further trials after what is documented below.    Anatomic Findings: WFL  Vocal Fold Motion: Bilateral movement with hyper-movement of the R arytenoid and reduced movement of the L  Secretion Management: Adequate  PO Trials: Ice Chips, Thin Liquid, Mildly Thick Liquid, Pudding, Liquidised      Consistency PAS Score Timing Residue Comments   Thin Liquid 6 Pre Swallow, During swallow, Post swallow Vallecular Residue: Mild (5%-25%)  Pyriform Sinus Residue: Mild (5%-25%) Tsp - gross PAS 4 before, cleared to PAS 3  Cup - PAS 5 before, PAS 6 x3 (PAS 6 would be re-aspirated)  Straw - PAS 4 before/during    Mildly Thick 8 During Swallow, Pre Swallow Vallecular Residue: Mild (5%-25%)  Pyriform Sinus Residue: Mild (5%-25%) Cup - PAS 3 before; PAS 1, 3, 5 during  Straw - PAS 2 before, PAS 5 during, PAS 8 during   Liquidised 2 Pre Swallow Vallecular Residue: Mild (5%-25%)  Pyriform Sinus Residue: Mild (5%-25%)    Pudding 3 During Swallow Vallecular Residue: Moderate (25%-50%)  Pyriform Sinus Residue: Moderate (25%-50%)      Penetration-Aspiration Scale (PAS)  1     No contrast enters airway  2     Contrast enters the airway, remains above the vocal folds, and is ejected from the airway (not seen in the airway at the end of the swallow).  3     Contrast enters the airway, remains above the vocal folds, and is not ejected from the airway (is seen in the airway after the swallow).  4     Contrast enters the airway, contacts the vocal folds, and is ejected from the airway.  5     Contrast enters the airway, contacts the vocal folds, and is not ejected from the airway  6     Contrast enters the airway, crosses the plane of the vocal folds, and is ejected from the airway.  7     Contrast enters the airway, crosses the plane of the vocal folds, and is not ejected from the airway despite effort.  8     Contrast enters the airway, crosses the plane of the vocal folds, is not ejected from the airway and there is no response to aspiration.      Oral phase  Poor coordination of bolus stripping from spoon, oral acceptance of cup, and straw suction, suspect worsened by agitation  with scope. Improved with self-feeding but was impulsive with bolus sizes at times. Gross anterior spillage of TN0 by tsp on first attempt did result in complete loss of bolus. Loss of bolus control throughout study with TN0 and MT2 which did result in gross deep penetration of TN0 by tsp, cup, and straw and in shallow penetration of MT2 by cup and straw before the swallow. Inconsistent ability to clear.     Pharyngeal phase:  Pharyngeal phase characterized  by impairments in timing of pharyngeal swallow, pharyngeal sensation, epiglottic inversion, LVC, BOT retraction, and pharyngeal constriction/shortening which resulted in the following:  - Aspiration and deep penetration of TN0 and MT2 related to loss of bolus control as described above  - Aspiration of TN0 during the swallow (inferred) which was cleared and ultimately re-aspirated. Did not completely clear from the airway. Single instance of aspiration with MT2 with straw only; cued cough did clear.    - Deep penetration (to the vocal folds) with TN0 and MT2 during the swallow (inferred). Gross amounts with MT2 by straw only. Cued cough effective to clear throughout study  - Shallow penetration with LQ3 and PU4 during the swallow (inferred). Penetrate did not completely clear from the airway with PU4.   - Mild-moderate vallecular residue throughout study that did not clear spontaneously  - Mild to moderate pyriform sinus residue throughout study that did not clear spontaneously  -  Mild PPW residue with solid trials (LQ3 and PU4) in ring at the level of the epiglottis that did not clear spontaneously    Compensatory Strategies:  Cued cough - EFFECTIVE to clear aspirate of MT2 into the laryngeal vestibule (cleared to PAS 3)  Liquid wash - EFFECTIVE to reduce residue    Pt poor tolerance of scope and overall impaired mentation precludes further strategies    Severity Rating:  PRIYA: Moderate-Severe      Clinical Impressions  The pt presents with a moderate-severe oropharyngeal dysphagia, likely acute on chronic related to acute CVA in the setting of age-related changes and debility. Swallow safety is impaired; pharyngeal efficiency is impaired. Discussed with DO who wishes to avoid NG if possible; concern for increased confusion and agitation with non-oral nutrition. Use of strategies as outlined below and posted HOB does significantly decrease risk. Would recommend PU4/MT2 diet at this time with use of 1:1 spv,  "periodic cough, and frequent oral care.     Pt will likely benefit from repeat diagnostic evaluation, recommend MBSS if appropriate, prior to upgrading diet given inconsistent sensation and clearance of aspiration. Pt appears to be a fair to guarded candidate for ongoing behavorial and exercise-based swallow rehabilitation. Dysphagia outcomes can be maximized with use of mobility as pt is able and frequent, thorough oral care.         Recommendations  Pureed solids with mildly thick liquids.   Some acceptance of risk (DO aware).  Strategies below significantly decrease risk:  Medication: Crush with applesauce, as appropriate, Non Oral  Supervision: 1:1 feeding with constant supervision, Careful 1:1 hand feeding  Positioning: Fully upright and midline during oral intake  Strategies: Small bites/sips, Slow rate of intake, Multiple swallows (2-3) per bite/sips, No straws, Alternate bites and sips, Periodic cough/clear, oral care and mobility as tolerated  Oral Care: Pre- and post-meals  Additional Instrumentation: Repeat diagnostic study when clinically appropriate         SLP Treatment Plan  Treatment Plan: Dysphagia Treatment, Patient/Family/Caregiver Training (per results of SLE/cog, pending)  SLP Frequency: 3x Per Week  Estimated Duration: Until Therapy Goals Met      Anticipated Discharge Needs  Discharge Recommendations: Recommend post-acute placement for additional speech therapy services prior to discharge home   Therapy Recommendations Upon DC: Dysphagia Training, Patient / Family / Caregiver Education, Community Re-Integration       Patient / Family Goals  Patient / Family Goal #1: \"Oh yes, applesauce is much better.\"  Goal #1 Outcome: Progressing as expected  Short Term Goal # 1: Pt will complete FEES w SLP to further evaluate swallow function and inform POC.  Goal Outcome # 1: Goal met, new goal added  Short Term Goal # 1 B : Pt will complete exercises targeting epiglottic inversion, LVC, BOT retraction, " "and pharyngeal constriction/shortening x20 in a session given max cues from SLP with \"good\" accuracy.  Short Term Goal # 2: Pt will consume trials of MT2 and PU4 with use of cough strategy with no worsening of lung status.  Short Term Goal # 3: Pt will verbalize the importance of oral care in promoting pulmonary hygiene/maximizing dysphagia outcomes given min cues from SLP.      Roxanna Ash, SLP  "

## 2023-08-13 NOTE — ASSESSMENT & PLAN NOTE
08/15/23    Baseline creatinine around 1 which is where he is currently  Renally dose medications as appropriate  I ordered BMP for tomorrow.

## 2023-08-13 NOTE — THERAPY
"Speech Language Pathology   Clinical Swallow Evaluation     Patient Name: Guru Pena  AGE:  89 y.o., SEX:  male  Medical Record #: [id number]  Date of Service: 8/13/2023      History of Present Illness  89 y/o male presented 8/12 with left-sided deficits. CTA was negative for LVO, CT noncontrast was negative for other acute pathologies.  Unable to tolerate positioning for perfusion study. S/p TNK.    CMHx: CVA, chronic afib, JAMES on CPAP, HTN, stage 3 SKD, hypothyroidism  PMHx:  COVID, acute respiratory failure, urinary retetnion, pulmonary fibrosis    Last seen by SLP in 7/2021 with recommendations for SB6/MT2 following MBSS (aspiration of TN0 with sequential straw sips per note).     CXR 8/12:  \"1.  Mildly enlarged cardiac silhouette with probable vascular congestion.\"    CT Head 8/13:  \"Findings are consistent with atrophy.  Decreased attenuation in the periventricular white matter likely indicates microvascular ischemic disease.\"    General Information:  Vitals  Pulse Oximetry: 93 %  O2 (LPM): 0  O2 Delivery Device: None - Room Air  Level of Consciousness: Alert, Awake  Patient Behaviors: Confused  Orientation: Oriented to but year, stated it is 1923.   Follows Directives: Yes - simple commands only      Prior Living Situation & Level of Function:  Housing / Facility: Skilled Nursing Facility  Comments: Transferred from SNF per EMR  Communication: WFL  Swallowing: Unclear - initially pt stated RG/TN but went on to say that he doesn't like the food at SNF because it \"comes like mush and is not appetizing.\" Also stated he likes his meds floated in applesauce because \"It's much easier.\"       Oral Mechanism Evaluation:  Dentition: Fair, Lower partial, Upper partial   Facial Symmetry: Central left facial droop  Facial Sensation: Equal     Labial Observations: Left sided weakness   Lingual Observations: Midline  Motor Speech: Fair, intelligible            Laryngeal Function:  Secretion Management: " "Adequate  Voice Quality:  (Gravely)  Cough: Perceptually WNL       Subjective  Pt agreeable and cooperative with SLP evaluation tasks, albeit confused. \"Oh no, that's not normal.\"      Assessment  Current Method of Nutrition: NPO until cleared by speech pathology  Positioning: Caicedo's (60-90 degrees)  Bolus Administration: SLP, Patient  O2 (LPM): 0 O2 Delivery Device: None - Room Air  Factor(s) Affecting Performance: Impaired mental status, Impaired command following  Tracheostomy : No       Swallowing Trials:  Ice: WFL  Thin Liquid (TN0): Impaired  Pureed (PU4): Impaired      Comments: Pt w/ fair self-feeding given min A. Appropriate oral bolus stripping, anterior spillage of PU4 on the L. Following sips of TN0, pt with immediate and prolonged congested cough. Difficult to resolve and accompanied by reddening of face and desaturation into high 80s. All symptoms resolved. Pt reports this is not his baseline. No overt indicators of airway invasion with PU4; pt verbalized \"Oh yes this is much easier.\" One to two swallows appreciated per bolus.     Education provided related ot FEES. Pt agreeable, states he does not want to cough with water. However, limited learning evidence and will need reinforcement.       Clinical Impressions  Pt presents with clinical indicators of and elevated risk for oropharyngeal dysphagia given acute CVA. Pt would benefit from further evaluation of swallow using FEES prior to meaningful initiation of PO. Meds okay floated/crushed in applesauce if necessary. FEES tentatively scheduled for 2pm this date.       Recommendations  NPO pending FEES  Instrumentation: FEES  Medication: Crush with pudding/puree, as appropriate, Crush with applesauce, as appropriate, Whole with puree, Non Oral  Oral Care: Q6h         SLP Treatment Plan  Treatment Plan: Dysphagia Treatment, Patient/Family/Caregiver Training (per results of SLE/cog, pending)  SLP Frequency: 3x Per Week  Estimated Duration: Until Therapy " "Goals Met      Anticipated Discharge Needs  Discharge Recommendations: Recommend post-acute placement for additional speech therapy services prior to discharge home   Therapy Recommendations Upon DC: Dysphagia Training, Community Re-Integration, Patient / Family / Caregiver Education        Patient / Family Goals  Patient / Family Goal #1: \"Oh yes, applesauce is much better.\"  Short Term Goals  Short Term Goal # 1: Pt will complete FEES w SLP to further evaluate swallow function and inform POC.      Roxanna Ash, SLP   "

## 2023-08-14 ENCOUNTER — APPOINTMENT (OUTPATIENT)
Dept: RADIOLOGY | Facility: MEDICAL CENTER | Age: 88
DRG: 061 | End: 2023-08-14
Attending: HOSPITALIST
Payer: MEDICARE

## 2023-08-14 ENCOUNTER — APPOINTMENT (OUTPATIENT)
Dept: RADIOLOGY | Facility: MEDICAL CENTER | Age: 88
DRG: 061 | End: 2023-08-14
Attending: NURSE PRACTITIONER
Payer: MEDICARE

## 2023-08-14 LAB
ALBUMIN SERPL BCP-MCNC: 3.2 G/DL (ref 3.2–4.9)
ALBUMIN/GLOB SERPL: 0.9 G/DL
ALP SERPL-CCNC: 85 U/L (ref 30–99)
ALT SERPL-CCNC: 16 U/L (ref 2–50)
ANION GAP SERPL CALC-SCNC: 11 MMOL/L (ref 7–16)
AST SERPL-CCNC: 27 U/L (ref 12–45)
BASOPHILS # BLD AUTO: 0.2 % (ref 0–1.8)
BASOPHILS # BLD: 0.01 K/UL (ref 0–0.12)
BILIRUB SERPL-MCNC: 0.9 MG/DL (ref 0.1–1.5)
BUN SERPL-MCNC: 19 MG/DL (ref 8–22)
CALCIUM ALBUM COR SERPL-MCNC: 9.2 MG/DL (ref 8.5–10.5)
CALCIUM SERPL-MCNC: 8.6 MG/DL (ref 8.5–10.5)
CHLORIDE SERPL-SCNC: 103 MMOL/L (ref 96–112)
CO2 SERPL-SCNC: 21 MMOL/L (ref 20–33)
CREAT SERPL-MCNC: 0.99 MG/DL (ref 0.5–1.4)
EOSINOPHIL # BLD AUTO: 0.04 K/UL (ref 0–0.51)
EOSINOPHIL NFR BLD: 0.8 % (ref 0–6.9)
ERYTHROCYTE [DISTWIDTH] IN BLOOD BY AUTOMATED COUNT: 56.6 FL (ref 35.9–50)
GFR SERPLBLD CREATININE-BSD FMLA CKD-EPI: 69 ML/MIN/1.73 M 2
GLOBULIN SER CALC-MCNC: 3.5 G/DL (ref 1.9–3.5)
GLUCOSE BLD STRIP.AUTO-MCNC: 100 MG/DL (ref 65–99)
GLUCOSE BLD STRIP.AUTO-MCNC: 79 MG/DL (ref 65–99)
GLUCOSE BLD STRIP.AUTO-MCNC: 80 MG/DL (ref 65–99)
GLUCOSE BLD STRIP.AUTO-MCNC: 88 MG/DL (ref 65–99)
GLUCOSE BLD STRIP.AUTO-MCNC: 93 MG/DL (ref 65–99)
GLUCOSE SERPL-MCNC: 101 MG/DL (ref 65–99)
HCT VFR BLD AUTO: 35.7 % (ref 42–52)
HGB BLD-MCNC: 11.6 G/DL (ref 14–18)
IMM GRANULOCYTES # BLD AUTO: 0.03 K/UL (ref 0–0.11)
IMM GRANULOCYTES NFR BLD AUTO: 0.6 % (ref 0–0.9)
LYMPHOCYTES # BLD AUTO: 0.63 K/UL (ref 1–4.8)
LYMPHOCYTES NFR BLD: 13.2 % (ref 22–41)
MAGNESIUM SERPL-MCNC: 2.2 MG/DL (ref 1.5–2.5)
MCH RBC QN AUTO: 32.9 PG (ref 27–33)
MCHC RBC AUTO-ENTMCNC: 32.5 G/DL (ref 32.3–36.5)
MCV RBC AUTO: 101.1 FL (ref 81.4–97.8)
MONOCYTES # BLD AUTO: 0.61 K/UL (ref 0–0.85)
MONOCYTES NFR BLD AUTO: 12.7 % (ref 0–13.4)
NEUTROPHILS # BLD AUTO: 3.47 K/UL (ref 1.82–7.42)
NEUTROPHILS NFR BLD: 72.5 % (ref 44–72)
NRBC # BLD AUTO: 0 K/UL
NRBC BLD-RTO: 0 /100 WBC (ref 0–0.2)
PHOSPHATE SERPL-MCNC: 2.8 MG/DL (ref 2.5–4.5)
PLATELET # BLD AUTO: 136 K/UL (ref 164–446)
PMV BLD AUTO: 10.5 FL (ref 9–12.9)
POTASSIUM SERPL-SCNC: 4.5 MMOL/L (ref 3.6–5.5)
PROT SERPL-MCNC: 6.7 G/DL (ref 6–8.2)
RBC # BLD AUTO: 3.53 M/UL (ref 4.7–6.1)
SODIUM SERPL-SCNC: 135 MMOL/L (ref 135–145)
WBC # BLD AUTO: 4.8 K/UL (ref 4.8–10.8)

## 2023-08-14 PROCEDURE — 99232 SBSQ HOSP IP/OBS MODERATE 35: CPT | Performed by: NURSE PRACTITIONER

## 2023-08-14 PROCEDURE — 85025 COMPLETE CBC W/AUTO DIFF WBC: CPT

## 2023-08-14 PROCEDURE — 97167 OT EVAL HIGH COMPLEX 60 MIN: CPT

## 2023-08-14 PROCEDURE — 99233 SBSQ HOSP IP/OBS HIGH 50: CPT | Performed by: HOSPITALIST

## 2023-08-14 PROCEDURE — 700102 HCHG RX REV CODE 250 W/ 637 OVERRIDE(OP): Performed by: INTERNAL MEDICINE

## 2023-08-14 PROCEDURE — A9270 NON-COVERED ITEM OR SERVICE: HCPCS | Performed by: NURSE PRACTITIONER

## 2023-08-14 PROCEDURE — 97163 PT EVAL HIGH COMPLEX 45 MIN: CPT

## 2023-08-14 PROCEDURE — 99223 1ST HOSP IP/OBS HIGH 75: CPT | Performed by: PHYSICAL MEDICINE & REHABILITATION

## 2023-08-14 PROCEDURE — 80053 COMPREHEN METABOLIC PANEL: CPT

## 2023-08-14 PROCEDURE — 700102 HCHG RX REV CODE 250 W/ 637 OVERRIDE(OP): Performed by: STUDENT IN AN ORGANIZED HEALTH CARE EDUCATION/TRAINING PROGRAM

## 2023-08-14 PROCEDURE — 92526 ORAL FUNCTION THERAPY: CPT

## 2023-08-14 PROCEDURE — 84100 ASSAY OF PHOSPHORUS: CPT

## 2023-08-14 PROCEDURE — 70450 CT HEAD/BRAIN W/O DYE: CPT

## 2023-08-14 PROCEDURE — A9270 NON-COVERED ITEM OR SERVICE: HCPCS | Performed by: INTERNAL MEDICINE

## 2023-08-14 PROCEDURE — 770020 HCHG ROOM/CARE - TELE (206)

## 2023-08-14 PROCEDURE — A9270 NON-COVERED ITEM OR SERVICE: HCPCS | Performed by: STUDENT IN AN ORGANIZED HEALTH CARE EDUCATION/TRAINING PROGRAM

## 2023-08-14 PROCEDURE — 83735 ASSAY OF MAGNESIUM: CPT

## 2023-08-14 PROCEDURE — A9270 NON-COVERED ITEM OR SERVICE: HCPCS | Performed by: HOSPITALIST

## 2023-08-14 PROCEDURE — 82962 GLUCOSE BLOOD TEST: CPT | Mod: 91

## 2023-08-14 PROCEDURE — 700102 HCHG RX REV CODE 250 W/ 637 OVERRIDE(OP): Performed by: NURSE PRACTITIONER

## 2023-08-14 PROCEDURE — 700102 HCHG RX REV CODE 250 W/ 637 OVERRIDE(OP): Performed by: HOSPITALIST

## 2023-08-14 RX ORDER — ATORVASTATIN CALCIUM 40 MG/1
40 TABLET, FILM COATED ORAL EVERY EVENING
Status: DISCONTINUED | OUTPATIENT
Start: 2023-08-14 | End: 2023-08-18 | Stop reason: HOSPADM

## 2023-08-14 RX ORDER — HYDRALAZINE HYDROCHLORIDE 20 MG/ML
20 INJECTION INTRAMUSCULAR; INTRAVENOUS EVERY 4 HOURS PRN
Status: DISCONTINUED | OUTPATIENT
Start: 2023-08-14 | End: 2023-08-18 | Stop reason: HOSPADM

## 2023-08-14 RX ORDER — LABETALOL HYDROCHLORIDE 5 MG/ML
10 INJECTION, SOLUTION INTRAVENOUS
Status: DISCONTINUED | OUTPATIENT
Start: 2023-08-14 | End: 2023-08-18 | Stop reason: HOSPADM

## 2023-08-14 RX ADMIN — FLUDROCORTISONE ACETATE 0.1 MG: 0.1 TABLET ORAL at 05:11

## 2023-08-14 RX ADMIN — SENNOSIDES AND DOCUSATE SODIUM 2 TABLET: 50; 8.6 TABLET ORAL at 18:14

## 2023-08-14 RX ADMIN — ATORVASTATIN CALCIUM 40 MG: 40 TABLET, FILM COATED ORAL at 18:13

## 2023-08-14 RX ADMIN — LEVOTHYROXINE SODIUM 175 MCG: 0.17 TABLET ORAL at 05:11

## 2023-08-14 RX ADMIN — SENNOSIDES AND DOCUSATE SODIUM 2 TABLET: 50; 8.6 TABLET ORAL at 05:11

## 2023-08-14 RX ADMIN — APIXABAN 5 MG: 5 TABLET, FILM COATED ORAL at 05:11

## 2023-08-14 ASSESSMENT — ENCOUNTER SYMPTOMS
ABDOMINAL PAIN: 0
SPEECH CHANGE: 1
HEADACHES: 0
DOUBLE VISION: 0
PALPITATIONS: 0
VOMITING: 0
FEVER: 0
CHILLS: 0
DIARRHEA: 0
LOSS OF CONSCIOUSNESS: 0
COUGH: 0
FOCAL WEAKNESS: 1
BACK PAIN: 0
DIZZINESS: 0
NAUSEA: 0
BLURRED VISION: 0
SHORTNESS OF BREATH: 0
SORE THROAT: 0

## 2023-08-14 ASSESSMENT — COGNITIVE AND FUNCTIONAL STATUS - GENERAL
STANDING UP FROM CHAIR USING ARMS: A LOT
MOBILITY SCORE: 8
MOVING TO AND FROM BED TO CHAIR: UNABLE
DRESSING REGULAR UPPER BODY CLOTHING: A LOT
SUGGESTED CMS G CODE MODIFIER DAILY ACTIVITY: CL
PERSONAL GROOMING: A LOT
TURNING FROM BACK TO SIDE WHILE IN FLAT BAD: UNABLE
WALKING IN HOSPITAL ROOM: A LOT
SUGGESTED CMS G CODE MODIFIER MOBILITY: CM
HELP NEEDED FOR BATHING: A LOT
CLIMB 3 TO 5 STEPS WITH RAILING: TOTAL
MOVING FROM LYING ON BACK TO SITTING ON SIDE OF FLAT BED: UNABLE
DRESSING REGULAR LOWER BODY CLOTHING: A LOT
DAILY ACTIVITIY SCORE: 12
EATING MEALS: A LOT
TOILETING: A LOT

## 2023-08-14 ASSESSMENT — ACTIVITIES OF DAILY LIVING (ADL): TOILETING: INDEPENDENT

## 2023-08-14 ASSESSMENT — PAIN DESCRIPTION - PAIN TYPE
TYPE: ACUTE PAIN

## 2023-08-14 ASSESSMENT — GAIT ASSESSMENTS: GAIT LEVEL OF ASSIST: UNABLE TO PARTICIPATE

## 2023-08-14 ASSESSMENT — FIBROSIS 4 INDEX: FIB4 SCORE: 4.86

## 2023-08-14 NOTE — PROGRESS NOTES
0800 - ICU NP (Adina Garcia) notified this RN that patient's speech sounds worse today than yesterday. RN and NP assessed patient at bedside.     0821 - Dr. Khan notified of speech changes, MD stated he will come down to round on patient.    1000 - Patient's son at the bedside, Dr. Khan discussed findings with son and this RN. Per MD, he will order repeat CT this afternoon to assess size of bleed & adjust scheduled medications. MD also gave verbal order to change neuro checks to every 2 hours.

## 2023-08-14 NOTE — DISCHARGE PLANNING
Case Management Discharge Planning    Admission Date: 8/12/2023  GMLOS: 2.9  ALOS: 2    6-Clicks ADL Score:    6-Clicks Mobility Score:    PT and/or OT Eval ordered: Yes  PT/OT: evaluations pending  SLP: Recommend post-acute placement   Post-acute Referrals Ordered: Yes - PMR & SNF  Post-acute Choice Obtained: No  Has referral(s) been sent to post-acute provider:  No    RNCM noted pt DC to [facility] on 7/28/23; HCM to inquire if short stay or LTR    Anticipated Discharge Dispo: Discharge Disposition: D/T to SNF with Medicare cert in anticipation of skilled care (03)  Per chart review pt resides in [place]    Family support:  Damon Pena Son [phone]   Raghavendra Pena Daughter   [phone]     Current Insurance on file: [employer] Medicare    DME Needed: pending hospital course    Action(s) Taken: chart reviewed  Pt discussed during ICU rounds  Pt has floor orders for today    Escalations Completed: None    Medically Clear: No    Next Steps: f/u with pt and medical team to discuss dc needs and barriers.  Assessment to be completed to assess for HCM needs.    Barriers to Discharge: Medical clearance /Specialty Clearance    Is the patient up for discharge tomorrow: No

## 2023-08-14 NOTE — CONSULTS
Physical Medicine and Rehabilitation Consultation          Date of initial consultation: 8/14/2023  Consulting provider: Yumi Barrera M.D.  Reason for consultation: assess for acute inpatient rehab appropriateness  LOS: 2 Day(s)    Chief complaint: Weakness    HPI: The patient is a 89 y.o. right hand dominant male with a past medical history of atrial fibrillation on Eliquis, hypothyroidism;  who presented on 8/12/2023  1:46 PM from SNF with left-sided weakness, facial droop, right gaze deviation and dysarthria.  Patient was seen by neurology, found to have NIH score of 10.  CT head showed no intracranial abnormalities, CTA showed no LVO.  Neurology suspected small vessel stroke, recommended TNK.  NIH improved to 5 today.  Follow-up MR brain is pending    The patient currently reports overall feeling good.  Patient has no acute complaints.  Patient reports that he was staying at SNF temporarily, and typically resides at [facility] facility with his spouse.  I did not get a good answer on how much support he needs from his assisted living facility.  Patient is interested in IPR.    ROS  Pertinent positives are mentioned in the HPI, all others reviewed and are negative.    Social Hx:  Presented from SNF, however resides at [facility]    THERAPY:  Restrictions: Fall risk  PT: Functional mobility   8/14: Unable to participate in gait, max assist sit to stand    OT: ADLs  8/14: Max assist dressing    SLP:   8/13: Moderate-severe oropharyngeal dysphagia    IMAGING:  CT head 8/14/2023  1.  Small right MCA distribution parenchymal hemorrhages with associated edema.  2.  Atrophy.  3.  White matter disease likely representing microvascular ischemic changes.  4.  Right maxillary sinus disease.    PROCEDURES:  None    PMH:  Past Medical History:   Diagnosis Date    A-fib (HCC)     Elevated troponin 6/30/2021    Generalized weakness 6/22/2021    Hypothyroid     Other specified hypothyroidism 6/1/2021        PSH:  History reviewed. No pertinent surgical history.    FHX:  Non-pertinent to today's issues    Medications:  Current Facility-Administered Medications   Medication Dose    MD Alert...ICU Electrolyte Replacement per Pharmacy      acetaminophen (Tylenol) tablet 650 mg  650 mg    ondansetron (Zofran) syringe/vial injection 4 mg  4 mg    ondansetron (Zofran ODT) dispertab 4 mg  4 mg    senna-docusate (Pericolace Or Senokot S) 8.6-50 MG per tablet 2 Tablet  2 Tablet    And    polyethylene glycol/lytes (Miralax) PACKET 1 Packet  1 Packet    And    magnesium hydroxide (Milk Of Magnesia) suspension 30 mL  30 mL    And    bisacodyl (Dulcolax) suppository 10 mg  10 mg    insulin regular (HumuLIN R,NovoLIN R) injection  1-6 Units    And    dextrose 50% (D50W) injection 25 g  25 g    labetalol (Normodyne/Trandate) injection 10 mg  10 mg    hydrALAZINE (Apresoline) injection 20 mg  20 mg    fludrocortisone (Florinef) tablet 0.1 mg  0.1 mg    levothyroxine (Synthroid) tablet 175 mcg  175 mcg       Allergies:  Allergies   Allergen Reactions    Digoxin      nausea         Physical Exam:  Vitals: /62   Pulse 76   Temp 36.1 °C (97 °F) (Temporal)   Resp 14   Ht 1.829 m (6')   Wt 73.3 kg (161 lb 9.6 oz)   SpO2 92%   Gen: NAD  Head: NC/AT  Eyes/ Nose/ Mouth: PERRLA, moist mucous membranes  Cardio: RRR, good distal perfusion, warm extremities  Pulm: normal respiratory effort, no cyanosis   Abd: Soft NTND, negative borborygmi   Ext: No peripheral edema. No calf tenderness. No clubbing.    Mental status: answers questions appropriately follows commands  Speech: fluent, no aphasia or dysarthria    Motor:      Upper Extremity  Myotome R L   Shoulder flexion C5 4/5 4/5   Elbow flexion C5 5 5   Wrist extension C6 5 5   Elbow extension C7 5 5   Finger flexion C8 4/5 4/5   Finger abduction T1 4/5 4/5     Lower Extremity Myotome R L   Hip flexion L2 4/5 4/5   Knee extension L3 5 5   Ankle dorsiflexion L4 4/5 4/5   Toe  extension L5 4/5 4/5   Ankle plantarflexion S1 5 5     Sensory:   intact to light touch through out    Labs: Reviewed and significant for   Recent Labs     08/12/23  1351 08/13/23  0535 08/14/23  0527   RBC 4.20* 4.00* 3.53*   HEMOGLOBIN 13.9* 13.3* 11.6*   HEMATOCRIT 40.4* 37.9* 35.7*   PLATELETCT 139* 149* 136*   PROTHROMBTM 17.0*  --   --    APTT 41.5*  --   --    INR 1.41*  --   --      Recent Labs     08/12/23  1351 08/13/23  0535   SODIUM 134* 134*   POTASSIUM 4.0 4.2   CHLORIDE 102 104   CO2 19* 19*   GLUCOSE 98 91   BUN 19 17   CREATININE 1.03 0.99   CALCIUM 8.7 8.3*     Recent Results (from the past 24 hour(s))   POCT glucose device results    Collection Time: 08/13/23 12:18 PM   Result Value Ref Range    POC Glucose, Blood 88 65 - 99 mg/dL   EC-ECHOCARDIOGRAM LTD W/O CONT    Collection Time: 08/13/23 12:30 PM   Result Value Ref Range    Left Ventrical Ejection Fraction 55    POCT glucose device results    Collection Time: 08/13/23  5:25 PM   Result Value Ref Range    POC Glucose, Blood 90 65 - 99 mg/dL   POCT glucose device results    Collection Time: 08/14/23 12:19 AM   Result Value Ref Range    POC Glucose, Blood 80 65 - 99 mg/dL   POCT glucose device results    Collection Time: 08/14/23  5:26 AM   Result Value Ref Range    POC Glucose, Blood 79 65 - 99 mg/dL   CBC With Differential    Collection Time: 08/14/23  5:27 AM   Result Value Ref Range    WBC 4.8 4.8 - 10.8 K/uL    RBC 3.53 (L) 4.70 - 6.10 M/uL    Hemoglobin 11.6 (L) 14.0 - 18.0 g/dL    Hematocrit 35.7 (L) 42.0 - 52.0 %    .1 (H) 81.4 - 97.8 fL    MCH 32.9 27.0 - 33.0 pg    MCHC 32.5 32.3 - 36.5 g/dL    RDW 56.6 (H) 35.9 - 50.0 fL    Platelet Count 136 (L) 164 - 446 K/uL    MPV 10.5 9.0 - 12.9 fL    Neutrophils-Polys 72.50 (H) 44.00 - 72.00 %    Lymphocytes 13.20 (L) 22.00 - 41.00 %    Monocytes 12.70 0.00 - 13.40 %    Eosinophils 0.80 0.00 - 6.90 %    Basophils 0.20 0.00 - 1.80 %    Immature Granulocytes 0.60 0.00 - 0.90 %    Nucleated  RBC 0.00 0.00 - 0.20 /100 WBC    Neutrophils (Absolute) 3.47 1.82 - 7.42 K/uL    Lymphs (Absolute) 0.63 (L) 1.00 - 4.80 K/uL    Monos (Absolute) 0.61 0.00 - 0.85 K/uL    Eos (Absolute) 0.04 0.00 - 0.51 K/uL    Baso (Absolute) 0.01 0.00 - 0.12 K/uL    Immature Granulocytes (abs) 0.03 0.00 - 0.11 K/uL    NRBC (Absolute) 0.00 K/uL         ASSESSMENT:  Patient is a 89 y.o. male admitted with right MCA stroke now s/p TNK    [facility] Code / Diagnosis to Support: 0001.1 - Stroke: Left Body Involvement (Right Brain)    Rehabilitation: Impaired ADLs and mobility  Patient is a good candidate for inpatient rehab based on needs for PT, OT, and speech therapy.  Patient will also benefit from family training.  Patient has a good discharge situation which will be home with spouse.     Barriers to transfer include: Insurance authorization, TCCs to verify disposition, medical clearance and bed availability     All cases are subject to administrative review and recommendations may change    Disposition recommendations:  -Good candidate for IPR.  Patient has deficits with mobility, ADLs and swallow.  Patient has a stable discharge environment which is his assisted living facility, and support from his spouse as well as staff.  -T[facility] to [facility] to ensure that he is welcome back to return, and to delineate how much support he is currently getting  -PMR to follow in the periphery for rehab appropriateness, please reach out with questions or request for medical management    Medical Complexity:    Right MCA stroke with hemorrhagic conversion seen on CT head  -Initial NIH score 10  -Status post tPA  -Reduction NIH score to 5 today  -Secondary stroke prevention with atorvastatin 40 mg nightly  -Eliquis currently on hold for 10 days due to small hemorrhagic conversion  -Continue PT OT and SLP while in house  -Good candidate for IPR    Hypertension/hypotension  -Blood pressure goal -140, normalize to long term goal of  110-130/60-80 in coming days, per neurology  -Florinef 0.1 mg every morning  -Hydralazine as needed    Hypothyroidism  -Synthroid 175 mcg every morning    Diabetes mellitus  -Insulin sliding scale  -BG goal 80-1 80 per neurology  -Checking A1c    Thrombocytopenia  -Platelets 136  -Close observation monitoring with serial labs by primary team        DVT PPX: SCDs      Thank you for allowing us to participate in the care of this patient.     Patient was seen for >80 minutes on unit/floor of which > 50% of time was spent on counseling and coordination of care regarding the above, including prognosis, risk reduction, benefits of treatment, and options for next stage of care.    Paramjit Vergara, DO   Physical Medicine and Rehabilitation     Please note that this dictation was created using voice recognition software. I have made every reasonable attempt to correct obvious errors, but there may be errors of grammar and possibly content that I did not discover before finalizing the note.

## 2023-08-14 NOTE — CARE PLAN
The patient is Watcher - Medium risk of patient condition declining or worsening    Shift Goals  Clinical Goals: Stable neuro exam  Patient Goals: Eat  Family Goals: RYAN    Progress made toward(s) clinical / shift goals: Blood pressure within goal of 100-180, no PRNs given.    Patient is not progressing towards the following goals: Patient's speech worse today than yesterday per NP and MD assessment, patient sent for STAT CT which showed hemorrhage and edema in R MCA area versus artifact. MD ordered repeat CT at 1500 and Neuro checks every 2 hours

## 2023-08-14 NOTE — DISCHARGE PLANNING
[facility] Transitional Care Coordination    Referral from: Dr. Barrera    Insurance Provider on Facesheet: [employer] MCR    Potential Rehab Diagnosis: CVA    Chart review indicates patient may have on going medical management and may have therapy needs to possibly meet inpatient rehab facility criteria with the goal of returning to community.    D/C support will need to be verified: Son    Physiatry consultation forwarded per protocol.  From SNF. W/U & TX pending.     Thank you for the referral.

## 2023-08-14 NOTE — THERAPY
Physical Therapy   Initial Evaluation     Patient Name: Guru Pena  Age:  89 y.o., Sex:  male  Medical Record #: [id number]  Today's Date: 8/14/2023     Precautions  Precautions: Fall Risk;Swallow Precautions    Assessment  Patient is 89 y.o. male presented w/possible small vessel stroke while at [facility] SNF. At admission CT head negative for acute intracranial abnormalities. Today, repeat CT demonstrated small R MCA distribution parenchymal hemorrhages w/associated edema. PMH includes Afib, generalized weakness, hypothyroidism. Pt son at bedside upon PT arrival. Pt displayed delayed responses and slurred speech during subjective questioning. Pt performed supine to sit, scooting, w/Mod A. Pt performed sit to supine w/SBA. Pt required 2x Max A to perform sit to stand. Pt displayed poor standing balance w/posterior lean requiring 2x max A. Pt sidestepped at EOB w/Max A. Pt and son were educated on acute care PT POC and potential discharge planning. Acute care PT appropriate for pt to address impairments.     Plan    Physical Therapy Initial Treatment Plan   Treatment Plan : Bed Mobility, Gait Training, Neuro Re-Education / Balance, Self Care / Home Evaluation, Stair Training, Therapeutic Activities, Therapeutic Exercise  Treatment Frequency: 4 Times per Week  Duration: Until Therapy Goals Met    DC Equipment Recommendations: Unable to determine at this time  Discharge Recommendations: Recommend post-acute placement for additional physical therapy services prior to discharge home     Objective       08/14/23 1121   Initial Contact Note    Initial Contact Note Order Received and Verified, Physical Therapy Evaluation in Progress with Full Report to Follow.   Precautions   Precautions Fall Risk;Swallow Precautions   Vitals   O2 (LPM) 0   O2 Delivery Device None - Room Air   Pain 0 - 10 Group   Therapist Pain Assessment Post Activity Pain Same as Prior to Activity;Nurse Notified;0  (Pt did not report any pain)    Prior Living Situation   Prior Services Continuous (24 Hour) Care Giving Per Service  ([facility])   Housing / Facility 1 Story Apartment / Condo;Assisted Living Residence  ([facility])   Steps Into Home 0   Steps In Home 0   Equipment Owned 4-Wheel Walker;Front-Wheel Walker;Wheelchair   Lives with - Patient's Self Care Capacity Spouse   Comments Transferred from [facility] post-covid. Prior to last admission, [facility] w/spouse. Pt independent w/basic mobility including ambulation to dining [place]. Pt son says they are planning to add more assistance back home at [facility].   Prior Level of Functional Mobility   Bed Mobility Independent   Transfer Status Independent   Ambulation Independent   Ambulation Distance Household   Assistive Devices Used Front-Wheel Walker   Comments Pt independent w/mobility w/FWW at [facility]. During time at SNF, pt required assist and was working on transfers and ambulation according to son.   History of Falls   History of Falls No   Cognition    Cognition / Consciousness X   Speech/ Communication Delayed Responses;Slurred   Level of Consciousness Alert   Active ROM Lower Body    Active ROM Lower Body  WDL   Strength Lower Body   Lower Body Strength  X   Gross Strength Generalized Weakness, Equal Bilaterally   Comments Pt strength WDL when tested grossly. Pt displayed poor muscle endurance and weakness during standing   Sensation Lower Body   Lower Extremity Sensation   WDL   Coordination Lower Body    Coordination Lower Body  WDL   Vision   Vision Comments Pt wears glasses   Other Treatments   Other Treatments Provided Pt and son educated on potential discharge planning and acute care PT plan of care.   Balance Assessment   Sitting Balance (Static) Fair -   Sitting Balance (Dynamic) Poor +   Standing Balance (Static) Poor -   Standing Balance (Dynamic) Trace +   Weight Shift Sitting Poor   Weight Shift Standing Poor   Comments  Pt required 2x Max A   Bed Mobility    Supine to Sit Moderate Assist   Sit to Supine Standby Assist   Scooting Moderate Assist   Rolling Moderate Assist to Lt.   Comments HOB elevated   Gait Analysis   Gait Level Of Assist Unable to Participate   Comments Pt sidestepped w/Max A   Functional Mobility   Sit to Stand Maximal Assist   Transfer Method Stand Step   Mobility Standing up from EOB   Comments Pt required 2x max A   ICU Target Mobility Level   ICU Mobility - Targeted Level Level 3A   How much difficulty does the patient currently have...   Turning over in bed (including adjusting bedclothes, sheets and blankets)? 1   Sitting down on and standing up from a chair with arms (e.g., wheelchair, bedside commode, etc.) 1   Moving from lying on back to sitting on the side of the bed? 1   How much help from another person does the patient currently need...   Moving to and from a bed to a chair (including a wheelchair)? 2   Need to walk in a hospital room? 2   Climbing 3-5 steps with a railing? 1   6 clicks Mobility Score 8   Patient / Family Goals    Patient / Family Goal #1 Return to PLOF   Short Term Goals    Short Term Goal # 1 Pt will be able to peform bed mobility w/SPV in 6 visits in order to promote independence   Short Term Goal # 2 Pt will be able to perform functional transfers w/LRAD w/SPV in 6 visits in order to promote independence   Short Term Goal # 3 Pt will be able to ambulate 50 ft w/LRAD w/SPV in 6 visits in order to increase household mobility   Education Group   Education Provided Role of Physical Therapist   Role of Physical Therapist Patient Response Patient;Family;Eager;Explanation;Demonstration;Verbal Demonstration;Action Demonstration   Additional Comments Pt and son educated on acute care PT plan of care and potential discharge planning   Physical Therapy Initial Treatment Plan    Treatment Plan  Bed Mobility;Gait Training;Neuro Re-Education / Balance;Self Care / Home Evaluation;Stair  Training;Therapeutic Activities;Therapeutic Exercise   Treatment Frequency 4 Times per Week   Duration Until Therapy Goals Met   Problem List    Problems Impaired Bed Mobility;Impaired Transfers;Impaired Ambulation;Functional Strength Deficit;Impaired Balance;Safety Awareness Deficits / Cognition;Decreased Activity Tolerance   Anticipated Discharge Equipment and Recommendations   DC Equipment Recommendations Unable to determine at this time   Discharge Recommendations Recommend post-acute placement for additional physical therapy services prior to discharge home   Interdisciplinary Plan of Care Collaboration   IDT Collaboration with  Nursing;Occupational Therapist;Family / Caregiver   Patient Position at End of Therapy In Bed;Bed Alarm On;Call Light within Reach;Phone within Reach;Tray Table within Reach;Family / Friend in Room   Collaboration Comments RN updated   Session Information   Date / Session Number  8/14 - 1 (1/4, 8/20)

## 2023-08-14 NOTE — PROGRESS NOTES
Neurology Progress Note  Neurohospitalist Service, [facility]    Referring Physician: Luis Hanna M.D.    STROKE CODE:   Chief Complaint   Patient presents with    Possible Stroke     From [facility], LKW 09:30 while at breakfast with staff, pt was then found at 12:30 today by staff with weakness, pt arrives with L sided weakness, R sided facial droop, and inability to look left. GCS 15, VSS on RA. Hx A-fib on eliquis.        To obtain the most accurate data regarding the time called, and time patient seen, refer to the stroke run-sheet and chart.  For time of CT, refer to the radiology report. See A&P below for TPA Decision and door to needle time if and when applicable.    HPI: Guru Pena is a 89 y.o. male with a past medical history of atrial fibrillation on Eliquis 2.5mg BID, and hypothyroidism who presented via EMS from his SNF with left sided weakness, left facial droop, right gaze deviation and dysarthria. Per EMS the patient awoke in his normal state of health and ate breakfast and was last seen normal at 0930. The SNF staff noticed the patient with the above mentioned deficits when they gathered for lunch at 1230. In the ED the patient was normotensive with systolics in the 130s. Noncontrast CT head negative for acute findings. CTA head/neck negative for identifiable LVO or flow limiting stenoses. Technically difficult imaging study secondary to patient's kyphosis. Neurology was consulted for further evaluation of the above.     Interval Update 08/13/2023: Improvement in neuro exam following administration of thrombolytics. No acute events overnight. Repeat CT head does not show hemorrhagic conversion.    Interval Update 08/14/2023: No acute events overnight. Restarted on Eliquis yesterday. TTE shows LV EF 55%    Update 1533: Repeat CT head obtained to reevaluate for possible hemorrhagic conversion versus artifact. There appears to be a small focus of hemorrhage within the  right MCA stroke burden. Will hold Eliquis at this time.     Review of systems: In addition to what is detailed in the HPI above, all other systems reviewed and are negative.    Past Medical History:    has a past medical history of A-fib (HCC), Elevated troponin (6/30/2021), Generalized weakness (6/22/2021), Hypothyroid, and Other specified hypothyroidism (6/1/2021).    FHx:  family history is not on file.    SHx:   reports that he has never smoked. He has never used smokeless tobacco. He reports that he does not drink alcohol and does not use drugs.    Allergies:  Allergies   Allergen Reactions    Digoxin      nausea       Medications:    Current Facility-Administered Medications:     MD Alert...ICU Electrolyte Replacement per Pharmacy, , Other, PHARMACY TO DOSE, Dayday Fletcher M.D.    apixaban (Eliquis) tablet 5 mg, 5 mg, Oral, BID, CLARISSA HobsonP.R.N., 5 mg at 08/14/23 0511    acetaminophen (Tylenol) tablet 650 mg, 650 mg, Oral, Q6HRS PRN, Alex Lopez M.D.    ondansetron (Zofran) syringe/vial injection 4 mg, 4 mg, Intravenous, Q4HRS PRN, Alex Lopez M.D.    ondansetron (Zofran ODT) dispertab 4 mg, 4 mg, Oral, Q4HRS PRN, Alex Lopez M.D.    senna-docusate (Pericolace Or Senokot S) 8.6-50 MG per tablet 2 Tablet, 2 Tablet, Oral, BID, 2 Tablet at 08/14/23 0511 **AND** polyethylene glycol/lytes (Miralax) PACKET 1 Packet, 1 Packet, Oral, QDAY PRN **AND** magnesium hydroxide (Milk Of Magnesia) suspension 30 mL, 30 mL, Oral, QDAY PRN **AND** bisacodyl (Dulcolax) suppository 10 mg, 10 mg, Rectal, QDAY PRN, Alex Lopez M.D.    insulin regular (HumuLIN R,NovoLIN R) injection, 1-6 Units, Subcutaneous, Q6HRS **AND** POC blood glucose manual result, , , Q6H **AND** NOTIFY MD and PharmD, , , Once **AND** Administer 20 grams of glucose (approximately 8 ounces of fruit juice) every 15 minutes PRN FSBG less than 70 mg/dL, , , PRN **AND** dextrose 50% (D50W) injection 25 g, 25 g, Intravenous,  Q15 MIN PRN, Alex Lopez M.D.    labetalol (Normodyne/Trandate) injection 10 mg, 10 mg, Intravenous, Q HOUR PRN, Alex Lopez M.D.    hydrALAZINE (Apresoline) injection 20 mg, 20 mg, Intravenous, Q4HRS PRN, Alex Lopez M.D.    fludrocortisone (Florinef) tablet 0.1 mg, 0.1 mg, Oral, QAM, Yumi Barrera M.D., 0.1 mg at 08/14/23 0511    levothyroxine (Synthroid) tablet 175 mcg, 175 mcg, Oral, AM ES, Yumi Barrera M.D., 175 mcg at 08/14/23 0511    atorvastatin (Lipitor) tablet 40 mg, 40 mg, Oral, Q EVENING, Yumi Barrera M.D., 40 mg at 08/13/23 1728    Physical Examination:    Vitals:    08/14/23 0000 08/14/23 0200 08/14/23 0400 08/14/23 0600   BP: 111/68 110/73 109/81 111/61   Pulse: (!) 110 (!) 112 (!) 109 (!) 115   Resp: 20 (!) 22 (!) 21 14   Temp: 36.6 °C (97.9 °F)  36.6 °C (97.9 °F)    TempSrc: Temporal  Temporal    SpO2: 90% 93% 92% 93%   Weight:       Height:             General: Patient is awake and in no acute distress  Eye: Examination of optic disks not indicated at this time given acuity of consult  Neck: There is normal range of motion  CV: Regular rate   Extremities:  Clear, dry, intact, without peripheral edema    NEUROLOGICAL EXAM:     Mental status: Awake, alert and fully oriented  Speech and language: Speech is mildly dysarthric and fluent. The patient is able to name and repeat, and follow commands  Cranial nerve exam: Pupils are equal, round and reactive to light bilaterally. Visual fields are full. There is no nystagmus. Midline gaze. Slight right facial droop Sensation in the face is intact to light touch. Palate elevates symmetrically. Tongue is midline.  Motor exam: 5/5 strength in the RUE and RLE extremities. 4/5 in LUE and LLE with drift appreciated  Sensory exam:  Diminished sensation on left side  Deep tendon reflexes:  2+ throughout. Toes down-going bilaterally.  Coordination: LUE ataxia  Gait: Deferred due to patient acuity    NIHSS: National Institutes of Health  Stroke Scale    [0] 1a:Level of Consciousness    0-alert 1-drowsy   2-stupor   3-coma  [0] 1b:LOC Questions                  0-both  1-one      2-neither  [0] 1c:LOC Commands                   0-both  1-one      2-neither  [0] 2: Best Gaze                     0-nl    1-partial  2-forced  [0] 3: Visual Fields                   0-nl    1-partial  2-complete 3-bilat  [1] 4: Facial Paresis                0-nl    1-minor    2-partial  3-full  MOTOR                       0-nl  [0] 5: Right Arm           1-drift  [1] 6: Left Arm             2-some effort vs gravity  [0] 7: Right Leg           3-no effort vs gravity  [0] 8: Left Leg             4-no movement                             x-untestable  [1] 9: Limb Ataxia                    0-abs   1-1_limb   2-2+_limbs       x-untestable  [1] 10:Sensory                        0-nl    1-partial  2-dense  [0] 11:Best Language/Aphasia         0-nl    1-mild/mod 2-severe   3-mute  [0] 12:Dysarthria                     0-nl    1-mild/mod 2-severe       x-untestable  [1] 13:Neglect/Inattention            0-none  1-partial  2-complete  [5] TOTAL    Baseline Modified [place] Scale (MRS): 4 = Moderately severe disability; unable to walk without assistance and unable to attend to own bodily needs without assistance    Objective Data:    Labs:  Lab Results   Component Value Date/Time    PROTHROMBTM 17.0 (H) 08/12/2023 01:51 PM    INR 1.41 (H) 08/12/2023 01:51 PM      Lab Results   Component Value Date/Time    WBC 4.8 08/14/2023 05:27 AM    RBC 3.53 (L) 08/14/2023 05:27 AM    HEMOGLOBIN 11.6 (L) 08/14/2023 05:27 AM    HEMATOCRIT 35.7 (L) 08/14/2023 05:27 AM    .1 (H) 08/14/2023 05:27 AM    MCH 32.9 08/14/2023 05:27 AM    MCHC 32.5 08/14/2023 05:27 AM    MPV 10.5 08/14/2023 05:27 AM    NEUTSPOLYS 72.50 (H) 08/14/2023 05:27 AM    LYMPHOCYTES 13.20 (L) 08/14/2023 05:27 AM    MONOCYTES 12.70 08/14/2023 05:27 AM    EOSINOPHILS 0.80 08/14/2023 05:27 AM    BASOPHILS 0.20 08/14/2023 05:27  AM      Lab Results   Component Value Date/Time    SODIUM 134 (L) 08/13/2023 05:35 AM    POTASSIUM 4.2 08/13/2023 05:35 AM    CHLORIDE 104 08/13/2023 05:35 AM    CO2 19 (L) 08/13/2023 05:35 AM    GLUCOSE 91 08/13/2023 05:35 AM    BUN 17 08/13/2023 05:35 AM    CREATININE 0.99 08/13/2023 05:35 AM      Lab Results   Component Value Date/Time    CHOLSTRLTOT 142 08/13/2023 05:35 AM    LDL 86 08/13/2023 05:35 AM    HDL 43 08/13/2023 05:35 AM    TRIGLYCERIDE 67 08/13/2023 05:35 AM       Lab Results   Component Value Date/Time    ALKPHOSPHAT 82 08/13/2023 05:35 AM    ASTSGOT 21 08/13/2023 05:35 AM    ALTSGPT 12 08/13/2023 05:35 AM    TBILIRUBIN 0.8 08/13/2023 05:35 AM        Imaging/Testing:    I interpreted and/or reviewed the patient's neuroimaging    EC-ECHOCARDIOGRAM LTD W/O CONT   Final Result      CT-HEAD W/O   Final Result         NO ACUTE ABNORMALITIES ARE NOTED ON CT SCAN OF THE HEAD.      Findings are consistent with atrophy.  Decreased attenuation in the periventricular white matter likely indicates microvascular ischemic disease.         DX-CHEST-PORTABLE (1 VIEW)   Final Result      1.  Mildly enlarged cardiac silhouette with probable vascular congestion.      CT-CTA NECK WITH & W/O-POST PROCESSING   Final Result      No significant stenosis or occlusion      CT-CTA HEAD WITH & W/O-POST PROCESS   Final Result      1.  CT angiogram of the [place] of  demonstrating no evidence of large vessel occlusion.      CT-HEAD W/O   Final Result      1.  There is no acute intracranial hemorrhage or infarct.      2.  White matter lucencies most consistent with small vessel ischemic change versus demyelination or gliosis.      3.  There is cerebral atrophy.             Assessment and Plan:    89 y.o. male with PMHx of afib on Eliquis 2.5mg BID presenting from SNF with left sided weakness and facial droop and right gaze deviation. Stroke protocol CT head negative for acute intracranial abnormalities. CTA head/neck  negative for LVO or flow limiting stenoses. This is likely a small vessel stroke. Improvement in symptoms following administration of TNK. Repeat CT head negative for hemorrhagic transformation. Restarted on Eliquis at 5mg BID. TTE shows LV EF 55%.      Problem list:  Stroke    Recommendations:  -Q2H neuro exams, okay for floor  -Blood pressure goal -140, normalize to long term goal of 110-130/60-80 in coming days. Antihypertensives per primary team '  -Hold Eliquis for 7 days in setting of small hemorrhagic conversion within stroke burden  -Telemetry; currently afib. No need for TTE from neurology perspective as patient has known history of afib and is on NOAC  -Check Stroke Labs: LDL 86, check HgbA1c  -Atorvastatin 40 mg PO q HS. Titrate to long term LDL goal < 70  -BG management per primary team. BG goal .   -PT/OT/SLP eval and treat.   -DVT PPX: SCDs  -[facility] as outpatient      The plan of care above has been discussed with Dr. Tee Kasper, Stroke Neurology. Neurology will sign off at this time. Please reconsult with acute developments.          MARILEE Mena  Stroke Neurology, Acute Care Services

## 2023-08-14 NOTE — THERAPY
"Speech Language Pathology   Daily Treatment     Patient Name: Guru Pena  AGE:  89 y.o., SEX:  male  Medical Record #: [id number]  Date of Service: 8/14/2023      Precautions:  Precautions: Fall Risk, Swallow Precautions         Subjective  RN cleared pt for session, requested reassessment d/t CT showing new bleed and increased speech deficits. RN holding PO until SLP evaluation. Pt received awake, upright in bed following PT session. Family at bedside initially, however left prior to completion of session. Oriented x2 (self and place). Pt reported \"dinner was delicious.\" Pt appeared lethargic but willing to participate in SLP tasks.       Assessment  Pt seen this date for dysphagia management. Wet vocal quality and weak cough appreciated prior to oral intake. Pt recalled swallow strategies \"bite tongue, hard swallow.\" Provided education, reminding why strategies are important (increase swallow safety.) Presented PO trials of mildly thick liquid (via tsp, cup) and liquidised per pt request vs. pudding. Pt required moderate cueing for strategies with initial oral intake, eventually independently using strategies, stating \"hard swallow\" with every trial. Pt reported easier to incorporate strategies with presentation via tsp vs. Cup. Pt cued intermittently to cough and reswallow, one instance of independent cough immediately following trial of MT2. Wet vocal quality appreciated throughout session. No change in sats during PO. 2 swallows per bolus.    Attempted cognitive/communication evaluation, able to complete orientation and attention tasks, however pt demo'd increased lethargy and unable to maintain wakefulness therefore complete evaluation discontinued.     COGNISTAT  Orientation: Moderate  Attention: Average      Clinical Impressions  Patient continues to present with moderate-severe oropharyngeal dysphagia. Per FEES 8/13: swallow safety is impaired, pharyngeal efficiency is impaired. Family requesting to " "avoid NG if possible. Recommend continue diet of PU4/MT2 with strict use of swallow strategies, 1:1 supervision, periodic cough, and frequent oral care. Due to inconsistent sensation per FEES, unable to rule out silent aspiration at the bedside.  Pt will likely benefit from repeat diagnostic swallow study, MBSS, when clinically appropriate prior to upgrading diet given inconsistent sensation and clearance of aspiration.     Pt would benefit from complete cognitive-communication evaluation once alert and able to participate.       Recommendations  Treatment Completed: Dysphagia Treatment  Dysphagia Treatment  Diet Consistency: Pureed solids with mildly thick liquids. Some acceptance of risk; strategies below significantly decrease risk:  Instrumentation: Instrumental swallow study pending clinical progress  Medication: Crush with applesauce, as appropriate, Non Oral  Supervision: 1:1 feeding with constant supervision, Careful 1:1 hand feeding  Positioning: Fully upright and midline during oral intake  Oral Care: Pre- and post-meals         SLP Treatment Plan  Treatment Plan: Dysphagia Treatment, Patient/Family/Caregiver Training  SLP Frequency: 3x Per Week  Estimated Duration: Until Therapy Goals Met      Anticipated Discharge Needs  Discharge Recommendations: Recommend post-acute placement for additional speech therapy services prior to discharge home  Therapy Recommendations Upon DC: Dysphagia Training, Patient / Family / Caregiver Education, Community Re-Integration      Patient / Family Goals  Patient / Family Goal #1: \"Oh yes, applesauce is much better.\"  Goal #1 Outcome: Progressing as expected  Short Term Goals  Short Term Goal # 1: Pt will complete FEES w SLP to further evaluate swallow function and inform POC.  Goal Outcome # 1: Goal met, new goal added  Short Term Goal # 1 B : Pt will complete exercises targeting epiglottic inversion, LVC, BOT retraction, and pharyngeal constriction/shortening x20 in a " "session given max cues from SLP with \"good\" accuracy.  Goal Outcome  # 1 B: Progressing as expected  Short Term Goal # 2: Pt will consume diet of MT2 and PU4 with use 1:1 spv and strategies with no worsening of lung status.  Goal Outcome # 2 : Progressing as expected  Short Term Goal # 3: Pt will verbalize the importance of oral care in promoting pulmonary hygiene/maximizing dysphagia outcomes given min cues from SLP.      Dylan Cavazos, SLP  "

## 2023-08-14 NOTE — PROGRESS NOTES
Hospital Medicine Daily Progress Note    Date of Service  8/14/2023    Chief Complaint  Guru Pena is a 89 y.o. male admitted 8/12/2023 with L side weakness    Hospital Course  89 y.o. male who presented 8/12/2023 with with history of atrial fibrillation on dose adjusted apixaban, hypothyroidism.  He is a resident at a skilled nursing facility     Patient presented on August 12 with left-sided deficits.  CTA was negative for LVO, CT noncontrast was negative for other acute pathologies.  Unable to tolerate positioning for perfusion study.  After review with neurology, and based on the severity of his symptoms, the decision was given to give him TNK, the patient was admitted to the ICU.  On the monitor the patient has been in rate controlled A-fib/flutter generally.    Interval Problem Update  Pt states he feels fine  Son notes speech is a little more slurred today    AFib 70s  -120  On RA    I have discussed this patient's plan of care and discharge plan at IDT rounds today with Case Management, Nursing, Nursing leadership, and other members of the IDT team.    Consultants/Specialty  neurology    Code Status  Full Code    Disposition  The patient is not medically cleared for discharge to home or a post-acute facility.      I have placed the appropriate orders for post-discharge needs.    Review of Systems  Review of Systems   Constitutional:  Negative for chills and fever.   HENT:  Negative for nosebleeds and sore throat.    Eyes:  Negative for blurred vision and double vision.   Respiratory:  Negative for cough and shortness of breath.    Cardiovascular:  Negative for chest pain, palpitations and leg swelling.   Gastrointestinal:  Negative for abdominal pain, diarrhea, nausea and vomiting.   Genitourinary:  Negative for dysuria and urgency.   Musculoskeletal:  Negative for back pain.   Skin:  Negative for rash.   Neurological:  Positive for speech change and focal weakness. Negative for dizziness,  loss of consciousness and headaches.        Physical Exam  Temp:  [35.6 °C (96 °F)-36.7 °C (98 °F)] 35.8 °C (96.5 °F)  Pulse:  [] 57  Resp:  [14-38] 17  BP: (105-134)/(61-85) 120/73  SpO2:  [90 %-100 %] 95 %    Physical Exam  Constitutional:       General: He is not in acute distress.     Appearance: Normal appearance. He is well-developed. He is not diaphoretic.   HENT:      Head: Normocephalic and atraumatic.   Eyes:      Conjunctiva/sclera: Conjunctivae normal.   Neck:      Vascular: No JVD.   Cardiovascular:      Rate and Rhythm: Normal rate. Rhythm irregular.      Heart sounds: No murmur heard.     No gallop.   Pulmonary:      Effort: Pulmonary effort is normal. No respiratory distress.      Breath sounds: No stridor. No wheezing or rales.   Abdominal:      Palpations: Abdomen is soft.      Tenderness: There is no abdominal tenderness. There is no guarding or rebound.   Musculoskeletal:         General: No tenderness.      Right lower leg: No edema.      Left lower leg: No edema.   Skin:     General: Skin is warm and dry.      Findings: No rash.   Neurological:      Mental Status: He is alert and oriented to person, place, and time.      Comments: Patient is alert and oriented  Speech is very slightly dysarthric however content is logical  No expressive or receptive deficits  Left facial droop  Tongue midline  EOMI  3+/5 left arm and leg, normal strength otherwise.  Gait not tested pending PT eval   Psychiatric:         Thought Content: Thought content normal.         Fluids    Intake/Output Summary (Last 24 hours) at 8/14/2023 1549  Last data filed at 8/14/2023 1300  Gross per 24 hour   Intake 251.73 ml   Output 975 ml   Net -723.27 ml       Laboratory  Recent Labs     08/12/23  1351 08/13/23  0535 08/14/23  0527   WBC 7.2 6.2 4.8   RBC 4.20* 4.00* 3.53*   HEMOGLOBIN 13.9* 13.3* 11.6*   HEMATOCRIT 40.4* 37.9* 35.7*   MCV 96.2 94.8 101.1*   MCH 33.1* 33.3* 32.9   MCHC 34.4 35.1 32.5   RDW 53.4* 51.7*  56.6*   PLATELETCT 139* 149* 136*   MPV 10.8 10.8 10.5     Recent Labs     08/12/23  1351 08/13/23  0535 08/14/23  1056   SODIUM 134* 134* 135   POTASSIUM 4.0 4.2 4.5   CHLORIDE 102 104 103   CO2 19* 19* 21   GLUCOSE 98 91 101*   BUN 19 17 19   CREATININE 1.03 0.99 0.99   CALCIUM 8.7 8.3* 8.6     Recent Labs     08/12/23  1351   APTT 41.5*   INR 1.41*         Recent Labs     08/13/23  0535   TRIGLYCERIDE 67   HDL 43   LDL 86       Imaging  CT-HEAD W/O   Final Result      1.  Advanced cerebral atrophy. Age-appropriate (89 years).   2.  Left middle cranial fossa arachnoid cyst.   3.  Extensive area of acute infarction in the right MCA territory with an approximately 8 mm hazy focus of hemorrhagic transformation which is stable compared with earlier CT from this morning.   4.  Old brainstem infarcts in the central and right paramedian ankush.         CT-HEAD W/O   Final Result      1.  Small right MCA distribution parenchymal hemorrhages with associated edema.   2.  Atrophy.   3.  White matter disease likely representing microvascular ischemic changes.   4.  Right maxillary sinus disease.         EC-ECHOCARDIOGRAM LTD W/O CONT   Final Result      CT-HEAD W/O   Final Result         NO ACUTE ABNORMALITIES ARE NOTED ON CT SCAN OF THE HEAD.      Findings are consistent with atrophy.  Decreased attenuation in the periventricular white matter likely indicates microvascular ischemic disease.         DX-CHEST-PORTABLE (1 VIEW)   Final Result      1.  Mildly enlarged cardiac silhouette with probable vascular congestion.      CT-CTA NECK WITH & W/O-POST PROCESSING   Final Result      No significant stenosis or occlusion      CT-CTA HEAD WITH & W/O-POST PROCESS   Final Result      1.  CT angiogram of the [place] of  demonstrating no evidence of large vessel occlusion.      CT-HEAD W/O   Final Result      1.  There is no acute intracranial hemorrhage or infarct.      2.  White matter lucencies most consistent with small vessel  ischemic change versus demyelination or gliosis.      3.  There is cerebral atrophy.              Assessment/Plan  Idiopathic hypotension  Assessment & Plan  Patient was diagnosed with this on a previous recent admission  At that time he was taking off of metoprolol and started on Florinef which we are continuing  Continue telemetry monitoring    CVA (cerebral vascular accident) (HCC)  Assessment & Plan  Patient presents acutely with left-sided deficits  Now s/p TNK  MRI pending  TTE pending  Rhythm on monitor has been A-fib consistent with known history.  He is maintained on apixaban 2.5 mg twice daily  Neurology consulted  Lipid panel and A1c pending  PT OT and speech consulted  Patient is a resident of a skilled nursing facility however he is able to ambulate normally with front wheeled walker independently  Pending completed work-up anticipate discharge on full dose anticoagulation with apixaban 5 mg twice daily  Probable rehab discharge  Continue with telemetry    8/14  Pt with slight worsening of speech today so ordered a STAT CT.  This showed possible ICH.  As unclear DW Rad and Neuro.  Repeated CT this afternoon which confirms small bleed  Keep in ICU on q2 Neuros  DC apixaban but as bleed is stable will not reverse.  Will hold on resuming for 10days    Thrombocytopenia (HCC)- (present on admission)  Assessment & Plan  Patient runs chronically between 120 and 115  Continue to monitor    Hypothyroidism due to acquired atrophy of thyroid- (present on admission)  Assessment & Plan  TSH done on this admission 3.89.  Continue current dose of replacement    Urinary retention  Assessment & Plan  Monitor for evidence of retention    Stage 3a chronic kidney disease (HCC)- (present on admission)  Assessment & Plan  Baseline creatinine around 1 which is where he is currently  Renally dose medications as appropriate  Daily BMP    JAMES on CPAP- (present on admission)  Assessment & Plan  Nightly CPAP    Pulmonary fibrosis  (HCC)  Assessment & Plan  O2 RT protocols    Moderate mitral regurgitation by prior echocardiogram  Assessment & Plan  Checking cardiac echo    Chronic atrial fibrillation (HCC)- (present on admission)  Assessment & Plan  Patient is anticoagulated on dose adjusted apixaban 2.5 mg twice daily.  He is not on any rate or rhythm control medications as an outpatient and has not required them in house    Elevated troponin  Assessment & Plan  Patient's troponin is running in the 40s however on review of previous records going back more than a year, his troponins are always in the 40s so this would appear to be at his baseline and not indicative of an acute ischemic event    Vitamin D deficiency  Assessment & Plan  Continue replacement         VTE prophylaxis:   SCDs/TEDs      I have performed a physical exam and reviewed and updated ROS and Plan today (8/14/2023). In review of yesterday's note (8/13/2023), there are no changes except as documented above.

## 2023-08-14 NOTE — THERAPY
Occupational Therapy   Initial Evaluation     Patient Name: Guru Pena  Age:  89 y.o., Sex:  male  Medical Record #: [id number]  Today's Date: 8/14/2023     Precautions  Precautions: Fall Risk, Swallow Precautions    Assessment    Patient is 89 y.o. male admitted for possible stroke, left sided weakness, right gaze preference s/p tNK. Pt had been at a SNF prior to admission following COVID hospitilization, prior to that admission pt was independent at his [facility] ([facility]) living with his spouse who intermittently assisted patient, son states patient likely moving to assisted living side of facility in the future once ready for discharge. Pt required maxA for functional mobility and lower body ADLs, Nabil for seated G/H. Will continue to see for skilled therapy while admitted as well as recommend post-acute placement.     Plan    Occupational Therapy Initial Treatment Plan   Treatment Interventions: (P) Self Care / Activities of Daily Living, Adaptive Equipment, Neuro Re-Education / Balance, Therapeutic Exercises, Therapeutic Activity  Treatment Frequency: (P) 3 Times per Week  Duration: (P) Until Therapy Goals Met    DC Equipment Recommendations: (P) Unable to determine at this time  Discharge Recommendations: (P) Recommend post-acute placement for additional occupational therapy services prior to discharge home     Objective       08/14/23 1124   Prior Living Situation   Prior Services Continuous (24 Hour) Care Giving Per Service   Housing / Facility 1 Story Apartment / Condo;Assisted Living Residence   Steps Into Home 0   Steps In Home 0   Bathroom Set up Walk In Shower;Shower Chair   Equipment Owned 4-Wheel Walker;Wheelchair;Front-Wheel Walker   Lives with - Patient's Self Care Capacity Spouse   Prior Level of ADL Function   Self Feeding Independent   Grooming / Hygiene Independent   Bathing Independent   Dressing Independent   Toileting Independent   Comments prior to admission for COVID and subsequent SNF stay    Prior Level of IADL Function   Medication Management Requires Assist   Laundry Requires Assist   Kitchen Mobility Requires Assist   Finances Requires Assist   Home Management Requires Assist   Shopping Requires Assist   Prior Level Of Mobility Independent With Device in Home   Driving / Transportation Relatives / Others Provide Transportation   Occupation (Pre-Hospital Vocational) Retired Due To Age   History of Falls   History of Falls No   Precautions   Precautions Fall Risk;Swallow Precautions   Pain 0 - 10 Group   Therapist Pain Assessment Post Activity Pain Same as Prior to Activity;Nurse Notified   Cognition    Cognition / Consciousness X   Speech/ Communication Delayed Responses;Slurred;Hard of Hearing   Level of Consciousness Alert   Active ROM Upper Body   Active ROM Upper Body  WDL   Dominant Hand Right   Strength Upper Body   Upper Body Strength  WDL   Sensation Upper Body   Upper Extremity Sensation  WDL   Upper Body Muscle Tone   Upper Body Muscle Tone  WDL   Coordination Upper Body   Coordination WDL   Balance Assessment   Sitting Balance (Static) Fair -   Sitting Balance (Dynamic) Poor +   Standing Balance (Static) Poor -   Standing Balance (Dynamic) Trace +   Weight Shift Sitting Poor   Weight Shift Standing Poor   Comments w/ HHA x2   Bed Mobility    Supine to Sit Moderate Assist   Scooting Moderate Assist   Rolling Moderate Assist to Lt.   ADL Assessment   Grooming Contact Guard Assist;Seated   Upper Body Dressing Maximal Assist   Lower Body Dressing Maximal Assist   How much help from another person does the patient currently need...   Putting on and taking off regular lower body clothing? 2   Bathing (including washing, rinsing, and drying)? 2   Toileting, which includes using a toilet, bedpan, or urinal? 2   Putting on and taking off regular upper body clothing? 2   Taking care of personal grooming such as brushing teeth? 2   Eating meals? 2   6 Clicks Daily Activity Score 12   mRS Prior to  admission   Prior to admission mRS 0   Modified [place] (mRS)   Modified [place] Score 4   Functional Mobility   Sit to Stand Maximal Assist   Mobility bed mobility, attempted STS x3, returned to supine  (better with FWW vs HHA)   Visual Perception   Visual Perception  X   Neglect Mild Left   Activity Tolerance   Sitting Edge of Bed 8 min   Standing 3 min total   Short Term Goals   Short Term Goal # 1 Pt will complete ADL transfers with supervision   Short Term Goal # 2 Pt will complete LB dressing with supervision   Short Term Goal # 3 Pt will complete toileting with supervision   Education Group   Education Provided Role of Occupational Therapist   Role of Occupational Therapist Patient Response Patient;Acceptance;Explanation   Occupational Therapy Initial Treatment Plan    Treatment Interventions Self Care / Activities of Daily Living;Adaptive Equipment;Neuro Re-Education / Balance;Therapeutic Exercises;Therapeutic Activity   Treatment Frequency 3 Times per Week   Duration Until Therapy Goals Met   Problem List   Problem List Decreased Active Daily Living Skills;Decreased Homemaking Skills;Decreased Functional Mobility;Decreased Activity Tolerance;Impaired Postural Control / Balance;Impaired Cognitive Function;Safety Awareness Deficits / Cognition   Anticipated Discharge Equipment and Recommendations   DC Equipment Recommendations Unable to determine at this time   Discharge Recommendations Recommend post-acute placement for additional occupational therapy services prior to discharge home   Interdisciplinary Plan of Care Collaboration   IDT Collaboration with  Nursing;Pharmacist;Family / Caregiver   Patient Position at End of Therapy In Bed;Bed Alarm On;Call Light within Reach;Tray Table within Reach;Phone within Reach;Family / Friend in Room   Collaboration Comments RN updated

## 2023-08-14 NOTE — CARE PLAN
The patient is Stable - Low risk of patient condition declining or worsening    Shift Goals  Clinical Goals: SBP < 180  Patient Goals: Rest  Family Goals: RYAN    Progress made toward(s) clinical / shift goals:        Problem: Knowledge Deficit - Stroke Education  Goal: Patient's knowledge of stroke and risk factors will improve  Outcome: Progressing     Problem: Neuro Status  Goal: Neuro status will remain stable or improve  Outcome: Progressing     Problem: Hemodynamic Monitoring  Goal: Patient's hemodynamics, fluid balance and neurologic status will be stable or improve  Outcome: Progressing     Problem: Respiratory - Stroke Patient  Goal: Patient will achieve/maintain optimum respiratory rate/effort  Outcome: Progressing     Problem: Dysphagia  Goal: Dysphagia will improve  Outcome: Progressing     Problem: Risk for Aspiration  Goal: Patient's risk for aspiration will be absent or decrease  Outcome: Progressing       Patient is not progressing towards the following goals:

## 2023-08-15 LAB
ALBUMIN SERPL BCP-MCNC: 2.9 G/DL (ref 3.2–4.9)
ALBUMIN/GLOB SERPL: 1 G/DL
ALP SERPL-CCNC: 77 U/L (ref 30–99)
ALT SERPL-CCNC: 12 U/L (ref 2–50)
ANION GAP SERPL CALC-SCNC: 12 MMOL/L (ref 7–16)
AST SERPL-CCNC: 24 U/L (ref 12–45)
BASOPHILS # BLD AUTO: 0.4 % (ref 0–1.8)
BASOPHILS # BLD: 0.02 K/UL (ref 0–0.12)
BILIRUB SERPL-MCNC: 0.7 MG/DL (ref 0.1–1.5)
BUN SERPL-MCNC: 18 MG/DL (ref 8–22)
CALCIUM ALBUM COR SERPL-MCNC: 9 MG/DL (ref 8.5–10.5)
CALCIUM SERPL-MCNC: 8.1 MG/DL (ref 8.5–10.5)
CHLORIDE SERPL-SCNC: 102 MMOL/L (ref 96–112)
CO2 SERPL-SCNC: 21 MMOL/L (ref 20–33)
CREAT SERPL-MCNC: 0.99 MG/DL (ref 0.5–1.4)
EOSINOPHIL # BLD AUTO: 0.09 K/UL (ref 0–0.51)
EOSINOPHIL NFR BLD: 1.7 % (ref 0–6.9)
ERYTHROCYTE [DISTWIDTH] IN BLOOD BY AUTOMATED COUNT: 50.9 FL (ref 35.9–50)
EST. AVERAGE GLUCOSE BLD GHB EST-MCNC: 120 MG/DL
GFR SERPLBLD CREATININE-BSD FMLA CKD-EPI: 69 ML/MIN/1.73 M 2
GLOBULIN SER CALC-MCNC: 3 G/DL (ref 1.9–3.5)
GLUCOSE BLD STRIP.AUTO-MCNC: 116 MG/DL (ref 65–99)
GLUCOSE BLD STRIP.AUTO-MCNC: 95 MG/DL (ref 65–99)
GLUCOSE SERPL-MCNC: 101 MG/DL (ref 65–99)
HBA1C MFR BLD: 5.8 % (ref 4–5.6)
HCT VFR BLD AUTO: 37.3 % (ref 42–52)
HGB BLD-MCNC: 13.1 G/DL (ref 14–18)
IMM GRANULOCYTES # BLD AUTO: 0.02 K/UL (ref 0–0.11)
IMM GRANULOCYTES NFR BLD AUTO: 0.4 % (ref 0–0.9)
LYMPHOCYTES # BLD AUTO: 1.18 K/UL (ref 1–4.8)
LYMPHOCYTES NFR BLD: 22.3 % (ref 22–41)
MAGNESIUM SERPL-MCNC: 2 MG/DL (ref 1.5–2.5)
MCH RBC QN AUTO: 33.7 PG (ref 27–33)
MCHC RBC AUTO-ENTMCNC: 35.1 G/DL (ref 32.3–36.5)
MCV RBC AUTO: 95.9 FL (ref 81.4–97.8)
MONOCYTES # BLD AUTO: 0.64 K/UL (ref 0–0.85)
MONOCYTES NFR BLD AUTO: 12.1 % (ref 0–13.4)
NEUTROPHILS # BLD AUTO: 3.35 K/UL (ref 1.82–7.42)
NEUTROPHILS NFR BLD: 63.1 % (ref 44–72)
NRBC # BLD AUTO: 0 K/UL
NRBC BLD-RTO: 0 /100 WBC (ref 0–0.2)
PHOSPHATE SERPL-MCNC: 2.8 MG/DL (ref 2.5–4.5)
PLATELET # BLD AUTO: 161 K/UL (ref 164–446)
PMV BLD AUTO: 10.5 FL (ref 9–12.9)
POTASSIUM SERPL-SCNC: 3.8 MMOL/L (ref 3.6–5.5)
PROT SERPL-MCNC: 5.9 G/DL (ref 6–8.2)
RBC # BLD AUTO: 3.89 M/UL (ref 4.7–6.1)
SODIUM SERPL-SCNC: 135 MMOL/L (ref 135–145)
WBC # BLD AUTO: 5.3 K/UL (ref 4.8–10.8)

## 2023-08-15 PROCEDURE — A9270 NON-COVERED ITEM OR SERVICE: HCPCS | Performed by: INTERNAL MEDICINE

## 2023-08-15 PROCEDURE — 99232 SBSQ HOSP IP/OBS MODERATE 35: CPT | Performed by: INTERNAL MEDICINE

## 2023-08-15 PROCEDURE — 700111 HCHG RX REV CODE 636 W/ 250 OVERRIDE (IP): Performed by: NURSE PRACTITIONER

## 2023-08-15 PROCEDURE — A9270 NON-COVERED ITEM OR SERVICE: HCPCS | Performed by: HOSPITALIST

## 2023-08-15 PROCEDURE — 83735 ASSAY OF MAGNESIUM: CPT

## 2023-08-15 PROCEDURE — 302093 ICE PACK LG: Performed by: INTERNAL MEDICINE

## 2023-08-15 PROCEDURE — 84100 ASSAY OF PHOSPHORUS: CPT

## 2023-08-15 PROCEDURE — 770020 HCHG ROOM/CARE - TELE (206)

## 2023-08-15 PROCEDURE — 700102 HCHG RX REV CODE 250 W/ 637 OVERRIDE(OP): Performed by: HOSPITALIST

## 2023-08-15 PROCEDURE — 700102 HCHG RX REV CODE 250 W/ 637 OVERRIDE(OP): Performed by: INTERNAL MEDICINE

## 2023-08-15 PROCEDURE — A9270 NON-COVERED ITEM OR SERVICE: HCPCS | Performed by: STUDENT IN AN ORGANIZED HEALTH CARE EDUCATION/TRAINING PROGRAM

## 2023-08-15 PROCEDURE — 80053 COMPREHEN METABOLIC PANEL: CPT

## 2023-08-15 PROCEDURE — 97602 WOUND(S) CARE NON-SELECTIVE: CPT

## 2023-08-15 PROCEDURE — 700102 HCHG RX REV CODE 250 W/ 637 OVERRIDE(OP): Performed by: STUDENT IN AN ORGANIZED HEALTH CARE EDUCATION/TRAINING PROGRAM

## 2023-08-15 PROCEDURE — 82962 GLUCOSE BLOOD TEST: CPT

## 2023-08-15 PROCEDURE — 85025 COMPLETE CBC W/AUTO DIFF WBC: CPT

## 2023-08-15 RX ORDER — POTASSIUM CHLORIDE 20 MEQ/1
40 TABLET, EXTENDED RELEASE ORAL ONCE
Status: DISCONTINUED | OUTPATIENT
Start: 2023-08-15 | End: 2023-08-15

## 2023-08-15 RX ADMIN — LABETALOL HYDROCHLORIDE 10 MG: 5 INJECTION INTRAVENOUS at 10:42

## 2023-08-15 RX ADMIN — SENNOSIDES AND DOCUSATE SODIUM 2 TABLET: 50; 8.6 TABLET ORAL at 05:19

## 2023-08-15 RX ADMIN — SENNOSIDES AND DOCUSATE SODIUM 2 TABLET: 50; 8.6 TABLET ORAL at 18:04

## 2023-08-15 RX ADMIN — LEVOTHYROXINE SODIUM 175 MCG: 0.17 TABLET ORAL at 05:20

## 2023-08-15 RX ADMIN — FLUDROCORTISONE ACETATE 0.1 MG: 0.1 TABLET ORAL at 05:19

## 2023-08-15 RX ADMIN — ATORVASTATIN CALCIUM 40 MG: 40 TABLET, FILM COATED ORAL at 18:04

## 2023-08-15 RX ADMIN — POTASSIUM BICARBONATE 25 MEQ: 978 TABLET, EFFERVESCENT ORAL at 12:11

## 2023-08-15 ASSESSMENT — ENCOUNTER SYMPTOMS
TINGLING: 0
BLURRED VISION: 0
ORTHOPNEA: 0
WEIGHT LOSS: 0
CONSTIPATION: 0
NECK PAIN: 0
DIARRHEA: 0
PHOTOPHOBIA: 0
FOCAL WEAKNESS: 1
SHORTNESS OF BREATH: 0
ABDOMINAL PAIN: 0
BACK PAIN: 0
CHILLS: 0
NAUSEA: 0
FEVER: 0
DOUBLE VISION: 0
MYALGIAS: 0
SENSORY CHANGE: 0
SPEECH CHANGE: 1
TREMORS: 0
HEADACHES: 0
SPUTUM PRODUCTION: 0
COUGH: 0
DIZZINESS: 0
HALLUCINATIONS: 0
VOMITING: 0
PALPITATIONS: 0
EYE PAIN: 0

## 2023-08-15 ASSESSMENT — FIBROSIS 4 INDEX: FIB4 SCORE: 4.22

## 2023-08-15 ASSESSMENT — PAIN DESCRIPTION - PAIN TYPE
TYPE: ACUTE PAIN

## 2023-08-15 ASSESSMENT — LIFESTYLE VARIABLES: SUBSTANCE_ABUSE: 0

## 2023-08-15 NOTE — CARE PLAN
The patient is Stable - Low risk of patient condition declining or worsening    Shift Goals  Clinical Goals: q2h neuro  Patient Goals: rest  Family Goals: RYAN    Progress made toward(s) clinical / shift goals:  q2h neuro assessments stable    Patient is not progressing towards the following goals:      Problem: Neuro Status  Goal: Neuro status will remain stable or improve  Outcome: Progressing  Note: Q2h neuro assessments stable. Will continue to monitor.

## 2023-08-15 NOTE — DISCHARGE PLANNING
Spoke with Damon, son regarding [facility] D/C resources/support.  He is agreeable with an admission.  uGru resides with his spouse @ [facility].  The plan is for Guru to transition to the [facility].  A room will be available after Labor Day weekend.  Submitted to insurance.

## 2023-08-15 NOTE — PREADMISSION SCREENING NOTE
Updated Pre-Screen Assessment     Name: Guru Pena  MRN: [id number]  : 1925    Medical Status/ Changes:     ZULEIKA Laguerre  Nurse Practitioner  Hospital Medicine  Progress Notes     Signed  Date of Service:  2023  7:04 AM        [facility] HOSPITALIST CROSS COVER     Notified by RN regarding nipples being nonreactive to light bilaterally.  Yesterday a similar issue occurred, with patient having left-sided monocular vision loss.  Ophthalmology was consulted and said bilateral pupils were reactive but demonstrated hemorrhages in the left eye suspicious for central retinal vein occlusion.  Suspect that pupillary change may be secondary to malocclusion.  Patient's neuro exam is otherwise unchanged from prior.  Later updated that the patient's right eye was reacting to light at 0430 neuro check.  Discussed with collaborating physician, will hold off on repeat head CT given that the patient's neuro assessment is otherwise unchanged.          Vitals:    23 0327  BP: 117/67  Pulse: 71  Resp: 16  Temp: 36.3 °C (97.3 °F)  SpO2: 90%           -----------------------------------------------------------------------------------------------------------     Electronically signed by:  Cynthia Marie, ADELA, APRN, AGACNP-BC  Hospitalist Services      Chanda Zuñiga M.D.  Physician  Ophthalmology  Consults     Unsigned Transcription  Date of Service:  2023  9:50 PM   suggestion   Draft: Not electronically signed.          DATE OF SERVICE:  2023      INPATIENT CONSULTATION     HISTORY OF PRESENT ILLNESS:  The patient is a 89-year-old gentleman who was   admitted with an acute CVA, decreased vision in the left eye was noted today.     PHYSICAL EXAMINATION:  On examination, the patient was quite somnolent.  Due   to somnolence, visual acuity measurement was difficult to do today.  Motility   was grossly full.  Pupils were reactive.  Unable to test for afferent   pupillary defect.  Anterior segment  examination showed pseudophakia   bilaterally.  Dilated fundus examination showed hemorrhages and exudate in the   left eye suspicious for central retinal vein occlusion.     IMPRESSION:  Diffuse retinal hemorrhages in all 4 quadrants of the retina,   suspicious for central retinal vein occlusion.     RECOMMENDATION: Further examination will be performed within the next couple   of days.           ______________________________  MD GALA VALDEZ/DEDRICK/SHEILA     DD:  08/17/2023 21:50  DT:  08/17/2023 22:07    Sammy Abreu  Clinical Admissions Coordinator  Discharge Planning     Signed  Date of Service:  8/18/2023 11:28 AM        Follow up for potential transfer to [facility]. Spoke with Dr. Chanda Zuñiga. Patient can follow up with Opthalmology as an out patient after IRF.        JAH Noel.  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 Patient has been doing somewhat well, now on nursing floor. Repeat CT head revealed evolving Right MCA ischemic infarct with mild/minimal hemorrhagic conversion.      This morning, RRT was called given new onset Left eye monocular vision loss; the patient denies involvement of Right eye; no obvious hemianopsia. No other new deficit. Repeat CT head stable, no hemorrhage nor obvious new infarct. I examined the patient-- he does appear to have new/exclusive Left eye monocular vision loss. Given exclusive monocular involvement, have suspicion for retinal artery occlusion (central or branch) secondary to emobli/cardioemboli secondary to Afib. Opthalmology consultation is pending; not a candidate for thrombolytics given recent stroke and recent receipt of TNK.      As was previously noted, please plan to restart Eliquis at 5 mg PO BID On 8/18/23.  Continue PT/OT/SLP and other stroke risk factor modification as was previously documented.      ZULEIKA Noel D.O.  Physician  Physical Medicine & Rehab  Progress Notes     Addendum  Date of Service:  8/16/2023  8:37 AM    Addendum      Expand All Collapse All                                                      Physical Medicine and Rehabilitation Consultation                Follow up note   ASSESSMENT:  Patient is a 89 y.o. male admitted with right MCA stroke now s/p TNK     [facility] Code / Diagnosis to Support: 0001.1 - Stroke: Left Body Involvement (Right Brain)     Rehabilitation: Impaired ADLs and mobility  Patient is a good candidate for inpatient rehab based on needs for PT, OT, and speech therapy.  Patient will also benefit from family training.  Patient has a good discharge situation which will be home with spouse.      Barriers to transfer include: Insurance authorization, TCCs to verify disposition, medical clearance and bed availability      All cases are subject to administrative review and recommendations may change     Disposition recommendations:  -Good candidate for IPR.  Patient has deficits with mobility, ADLs and swallow.  Patient has a stable discharge environment which is his assisted living facility, and support from his spouse as well as staff.  -[facility] to [facility] to ensure that he is welcome back to return, and to delineate how much support he is currently getting  -PMR to follow in the periphery for rehab appropriateness, please reach out with questions or request for medical management     Medical Complexity:     Right MCA stroke with hemorrhagic conversion seen on CT head  -Initial NIH score 10  -Status post tPA  -Reduction NIH score to 5 today  -Secondary stroke prevention with atorvastatin 40 mg nightly  -Eliquis currently on hold for 10 days due to small hemorrhagic conversion  - MRI brain pending   - 8/16 repeat CT head showed stable  subtle hemorrhagic conversion   -Continue PT OT and SLP while in house  -Good candidate for IPR     Hypertension/hypotension  -Blood pressure goal -140, normalize to long term goal of 110-130/60-80 in coming days, per neurology  -8/16 116/77   -Florinef 0.1 mg every morning  -Hydralazine as needed     Hypothyroidism  -Synthroid 175 mcg every morning     Diabetes mellitus  -Insulin sliding scale  -BG goal 80-1 80 per neurology  -Checking A1c     Thrombocytopenia  -8/16 Platelets 152   -Close observation monitoring with serial labs by primary team           DVT PPX: SCDs        Thank you for allowing us to participate in the care of this patient.      Patient was seen for >51 minutes on unit/floor of which > 50% of time was spent on counseling and coordination of care regarding the above, including prognosis, risk reduction, benefits of treatment, and options for next stage of care.Including discussing plan with primary team, waiting on hold >15 mins to schedule peer to peer with insurance.      Maribel Zimmerman D.O.   Physical Medicine and Rehabilitation      Please note that this dictation was created using voice recognition software. I have made every reasonable attempt to correct obvious errors, but there may be errors of grammar and possibly content that I did not discover before finalizing the note.        Functional Status/ Changes:     Yajaira Guadalupe, SLP  Speech Language Pathologist  Therapy     Signed  Date of Service:  8/16/2023  2:29 PM        Speech Language Pathology   Daily Treatment     Patient Name: Guru Pena  AGE:  89 y.o., SEX:  male  Medical Record #: [id number]  Date of Service: 8/16/2023        Precautions:  Precautions: Fall Risk, Swallow Precautions        Assessment  Pt seen on this date for dysphagia therapy. Pt with generalized weakness and agreeable to PO trials. He consumed trials of puree and MTL via cup sip with no overt s/sx of aspiration. Trials of minced and moist resulted  "in immediate coughing and throat clearing concerning for airway invasion. Suspected delayed AP transport with minced and moist. Pt unable to feed self so 1:1 feeding provided by this clinician. Pt became more lethargic as session progressed requiring increased use of verbal cues so further PO trials were discontinued.        Clinical Impressions  Pt continues to present with an acute dysphagia 2/2 R MCA syndrome. Recommend continuation of modified puree/MTL diet with 1:1 feeding. Please discontinue PO with any s/sx of aspiration or difficulty.        Recommendations  Treatment Completed: Dysphagia Treatment        Dysphagia Treatment  Diet Consistency: puree/MTL with 1:1 feeding  Instrumentation: None indicated at this time  Medication: Crush with applesauce, as appropriate, As tolerated  Supervision: 1:1 feeding with constant supervision  Positioning: Fully upright and midline during oral intake  Risk Management : Small bites/sips, No straws           SLP Treatment Plan  Treatment Plan: Dysphagia Treatment  SLP Frequency: 3x Per Week  Estimated Duration: Until Therapy Goals Met        Anticipated Discharge Needs  Discharge Recommendations: Recommend post-acute placement for additional speech therapy services prior to discharge home  Therapy Recommendations Upon DC: Dysphagia Training, Community Re-Integration, Patient / Family / Caregiver Education        Patient / Family Goals  Patient / Family Goal #1: \"Oh yes, applesauce is much better.\"  Goal #1 Outcome: Progressing as expected  Short Term Goals  Short Term Goal # 1: Pt will complete FEES w SLP to further evaluate swallow function and inform POC.  Goal Outcome # 1: Goal met, new goal added  Short Term Goal # 1 B : Pt will complete exercises targeting epiglottic inversion, LVC, BOT retraction, and pharyngeal constriction/shortening x20 in a session given max cues from SLP with \"good\" accuracy.  Goal Outcome  # 1 B: Progressing slower than expected  Short Term " Goal # 2: Pt will consume diet of MT2 and PU4 with use 1:1 spv and strategies with no worsening of lung status.  Goal Outcome # 2 : Progressing as expected  Short Term Goal # 3: Pt will verbalize the importance of oral care in promoting pulmonary hygiene/maximizing dysphagia outcomes given min cues from SLP.  Goal Outcome  # 3: Goal not met        Yajaira Guadalupe, SLP    Martita Vargas, PTA  Physical Therapy Assistant  Therapy     Signed  Date of Service:  8/16/2023 11:35 AM        Physical Therapy   Daily Treatment     Patient Name: Guru Pena  Age:  89 y.o., Sex:  male  Medical Record #: [id number]  Today's Date: 8/16/2023     Precautions  Precautions: Fall Risk;Swallow Precautions  Comments: Lft side inattention. 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 The pt willing to participate w/PT. 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 Functionally the pt requires min<->mod assist w/bed mobility and STS. The pt tolerated 20 mins EOB w/CGA<->min assist for static sit, still w/posterior lean. Standing w/min assist w/small side steps along the EOB. Pt too fatigued to tolerate OOB in chair.   Per son, the pt was in the hospital in July w/Covid and went to [facility] for therapy. The son stated the pt never did fully recover from Covid strength wise. The pt's tx session was limited by fatigue w/back to back therapy sessions.   PT will cont to follow     Plan     Treatment Plan Status: (P) Continue Current Treatment Plan  Type of Treatment: Bed Mobility, Gait Training, Neuro Re-Education / Balance, Self Care / Home Evaluation, Stair Training, Therapeutic Activities, Therapeutic Exercise  Treatment Frequency: 4 Times per Week  Treatment Duration: Until Therapy Goals Met     DC Equipment Recommendations: (P) Unable to determine at this time  Discharge Recommendations: (P) Recommend post-acute placement for  additional physical therapy services prior to discharge home     Objective            08/16/23 1135  Charge Group  PT Therapeutic Activities (Units) 3  Total Time Spent  PT Therapeutic Activities Time Spent (Mins) 40  PT Total Time Spent (Calculated) 40  Precautions  Precautions Fall Risk;Swallow Precautions  Comments Lft side inattention. 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 The pt required cueing to shift wt forward, posterior lean throughout his efforts.  Balance  Sitting Balance (Static) Fair  Sitting Balance (Dynamic) Fair -  Standing Balance (Static) Poor  Standing Balance (Dynamic) Poor -  Weight Shift Sitting Fair  Weight Shift Standing Poor  Comments standing w/FWW  Bed Mobility   Supine to Sit Moderate Assist  Sit to Supine Contact Guard Assist  Scooting Minimal Assist  (seated)  Rolling Minimal Assist to Rt.;Minimum Assist to Lt.  Skilled Intervention Verbal Cuing;Postural Facilitation;Compensatory Strategies  Comments HOB partially elevated and railing support for rolling.  Gait Analysis  Gait Level Of Assist Unable to Participate  Comments Side stepping along EOB w/walker assist. Pt struggled w/moving of Lft LE w/his efforts.  Functional Mobility  Sit to Stand Minimal Assist  (from elevated bed->FWW,)  Bed, Chair, Wheelchair Transfer Unable to Participate  (too fatigued)  Skilled Intervention Verbal Cuing;Tactile Cuing;Postural Facilitation;Compensatory Strategies  Comments Improvement w/STS from previous PT session  How much difficulty does the patient currently have...  Turning over in bed (including adjusting bedclothes, sheets and blankets)? 1  Sitting down on and standing up from a chair with arms (e.g., wheelchair, bedside commode, etc.) 1  Moving from lying on back to sitting on the side of the  bed? 1  How much help from another person does the patient currently need...  Moving to and from a bed to a chair (including a wheelchair)? 2  Need to walk in a hospital room? 2  Climbing 3-5 steps with a railing? 1  6 clicks Mobility Score 8  Short Term Goals   Short Term Goal # 1 Pt will be able to peform bed mobility w/SPV in 6 visits in order to promote independence  Goal Outcome # 1 goal not met  Short Term Goal # 2 Pt will be able to perform functional transfers w/LRAD w/SPV in 6 visits in order to promote independence  Goal Outcome # 2 Goal not met  Short Term Goal # 3 Pt will be able to ambulate 50 ft w/LRAD w/SPV in 6 visits in order to increase household mobility  Goal Outcome # 3 Goal not met  Education Group  Role of Physical Therapist Patient Response Patient;Acceptance;Explanation;Action Demonstration  Physical Therapy Treatment Plan  Physical Therapy Treatment Plan Continue Current Treatment Plan  Anticipated Discharge Equipment and Recommendations  DC Equipment Recommendations Unable to determine at this time  Discharge Recommendations Recommend post-acute placement for additional physical therapy services prior to discharge home  Interdisciplinary Plan of Care Collaboration  IDT Collaboration with  Nursing;Family / Caregiver  Patient Position at End of Therapy In Bed;Call Light within Reach;Tray Table within Reach;Phone within Reach  Collaboration Comments Nrsg notified of pts tx efforts  Session Information  Date / Session Number  8/16--2 (2/4, 8/20) PTA/1  Supervising Physical Therapist (PTA Treatments Only)  Supervising Physical Therapist Marilu Rouse, OT  Occupational Therapist  Therapy     Signed  Date of Service:  8/16/2023 10:08 AM        Occupational Therapy  Daily Treatment     Patient Name: Guru Pena  Age:  89 y.o., Sex:  male  Medical Record #: [id number]  Today's Date: 8/16/2023     Precautions: Fall Risk, Swallow Precautions     Assessment  Pt was seen for  "OT tx, session focused on sitting balance at EOB while participating in ADL's. Pt extra time pt was able to achieve EOB sitting w/CGA, pt did required facilitation w/postural changes to maintain midline d/t mild posterior lean. Also noted LUE weakness mild and L inattention. Pt reports no vision in L eye. After completing oral care and UB partial bathing, pt noted to have BM, returned to supine for rick care, RN present. Pt's CT does not \"suble stable hemorrhagic transformation w/R MCA CVA\" pt did present w/mild LUE weakness and vision changes from previous OT eval, however pt did show improved overall functional participation from previous session.      Plan  Treatment Plan Status: Modify Current Treatment Plan  Type of Treatment: Manual Therapy Techniques  Treatment Frequency: 5 Times per Week  Treatment Duration: Until Therapy Goals Met     DC Equipment Recommendations: Unable to determine at this time  Discharge Recommendations: Recommend post-acute placement for additional occupational therapy services prior to discharge home     Subjective  \"I used to work for [employer]\"      Objective         08/16/23 1008  Treatment Charges  Charges Yes  OT Self Care / ADL (Units) 2  Total Time Spent  OT Time Spent Yes  OT Self Care / ADL (Minutes) 25  OT Total Time Spent (Calculated) 25  Precautions  Precautions Fall Risk;Swallow Precautions  Cognition   Cognition / Consciousness X  Speech/ Communication Delayed Responses;Slurred;Hard of Hearing  Orientation Level Not Oriented to Reason;Not Oriented to Place  Level of Consciousness Alert  Sequencing Impaired  Comments Slow to respond AOx3 cooperative and motivated  Passive ROM Upper Body  Passive ROM Upper Body WDL  Active ROM Upper Body  Active ROM Upper Body  X  Comments RUE WFL; LUE w/mild end range shoulder ROM d/t weakness and inattention  Strength Upper Body  Upper Body Strength  X  Comments RUE WFL; LUE 4/5 diminshed grasp and shoulder ROM  Fine Motor / Dexterity "   Fine Motor / Dexterity Yes  Fine Motor / Dexterity Interventions delayed LUE in hand manipulation and weaker grasp  Other Treatments  Other Treatments Provided Focused on EOB grooming and postural control mild posterior lean in sitting able to correct w/cues  Balance  Sitting Balance (Static) Fair  Sitting Balance (Dynamic) Fair -  Weight Shift Sitting Fair  Skilled Intervention Verbal Cuing;Tactile Cuing;Facilitation;Compensatory Strategies  Comments standing defered d/t BM  Bed Mobility   Supine to Sit Contact Guard Assist  Sit to Supine Standby Assist  Scooting Minimal Assist  Rolling Minimal Assist to Rt.;Minimum Assist to Lt.  Skilled Intervention Verbal Cuing;Tactile Cuing;Facilitation  Comments HOB elevated and w/extra time pt was able to acheive EOB position  Activities of Daily Living  Grooming Contact Guard Assist;Seated  Upper Body Dressing Moderate Assist  Lower Body Dressing Maximal Assist  Toileting Total Assist  Skilled Intervention Verbal Cuing;Tactile Cuing;Facilitation;Compensatory Strategies  Comments bowel incontinence, UB dressing diminished by mild LUE weakness and need to use UE for sitting balance  How much help from another person does the patient currently need...  6 Clicks Daily Activity Score 15  Modified Burnett (mRS)  Modified Burnett Score 4  Functional Mobility  Sit to Stand Unable to Participate  Mobility EOB deferred standing d/t BM  Skilled Intervention Tactile Cuing;Verbal Cuing;Facilitation  Visual Perception  Visual Perception  X  Neglect Severe Left  Comments pt reports little to no vision in left eye  Activity Tolerance  Comments mild c/o fatigue  Patient / Family Goals  Patient / Family Goal #1 to use by L eye  Short Term Goals  Short Term Goal # 1 Pt will complete ADL transfers with supervision  Goal Outcome # 1 Progressing as expected  Short Term Goal # 2 Pt will complete LB dressing with supervision  Goal Outcome # 2 Progressing slower than expected  Short Term Goal # 3 Pt  will complete toileting with supervision  Goal Outcome # 3 Progressing slower than expected  Education Group  Role of Occupational Therapist Patient Response Patient;Acceptance;Explanation  Occupational Therapy Treatment Plan   O.T. Treatment Plan Modify Current Treatment Plan  Treatment Interventions Manual Therapy Techniques  Treatment Frequency 5 Times per Week  Anticipated Discharge Equipment and Recommendations  DC Equipment Recommendations Unable to determine at this time  Discharge Recommendations Recommend post-acute placement for additional occupational therapy services prior to discharge home  Interdisciplinary Plan of Care Collaboration  IDT Collaboration with  Nursing;Physician  Patient Position at End of Therapy In Bed;Other (Comments)  (RN present)  Collaboration Comments RN aware of session  Session Information  Date / Session Number   #2 (, )         Medical director approval for admission       Reviewer: Sammy Abreu  Date: 2023  Time: 10:59 AM  Pre-Admission Screening Form    Patient Information:   Name: Guru Pena     MRN: [id number]       : 1925      Age: 89 y.o.   Gender: male      Race: White [7]       Marital Status:  [5]  Family Contact: Damon Pena Slyvie        Relationship: Son [15]  Daughter [2]  Home Phone: [phone]             Cell Phone: [phone]  Advanced Directives: None  Code Status:  FULL  Current Attending Provider: KRISTIAN Kimble  Referring Physician: Dr. Barrera  Physiatrist Consult: Dr. Vergara   Referral Date: 23  Primary Payor Source:  [employer] MEDICARE  Secondary Payor Source:      Medical Information:   Date of Admission to Acute Care [place]2023  Room Number: R113/00  Rehabilitation Diagnosis: 0001.1 - Stroke: Left Body Involvement (Right Brain)  Immunization History   Administered Date(s) Administered    Influenza Vaccine Adult HD 2021, 10/04/2022    PFIZER BIVALENT  "SARS-COV-2 VACCINE (12+) 10/19/2022    PFIZER PURPLE CAP SARS-COV-2 VACCINATION (12+) 04/22/2021, 05/13/2021, 12/06/2021    Tdap Vaccine 02/01/2022    Zoster Vaccine Recombinant (RZV) (SHINGRIX) 06/21/2021, 02/07/2022     Allergies   Allergen Reactions    Digoxin      nausea     Past Medical History:   Diagnosis Date    A-fib (HCC)     Elevated troponin 6/30/2021    Generalized weakness 6/22/2021    Hypothyroid     Other specified hypothyroidism 6/1/2021     History reviewed. No pertinent surgical history.    History Leading to Admission, Conditions that Caused the Need for Rehab (CMS):     Dr. Barrera H&P:  Chief Complaint:  Possible Stroke (From SNF, LKW 09:30 while at breakfast with staff, pt was then found at 12:30 today by staff with weakness, pt arrives with L sided weakness, R sided facial droop, and inability to look left. GCS 15, VSS on RA. Hx A-fib on eliquis. )     History of Present Illness: Guru Pena is a very pleasant 89 y.o. male with a past medical history of hypertension hyperlipidemia, A-fib (rate controlled off of metoprolol on reduced dose to AC, vitamin D deficiency, hypothyroidism, JAMES on CPAP, pulmonary hypertension, mild aortic stenosis, moderate mitral regurg, urinary retention, and a questionable history of pulmonary fibrosis?  Who presents today for a 3-hour history of left-sided weakness, and facial droop.  Patient states that he was in his usual health this morning, patient noticed that he ate breakfast and then his \"stomach did not feel quite right,\" patient stated he was sitting cannot move his left side very well and was noted by the nursing patient staff.  Patient does not notice weakness, is laying in bed favoring his right side.  Patient denies any other acute complaints denies any recent illness states that he had been feeling \"like I am been getting better in the nursing home, stating that he used to live at [facility].  Patient denies any dizziness, lightheadedness, " "headaches, vision changes, hearing changes, sensation changes, endorses left-sided weakness which \"I hope will get better after the clot medication, I am hopeful overall.\"  Patient denies any recent illnesses any fevers, chills, night sweats, chest pain, palpitations, shortness of breath, cough, abdominal pain, nausea/vomiting/diarrhea/constipation any recent trauma, melena, hematochezia, bleeding history, easy bruising, dysuria, urgency.     In the emergency department patient was afebrile, pulse of 110, respiratory rate of 16, blood pressure of 153/116 initially down to 132/86, saturating at 94% on room air.  CBC remarkable for hemoglobin 13.9 chronic normocytic anemia mild likely given age, platelets of 139 also likely given age, CMP remarkable for sodium of 134, potassium of 4.0 chloride of 102 bicarb of 19 without gap, creatinine of 1.03 near baseline, liver function tests were within normal limits, troponin mildly elevated at 43 however this is noted to be chronic, INR 1.41, chest x-ray did showing mildly enlarged cardiac silhouette with probable vascular congestion, CT head without any acute intracranial hemorrhage or infarct White matter lucencies consistent with small vessel ischemic change versus demyelination and gliosis and cerebral atrophy most likely secondary to age, CTA of the head and neck without any significant stenosis or occlusion, CTA the [place] of  without large vessel occlusion.  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  After consideration patient and ED provider discussed risks and benefits of TNK, patient ultimately decided for TNK, admit to ICU for post TNK monitoring and further work-up of suspected CVA.    Assessment and Plan:     CVA (cerebral vascular accident) (HCC)  Assessment & Plan  Patient presents with less than 6-hour history of left-sided weakness, dysarthria and facial droop, evaluated by ED and " neurology given TNK at approximately 1421, still with left-sided weakness status post administration dysarthria and facial droop, as well as left-sided hemineglect, patient denies any other signs or symptoms denies previous history of CVA, history of A-fib on anticoagulation, non-smoker, denies family history.  Neurology consulted thinks most likely small vessel stroke is not seen on CTA, will monitor post TNK, examined patient at approximately 1510, without any red flag signs or symptoms for hemorrhagic conversion or bleeding.  -Admit to ICU for postthrombolytic protocol  - Neurochecks per protocol  - SBP goal 100-1 80, antihypertensive ordered  - Labs: CBC, CMP, A1c, lipid profile  - Imaging: MRI brain plus CT head (in 24 hours)  - Therapeutics: Hold AC for 24 hours status post thrombolytics  - Atorvastatin  - PT, OT, SLP  SCDs for DVT prophylaxis  - Given age and rope score no need to check echo had recent echo on 723  -Telemetry  - Blood glucose goal 80-1 80 avoid hypoglycemia  -Neurology consulted appreciate recommendations     Idiopathic hypotension  Assessment & Plan  History of hypotension, bradycardia during previous admission stopped metoprolol, was started on fludrocortisone, work-up was a.m. cortisol at approximately 5 AM of 1.1, systolic blood pressure of 1 30-1 50 in the emergency department  -will continue home fludrocortisone now to avoid hypotension in the setting of CVA.  - will require further work-up     Thrombocytopenia (HCC)- (present on admission)  Assessment & Plan  Mild chronic thrombocytopenia likely secondary to age  - Continue to monitor on CBC     Hypothyroidism due to acquired atrophy of thyroid- (present on admission)  Assessment & Plan  History of hypothyroidism, most recent TSH 2 weeks ago within normal limits  - We will recheck TSH  - Continue home levothyroxine     Urinary retention- (present on admission)  Assessment & Plan  History of urinary retention previous history of  straight caths  - Okay to place Catherine, otherwise bladder scans for retention     Stage 3a chronic kidney disease (HCC)- (present on admission)  Assessment & Plan  History of CKD stage IIIa, however last several creatinines with GFR's greater than 60 possible CKD stage II patient currently at baseline  - Continue to monitor closely on BMP and renally dose all medications as appropriate.     Pulmonary hypertension (HCC)- RVSP= 30,  ECHO 7/2021- (present on admission)  Assessment & Plan  History of likely secondary to JAMES     JAMES on CPAP- (present on admission)  Assessment & Plan  Supplemental O2 as needed, will hold off on ordering CPAP overnight as well to make sure patient is able to protect his airway sufficiently with new history of CVA.  -Supplemental O2 as needed  -Order RT CPAP overnight when patient is cleared from swallow study is able to protect airway.     Pulmonary fibrosis (HCC)- (present on admission)  Assessment & Plan  Questionable history of pulmonary fibrosis, on previous admissions not hypoxic, previously had COVID on July admission, no real risk factors when talking from patient, continue monitor clinically, has hypoxia at nighttime on JAMES     Moderate mitral regurgitation by prior echocardiogram- (present on admission)  Assessment & Plan  History of     Chronic atrial fibrillation (HCC)- (present on admission)  Assessment & Plan  History of A-fib on AC dose reduced given age, currently in atrial fibrillation rate 110, previous history of beta-blocker usage however given hypotension and during past admission was stopped, not currently in RVR, currently asymptomatic,  - Continue monitor rate on telemetry  - Hold AC in the setting of Antithrombin lytic as per CVA     Vitamin D deficiency- (present on admission)  Assessment & Plan  History of vitamin D deficiency  - Hold home med until passes swallow eval by SLP.     FEN: SLP to evaluate speech, replete lites as needed, as SLP to guide diet  GI: Not  indicated  DVT:SCDs   Code: Full code, discussed with patient, has capacity even despite CVA, still wishes to remain full code, even though outcome is likely poor given his advanced age and medical comorbidities.     Yumi Barrera M.D.  [facility] ICU Gold team  Patient is critically ill.   The patient continues to have: 6 possible CVA status post TNK, patient needs strict monitoring of neuro status.  The vital organ system that is affected is the: Neurological  If untreated there is a high chance of deterioration into: Hemorrhagic infarct, coma, multiorgan failure and eventually death.   The critical care that I am providing today is: Close monitoring in the ICU in the setting of status postthrombolytic therapy  The critical that has been undertaken is medically complex.   There has been no overlap in critical care time.     MARILEE Walters (Neurology) recommendations:  Assessment and Plan:     89 y.o. male with PMHx of afib on Eliquis 2.5mg BID presenting from SNF with left sided weakness and facial droop and right gaze deviation. Stroke protocol CT head negative for acute intracranial abnormalities. CTA head/neck negative for LVO or flow limiting stenoses. This is likely a small vessel stroke.     Lengthy conversation held with patient to discuss the use of off label thrombolytic medications secondary to NOAC dose this morning. I reviewed the risks (including possible bleeding complications and death), benefits, and alternatives with the patient to include forgoing TNK and pursuing therapies. The patient voiced understanding of the risks and benefits of thrombolytic therapy and wished to proceed with the administration of TNK.     TNK bolus Time: 1436     Problem list:  1. Stroke     Recommendations:  -Admit to ICU per post thrombolytic protocol.   -Neuro assessment and VS per post thrombolytic protocol.   --180 s/p TNK. Antihypertensives per ICU team.   -Obtain MRI Brain wo contrast- No need to wait 24  hours  -No anticoagulation/anti thrombotics x 24 hours post thrombolytic  -Telemetry; currently afib. No need for TTE from neurology perspective as patient has known history of afib and is on NOAC  -Check Stroke Labs: Lipid Panel, HgbA1c  -Atorvastatin 40 mg PO q HS. Titrate to long term LDL goal < 70  -BG management per primary team. BG goal .   -PT/OT/SLP eval and treat.   -DVT PPX: SCDs.     Dr. Vergara (Physiatry) recommendations:  ASSESSMENT:  Patient is a 89 y.o. male admitted with right MCA stroke now s/p TNK     [facility] Code / Diagnosis to Support: 0001.1 - Stroke: Left Body Involvement (Right Brain)     Rehabilitation: Impaired ADLs and mobility  Patient is a good candidate for inpatient rehab based on needs for PT, OT, and speech therapy.  Patient will also benefit from family training.  Patient has a good discharge situation which will be home with spouse.      Barriers to transfer include: Insurance authorization, TCCs to verify disposition, medical clearance and bed availability      All cases are subject to administrative review and recommendations may change     Disposition recommendations:  -Good candidate for IPR.  Patient has deficits with mobility, ADLs and swallow.  Patient has a stable discharge environment which is his assisted living facility, and support from his spouse as well as staff.  -T[facility] to [facility] out to [facility] to ensure that he is welcome back to return, and to delineate how much support he is currently getting  -PMR to follow in the periphery for rehab appropriateness, please reach out with questions or request for medical management     Medical Complexity:     Right MCA stroke with hemorrhagic conversion seen on CT head  -Initial NIH score 10  -Status post tPA  -Reduction NIH score to 5 today  -Secondary stroke prevention with atorvastatin 40 mg nightly  -Eliquis currently on hold for 10 days due to small hemorrhagic conversion  -Continue PT OT and SLP while in house  -Good  candidate for IPR     Hypertension/hypotension  -Blood pressure goal -140, normalize to long term goal of 110-130/60-80 in coming days, per neurology  -Florinef 0.1 mg every morning  -Hydralazine as needed     Hypothyroidism  -Synthroid 175 mcg every morning     Diabetes mellitus  -Insulin sliding scale  -BG goal 80-1 80 per neurology  -Checking A1c     Thrombocytopenia  -Platelets 136  -Close observation monitoring with serial labs by primary team     DVT PPX: SCDs    Dr. London progress note:  Date of Service  8/14/2023     Chief Complaint  Guru Pena is a 89 y.o. male admitted 8/12/2023 with L side weakness     Hospital Course  89 y.o. male who presented 8/12/2023 with with history of atrial fibrillation on dose adjusted apixaban, hypothyroidism.  He is a resident at a skilled nursing facility     Patient presented on August 12 with left-sided deficits.  CTA was negative for LVO, CT noncontrast was negative for other acute pathologies.  Unable to tolerate positioning for perfusion study.  After review with neurology, and based on the severity of his symptoms, the decision was given to give him TNK, the patient was admitted to the ICU.  On the monitor the patient has been in rate controlled A-fib/flutter generally.     Interval Problem Update  Pt states he feels fine  Son notes speech is a little more slurred today     AFib 70s  -120  On RA     I have discussed this patient's plan of care and discharge plan at IDT rounds today with Case Management, Nursing, Nursing leadership, and other members of the IDT team.     Consultants/Specialty  neurology     Code Status  Full Code     Disposition  The patient is not medically cleared for discharge to home or a post-acute facility.     I have placed the appropriate orders for post-discharge needs.     Review of Systems  Review of Systems   Constitutional:  Negative for chills and fever.   HENT:  Negative for nosebleeds and sore throat.     Eyes:  Negative for blurred vision and double vision.   Respiratory:  Negative for cough and shortness of breath.    Cardiovascular:  Negative for chest pain, palpitations and leg swelling.   Gastrointestinal:  Negative for abdominal pain, diarrhea, nausea and vomiting.   Genitourinary:  Negative for dysuria and urgency.   Musculoskeletal:  Negative for back pain.   Skin:  Negative for rash.   Neurological:  Positive for speech change and focal weakness. Negative for dizziness, loss of consciousness and headaches.      Physical Exam  Temp:  [35.6 °C (96 °F)-36.7 °C (98 °F)] 35.8 °C (96.5 °F)  Pulse:  [] 57  Resp:  [14-38] 17  BP: (105-134)/(61-85) 120/73  SpO2:  [90 %-100 %] 95 %     Physical Exam  Constitutional:       General: He is not in acute distress.     Appearance: Normal appearance. He is well-developed. He is not diaphoretic.   HENT:      Head: Normocephalic and atraumatic.   Eyes:      Conjunctiva/sclera: Conjunctivae normal.   Neck:      Vascular: No JVD.   Cardiovascular:      Rate and Rhythm: Normal rate. Rhythm irregular.      Heart sounds: No murmur heard.     No gallop.   Pulmonary:      Effort: Pulmonary effort is normal. No respiratory distress.      Breath sounds: No stridor. No wheezing or rales.   Abdominal:      Palpations: Abdomen is soft.      Tenderness: There is no abdominal tenderness. There is no guarding or rebound.   Musculoskeletal:         General: No tenderness.      Right lower leg: No edema.      Left lower leg: No edema.   Skin:     General: Skin is warm and dry.      Findings: No rash.   Neurological:      Mental Status: He is alert and oriented to person, place, and time.      Comments: Patient is alert and oriented  Speech is very slightly dysarthric however content is logical  No expressive or receptive deficits  Left facial droop  Tongue midline  EOMI  3+/5 left arm and leg, normal strength otherwise.  Gait not tested pending PT eval   Psychiatric:         Thought  Content: Thought content normal.      Fluids     Intake/Output Summary (Last 24 hours) at 8/14/2023 1549  Last data filed at 8/14/2023 1300  Gross per 24 hour  Intake 251.73 ml  Output 975 ml  Net -723.27 ml     Laboratory  Recent Labs    08/12/23  1351 08/13/23  0535 08/14/23  0527  WBC 7.2 6.2 4.8  RBC 4.20* 4.00* 3.53*  HEMOGLOBIN 13.9* 13.3* 11.6*  HEMATOCRIT 40.4* 37.9* 35.7*  MCV 96.2 94.8 101.1*  MCH 33.1* 33.3* 32.9  MCHC 34.4 35.1 32.5  RDW 53.4* 51.7* 56.6*  PLATELETCT 139* 149* 136*  MPV 10.8 10.8 10.5     Recent Labs    08/12/23  1351 08/13/23  0535 08/14/23  1056  SODIUM 134* 134* 135  POTASSIUM 4.0 4.2 4.5  CHLORIDE 102 104 103  CO2 19* 19* 21  GLUCOSE 98 91 101*  BUN 19 17 19  CREATININE 1.03 0.99 0.99  CALCIUM 8.7 8.3* 8.6     Recent Labs    08/12/23  1351  APTT 41.5*  INR 1.41*     Recent Labs    08/13/23  0535  TRIGLYCERIDE 67  HDL 43  LDL 86     Imaging  CT-HEAD W/O  Final Result     1.  Advanced cerebral atrophy. Age-appropriate (89 years).  2.  Left middle cranial fossa arachnoid cyst.  3.  Extensive area of acute infarction in the right MCA territory with an approximately 8 mm hazy focus of hemorrhagic transformation which is stable compared with earlier CT from this morning.  4.  Old brainstem infarcts in the central and right paramedian ankush.     CT-HEAD W/O  Final Result     1.  Small right MCA distribution parenchymal hemorrhages with associated edema.  2.  Atrophy.  3.  White matter disease likely representing microvascular ischemic changes.  4.  Right maxillary sinus disease.     EC-ECHOCARDIOGRAM LTD W/O CONT  Final Result     CT-HEAD W/O  Final Result     NO ACUTE ABNORMALITIES ARE NOTED ON CT SCAN OF THE HEAD.     Findings are consistent with atrophy.  Decreased attenuation in the periventricular white matter likely indicates microvascular ischemic disease.     DX-CHEST-PORTABLE (1 VIEW)  Final Result     1.  Mildly enlarged cardiac silhouette with probable vascular congestion.      CT-CTA NECK WITH & W/O-POST PROCESSING  Final Result     No significant stenosis or occlusion     CT-CTA HEAD WITH & W/O-POST PROCESS  Final Result     1.  CT angiogram of the [place] of  demonstrating no evidence of large vessel occlusion.     CT-HEAD W/O  Final Result     1.  There is no acute intracranial hemorrhage or infarct.     2.  White matter lucencies most consistent with small vessel ischemic change versus demyelination or gliosis.     3.  There is cerebral atrophy.     Assessment/Plan  Idiopathic hypotension  Assessment & Plan  Patient was diagnosed with this on a previous recent admission  At that time he was taking off of metoprolol and started on Florinef which we are continuing  Continue telemetry monitoring     CVA (cerebral vascular accident) (HCC)  Assessment & Plan  Patient presents acutely with left-sided deficits  Now s/p TNK  MRI pending  TTE pending  Rhythm on monitor has been A-fib consistent with known history.  He is maintained on apixaban 2.5 mg twice daily  Neurology consulted  Lipid panel and A1c pending  PT OT and speech consulted  Patient is a resident of a skilled nursing facility however he is able to ambulate normally with front wheeled walker independently  Pending completed work-up anticipate discharge on full dose anticoagulation with apixaban 5 mg twice daily  Probable rehab discharge  Continue with telemetry     8/14  Pt with slight worsening of speech today so ordered a STAT CT.  This showed possible ICH.  As unclear DW Rad and Neuro.  Repeated CT this afternoon which confirms small bleed  Keep in ICU on q2 Neuros  DC apixaban but as bleed is stable will not reverse.  Will hold on resuming for 10days     Thrombocytopenia (HCC)- (present on admission)  Assessment & Plan  Patient runs chronically between 120 and 115  Continue to monitor     Hypothyroidism due to acquired atrophy of thyroid- (present on admission)  Assessment & Plan  TSH done on this admission  3.89.  Continue current dose of replacement     Urinary retention  Assessment & Plan  Monitor for evidence of retention     Stage 3a chronic kidney disease (HCC)- (present on admission)  Assessment & Plan  Baseline creatinine around 1 which is where he is currently  Renally dose medications as appropriate  Daily BMP     JAMES on CPAP- (present on admission)  Assessment & Plan  Nightly CPAP     Pulmonary fibrosis (HCC)  Assessment & Plan  O2 RT protocols     Moderate mitral regurgitation by prior echocardiogram  Assessment & Plan  Checking cardiac echo     Chronic atrial fibrillation (HCC)- (present on admission)  Assessment & Plan  Patient is anticoagulated on dose adjusted apixaban 2.5 mg twice daily.  He is not on any rate or rhythm control medications as an outpatient and has not required them in house     Elevated troponin  Assessment & Plan  Patient's troponin is running in the 40s however on review of previous records going back more than a year, his troponins are always in the 40s so this would appear to be at his baseline and not indicative of an acute ischemic event     Vitamin D deficiency  Assessment & Plan  Continue replacement     VTE prophylaxis:   SCDs/TEDs    Head CT 08-14-23:  IMPRESSION:     1.  Advanced cerebral atrophy. Age-appropriate (89 years).  2.  Left middle cranial fossa arachnoid cyst.  3.  Extensive area of acute infarction in the right MCA territory with an approximately 8 mm hazy focus of hemorrhagic transformation which is stable compared with earlier CT from this morning.  4.  Old brainstem infarcts in the central and right paramedian ankush.    Co-morbidities:  See PMH  Potential Risk - Complications: Aphasia, Cognitive Impairment, Contractures, Deep Vein Thrombosis, Dysphagia, Incontinence, Malnutrition, Pain, Perceptual Impairment, Pneumonia, Pressure Ulcer, and Urinary Tract Infection  Level of Risk: High    Ongoing Medical Management Needed (Medical/Nursing Needs):   Patient Active  Problem List    Diagnosis Date Noted    CVA (cerebral vascular accident) (HCC) 08/12/2023    Idiopathic hypotension 08/12/2023    COVID-19 07/23/2023    Acute hypoxemic respiratory failure (HCC) 07/23/2023    Thrombocytopenia ([facility]) 07/23/2023    Hypothyroidism due to acquired atrophy of thyroid 02/01/2022    JAMES on CPAP 08/10/2021    Pulmonary hypertension (HCC)- RVSP= 30,  ECHO 7/2021 08/10/2021    Mild concentric left ventricular hypertrophy (LVH) 08/10/2021    Stage 3a chronic kidney disease (HCC) 08/10/2021    Urinary retention 08/10/2021    Chronic atrial fibrillation ([facility]) 07/29/2021    Moderate mitral regurgitation by prior echocardiogram 07/29/2021    Mild aortic stenosis by prior echocardiogram 07/29/2021    Pulmonary fibrosis ([facility]) 07/29/2021    Anticoagulated- Eliquis for a. fib 06/30/2021    Elevated troponin 06/30/2021    Weakness 06/22/2021    Vitamin D deficiency 06/01/2021    Impaired vision in both eyes 06/01/2021     Alert with forgetfulness.    Current Vital Signs:   Temperature: (!) 35.6 °C (96.1 °F) Pulse: 63 Respiration: 19 Blood Pressure : 136/60  Weight: 73.6 kg (162 lb 4.1 oz) Height: 182.9 cm (6')  Pulse Oximetry: 92 % O2 (LPM): 0      Completed Laboratory Reports:  Recent Labs     08/12/23  1351 08/13/23  0535 08/14/23  0527 08/14/23  1056 08/15/23  0300   WBC 7.2 6.2 4.8  --  5.3   HEMOGLOBIN 13.9* 13.3* 11.6*  --  13.1*   HEMATOCRIT 40.4* 37.9* 35.7*  --  37.3*   PLATELETCT 139* 149* 136*  --  161*   SODIUM 134* 134*  --  135 135   POTASSIUM 4.0 4.2  --  4.5 3.8   BUN 19 17  --  19 18   CREATININE 1.03 0.99  --  0.99 0.99   ALBUMIN 3.1* 3.0*  --  3.2 2.9*   GLUCOSE 98 91  --  101* 101*   INR 1.41*  --   --   --   --      Additional Labs: Not Applicable    Prior Living Situation:   Housing / Facility: 1 Story Apartment / Condo, Assisted Living Residence  Steps Into Home: 0  Steps In Home: 0  Lives with - Patient's Self Care Capacity: Spouse  Equipment Owned: 4-Wheel Walker, Wheelchair,  Front-Wheel Walker    Prior Level of Function / Living Situation:   Physical Therapy: Prior Services: Continuous (24 Hour) Care Giving Per Service  Housing / Facility: [address] [place] [address] / Condo, Assisted Living Residence  Steps Into Home: 0  Steps In Home: 0  Bathroom Set up: Walk In Shower, Shower Chair  Equipment Owned: 4-Wheel Walker, Wheelchair, Front-Wheel Walker  Lives with - Patient's Self Care Capacity: Spouse  Bed Mobility: Independent  Transfer Status: Independent  Ambulation: Independent  Assistive Devices Used: Front-Wheel Walker  Current Level of Function:   Gait Level Of Assist: Unable to Participate  Supine to Sit: Moderate Assist  Sit to Supine: Standby Assist  Scooting: Moderate Assist  Rolling: Moderate Assist to Lt.  Comments: HOB elevated  Sit to Stand: Maximal Assist  Transfer Method: Stand Step  Sitting Edge of Bed: 8 min  Standing: 3 min total  Occupational Therapy:   Self Feeding: Independent  Grooming / Hygiene: Independent  Bathing: Independent  Dressing: Independent  Toileting: Independent  Medication Management: Requires Assist  Laundry: Requires Assist  Kitchen Mobility: Requires Assist  Finances: Requires Assist  Home Management: Requires Assist  Shopping: Requires Assist  Prior Level Of Mobility: Independent With Device in Home  Driving / Transportation: Relatives / Others Provide Transportation  Prior Services: Continuous (24 Hour) Care Giving Per Service  Housing / Facility: [address] [place] Apartment / Condo, Assisted Living Residence  Occupation (Pre-Hospital Vocational): Retired Due To Age  Current Level of Function:   Upper Body Dressing: Maximal Assist  Lower Body Dressing: Maximal Assist  Speech Language Pathology:      Rehabilitation Prognosis/Potential: Good  Estimated Length of Stay: 10-12 days    Nursing:      Incontinent-external cath in place.    Scope/Intensity of Services Recommended:  Physical Therapy: 1 hr / day  5 days / week. Therapeutic Interventions Required: Maximize  Endurance, Mobility, Strength, and Safety  Occupational Therapy: 1 hr / day 5 days / week. Therapeutic Interventions Required: Maximize Self Care, ADLs, IADLs, and Energy Conservation  Speech & Language Pathology: 1 hr / day 5 days / week. Therapeutic Interventions Required: Maximize Cognition, Swallowing, and Safety  Rehabilitation Nursin/7. Therapeutic Interventions Required: Monitor Pain, Skin, Vital Signs, Intake and Output, Labs, Safety, Aspiration Risk, and Family Training  Rehabilitation Physician: 3 - 5 days / week. Therapeutic Interventions Required: Medical Management    He requires 24-hour rehabilitation nursing to manage bowel and bladder function, skin care, nutrition and fluid intake, pain control, safety, medication management, and patient/family goals. In addition, rehabilitation nursing will reiterate and reinforce therapy skills and equipment use, including ADLs, as well as provide education to the patient and family. Guru Pena is willing to participate in and is able to tolerate the proposed plan of care.    Rehabilitation Goals and Plan (Expected frequency & duration of treatment in the IRF):   Return to the Community, Minimal Assist Level of Care, and assisted  Anticipated Date of Rehabilitation Admission: 23  Patient/Family oriented IRF level of care/facility/plan: Yes  Patient/Family willing to participate in IRF care/facility/plan: Yes  Patient able to tolerate IRF level of care proposed: Yes  Patient has potential to benefit IRF level of care proposed: Yes  Comments: Not Applicable    Special Needs or Precautions - Medical Necessity:  Safety Concerns/Precautions:  Fall Risk / High Risk for Falls, Balance, Cognition, and Bed / Chair Alarm  Pain Management  Precautions: Fall Risk, Swallow Precautions  IV Site: Peripheral  Current Medications:    Current Facility-Administered Medications Ordered in Epic   Medication Dose Route Frequency Provider Last Rate Last Admin    potassium  chloride SA (Kdur) tablet 40 mEq  40 mEq Oral Once Dani Mera M.D.        atorvastatin (Lipitor) tablet 40 mg  40 mg Oral Q EVENING Jose Miguel London D.O.   40 mg at 08/14/23 1813    hydrALAZINE (Apresoline) injection 20 mg  20 mg Intravenous Q4HRS PRN ZULEIKA Aceves        labetalol (Normodyne/Trandate) injection 10 mg  10 mg Intravenous Q HOUR PRN CLARISSA AcevesPNANCY PELAYO Alert...ICU Electrolyte Replacement per Pharmacy   Other PHARMACY TO DOSE Dayday Fletcher M.D.        acetaminophen (Tylenol) tablet 650 mg  650 mg Oral Q6HRS PRN Alex Lopez M.D.        ondansetron (Zofran) syringe/vial injection 4 mg  4 mg Intravenous Q4HRS PRN Alex Lopez M.D.        ondansetron (Zofran ODT) dispertab 4 mg  4 mg Oral Q4HRS PRN Alex Lopez M.D.        senna-docusate (Pericolace Or Senokot S) 8.6-50 MG per tablet 2 Tablet  2 Tablet Oral BID Alex Lopez M.D.   2 Tablet at 08/15/23 0519    And    polyethylene glycol/lytes (Miralax) PACKET 1 Packet  1 Packet Oral QDAY PRN Alex Lopez M.D.        And    magnesium hydroxide (Milk Of Magnesia) suspension 30 mL  30 mL Oral QDAY PRN Alex Lopez M.D.        And    bisacodyl (Dulcolax) suppository 10 mg  10 mg Rectal QDAY PRN Alex Lopez M.D.        insulin regular (HumuLIN R,NovoLIN R) injection  1-6 Units Subcutaneous Q6HRS Alex Lopez M.D.        And    dextrose 50% (D50W) injection 25 g  25 g Intravenous Q15 MIN PRN Alex Lopez M.D.        fludrocortisone (Florinef) tablet 0.1 mg  0.1 mg Oral QAREESE Barrera M.D.   0.1 mg at 08/15/23 0519    levothyroxine (Synthroid) tablet 175 mcg  175 mcg Oral AM ES Yumi Barrera M.D.   175 mcg at 08/15/23 0520     No current [facility]-ordered outpatient medications on file.     Diet:   DIET ORDERS (From admission to next 24h)       Start     Ordered    08/13/23 1530  Diet Order Diet: Level 4 - Pureed (meds crushed. oral care before/after  meals. perodic cough w MT2); Liquid level: Level 2 - Mildly Thick; Tray Modifications (optional): SLP - 1:1 Supervision by Nursing, SLP - Deliver to Nursing Station, No Straws  ALL MEALS        Question Answer Comment   Diet: Level 4 - Pureed meds crushed. oral care before/after meals. perodic cough w MT2   Liquid level Level 2 - Mildly Thick    Tray Modifications (optional) SLP - 1:1 Supervision by Nursing    Tray Modifications (optional) SLP - Deliver to Nursing Station    Tray Modifications (optional) No Straws        08/13/23 1529                    Anticipated Discharge Destination / Patient/Family Goal:  Destination: Assisted Living Support System: Spouse and Family   Anticipated home health services: OT and PT  Previously used HH service/ provider: Not Applicable  Anticipated DME Needs: [facility]  Outpatient Services: OT and PT  Alternative resources to address additional identified needs:   Future Appointments   Date Time Provider Department Center   8/24/2023 11:00 AM Alex Smart M.D. [facility]      Pre-Screen Completed: 8/15/2023 8:27 AM Aron Bajwa L.P.N.

## 2023-08-15 NOTE — WOUND TEAM
[facility]  Inpatient Services  Wound and Skin Care Brief Evaluation    Admission Date: 8/12/2023     Last order of IP CONSULT TO WOUND CARE was found on 8/12/2023 from Hospital Encounter on 8/12/2023     HPI, PMH, SH: Reviewed    Chief Complaint   Patient presents with    Possible Stroke     From SNF, LKW 09:30 while at breakfast with staff, pt was then found at 12:30 today by staff with weakness, pt arrives with L sided weakness, R sided facial droop, and inability to look left. GCS 15, VSS on RA. Hx A-fib on eliquis.      Diagnosis: Acute CVA (cerebrovascular accident) (HCC) [I63.9]    Unit where seen by Wound Team: R113/00     Wound consult placed regarding Sacrum, R elbow and feet. Chart and images reviewed. This discussed with bedside RN Jc. This RN in to assess patient. Pt pleasant and agreeable. Feet dry with no wounds. L shin has small scab 0.4 x 0.3 cm. R elbow with 0.5 x 0.5 cm wound. Pt explained it was from sliding off bed. There is a scab 0.5 x 0.3 cm to nose with pink blanching tissue proximal. Pt stated he uses CPAP at night.  Non-selectively debrided with Normal Saline and Gauze.     No pressure injuries or advanced wound care needs identified. Wound consult completed. No further follow up unless indicated and consulted.     Wound 08/12/23 Partial Thickness Wound Elbow Posterior Right (Active)   Date First Assessed: 08/12/23   Present on Original Admission: Yes  Primary Wound Type: Partial Thickness Wound  Location: Elbow  Wound Orientation: Posterior  Laterality: Right        Assessments 8/15/2023 10:00 AM   Site Assessment Pink   Periwound Assessment Intact   Margins Defined edges   Closure Secondary intention   Drainage Amount None   Treatments Cleansed   Wound Cleansing Normal Saline Irrigation   Periwound Protectant Not Applicable   Dressing Status Intact   Dressing Changed Observed   Dressing Cleansing/Solutions Not Applicable   Dressing Options Mepilex Heel   Dressing  Change/Treatment Frequency Every 72 hrs, and As Needed   NEXT Dressing Change/Treatment Date 08/16/23   NEXT Weekly Photo (Inpatient Only) 08/19/23   Wound Team Following Not following   Non-staged Wound Description Partial thickness   Wound Length (cm) 0.5 cm   Wound Width (cm) 0.5 cm   Wound Surface Area (cm^2) 0.25 cm^2       PREVENTATIVE INTERVENTIONS:    Q shift Vaibhav - performed per nursing policy  Q shift pressure point assessments - performed per nursing policy    Surface/Positioning  ICU Low Airloss - Currently in Place  Reposition q 2 hours - Currently in Place    Offloading/Redistribution  Sacral offloading dressing (Silicone dressing) - Currently in Place  Heel offloading dressing (Silicone dressing) - Currently in Place  Float Heels off Bed with Pillows - Currently in Place           Respiratory  Silicone O2 tubing - Currently in Place  Gray Foam Ear protectors - Currently in Place    Containment/Moisture Prevention    Purwick/Condom Cath - Currently in Place

## 2023-08-15 NOTE — DIETARY
Nutrition Services: Brief Update    RN reporting pt eating 25-50% of meals and wanting oral supplements to ensure adequate PO intake. SLP saw on 8/14 and recommends pureed diet with MTL.     Weight: 73.6 kg, Height: 182.9 cm, BMI: 22.01 (normal).     Will order ReadyCare shakes and Boost VHC with meals to promote adequate PO intake.     No further nutrition intervention needed, RD will monitor per department policy.

## 2023-08-15 NOTE — DISCHARGE PLANNING
Case Management Discharge Planning    Admission Date: 8/12/2023  GMLOS: 2.9  ALOS: 3    6-Clicks ADL Score: 12  6-Clicks Mobility Score: 8  PT and/or OT Eval ordered: Yes PT/OT both agreed to  placement.  Post-acute Referrals Ordered: Yes   Post-acute Choice Obtained: Yes Patient and Son have agreed on the choice of [facility].  Has referral(s) been sent to post-acute provider:  No      Anticipated Discharge Dispo: Discharge Disposition: Disch to  rehab facility or distinct part unit (62)  Discharge Address: [address] [place] [id number]  Discharge Contact Phone Number: [phone]    DME Needed: Yes Before admission PT used a 4ww.  DME Ordered: No    Action(s) Taken: DC Assessment Complete (See below), Referral(s) sent, and Family Conference    Escalations Completed: None    Medically Clear: No  No    1140 AM CM met with patient at beside to obtain assessment. Though PT was falling asleep during assessment. CM called Son to obtain and complete the rest of the information.   .  Care Transition Team Assessment    Information Source  Orientation Level: Oriented to place, Oriented to situation, Oriented to person, Oriented to time  Information Given By: Relative  Informant's Name: Damon Pena  Who is responsible for making decisions for patient? : Adult child  Name(s) of Primary Decision Maker: Damon Pena    Readmission Evaluation  Is this a readmission?: No    Elopement Risk  Legal Hold: No  Ambulatory or Self Mobile in Wheelchair: No-Not an Elopement Risk  Elopement Risk: Not at Risk for Elopement    Interdisciplinary Discharge Planning  Does Admitting Nurse Feel This Could be a Complex Discharge?: No  Primary Care Physician: Alex Smart M.D  Lives with - Patient's Self Care Capacity: Spouse  Housing / Facility: Assisted Living Residence ([facility]  Prior Services: Other (Comments) ([facility])  Assistance Needed: Yes    Discharge Preparedness  What is your plan after discharge?:  Skilled nursing facility  What are your discharge supports?: Child  Prior Functional Level: Uses [facility]  Difficulity with ADLs: Walking    Functional Assesment  Prior Functional Level: Uses [facility]    Finances  Financial Barriers to Discharge: No  Prescription Coverage: Yes    Vision / Hearing Impairment  Right Eye Vision: Wears Glasses, Impaired  Left Eye Vision: Wears Glasses, Impaired    Domestic Abuse  Have you ever been the victim of abuse or violence?: No    Psychological Assessment  History of Substance Abuse: None  History of Psychiatric Problems: No  Non-compliant with Treatment: No  Newly Diagnosed Illness: No    Discharge Risks or Barriers  Discharge risks or barriers?: No    Anticipated Discharge Information  Discharge Disposition: Disch to IP rehab facility or distinct part unit (62)  Discharge Address: [address] [place] [address]  Discharge Contact Phone Number: [phone]

## 2023-08-16 ENCOUNTER — APPOINTMENT (OUTPATIENT)
Dept: RADIOLOGY | Facility: MEDICAL CENTER | Age: 88
DRG: 061 | End: 2023-08-16
Payer: MEDICARE

## 2023-08-16 PROBLEM — H54.60 MONOCULAR VISION LOSS: Status: ACTIVE | Noted: 2023-08-16

## 2023-08-16 LAB
ALBUMIN SERPL BCP-MCNC: 2.7 G/DL (ref 3.2–4.9)
ALBUMIN/GLOB SERPL: 0.9 G/DL
ALP SERPL-CCNC: 81 U/L (ref 30–99)
ALT SERPL-CCNC: 12 U/L (ref 2–50)
ANION GAP SERPL CALC-SCNC: 14 MMOL/L (ref 7–16)
AST SERPL-CCNC: 23 U/L (ref 12–45)
BASOPHILS # BLD AUTO: 0.2 % (ref 0–1.8)
BASOPHILS # BLD: 0.01 K/UL (ref 0–0.12)
BILIRUB SERPL-MCNC: 0.6 MG/DL (ref 0.1–1.5)
BUN SERPL-MCNC: 17 MG/DL (ref 8–22)
CALCIUM ALBUM COR SERPL-MCNC: 9.1 MG/DL (ref 8.5–10.5)
CALCIUM SERPL-MCNC: 8.1 MG/DL (ref 8.5–10.5)
CHLORIDE SERPL-SCNC: 102 MMOL/L (ref 96–112)
CO2 SERPL-SCNC: 20 MMOL/L (ref 20–33)
CREAT SERPL-MCNC: 1 MG/DL (ref 0.5–1.4)
EOSINOPHIL # BLD AUTO: 0.1 K/UL (ref 0–0.51)
EOSINOPHIL NFR BLD: 1.6 % (ref 0–6.9)
ERYTHROCYTE [DISTWIDTH] IN BLOOD BY AUTOMATED COUNT: 53.6 FL (ref 35.9–50)
GFR SERPLBLD CREATININE-BSD FMLA CKD-EPI: 68 ML/MIN/1.73 M 2
GLOBULIN SER CALC-MCNC: 3.1 G/DL (ref 1.9–3.5)
GLUCOSE BLD STRIP.AUTO-MCNC: 109 MG/DL (ref 65–99)
GLUCOSE SERPL-MCNC: 95 MG/DL (ref 65–99)
HCT VFR BLD AUTO: 40.5 % (ref 42–52)
HGB BLD-MCNC: 13.5 G/DL (ref 14–18)
IMM GRANULOCYTES # BLD AUTO: 0.03 K/UL (ref 0–0.11)
IMM GRANULOCYTES NFR BLD AUTO: 0.5 % (ref 0–0.9)
LYMPHOCYTES # BLD AUTO: 0.79 K/UL (ref 1–4.8)
LYMPHOCYTES NFR BLD: 12.7 % (ref 22–41)
MAGNESIUM SERPL-MCNC: 2 MG/DL (ref 1.5–2.5)
MCH RBC QN AUTO: 32.7 PG (ref 27–33)
MCHC RBC AUTO-ENTMCNC: 33.3 G/DL (ref 32.3–36.5)
MCV RBC AUTO: 98.1 FL (ref 81.4–97.8)
MONOCYTES # BLD AUTO: 0.77 K/UL (ref 0–0.85)
MONOCYTES NFR BLD AUTO: 12.4 % (ref 0–13.4)
NEUTROPHILS # BLD AUTO: 4.5 K/UL (ref 1.82–7.42)
NEUTROPHILS NFR BLD: 72.6 % (ref 44–72)
NRBC # BLD AUTO: 0 K/UL
NRBC BLD-RTO: 0 /100 WBC (ref 0–0.2)
PHOSPHATE SERPL-MCNC: 3.2 MG/DL (ref 2.5–4.5)
PLATELET # BLD AUTO: 152 K/UL (ref 164–446)
PMV BLD AUTO: 10.3 FL (ref 9–12.9)
POTASSIUM SERPL-SCNC: 3.7 MMOL/L (ref 3.6–5.5)
PROT SERPL-MCNC: 5.8 G/DL (ref 6–8.2)
RBC # BLD AUTO: 4.13 M/UL (ref 4.7–6.1)
SODIUM SERPL-SCNC: 136 MMOL/L (ref 135–145)
WBC # BLD AUTO: 6.2 K/UL (ref 4.8–10.8)

## 2023-08-16 PROCEDURE — 99233 SBSQ HOSP IP/OBS HIGH 50: CPT | Performed by: STUDENT IN AN ORGANIZED HEALTH CARE EDUCATION/TRAINING PROGRAM

## 2023-08-16 PROCEDURE — 99233 SBSQ HOSP IP/OBS HIGH 50: CPT | Performed by: PHYSICAL MEDICINE & REHABILITATION

## 2023-08-16 PROCEDURE — 700102 HCHG RX REV CODE 250 W/ 637 OVERRIDE(OP): Performed by: STUDENT IN AN ORGANIZED HEALTH CARE EDUCATION/TRAINING PROGRAM

## 2023-08-16 PROCEDURE — 80053 COMPREHEN METABOLIC PANEL: CPT

## 2023-08-16 PROCEDURE — 85025 COMPLETE CBC W/AUTO DIFF WBC: CPT

## 2023-08-16 PROCEDURE — A9270 NON-COVERED ITEM OR SERVICE: HCPCS | Performed by: HOSPITALIST

## 2023-08-16 PROCEDURE — 70450 CT HEAD/BRAIN W/O DYE: CPT

## 2023-08-16 PROCEDURE — 84100 ASSAY OF PHOSPHORUS: CPT

## 2023-08-16 PROCEDURE — A9270 NON-COVERED ITEM OR SERVICE: HCPCS | Performed by: STUDENT IN AN ORGANIZED HEALTH CARE EDUCATION/TRAINING PROGRAM

## 2023-08-16 PROCEDURE — 92526 ORAL FUNCTION THERAPY: CPT

## 2023-08-16 PROCEDURE — 82962 GLUCOSE BLOOD TEST: CPT

## 2023-08-16 PROCEDURE — 36415 COLL VENOUS BLD VENIPUNCTURE: CPT

## 2023-08-16 PROCEDURE — 83735 ASSAY OF MAGNESIUM: CPT

## 2023-08-16 PROCEDURE — 92523 SPEECH SOUND LANG COMPREHEN: CPT

## 2023-08-16 PROCEDURE — 97535 SELF CARE MNGMENT TRAINING: CPT

## 2023-08-16 PROCEDURE — 770020 HCHG ROOM/CARE - TELE (206)

## 2023-08-16 PROCEDURE — 97530 THERAPEUTIC ACTIVITIES: CPT | Mod: CQ

## 2023-08-16 PROCEDURE — 700102 HCHG RX REV CODE 250 W/ 637 OVERRIDE(OP): Performed by: HOSPITALIST

## 2023-08-16 RX ADMIN — ATORVASTATIN CALCIUM 40 MG: 40 TABLET, FILM COATED ORAL at 16:42

## 2023-08-16 RX ADMIN — FLUDROCORTISONE ACETATE 0.1 MG: 0.1 TABLET ORAL at 09:33

## 2023-08-16 RX ADMIN — LEVOTHYROXINE SODIUM 175 MCG: 0.17 TABLET ORAL at 09:33

## 2023-08-16 ASSESSMENT — ENCOUNTER SYMPTOMS
PALPITATIONS: 0
PHOTOPHOBIA: 0
FOCAL WEAKNESS: 1
EYE PAIN: 0
VOMITING: 0
HEADACHES: 0
NAUSEA: 0
SPUTUM PRODUCTION: 0
ABDOMINAL PAIN: 0
ORTHOPNEA: 0
SHORTNESS OF BREATH: 0
NECK PAIN: 0
TREMORS: 0
DIZZINESS: 0
DIARRHEA: 0
CONSTIPATION: 0
WEIGHT LOSS: 0
TINGLING: 0
COUGH: 0
SENSORY CHANGE: 0
CHILLS: 0
FEVER: 0
MYALGIAS: 0
SPEECH CHANGE: 1
BACK PAIN: 0
DOUBLE VISION: 0
HALLUCINATIONS: 0
BLURRED VISION: 0

## 2023-08-16 ASSESSMENT — COGNITIVE AND FUNCTIONAL STATUS - GENERAL
DRESSING REGULAR UPPER BODY CLOTHING: A LITTLE
SUGGESTED CMS G CODE MODIFIER DAILY ACTIVITY: CK
STANDING UP FROM CHAIR USING ARMS: A LOT
EATING MEALS: A LITTLE
HELP NEEDED FOR BATHING: A LOT
MOVING TO AND FROM BED TO CHAIR: UNABLE
SUGGESTED CMS G CODE MODIFIER MOBILITY: CM
MOBILITY SCORE: 8
TOILETING: A LOT
MOVING FROM LYING ON BACK TO SITTING ON SIDE OF FLAT BED: UNABLE
CLIMB 3 TO 5 STEPS WITH RAILING: TOTAL
DAILY ACTIVITIY SCORE: 15
WALKING IN HOSPITAL ROOM: A LOT
PERSONAL GROOMING: A LITTLE
TURNING FROM BACK TO SIDE WHILE IN FLAT BAD: UNABLE
DRESSING REGULAR LOWER BODY CLOTHING: A LOT

## 2023-08-16 ASSESSMENT — FIBROSIS 4 INDEX: FIB4 SCORE: 4.28

## 2023-08-16 ASSESSMENT — LIFESTYLE VARIABLES: SUBSTANCE_ABUSE: 0

## 2023-08-16 ASSESSMENT — GAIT ASSESSMENTS: GAIT LEVEL OF ASSIST: UNABLE TO PARTICIPATE

## 2023-08-16 ASSESSMENT — PAIN DESCRIPTION - PAIN TYPE: TYPE: OTHER (COMMENT)

## 2023-08-16 NOTE — THERAPY
"Speech Language Pathology   Communication Evaluation     Patient Name: Guru Pena  AGE:  89 y.o., SEX:  male  Medical Record #: [id number]  Date of Service: 8/16/2023      History of Present Illness    89 y/o male presented 8/12 with left-sided deficits. CTA was negative for LVO, CT noncontrast was negative for other acute pathologies.  Unable to tolerate positioning for perfusion study. S/p TNK.    CMHx: CVA, chronic afib, JAMES on CPAP, HTN, stage 3 SKD, hypothyroidism  PMHx:  COVID, acute respiratory failure, urinary retetnion, pulmonary fibrosis    Last seen by SLP in 7/2021 with recommendations for SB6/MT2 following MBSS (aspiration of TN0 with sequential straw sips per note).     CXR 8/12:  \"1.  Mildly enlarged cardiac silhouette with probable vascular congestion.\"    CT Head 8/13:  \"Findings are consistent with atrophy.  Decreased attenuation in the periventricular white matter likely indicates microvascular ischemic disease.\"      General Information  Vitals  O2 (LPM): 2  O2 Delivery Device: Silicone Nasal Cannula  Level of Consciousness: Alert  Patient Behaviors: Drowsy  Orientation: Self, General place, Current year         Prior Living Situation & Level of Function  Lives with - Patient's Self Care Capacity: Spouse  Comments: Transferred from [facility] post-covid. Prior to last admission, [facility] w/spouse. Pt independent w/basic mobility including ambulation to dining aiken. Pt son says they are planning to add more assistance back home [facility].     Communication: WFL  Swallowing: Unclear - initially pt stated RG/TN but went on to say that he doesn't like the food at SNF because it \"comes like mush and is not appetizing.\" Also stated he likes his meds floated in applesauce because \"It's much easier.\"         Assessment    Portions of the COGNISTAT (Neurobehavioral Cognitive Status Assessment) were administered in conjunction with non-standardized assessments. " "    Patient scored the following on the Cognistat:  Orientation: WNL 10  Memory:Moderate 6  Calculations:Severe 0  Similarities (Reasoning):Mild 4      Non-standardized measures were used to assess other cognitive domains and severe deficits found in following three-step directives. Pt followed two-step directives and answered simple, moderate, and complex yes/no questions WFL. Pt with delayed response time to questions which increased as session progressed. Pt unable to provide information regarding PLOF. Increase in lethargy noted as session progressed so further assessment was discontinued.       Of note, cognitive-linguistic evaluations assess performance on various cognitive-linguistic domains such as expressive and receptive language, attention, memory, executive function, problem solving, etc. It is not within the scope of Speech Language Pathologists to determine capacity, please defer to medical team or psych for capacity evaluation. Thank you.          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 Recommend cognitive speech therapy in the acute care setting and at next level of care. Pt will benefit from assistance with direct supervision with IADLs upon d/c.     Recommendations  Supervision Needs Upons Discharge: Direct assistance with IADLs (see below)  IADLs: Medication management, Financial management, Appointment management, Household chores, Cooking         SLP Treatment Plan  Treatment Plan: Dysphagia Treatment  SLP Frequency: 3x Per Week  Estimated Duration: Until Therapy Goals Met      Anticipated Discharge Needs  Discharge Recommendations: Recommend post-acute placement for additional speech therapy services prior to discharge home  Therapy Recommendations Upon DC: Dysphagia Training, Community Re-Integration, Patient / Family / Caregiver Education      Patient / Family Goals  Patient / Family Goal #1: \"Oh yes, applesauce is much better.\"  Goal #1 " "Outcome: Progressing as expected  Short Term Goal # 1: Pt will complete FEES w SLP to further evaluate swallow function and inform POC.  Goal Outcome # 1: Goal met, new goal added  Short Term Goal # 1 B : Pt will complete exercises targeting epiglottic inversion, LVC, BOT retraction, and pharyngeal constriction/shortening x20 in a session given max cues from SLP with \"good\" accuracy.  Goal Outcome  # 1 B: Progressing slower than expected  Short Term Goal # 2: Pt will consume diet of MT2 and PU4 with use 1:1 spv and strategies with no worsening of lung status.  Goal Outcome # 2 : Progressing as expected  Short Term Goal # 3: Pt will verbalize the importance of oral care in promoting pulmonary hygiene/maximizing dysphagia outcomes given min cues from SLP.  Goal Outcome  # 3: Goal not met  Short Term Goal # 4: Pt will complete functional memory tasks with >80% accuracy and min clinician cues  Short Term Goal # 5: Pt will complete functional math tasks with >75% accuracy and min clinician cues      VALENTIN Smith  "

## 2023-08-16 NOTE — PROGRESS NOTES
Pt arrived to unit via hospital bed. Report received from KAELYN Greene. Handoff neuro assessment completed at bedside with RICU RN Jc. Pt oriented to room, unit, and plan of care. All questions answered at this time. Call light within reach, fall precautions in place, will continue to monitor.

## 2023-08-16 NOTE — THERAPY
"Speech Language Pathology   Daily Treatment     Patient Name: Guru Pena  AGE:  89 y.o., SEX:  male  Medical Record #: [id number]  Date of Service: 8/16/2023      Precautions:  Precautions: Fall Risk, Swallow Precautions       Assessment  Pt seen on this date for dysphagia therapy. Pt with generalized weakness and agreeable to PO trials. He consumed trials of puree and MTL via cup sip with no overt s/sx of aspiration. Trials of minced and moist resulted in immediate coughing and throat clearing concerning for airway invasion. Suspected delayed AP transport with minced and moist. Pt unable to feed self so 1:1 feeding provided by this clinician. Pt became more lethargic as session progressed requiring increased use of verbal cues so further PO trials were discontinued.      Clinical Impressions  Pt continues to present with an acute dysphagia 2/2 R MCA syndrome. Recommend continuation of modified puree/MTL diet with 1:1 feeding. Please discontinue PO with any s/sx of aspiration or difficulty.      Recommendations  Treatment Completed: Dysphagia Treatment       Dysphagia Treatment  Diet Consistency: puree/MTL with 1:1 feeding  Instrumentation: None indicated at this time  Medication: Crush with applesauce, as appropriate, As tolerated  Supervision: 1:1 feeding with constant supervision  Positioning: Fully upright and midline during oral intake  Risk Management : Small bites/sips, No straws        SLP Treatment Plan  Treatment Plan: Dysphagia Treatment  SLP Frequency: 3x Per Week  Estimated Duration: Until Therapy Goals Met      Anticipated Discharge Needs  Discharge Recommendations: Recommend post-acute placement for additional speech therapy services prior to discharge home  Therapy Recommendations Upon DC: Dysphagia Training, Community Re-Integration, Patient / Family / Caregiver Education      Patient / Family Goals  Patient / Family Goal #1: \"Oh yes, applesauce is much better.\"  Goal #1 Outcome: Progressing " "as expected  Short Term Goals  Short Term Goal # 1: Pt will complete FEES w SLP to further evaluate swallow function and inform POC.  Goal Outcome # 1: Goal met, new goal added  Short Term Goal # 1 B : Pt will complete exercises targeting epiglottic inversion, LVC, BOT retraction, and pharyngeal constriction/shortening x20 in a session given max cues from SLP with \"good\" accuracy.  Goal Outcome  # 1 B: Progressing slower than expected  Short Term Goal # 2: Pt will consume diet of MT2 and PU4 with use 1:1 spv and strategies with no worsening of lung status.  Goal Outcome # 2 : Progressing as expected  Short Term Goal # 3: Pt will verbalize the importance of oral care in promoting pulmonary hygiene/maximizing dysphagia outcomes given min cues from SLP.  Goal Outcome  # 3: Goal not met      Yajaira Guadalupe SLP  "

## 2023-08-16 NOTE — THERAPY
"Occupational Therapy  Daily Treatment     Patient Name: Guru Pena  Age:  89 y.o., Sex:  male  Medical Record #: [id number]  Today's Date: 8/16/2023     Precautions: Fall Risk, Swallow Precautions    Assessment  Pt was seen for OT tx, session focused on sitting balance at EOB while participating in ADL's. Pt extra time pt was able to achieve EOB sitting w/CGA, pt did required facilitation w/postural changes to maintain midline d/t mild posterior lean. Also noted LUE weakness mild and L inattention. Pt reports no vision in L eye. After completing oral care and UB partial bathing, pt noted to have BM, returned to supine for rick care, RN present. Pt's CT does not \"suble stable hemorrhagic transformation w/R MCA CVA\" pt did present w/mild LUE weakness and vision changes from previous OT eval, however pt did show improved overall functional participation from previous session.     Plan  Treatment Plan Status: Modify Current Treatment Plan  Type of Treatment: Manual Therapy Techniques  Treatment Frequency: 5 Times per Week  Treatment Duration: Until Therapy Goals Met    DC Equipment Recommendations: Unable to determine at this time  Discharge Recommendations: Recommend post-acute placement for additional occupational therapy services prior to discharge home    Subjective  \"I used to work for [employer]\"      Objective     08/16/23 1008   Treatment Charges   Charges Yes   OT Self Care / ADL (Units) 2   Total Time Spent   OT Time Spent Yes   OT Self Care / ADL (Minutes) 25   OT Total Time Spent (Calculated) 25   Precautions   Precautions Fall Risk;Swallow Precautions   Cognition    Cognition / Consciousness X   Speech/ Communication Delayed Responses;Slurred;Hard of Hearing   Orientation Level Not Oriented to Reason;Not Oriented to Place   Level of Consciousness Alert   Sequencing Impaired   Comments Slow to respond AOx3 cooperative and motivated   Passive ROM Upper Body   Passive ROM Upper Body WDL   Active ROM Upper " Body   Active ROM Upper Body  X   Comments RUE WFL; LUE w/mild end range shoulder ROM d/t weakness and inattention   Strength Upper Body   Upper Body Strength  X   Comments RUE WFL; LUE 4/5 diminshed grasp and shoulder ROM   Fine Motor / Dexterity    Fine Motor / Dexterity Yes   Fine Motor / Dexterity Interventions delayed LUE in hand manipulation and weaker grasp   Other Treatments   Other Treatments Provided Focused on EOB grooming and postural control mild posterior lean in sitting able to correct w/cues   Balance   Sitting Balance (Static) Fair   Sitting Balance (Dynamic) Fair -   Weight Shift Sitting Fair   Skilled Intervention Verbal Cuing;Tactile Cuing;Facilitation;Compensatory Strategies   Comments standing defered d/t BM   Bed Mobility    Supine to Sit Contact Guard Assist   Sit to Supine Standby Assist   Scooting Minimal Assist   Rolling Minimal Assist to Rt.;Minimum Assist to Lt.   Skilled Intervention Verbal Cuing;Tactile Cuing;Facilitation   Comments HOB elevated and w/extra time pt was able to acheive EOB position   Activities of Daily Living   Grooming Contact Guard Assist;Seated   Upper Body Dressing Moderate Assist   Lower Body Dressing Maximal Assist   Toileting Total Assist   Skilled Intervention Verbal Cuing;Tactile Cuing;Facilitation;Compensatory Strategies   Comments bowel incontinence, UB dressing diminished by mild LUE weakness and need to use UE for sitting balance   How much help from another person does the patient currently need...   6 Clicks Daily Activity Score 15   Modified Alva (mRS)   Modified Alva Score 4   Functional Mobility   Sit to Stand Unable to Participate   Mobility EOB deferred standing d/t BM   Skilled Intervention Tactile Cuing;Verbal Cuing;Facilitation   Visual Perception   Visual Perception  X   Neglect Severe Left   Comments pt reports little to no vision in left eye   Activity Tolerance   Comments mild c/o fatigue   Patient / Family Goals   Patient / Family Goal  #1 to use by L eye   Short Term Goals   Short Term Goal # 1 Pt will complete ADL transfers with supervision   Goal Outcome # 1 Progressing as expected   Short Term Goal # 2 Pt will complete LB dressing with supervision   Goal Outcome # 2 Progressing slower than expected   Short Term Goal # 3 Pt will complete toileting with supervision   Goal Outcome # 3 Progressing slower than expected   Education Group   Role of Occupational Therapist Patient Response Patient;Acceptance;Explanation   Occupational Therapy Treatment Plan    O.T. Treatment Plan Modify Current Treatment Plan   Treatment Interventions Manual Therapy Techniques   Treatment Frequency 5 Times per Week   Anticipated Discharge Equipment and Recommendations   DC Equipment Recommendations Unable to determine at this time   Discharge Recommendations Recommend post-acute placement for additional occupational therapy services prior to discharge home   Interdisciplinary Plan of Care Collaboration   IDT Collaboration with  Nursing;Physician   Patient Position at End of Therapy In Bed;Other (Comments)  (RN present)   Collaboration Comments RN aware of session   Session Information   Date / Session Number  8/16 #2 (2/5, 8/20)

## 2023-08-16 NOTE — ASSESSMENT & PLAN NOTE
Left monocular vision loss noted today in the morning  Patient had rapid around 3 AM for dilated left pupil  Repeat head CT without changes  Neurology evaluated bedside and noted left monocular vision loss  Consult ophthalmology as per neurology's recommendation, appreciate recommendations

## 2023-08-16 NOTE — PROGRESS NOTES
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 Patient has been doing somewhat well, now on nursing floor. Repeat CT head revealed evolving Right MCA ischemic infarct with mild/minimal hemorrhagic conversion.     This morning, RRT was called given new onset Left eye monocular vision loss; the patient denies involvement of Right eye; no obvious hemianopsia. No other new deficit. Repeat CT head stable, no hemorrhage nor obvious new infarct. I examined the patient-- he does appear to have new/exclusive Left eye monocular vision loss. Given exclusive monocular involvement, have suspicion for retinal artery occlusion (central or branch) secondary to emobli/cardioemboli secondary to Afib. Opthalmology consultation is pending; not a candidate for thrombolytics given recent stroke and recent receipt of TNK.     As was previously noted, please plan to restart Eliquis at 5 mg PO BID On 8/18/23. Continue PT/OT/SLP and other stroke risk factor modification as was previously documented.     ZULEIKA Noel

## 2023-08-16 NOTE — CARE PLAN
Problem: Optimal Care of the Stroke Patient  Goal: Optimal emergency care for the stroke patient  Description: Target End Date:  End of day 1    Time of Onset    1.  Time of last known well obtained  2.  Patient and family/caregiver verbalize understanding of diagnosis, medications and testing  3.  NIHSS performed and documented, including date and time, for ischemic stroke patients prior to any acute recanalization therapy (thrombolytics or mechanical) or within 12 hours of arrival if no intervention is warranted  4.  Consults and referrals placed to appropriate departments    Medications Administration as Ordered:    1.  Implement appropriate reversal agents for INR greater than 1.5  2.  Pre-alteplase administration of antihypertensives for SBP >185 DBP >110  3.  Post-alteplase administration of antihypertensives for SBP >185, DBP >105  4.  Thrombolytic Therapy for qualifying ischemic stroke patients who arrive within 4.5 hours of time of Last Known Well. Thrombolytic therapy administered within 30 minutes or a documented reason for delay  Outcome: Progressing     Problem: Neuro Status  Goal: Neuro status will remain stable or improve  Description: Target End Date:  Prior to discharge or change in level of care    Document on Neuro assessment in the Assessment flowsheet    1.  Assess and monitor neurologic status per provider order/protocol/unit policy  2.  Assess level of consciousness and orientation  3.  Assess for speech, dysarthria, dysphagia, facial symmetry  4.  Assess visual field, eye movements, gaze preference, pupil reaction and size  5.  Assess muscle strength and motor response in all four extremities  6.  Assess for sensation (numbness and tingling)  7.  Assess basic neuro reflexes (cough, gag, corneal)  8.  Identify changes in neuro status and report to provider for testing/treatment orders  8/16/2023 1336 by Tali Archer RCHARMAINE.  Outcome: Progressing  8/16/2023 1335 by Tali Archer,  EDEN  Outcome: Progressing     Problem: Risk for Aspiration  Goal: Patient's risk for aspiration will be absent or decrease  Description: Target End Date:  Prior to discharge or change in level of care    1.   Complete dysphagia screening on admission  2.   NPO until dysphagia screening complete or medically cleared  3.   Collaborate with Speech Therapy, Clinical Dietitian and interdisciplinary team  4.   Implement aspiration precautions  5.   Assist patient up to chair for meals  6.   Elevate head of bed 90 degrees if patient is unable to get out of bed  7.   Encourage small bites  8.   Ensure foods/liquids are of appropriate consistency  9.   Assess for any signs/symptoms of aspiration  10. Assess breath sounds and vital signs after oral intake  8/16/2023 1336 by Tali Archer R.N.  Outcome: Progressing  8/16/2023 1335 by Tali Archer R.N.  Outcome: Progressing  Flowsheets (Taken 8/16/2023 1335)  Aspiration Prevention:   Collaborated with SLP   Elevated head of bed to 90 degrees   Supervised feedings     Problem: Bowel Elimination  Goal: Establish and maintain regular bowel function  Description: Target End Date:  Prior to discharge or change in level of care    1.   Note date of last BM  2.   Educate about diet, fluid intake, medication and activity to promote bowel function  3.   Educate signs and symptoms of constipation and interventions to implement  4.   Pharmacologic bowel management per provider order  5.   Regular toileting schedule  6.   Upright position for toileting  7.   High fiber diet  8.   Encourage hydration  9.   Collaborate with Clinical Dietician  10. Care and maintenance of ostomy if applicable  8/16/2023 1336 by Tali Archer R.N.  Outcome: Progressing  8/16/2023 1335 by Tali Archer R.N.  Outcome: Progressing  Flowsheets (Taken 8/16/2023 1007 by Imer Chavez)  Last BM: 08/16/23   The patient is Stable - Low risk of patient condition declining or worsening    Shift  Goals  Clinical Goals: stable neuro assessment  Patient Goals: eat  Family Goals: RYAN    Progress made toward(s) clinical / shift goals:  neuro stable, pt has eaten    Patient is not progressing towards the following goals:

## 2023-08-16 NOTE — PROGRESS NOTES
"    Physical Medicine and Rehabilitation Consultation     Follow up note          Date of initial consultation: 8/14/2023, Dr. Vergara's original consult   Consulting provider: Yumi Barrera M.D.  Reason for consultation: assess for acute inpatient rehab appropriateness  LOS: 4 Day(s)    Chief complaint: Weakness    HPI: Per Dr. Vergara's original consult note: \" The patient is a 89 y.o. right hand dominant male with a past medical history of atrial fibrillation on Eliquis, hypothyroidism;  who presented on 8/12/2023  1:46 PM from [facility] with left-sided weakness, facial droop, right gaze deviation and dysarthria.  Patient was seen by neurology, found to have NIH score of 10.  CT head showed no intracranial abnormalities, CTA showed no LVO.  Neurology suspected small vessel stroke, recommended TNK.  NIH improved to 5 today.  Follow-up MR brain is pending  8/14: The patient currently reports overall feeling good.  Patient has no acute complaints.  Patient reports that he was staying at [facility] temporarily, and typically resides at [facility] facility with his spouse.  I did not get a good answer on how much support he needs from his assisted living facility.  Patient is interested in IPR.\"    8/16: MRI brain still pending. Over night, patient had repeat CT head due to concern for changes in vision and pupils. Repeat CT head 8/16 showed subtle and stable hemorrhagic transformation, stable since CT head on 8/14. Patient able to participate with therapies today, he is Mod A upper body dressing, Max A lower body dressing. Patient seen and examined at bedside with son. Patient reports he feels well today, was able to tolerate OT, has no complaints. Denies HA, lightheadedness, SOB, CP, abdominal pain, or changes in numbness/tingling/weakness.        ROS  Pertinent positives are mentioned in the HPI, all others reviewed and are negative.    Social Hx:  Presented from [facility], however resides at [facility] " shelter    THERAPY:  Restrictions: Fall risk  PT: Functional mobility   8/14: Unable to participate in gait, max assist sit to stand    OT: ADLs  8/14: Max assist dressing  8/16 Mod A upper body dressing, Max A lower body dressing     SLP:   8/13: Moderate-severe oropharyngeal dysphagia    IMAGING:  CT head 8/14/2023  1.  Small right MCA distribution parenchymal hemorrhages with associated edema.  2.  Atrophy.  3.  White matter disease likely representing microvascular ischemic changes.  4.  Right maxillary sinus disease.    CT head 8/16   IMPRESSION:  1.  Area of low-density in the right MCA distribution with subtle hazy hyperdensity which appears similar to prior study and could represent subtle stable hemorrhagic transformation  2.  Nonspecific white matter changes, commonly associated with small vessel ischemic disease.  Associated mild cerebral atrophy is noted.  3.  Low-density fluid collection anterior to the left temporal lobe, location and appearance favors arachnoid cyst  4.  Atherosclerosis.    PROCEDURES:  None    PMH:  Past Medical History:   Diagnosis Date    A-fib (HCC)     Elevated troponin 6/30/2021    Generalized weakness 6/22/2021    Hypothyroid     Other specified hypothyroidism 6/1/2021       PSH:  History reviewed. No pertinent surgical history.    FHX:  Non-pertinent to today's issues    Medications:  Current Facility-Administered Medications   Medication Dose    atorvastatin (Lipitor) tablet 40 mg  40 mg    hydrALAZINE (Apresoline) injection 20 mg  20 mg    labetalol (Normodyne/Trandate) injection 10 mg  10 mg    MD Alert...ICU Electrolyte Replacement per Pharmacy      acetaminophen (Tylenol) tablet 650 mg  650 mg    ondansetron (Zofran) syringe/vial injection 4 mg  4 mg    ondansetron (Zofran ODT) dispertab 4 mg  4 mg    senna-docusate (Pericolace Or Senokot S) 8.6-50 MG per tablet 2 Tablet  2 Tablet    And    polyethylene glycol/lytes (Miralax) PACKET 1 Packet  1 Packet    And    magnesium  hydroxide (Milk Of Magnesia) suspension 30 mL  30 mL    And    bisacodyl (Dulcolax) suppository 10 mg  10 mg    dextrose 50% (D50W) injection 25 g  25 g    fludrocortisone (Florinef) tablet 0.1 mg  0.1 mg    levothyroxine (Synthroid) tablet 175 mcg  175 mcg       Allergies:  Allergies   Allergen Reactions    Digoxin      nausea         Physical Exam:  Vitals: /85   Pulse (!) 55   Temp 36.4 °C (97.5 °F) (Temporal)   Resp 17   Ht 1.829 m (6')   Wt 74.5 kg (164 lb 3.9 oz)   SpO2 96%   Gen: NAD  Head: NC/AT  Eyes/ Nose/ Mouth: PERRLA, moist mucous membranes  Cardio: RRR, good distal perfusion, warm extremities  Pulm: normal respiratory effort, no cyanosis   Abd: Soft NTND, negative borborygmi   Ext: No peripheral edema. No calf tenderness. No clubbing.    Mental status: answers questions appropriately follows commands  Speech: fluent, no aphasia or dysarthria    Motor:      Upper Extremity  Myotome R L   Shoulder flexion C5 5/5 4/5   Elbow flexion C5 5 5   Wrist extension C6 5 5   Elbow extension C7 5 5   Finger flexion C8 5/5 4/5   Finger abduction T1 4/5 4/5     Lower Extremity Myotome R L   Hip flexion L2 4/5 4/5   Knee extension L3 5 5   Ankle dorsiflexion L4 5/5 4/5   Toe extension L5 5/5 5/5   Ankle plantarflexion S1 5 5     Sensory:   intact to light touch through out    Labs: Reviewed and significant for   Recent Labs     08/14/23  0527 08/15/23  0300 08/16/23  0249   RBC 3.53* 3.89* 4.13*   HEMOGLOBIN 11.6* 13.1* 13.5*   HEMATOCRIT 35.7* 37.3* 40.5*   PLATELETCT 136* 161* 152*       Recent Labs     08/14/23  1056 08/15/23  0300 08/16/23  0249   SODIUM 135 135 136   POTASSIUM 4.5 3.8 3.7   CHLORIDE 103 102 102   CO2 21 21 20   GLUCOSE 101* 101* 95   BUN 19 18 17   CREATININE 0.99 0.99 1.00   CALCIUM 8.6 8.1* 8.1*       Recent Results (from the past 24 hour(s))   POCT glucose device results    Collection Time: 08/15/23 12:12 PM   Result Value Ref Range    POC Glucose, Blood 116 (H) 65 - 99 mg/dL   CBC  With Differential    Collection Time: 08/16/23  2:49 AM   Result Value Ref Range    WBC 6.2 4.8 - 10.8 K/uL    RBC 4.13 (L) 4.70 - 6.10 M/uL    Hemoglobin 13.5 (L) 14.0 - 18.0 g/dL    Hematocrit 40.5 (L) 42.0 - 52.0 %    MCV 98.1 (H) 81.4 - 97.8 fL    MCH 32.7 27.0 - 33.0 pg    MCHC 33.3 32.3 - 36.5 g/dL    RDW 53.6 (H) 35.9 - 50.0 fL    Platelet Count 152 (L) 164 - 446 K/uL    MPV 10.3 9.0 - 12.9 fL    Neutrophils-Polys 72.60 (H) 44.00 - 72.00 %    Lymphocytes 12.70 (L) 22.00 - 41.00 %    Monocytes 12.40 0.00 - 13.40 %    Eosinophils 1.60 0.00 - 6.90 %    Basophils 0.20 0.00 - 1.80 %    Immature Granulocytes 0.50 0.00 - 0.90 %    Nucleated RBC 0.00 0.00 - 0.20 /100 WBC    Neutrophils (Absolute) 4.50 1.82 - 7.42 K/uL    Lymphs (Absolute) 0.79 (L) 1.00 - 4.80 K/uL    Monos (Absolute) 0.77 0.00 - 0.85 K/uL    Eos (Absolute) 0.10 0.00 - 0.51 K/uL    Baso (Absolute) 0.01 0.00 - 0.12 K/uL    Immature Granulocytes (abs) 0.03 0.00 - 0.11 K/uL    NRBC (Absolute) 0.00 K/uL   Comp Metabolic Panel    Collection Time: 08/16/23  2:49 AM   Result Value Ref Range    Sodium 136 135 - 145 mmol/L    Potassium 3.7 3.6 - 5.5 mmol/L    Chloride 102 96 - 112 mmol/L    Co2 20 20 - 33 mmol/L    Anion Gap 14.0 7.0 - 16.0    Glucose 95 65 - 99 mg/dL    Bun 17 8 - 22 mg/dL    Creatinine 1.00 0.50 - 1.40 mg/dL    Calcium 8.1 (L) 8.5 - 10.5 mg/dL    Correct Calcium 9.1 8.5 - 10.5 mg/dL    AST(SGOT) 23 12 - 45 U/L    ALT(SGPT) 12 2 - 50 U/L    Alkaline Phosphatase 81 30 - 99 U/L    Total Bilirubin 0.6 0.1 - 1.5 mg/dL    Albumin 2.7 (L) 3.2 - 4.9 g/dL    Total Protein 5.8 (L) 6.0 - 8.2 g/dL    Globulin 3.1 1.9 - 3.5 g/dL    A-G Ratio 0.9 g/dL   Magnesium    Collection Time: 08/16/23  2:49 AM   Result Value Ref Range    Magnesium 2.0 1.5 - 2.5 mg/dL   Phosphorus    Collection Time: 08/16/23  2:49 AM   Result Value Ref Range    Phosphorus 3.2 2.5 - 4.5 mg/dL   ESTIMATED GFR    Collection Time: 08/16/23  2:49 AM   Result Value Ref Range    GFR  (CKD-EPI) 68 >60 mL/min/1.73 m 2         ASSESSMENT:  Patient is a 89 y.o. male admitted with right MCA stroke now s/p TNK    Psychiatric Code / Diagnosis to Support: 0001.1 - Stroke: Left Body Involvement (Right Brain)    Rehabilitation: Impaired ADLs and mobility  Patient is a good candidate for inpatient rehab based on needs for PT, OT, and speech therapy.  Patient will also benefit from family training.  Patient has a good discharge situation which will be home with spouse.     Barriers to transfer include: Insurance authorization, TCCs to verify disposition, medical clearance and bed availability     All cases are subject to administrative review and recommendations may change    Disposition recommendations:  -Good candidate for IPR.  Patient has deficits with mobility, ADLs and swallow.  Patient has a stable discharge environment which is his assisted living facility, and support from his spouse as well as staff.  -TCC to reach out to [facility] to ensure that he is welcome back to return, and to delineate how much support he is currently getting  -PMR to follow in the periphery for rehab appropriateness, please reach out with questions or request for medical management    Medical Complexity:    Right MCA stroke with hemorrhagic conversion seen on CT head  -Initial NIH score 10  -Status post tPA  -Reduction NIH score to 5 today  -Secondary stroke prevention with atorvastatin 40 mg nightly  -Eliquis currently on hold for 10 days due to small hemorrhagic conversion  - MRI brain pending   - 8/16 repeat CT head showed stable subtle hemorrhagic conversion   -Continue PT OT and SLP while in house  -Good candidate for IPR    Hypertension/hypotension  -Blood pressure goal -140, normalize to long term goal of 110-130/60-80 in coming days, per neurology  -8/16 116/77   -Florinef 0.1 mg every morning  -Hydralazine as needed    Hypothyroidism  -Synthroid 175 mcg every morning    Diabetes mellitus  -Insulin sliding scale  -BG  goal 80-1 80 per neurology  -Checking A1c    Thrombocytopenia  -8/16 Platelets 152   -Close observation monitoring with serial labs by primary team        DVT PPX: SCDs      Thank you for allowing us to participate in the care of this patient.     Patient was seen for >51 minutes on unit/floor of which > 50% of time was spent on counseling and coordination of care regarding the above, including prognosis, risk reduction, benefits of treatment, and options for next stage of care.Including discussing plan with primary team, waiting on hold >15 mins to schedule peer to peer with insurance.     Maribel Zimmerman D.O.   Physical Medicine and Rehabilitation     Please note that this dictation was created using voice recognition software. I have made every reasonable attempt to correct obvious errors, but there may be errors of grammar and possibly content that I did not discover before finalizing the note.

## 2023-08-16 NOTE — CARE PLAN
The patient is Stable - Low risk of patient condition declining or worsening  Shift Goals  Clinical Goals: q2h neuro  Patient Goals: rest  Family Goals: RYAN    Progress made toward(s) clinical / shift goals: Pt transferred out of ICU, neuro status stable.    Patient is not progressing towards the following goals:      Problem: Optimal Care of the Stroke Patient  Goal: Optimal acute care for the stroke patient  Outcome: Progressing     Problem: Knowledge Deficit - Stroke Education  Goal: Patient's knowledge of stroke and risk factors will improve  Outcome: Progressing     Problem: Psychosocial - Patient Condition  Goal: Patient's ability to verbalize feelings about condition will improve  Outcome: Progressing  Goal: Patient's ability to re-evaluate and adapt role responsibilities will improve  Outcome: Progressing     Problem: Discharge Planning - Stroke  Goal: Ensure Stroke Core Measures are met prior to discharge  Outcome: Progressing  Goal: Patient’s continuum of care needs will be met  Outcome: Progressing     Problem: Neuro Status  Goal: Neuro status will remain stable or improve  Outcome: Progressing     Problem: Hemodynamic Monitoring  Goal: Patient's hemodynamics, fluid balance and neurologic status will be stable or improve  Outcome: Progressing     Problem: Respiratory - Stroke Patient  Goal: Patient will achieve/maintain optimum respiratory rate/effort  Outcome: Progressing     Problem: Dysphagia  Goal: Dysphagia will improve  Outcome: Progressing     Problem: Risk for Aspiration  Goal: Patient's risk for aspiration will be absent or decrease  Outcome: Progressing     Problem: Urinary Elimination  Goal: Establish and maintain regular urinary output  Outcome: Progressing     Problem: Bowel Elimination  Goal: Establish and maintain regular bowel function  Outcome: Progressing     Problem: Mobility - Stroke  Goal: Patient's capacity to carry out activities will improve  Outcome: Progressing  Goal: Spasticity  will be prevented or improved  Outcome: Progressing  Goal: Subluxation will be prevented or improved  Outcome: Progressing     Problem: Self Care  Goal: Patient will have the ability to perform ADLs independently or with assistance (bathe, groom, dress, toilet and feed)  Outcome: Progressing     Problem: Knowledge Deficit - Standard  Goal: Patient and family/care givers will demonstrate understanding of plan of care, disease process/condition, diagnostic tests and medications  Outcome: Progressing     Problem: Psychosocial  Goal: Patient's ability to verbalize feelings about condition will improve  Outcome: Progressing  Goal: Patient's ability to re-evaluate and adapt role responsibilities will improve  Outcome: Progressing  Goal: Patient's level of anxiety will decrease  Outcome: Progressing  Goal: Patient and family will demonstrate ability to cope with life altering diagnosis and/or procedure  Outcome: Progressing  Goal: Spiritual and cultural needs incorporated into hospitalization  Outcome: Progressing     Problem: Hemodynamics  Goal: Patient's hemodynamics, fluid balance and neurologic status will be stable or improve  Outcome: Progressing     Problem: Respiratory  Goal: Patient will achieve/maintain optimum respiratory ventilation and gas exchange  Outcome: Progressing     Problem: Urinary - Renal Perfusion  Goal: Ability to achieve and maintain adequate renal perfusion and functioning will improve  Outcome: Progressing     Problem: Venous Thromboembolism (VTE) Prevention  Goal: The patient will remain free from venous thromboembolism (VTE)  Outcome: Progressing     Problem: Nutrition  Goal: Patient's nutritional and fluid intake will be adequate or improve  Outcome: Progressing  Goal: Enteral nutrition will be maintained or improve  Outcome: Progressing  Goal: Enteral nutrition will be maintained or improve  Outcome: Progressing     Problem: Mobility  Goal: Patient's capacity to carry out activities will  improve  Outcome: Progressing     Problem: Skin Integrity  Goal: Skin integrity is maintained or improved  Outcome: Progressing     Problem: Fall Risk  Goal: Patient will remain free from falls  Outcome: Progressing

## 2023-08-16 NOTE — DISCHARGE PLANNING
Chart reviewed.  Patient not yet medically cleared for DC.  Accepted by [facility] however, per Aron/Clinical Admissions Coord @ [facility], insurance with intent to deny & P2P scheduled for 1430 Hrs today.   CM asked DPA to submit [facility] referrals as alternate DCP in case of IRF denial by insurance.

## 2023-08-16 NOTE — DISCHARGE PLANNING
DPA sent [facility] referral per RN CM     [id number]    Agency/Facility Name: [facility]  Spoke To: Luba  Outcome: DPA received call from facility. Per Luba, pt is accepted back to facility       [phone]    Agency/Facility Name: [facility]  Spoke To: Parveen  Outcome: DPA received call from facility. Per Parveen, pt is accepted to facility

## 2023-08-16 NOTE — PROGRESS NOTES
Hospital Medicine Daily Progress Note    Date of Service  8/15/2023    Chief Complaint  Guru Pena is a 89 y.o. male admitted 8/12/2023 with L side weakness    Hospital Course  89 y.o. male who presented 8/12/2023 with with history of atrial fibrillation on dose adjusted apixaban, hypothyroidism.  He is a resident at a skilled nursing facility     Patient presented on August 12 with left-sided deficits.  CTA was negative for LVO, CT noncontrast was negative for other acute pathologies.  Unable to tolerate positioning for perfusion study.  After review with neurology, and based on the severity of his symptoms, the decision was given to give him TNK, the patient was admitted to the ICU.  On the monitor the patient has been in rate controlled A-fib/flutter generally.    Interval Problem Update  Pt states he feels fine  Son notes speech is a little more slurred today    AFib 70s  -120  On RA    I have discussed this patient's plan of care and discharge plan at IDT rounds today with Case Management, Nursing, Nursing leadership, and other members of the IDT team.    Consultants/Specialty  neurology    Code Status  Full Code    Disposition  The patient is not medically cleared for discharge to home or a post-acute facility.  Anticipate discharge to: an inpatient rehabilitation hospital    I have placed the appropriate orders for post-discharge needs.    Review of Systems  Review of Systems   Constitutional:  Negative for chills, fever and weight loss.   HENT:  Negative for hearing loss and tinnitus.    Eyes:  Negative for blurred vision, double vision, photophobia and pain.   Respiratory:  Negative for cough, sputum production and shortness of breath.    Cardiovascular:  Negative for chest pain, palpitations, orthopnea and leg swelling.   Gastrointestinal:  Negative for abdominal pain, constipation, diarrhea, nausea and vomiting.   Genitourinary:  Negative for dysuria, frequency and urgency.    Musculoskeletal:  Negative for back pain, joint pain, myalgias and neck pain.   Skin:  Negative for rash.   Neurological:  Positive for speech change and focal weakness. Negative for dizziness, tingling, tremors, sensory change and headaches.   Psychiatric/Behavioral:  Negative for hallucinations and substance abuse.    All other systems reviewed and are negative.       Physical Exam  Temp:  [35.6 °C (96 °F)-36.3 °C (97.3 °F)] 35.9 °C (96.7 °F)  Pulse:  [] 109  Resp:  [10-36] 15  BP: (106-142)/(60-92) 135/78  SpO2:  [82 %-100 %] 97 %    Physical Exam  Vitals reviewed.   Constitutional:       General: He is not in acute distress.     Appearance: He is ill-appearing.   HENT:      Head: Normocephalic and atraumatic. No contusion.      Right Ear: External ear normal.      Left Ear: External ear normal.      Nose: Nose normal.      Mouth/Throat:      Pharynx: No oropharyngeal exudate.   Eyes:      General:         Right eye: No discharge.         Left eye: No discharge.      Pupils: Pupils are equal, round, and reactive to light.   Cardiovascular:      Rate and Rhythm: Normal rate and regular rhythm.      Heart sounds: No murmur heard.     No friction rub. No gallop.   Pulmonary:      Effort: Pulmonary effort is normal.      Breath sounds: No wheezing or rhonchi.   Abdominal:      General: Bowel sounds are normal. There is no distension.      Palpations: Abdomen is soft.      Tenderness: There is no abdominal tenderness. There is no rebound.   Musculoskeletal:         General: No swelling or tenderness. Normal range of motion.      Cervical back: No rigidity. No muscular tenderness.   Skin:     General: Skin is warm and dry.      Coloration: Skin is not jaundiced.   Neurological:      General: No focal deficit present.      Mental Status: He is alert and oriented to person, place, and time.      Cranial Nerves: No cranial nerve deficit.      Sensory: No sensory deficit.      Comments: Motor 4/5 in all 4  extremities    He was alert and oriented and able to follow commands.   Psychiatric:         Mood and Affect: Mood normal.         Fluids    Intake/Output Summary (Last 24 hours) at 8/15/2023 1730  Last data filed at 8/15/2023 1400  Gross per 24 hour   Intake 560 ml   Output 200 ml   Net 360 ml         Laboratory  Recent Labs     08/13/23  0535 08/14/23  0527 08/15/23  0300   WBC 6.2 4.8 5.3   RBC 4.00* 3.53* 3.89*   HEMOGLOBIN 13.3* 11.6* 13.1*   HEMATOCRIT 37.9* 35.7* 37.3*   MCV 94.8 101.1* 95.9   MCH 33.3* 32.9 33.7*   MCHC 35.1 32.5 35.1   RDW 51.7* 56.6* 50.9*   PLATELETCT 149* 136* 161*   MPV 10.8 10.5 10.5       Recent Labs     08/13/23  0535 08/14/23  1056 08/15/23  0300   SODIUM 134* 135 135   POTASSIUM 4.2 4.5 3.8   CHLORIDE 104 103 102   CO2 19* 21 21   GLUCOSE 91 101* 101*   BUN 17 19 18   CREATININE 0.99 0.99 0.99   CALCIUM 8.3* 8.6 8.1*                 Recent Labs     08/13/23  0535   TRIGLYCERIDE 67   HDL 43   LDL 86         Imaging  CT-HEAD W/O   Final Result      1.  Advanced cerebral atrophy. Age-appropriate (89 years).   2.  Left middle cranial fossa arachnoid cyst.   3.  Extensive area of acute infarction in the right MCA territory with an approximately 8 mm hazy focus of hemorrhagic transformation which is stable compared with earlier CT from this morning.   4.  Old brainstem infarcts in the central and right paramedian ankush.         CT-HEAD W/O   Final Result      1.  Small right MCA distribution parenchymal hemorrhages with associated edema.   2.  Atrophy.   3.  White matter disease likely representing microvascular ischemic changes.   4.  Right maxillary sinus disease.         EC-ECHOCARDIOGRAM LTD W/O CONT   Final Result      CT-HEAD W/O   Final Result         NO ACUTE ABNORMALITIES ARE NOTED ON CT SCAN OF THE HEAD.      Findings are consistent with atrophy.  Decreased attenuation in the periventricular white matter likely indicates microvascular ischemic disease.         DX-CHEST-PORTABLE  (1 VIEW)   Final Result      1.  Mildly enlarged cardiac silhouette with probable vascular congestion.      CT-CTA NECK WITH & W/O-POST PROCESSING   Final Result      No significant stenosis or occlusion      CT-CTA HEAD WITH & W/O-POST PROCESS   Final Result      1.  CT angiogram of the [place] of  demonstrating no evidence of large vessel occlusion.      CT-HEAD W/O   Final Result      1.  There is no acute intracranial hemorrhage or infarct.      2.  White matter lucencies most consistent with small vessel ischemic change versus demyelination or gliosis.      3.  There is cerebral atrophy.                Assessment/Plan  Idiopathic hypotension  Assessment & Plan  Patient was diagnosed with this on a previous recent admission  At that time he was taking off of metoprolol and started on Florinef which we are continuing  Continue telemetry monitoring    CVA (cerebral vascular accident) (HCC)  Assessment & Plan  Patient presents acutely with left-sided deficits  Now s/p TNK  MRI pending  TTE pending  Rhythm on monitor has been A-fib consistent with known history.  He is maintained on apixaban 2.5 mg twice daily  Neurology consulted  Lipid panel and A1c pending  PT OT and speech consulted  Patient is a resident of a skilled nursing facility however he is able to ambulate normally with front wheeled walker independently  Pending completed work-up anticipate discharge on full dose anticoagulation with apixaban 5 mg twice daily  Probable rehab discharge  Continue with telemetry    I reviewed CT scan  Plan is to hold Eliquis for 7 days as recommended by neurology.  I discussed plan of care with bedside RN.  I am transitioning his neurochecks from every 2 hourly to every 4 hourly.  Plan is to transfer him to neuro floor with telemetry.      Thrombocytopenia (HCC)- (present on admission)  Assessment & Plan  08/15/23    Patient runs chronically between 120 and 115  Continue to monitor  I ordered CBC for  tomorrow.    Hypothyroidism due to acquired atrophy of thyroid- (present on admission)  Assessment & Plan  TSH done on this admission 3.89.  Continue current dose of replacement    Urinary retention  Assessment & Plan  Monitor for evidence of retention    Stage 3a chronic kidney disease (HCC)- (present on admission)  Assessment & Plan  08/15/23    Baseline creatinine around 1 which is where he is currently  Renally dose medications as appropriate  I ordered BMP for tomorrow.    JAMES on CPAP- (present on admission)  Assessment & Plan  Nightly CPAP    Pulmonary fibrosis (HCC)  Assessment & Plan  O2 RT protocols    Moderate mitral regurgitation by prior echocardiogram  Assessment & Plan  Checking cardiac echo    Chronic atrial fibrillation (HCC)- (present on admission)  Assessment & Plan  Patient is anticoagulated on dose adjusted apixaban 2.5 mg twice daily.  Held due to hemorrhagic conversion.  Plan is to resume anticoagulation after 7 days.  He is not on any rate or rhythm control medications as an outpatient and has not required them in house    Elevated troponin  Assessment & Plan  Patient's troponin is running in the 40s however on review of previous records going back more than a year, his troponins are always in the 40s so this would appear to be at his baseline and not indicative of an acute ischemic event  No acute complaints of chest pain  Continue monitor on telemetry.    Vitamin D deficiency  Assessment & Plan  Continue replacement      I discussed plan of care with bedside RN.    VTE prophylaxis:   SCDs/TEDs      I have performed a physical exam and reviewed and updated ROS and Plan today (8/15/2023). In review of yesterday's note (8/14/2023), there are no changes except as documented above.

## 2023-08-16 NOTE — PROGRESS NOTES
4 Eyes Skin Assessment Completed by Ameya RN and KAELYN Kinney.    Head WDL  Ears WDL  Nose WDL  Mouth WDL  Neck WDL  Breast/Chest WDL  Shoulder Blades WDL  Spine WDL  (R) Arm/Elbow/Hand WDL  (L) Arm/Elbow/Hand WDL  Abdomen WDL  Groin WDL  Scrotum/Coccyx/Buttocks Redness and Blanching  (R) Leg WDL  (L) Leg WDL  (R) Heel/Foot/Toe WDL  (L) Heel/Foot/Toe WDL          Devices In Places Tele Box, SCD's, Condom Cath, and Nasal Cannula      Interventions In Place Gray Ear Foams, Heel Mepilex, Sacral Mepilex, Waffle Overlay, Elbow Mepilex, and Q2 Turns    Possible Skin Injury No    Pictures Uploaded Into Epic N/A  Wound Consult Placed N/A  RN Wound Prevention Protocol Ordered Yes

## 2023-08-16 NOTE — CARE PLAN
The patient is Stable - Low risk of patient condition declining or worsening    Shift Goals  Clinical Goals: stable neuro assessment  Patient Goals: eat  Family Goals: RYAN    Progress made toward(s) clinical / shift goals:  neuros stable, pt has eaten     Patient is not progressing towards the following goals:

## 2023-08-16 NOTE — DISCHARGE PLANNING
Per PAR: insurance has an intent to deny.  A P2P is needed.  Dr. Zimmerman is requesting updated PT/OT evals in light of new head CT.  A P2P is due by 1430.  Tali MOREJON is aware.  Msg placed to Jc OT & Rachana PT.

## 2023-08-16 NOTE — THERAPY
Physical Therapy   Daily Treatment     Patient Name: Guru Pena  Age:  89 y.o., Sex:  male  Medical Record #: [id number]  Today's Date: 8/16/2023     Precautions  Precautions: Fall Risk;Swallow Precautions  Comments: Lft side inattention. 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 The pt willing to participate w/PT. 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 Functionally the pt requires min<->mod assist w/bed mobility and STS. The pt tolerated 20 mins EOB w/CGA<->min assist for static sit, still w/posterior lean. Standing w/min assist w/small side steps along the EOB. Pt too fatigued to tolerate OOB in chair.   Per son, the pt was in the hospital in July w/Covid and went to [facility] for therapy. The son stated the pt never did fully recover from Covid strength wise. The pt's tx session was limited by fatigue w/back to back therapy sessions.   PT will cont to follow    Plan    Treatment Plan Status: (P) Continue Current Treatment Plan  Type of Treatment: Bed Mobility, Gait Training, Neuro Re-Education / Balance, Self Care / Home Evaluation, Stair Training, Therapeutic Activities, Therapeutic Exercise  Treatment Frequency: 4 Times per Week  Treatment Duration: Until Therapy Goals Met    DC Equipment Recommendations: (P) Unable to determine at this time  Discharge Recommendations: (P) Recommend post-acute placement for additional physical therapy services prior to discharge home    Objective       08/16/23 1135   Charge Group   PT Therapeutic Activities (Units) 3   Total Time Spent   PT Therapeutic Activities Time Spent (Mins) 40   PT Total Time Spent (Calculated) 40   Precautions   Precautions Fall Risk;Swallow Precautions   Comments Lft side inattention. 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 The pt required cueing to shift wt forward, posterior lean throughout his efforts.   Balance   Sitting Balance (Static) Fair   Sitting Balance (Dynamic) Fair -   Standing Balance (Static) Poor   Standing Balance (Dynamic) Poor -   Weight Shift Sitting Fair   Weight Shift Standing Poor   Comments standing w/FWW   Bed Mobility    Supine to Sit Moderate Assist   Sit to Supine Contact Guard Assist   Scooting Minimal Assist  (seated)   Rolling Minimal Assist to Rt.;Minimum Assist to Lt.   Skilled Intervention Verbal Cuing;Postural Facilitation;Compensatory Strategies   Comments HOB partially elevated and railing support for rolling.   Gait Analysis   Gait Level Of Assist Unable to Participate   Comments Side stepping along EOB w/walker assist. Pt struggled w/moving of Lft LE w/his efforts.   Functional Mobility   Sit to Stand Minimal Assist  (from elevated bed->FWW,)   Bed, Chair, Wheelchair Transfer Unable to Participate  (too fatigued)   Skilled Intervention Verbal Cuing;Tactile Cuing;Postural Facilitation;Compensatory Strategies   Comments Improvement w/STS from previous PT session   How much difficulty does the patient currently have...   Turning over in bed (including adjusting bedclothes, sheets and blankets)? 1   Sitting down on and standing up from a chair with arms (e.g., wheelchair, bedside commode, etc.) 1   Moving from lying on back to sitting on the side of the bed? 1   How much help from another person does the patient currently need...   Moving to and from a bed to a chair (including a wheelchair)? 2   Need to walk in a hospital room? 2   Climbing 3-5 steps with a railing? 1   6 clicks Mobility Score 8   Short Term Goals    Short Term Goal # 1 Pt will be able to peform bed mobility w/SPV in 6 visits in order to promote independence   Goal Outcome # 1 goal not met   Short Term Goal # 2 Pt will be able to perform  functional transfers w/LRAD w/SPV in 6 visits in order to promote independence   Goal Outcome # 2 Goal not met   Short Term Goal # 3 Pt will be able to ambulate 50 ft w/LRAD w/SPV in 6 visits in order to increase household mobility   Goal Outcome # 3 Goal not met   Education Group   Role of Physical Therapist Patient Response Patient;Acceptance;Explanation;Action Demonstration   Physical Therapy Treatment Plan   Physical Therapy Treatment Plan Continue Current Treatment Plan   Anticipated Discharge Equipment and Recommendations   DC Equipment Recommendations Unable to determine at this time   Discharge Recommendations Recommend post-acute placement for additional physical therapy services prior to discharge home   Interdisciplinary Plan of Care Collaboration   IDT Collaboration with  Nursing;Family / Caregiver   Patient Position at End of Therapy In Bed;Call Light within Reach;Tray Table within Reach;Phone within Reach   Collaboration Comments Nrsg notified of pts tx efforts   Session Information   Date / Session Number  8/16--2 (2/4, 8/20) PTA/1   Supervising Physical Therapist (PTA Treatments Only)   Supervising Physical Therapist Marilu Villegas

## 2023-08-16 NOTE — PROGRESS NOTES
NOC HOSPITALIST CROSS COVER    Notified by RN regarding left pupil becoming nonreactive at 0400 neuro check.  The patient's neuro exam was otherwise unchanged from prior.  He was admitted with an acute CVA for which he underwent TNK and had a subsequent hemorrhagic conversion.      Vitals:    08/16/23 0308   BP: 127/76   Pulse: 60   Resp: 18   Temp: 36.5 °C (97.7 °F)   SpO2: 96%      Plan:  -Stat CT head without -results pending  -Likely evolution of his known hemorrhagic conversion  -Continue every 4 hours neurochecks    -----------------------------------------------------------------------------------------------------------    Electronically signed by:  Cynthia Marie, ADELA, APRN, VIOLAP-BC  Hospitalist Services

## 2023-08-16 NOTE — PROGRESS NOTES
Hospital Medicine Daily Progress Note    Date of Service  8/16/2023    Chief Complaint  Guru Pena is a 89 y.o. male admitted 8/12/2023 with L side weakness    Hospital Course  89 y.o. male who presented 8/12/2023 with with history of atrial fibrillation on dose adjusted apixaban, hypothyroidism.  He is a resident at a skilled nursing facility     Patient presented on August 12 with left-sided deficits.  CTA was negative for LVO, CT noncontrast was negative for other acute pathologies.  Unable to tolerate positioning for perfusion study.  After review with neurology, and based on the severity of his symptoms, the decision was given to give him TNK, the patient was admitted to the ICU.  On the monitor the patient has been in rate controlled A-fib/flutter generally.  Patient was transferred to the neurology floor.  Shortly after patient was noted for left monocular vision loss    Interval Problem Update  Patient evaluated bedside  Noted for new loss of vision left eye  Repeat MRI without changes  I discussed case with neurology who evaluated bedside and recommended ophthalmology evaluation  I discussed case with ophthalmology who will evaluate case  Aim for transition to inpatient rehab on discharge when medically clear    I have discussed this patient's plan of care and discharge plan at IDT rounds today with Case Management, Nursing, Nursing leadership, and other members of the IDT team.    Consultants/Specialty  neurology    Code Status  Full Code    Disposition  The patient is not medically cleared for discharge to home or a post-acute facility.  Anticipate discharge to: an inpatient rehabilitation hospital    Review of Systems  Review of Systems   Constitutional:  Negative for chills, fever and weight loss.   HENT:  Negative for hearing loss and tinnitus.    Eyes:  Negative for blurred vision, double vision, photophobia and pain.   Respiratory:  Negative for cough, sputum production and shortness of  breath.    Cardiovascular:  Negative for chest pain, palpitations, orthopnea and leg swelling.   Gastrointestinal:  Negative for abdominal pain, constipation, diarrhea, nausea and vomiting.   Genitourinary:  Negative for dysuria, frequency and urgency.   Musculoskeletal:  Negative for back pain, joint pain, myalgias and neck pain.   Skin:  Negative for rash.   Neurological:  Positive for speech change and focal weakness. Negative for dizziness, tingling, tremors, sensory change and headaches.   Psychiatric/Behavioral:  Negative for hallucinations and substance abuse.    All other systems reviewed and are negative.       Physical Exam  Temp:  [36.2 °C (97.2 °F)-36.5 °C (97.7 °F)] 36.2 °C (97.2 °F)  Pulse:  [55-91] 91  Resp:  [16-18] 18  BP: (104-168)/(59-99) 140/78  SpO2:  [95 %-97 %] 96 %    Physical Exam  Vitals reviewed.   Constitutional:       General: He is not in acute distress.     Appearance: He is ill-appearing.   HENT:      Head: Normocephalic and atraumatic. No contusion.      Right Ear: External ear normal.      Left Ear: External ear normal.      Nose: Nose normal.      Mouth/Throat:      Pharynx: No oropharyngeal exudate.   Eyes:      General:         Right eye: No discharge.         Left eye: No discharge.      Comments: Left eye vision loss   Cardiovascular:      Rate and Rhythm: Normal rate and regular rhythm.      Heart sounds: No murmur heard.     No friction rub. No gallop.   Pulmonary:      Effort: Pulmonary effort is normal.      Breath sounds: No wheezing or rhonchi.   Abdominal:      General: Bowel sounds are normal. There is no distension.      Palpations: Abdomen is soft.      Tenderness: There is no abdominal tenderness. There is no rebound.   Musculoskeletal:         General: No swelling or tenderness. Normal range of motion.      Cervical back: No rigidity. No muscular tenderness.   Skin:     General: Skin is warm and dry.      Coloration: Skin is not jaundiced.   Neurological:       General: No focal deficit present.      Mental Status: He is alert and oriented to person, place, and time.      Cranial Nerves: No cranial nerve deficit.      Sensory: No sensory deficit.      Comments: Motor 4/5 in all 4 extremities    He was alert and oriented and able to follow commands.   Psychiatric:         Mood and Affect: Mood normal.         Fluids    Intake/Output Summary (Last 24 hours) at 8/16/2023 1624  Last data filed at 8/16/2023 1604  Gross per 24 hour   Intake 320 ml   Output 950 ml   Net -630 ml       Laboratory  Recent Labs     08/14/23  0527 08/15/23  0300 08/16/23  0249   WBC 4.8 5.3 6.2   RBC 3.53* 3.89* 4.13*   HEMOGLOBIN 11.6* 13.1* 13.5*   HEMATOCRIT 35.7* 37.3* 40.5*   .1* 95.9 98.1*   MCH 32.9 33.7* 32.7   MCHC 32.5 35.1 33.3   RDW 56.6* 50.9* 53.6*   PLATELETCT 136* 161* 152*   MPV 10.5 10.5 10.3     Recent Labs     08/14/23  1056 08/15/23  0300 08/16/23  0249   SODIUM 135 135 136   POTASSIUM 4.5 3.8 3.7   CHLORIDE 103 102 102   CO2 21 21 20   GLUCOSE 101* 101* 95   BUN 19 18 17   CREATININE 0.99 0.99 1.00   CALCIUM 8.6 8.1* 8.1*                       Imaging  CT-HEAD W/O   Final Result         1.  Area of low-density in the right MCA distribution with subtle hazy hyperdensity which appears similar to prior study and could represent subtle stable hemorrhagic transformation   2.  Nonspecific white matter changes, commonly associated with small vessel ischemic disease.  Associated mild cerebral atrophy is noted.   3.  Low-density fluid collection anterior to the left temporal lobe, location and appearance favors arachnoid cyst   4.  Atherosclerosis.         CT-HEAD W/O   Final Result      1.  Advanced cerebral atrophy. Age-appropriate (89 years).   2.  Left middle cranial fossa arachnoid cyst.   3.  Extensive area of acute infarction in the right MCA territory with an approximately 8 mm hazy focus of hemorrhagic transformation which is stable compared with earlier CT from this  morning.   4.  Old brainstem infarcts in the central and right paramedian ankush.         CT-HEAD W/O   Final Result      1.  Small right MCA distribution parenchymal hemorrhages with associated edema.   2.  Atrophy.   3.  White matter disease likely representing microvascular ischemic changes.   4.  Right maxillary sinus disease.         EC-ECHOCARDIOGRAM LTD W/O CONT   Final Result      CT-HEAD W/O   Final Result         NO ACUTE ABNORMALITIES ARE NOTED ON CT SCAN OF THE HEAD.      Findings are consistent with atrophy.  Decreased attenuation in the periventricular white matter likely indicates microvascular ischemic disease.         DX-CHEST-PORTABLE (1 VIEW)   Final Result      1.  Mildly enlarged cardiac silhouette with probable vascular congestion.      CT-CTA NECK WITH & W/O-POST PROCESSING   Final Result      No significant stenosis or occlusion      CT-CTA HEAD WITH & W/O-POST PROCESS   Final Result      1.  CT angiogram of the [place] of  demonstrating no evidence of large vessel occlusion.      CT-HEAD W/O   Final Result      1.  There is no acute intracranial hemorrhage or infarct.      2.  White matter lucencies most consistent with small vessel ischemic change versus demyelination or gliosis.      3.  There is cerebral atrophy.                Assessment/Plan  Monocular vision loss- (present on admission)  Assessment & Plan  Left monocular vision loss noted today in the morning  Patient had rapid around 3 AM for dilated left pupil  Repeat head CT without changes  Neurology evaluated bedside and noted left monocular vision loss  Consult ophthalmology as per neurology's recommendation, appreciate recommendations    Idiopathic hypotension  Assessment & Plan  Patient was diagnosed with this on a previous recent admission  At that time he was taking off of metoprolol and started on Florinef which we are continuing  Continue telemetry monitoring    CVA (cerebral vascular accident) (HCC)  Assessment &  Plan  Patient presents acutely with left-sided deficits  Now s/p TNK  MRI pending  TTE pending  Rhythm on monitor has been A-fib consistent with known history.  He is maintained on apixaban 2.5 mg twice daily  Neurology consulted  Lipid panel and A1c pending  PT OT and speech consulted  Patient is a resident of a skilled nursing facility however he is able to ambulate normally with front wheeled walker independently  Pending completed work-up anticipate discharge on full dose anticoagulation with apixaban 5 mg twice daily  Probable rehab discharge  Continue with telemetry    I reviewed CT scan  Plan is to hold Eliquis for 7 days as recommended by neurology.  I discussed plan of care with bedside RN.  I am transitioning his neurochecks from every 2 hourly to every 4 hourly.  Plan is to transfer him to neuro floor with telemetry.      Thrombocytopenia (HCC)- (present on admission)  Assessment & Plan  08/15/23    Patient runs chronically between 120 and 115  Continue to monitor  I ordered CBC for tomorrow.    Hypothyroidism due to acquired atrophy of thyroid- (present on admission)  Assessment & Plan  TSH done on this admission 3.89.  Continue current dose of replacement    Urinary retention  Assessment & Plan  Monitor for evidence of retention    Stage 3a chronic kidney disease (HCC)- (present on admission)  Assessment & Plan  08/15/23    Baseline creatinine around 1 which is where he is currently  Renally dose medications as appropriate  I ordered BMP for tomorrow.    JAMES on CPAP- (present on admission)  Assessment & Plan  Nightly CPAP    Pulmonary fibrosis (HCC)  Assessment & Plan  O2 RT protocols    Moderate mitral regurgitation by prior echocardiogram  Assessment & Plan  Checking cardiac echo    Chronic atrial fibrillation (HCC)- (present on admission)  Assessment & Plan  Patient is anticoagulated on dose adjusted apixaban 2.5 mg twice daily.  Held due to hemorrhagic conversion.  Plan is to resume anticoagulation  after 7 days.  He is not on any rate or rhythm control medications as an outpatient and has not required them in house    Elevated troponin  Assessment & Plan  Patient's troponin is running in the 40s however on review of previous records going back more than a year, his troponins are always in the 40s so this would appear to be at his baseline and not indicative of an acute ischemic event  No acute complaints of chest pain  Continue monitor on telemetry.    Vitamin D deficiency  Assessment & Plan  Continue replacement      I discussed plan of care with bedside RN.    VTE prophylaxis: SCDs    I have performed a physical exam and reviewed and updated ROS and Plan today (8/16/2023). In review of yesterday's note (8/15/2023), there are no changes except as documented above.

## 2023-08-16 NOTE — PROGRESS NOTES
Monitor summary: Afib, HR 70-81, MD -, QRS 0.16, QT - with (F)PVCs, (R)bigem and (O)Trigem per strip from monitor room

## 2023-08-17 ENCOUNTER — PATIENT OUTREACH (OUTPATIENT)
Dept: SCHEDULING | Facility: IMAGING CENTER | Age: 88
End: 2023-08-17
Payer: MEDICARE

## 2023-08-17 PROCEDURE — 99232 SBSQ HOSP IP/OBS MODERATE 35: CPT | Performed by: STUDENT IN AN ORGANIZED HEALTH CARE EDUCATION/TRAINING PROGRAM

## 2023-08-17 PROCEDURE — 700102 HCHG RX REV CODE 250 W/ 637 OVERRIDE(OP): Performed by: INTERNAL MEDICINE

## 2023-08-17 PROCEDURE — 700102 HCHG RX REV CODE 250 W/ 637 OVERRIDE(OP): Performed by: HOSPITALIST

## 2023-08-17 PROCEDURE — 700102 HCHG RX REV CODE 250 W/ 637 OVERRIDE(OP): Performed by: STUDENT IN AN ORGANIZED HEALTH CARE EDUCATION/TRAINING PROGRAM

## 2023-08-17 PROCEDURE — A9270 NON-COVERED ITEM OR SERVICE: HCPCS | Performed by: HOSPITALIST

## 2023-08-17 PROCEDURE — A9270 NON-COVERED ITEM OR SERVICE: HCPCS | Performed by: INTERNAL MEDICINE

## 2023-08-17 PROCEDURE — A9270 NON-COVERED ITEM OR SERVICE: HCPCS | Performed by: STUDENT IN AN ORGANIZED HEALTH CARE EDUCATION/TRAINING PROGRAM

## 2023-08-17 PROCEDURE — 770020 HCHG ROOM/CARE - TELE (206)

## 2023-08-17 RX ADMIN — FLUDROCORTISONE ACETATE 0.1 MG: 0.1 TABLET ORAL at 05:30

## 2023-08-17 RX ADMIN — ATORVASTATIN CALCIUM 40 MG: 40 TABLET, FILM COATED ORAL at 18:00

## 2023-08-17 RX ADMIN — SENNOSIDES AND DOCUSATE SODIUM 2 TABLET: 50; 8.6 TABLET ORAL at 18:00

## 2023-08-17 RX ADMIN — LEVOTHYROXINE SODIUM 175 MCG: 0.17 TABLET ORAL at 05:30

## 2023-08-17 ASSESSMENT — ENCOUNTER SYMPTOMS
CONSTIPATION: 0
NAUSEA: 0
VOMITING: 0
WEIGHT LOSS: 0
NECK PAIN: 0
ORTHOPNEA: 0
BACK PAIN: 0
MYALGIAS: 0
EYE PAIN: 0
HALLUCINATIONS: 0
PHOTOPHOBIA: 0
FOCAL WEAKNESS: 1
DIZZINESS: 0
SENSORY CHANGE: 0
FEVER: 0
SPUTUM PRODUCTION: 0
TINGLING: 0
HEADACHES: 0
SHORTNESS OF BREATH: 0
DIARRHEA: 0
CHILLS: 0
DOUBLE VISION: 0
COUGH: 0
SPEECH CHANGE: 1
BLURRED VISION: 0
ABDOMINAL PAIN: 0
PALPITATIONS: 0
TREMORS: 0

## 2023-08-17 ASSESSMENT — LIFESTYLE VARIABLES: SUBSTANCE_ABUSE: 0

## 2023-08-17 ASSESSMENT — PAIN DESCRIPTION - PAIN TYPE: TYPE: OTHER (COMMENT)

## 2023-08-17 NOTE — PROGRESS NOTES
Monitor summary: Afib, HR 77-93, MT -, QRS 0.16, QT - with (F)PVC, (R)trigem, (R)coup, (R)trip per strip from monitor room

## 2023-08-17 NOTE — DISCHARGE PLANNING
Insurance has authorized.  Msg placed to Dr. Eloy Caal-seeking medical clearance.     0813-Medically cleared once Ophthalmology consults.

## 2023-08-17 NOTE — PROGRESS NOTES
Hospital Medicine Daily Progress Note    Date of Service  8/17/2023    Chief Complaint  Guru Pena is a 89 y.o. male admitted 8/12/2023 with L side weakness    Hospital Course  89 y.o. male who presented 8/12/2023 with with history of atrial fibrillation on dose adjusted apixaban, hypothyroidism.  He is a resident at a skilled nursing facility     Patient presented on August 12 with left-sided deficits.  CTA was negative for LVO, CT noncontrast was negative for other acute pathologies.  Unable to tolerate positioning for perfusion study.  After review with neurology, and based on the severity of his symptoms, the decision was given to give him TNK, the patient was admitted to the ICU.  On the monitor the patient has been in rate controlled A-fib/flutter generally.  Patient was transferred to the neurology floor. On the morning of 8/16/2023 patient was noted for left monocular vision loss.  Repeat head CT without changes and patient was evaluated by neurology for which ophthalmology was consulted    Interval Problem Update  Patient evaluated bedside  No changes with left visual loss  Stable vitals  On 0.5 L nasal cannula  I discussed case with ophthalmology, who will see patient today  Will defer restarting anticoagulation until ophthalmology evaluation  Patient was accepted to inpatient rehab    Patient is not medically clear  Pending ophthalmology clearance for disposition    I have discussed this patient's plan of care and discharge plan at IDT rounds today with Case Management, Nursing, Nursing leadership, and other members of the IDT team.    Consultants/Specialty  neurology    Code Status  Full Code    Disposition  The patient is not medically cleared for discharge to home or a post-acute facility.  Anticipate discharge to: an inpatient rehabilitation hospital    Review of Systems  Review of Systems   Constitutional:  Negative for chills, fever and weight loss.   HENT:  Negative for hearing loss and  tinnitus.    Eyes:  Negative for blurred vision, double vision, photophobia and pain.   Respiratory:  Negative for cough, sputum production and shortness of breath.    Cardiovascular:  Negative for chest pain, palpitations, orthopnea and leg swelling.   Gastrointestinal:  Negative for abdominal pain, constipation, diarrhea, nausea and vomiting.   Genitourinary:  Negative for dysuria, frequency and urgency.   Musculoskeletal:  Negative for back pain, joint pain, myalgias and neck pain.   Skin:  Negative for rash.   Neurological:  Positive for speech change and focal weakness. Negative for dizziness, tingling, tremors, sensory change and headaches.   Psychiatric/Behavioral:  Negative for hallucinations and substance abuse.    All other systems reviewed and are negative.       Physical Exam  Temp:  [36.3 °C (97.3 °F)-36.6 °C (97.9 °F)] 36.6 °C (97.9 °F)  Pulse:  [50-85] 72  Resp:  [16-18] 17  BP: (110-140)/(59-80) 127/64  SpO2:  [94 %-99 %] 94 %    Physical Exam  Vitals reviewed.   Constitutional:       General: He is not in acute distress.     Appearance: He is ill-appearing.   HENT:      Head: Normocephalic and atraumatic. No contusion.      Right Ear: External ear normal.      Left Ear: External ear normal.      Nose: Nose normal.      Mouth/Throat:      Pharynx: No oropharyngeal exudate.   Eyes:      General:         Right eye: No discharge.         Left eye: No discharge.      Comments: Left eye vision loss   Cardiovascular:      Rate and Rhythm: Normal rate and regular rhythm.      Heart sounds: No murmur heard.     No friction rub. No gallop.   Pulmonary:      Effort: Pulmonary effort is normal.      Breath sounds: No wheezing or rhonchi.   Abdominal:      General: Bowel sounds are normal. There is no distension.      Palpations: Abdomen is soft.      Tenderness: There is no abdominal tenderness. There is no rebound.   Musculoskeletal:         General: No swelling or tenderness. Normal range of motion.       Cervical back: No rigidity. No muscular tenderness.   Skin:     General: Skin is warm and dry.      Coloration: Skin is not jaundiced.   Neurological:      General: No focal deficit present.      Mental Status: He is alert and oriented to person, place, and time.      Cranial Nerves: No cranial nerve deficit.      Sensory: No sensory deficit.      Comments: Motor 4/5 in all 4 extremities    He was alert and oriented and able to follow commands.   Psychiatric:         Mood and Affect: Mood normal.         Fluids    Intake/Output Summary (Last 24 hours) at 8/17/2023 1656  Last data filed at 8/17/2023 1300  Gross per 24 hour   Intake 240 ml   Output --   Net 240 ml       Laboratory  Recent Labs     08/15/23  0300 08/16/23  0249   WBC 5.3 6.2   RBC 3.89* 4.13*   HEMOGLOBIN 13.1* 13.5*   HEMATOCRIT 37.3* 40.5*   MCV 95.9 98.1*   MCH 33.7* 32.7   MCHC 35.1 33.3   RDW 50.9* 53.6*   PLATELETCT 161* 152*   MPV 10.5 10.3     Recent Labs     08/15/23  0300 08/16/23  0249   SODIUM 135 136   POTASSIUM 3.8 3.7   CHLORIDE 102 102   CO2 21 20   GLUCOSE 101* 95   BUN 18 17   CREATININE 0.99 1.00   CALCIUM 8.1* 8.1*                       Imaging  CT-HEAD W/O   Final Result         1.  Area of low-density in the right MCA distribution with subtle hazy hyperdensity which appears similar to prior study and could represent subtle stable hemorrhagic transformation   2.  Nonspecific white matter changes, commonly associated with small vessel ischemic disease.  Associated mild cerebral atrophy is noted.   3.  Low-density fluid collection anterior to the left temporal lobe, location and appearance favors arachnoid cyst   4.  Atherosclerosis.         CT-HEAD W/O   Final Result      1.  Advanced cerebral atrophy. Age-appropriate (89 years).   2.  Left middle cranial fossa arachnoid cyst.   3.  Extensive area of acute infarction in the right MCA territory with an approximately 8 mm hazy focus of hemorrhagic transformation which is stable  compared with earlier CT from this morning.   4.  Old brainstem infarcts in the central and right paramedian ankush.         CT-HEAD W/O   Final Result      1.  Small right MCA distribution parenchymal hemorrhages with associated edema.   2.  Atrophy.   3.  White matter disease likely representing microvascular ischemic changes.   4.  Right maxillary sinus disease.         EC-ECHOCARDIOGRAM LTD W/O CONT   Final Result      CT-HEAD W/O   Final Result         NO ACUTE ABNORMALITIES ARE NOTED ON CT SCAN OF THE HEAD.      Findings are consistent with atrophy.  Decreased attenuation in the periventricular white matter likely indicates microvascular ischemic disease.         DX-CHEST-PORTABLE (1 VIEW)   Final Result      1.  Mildly enlarged cardiac silhouette with probable vascular congestion.      CT-CTA NECK WITH & W/O-POST PROCESSING   Final Result      No significant stenosis or occlusion      CT-CTA HEAD WITH & W/O-POST PROCESS   Final Result      1.  CT angiogram of the [place] of  demonstrating no evidence of large vessel occlusion.      CT-HEAD W/O   Final Result      1.  There is no acute intracranial hemorrhage or infarct.      2.  White matter lucencies most consistent with small vessel ischemic change versus demyelination or gliosis.      3.  There is cerebral atrophy.                Assessment/Plan  Monocular vision loss- (present on admission)  Assessment & Plan  Left monocular vision loss noted today in the morning  Patient had rapid around 3 AM for dilated left pupil  Repeat head CT without changes  Neurology evaluated bedside and noted left monocular vision loss  Consult ophthalmology as per neurology's recommendation, appreciate recommendations    Idiopathic hypotension  Assessment & Plan  Patient was diagnosed with this on a previous recent admission  At that time he was taking off of metoprolol and started on Florinef which we are continuing  Continue telemetry monitoring    CVA (cerebral vascular  accident) (HCC)  Assessment & Plan  Patient presents acutely with left-sided deficits  Now s/p TNK  MRI pending  TTE pending  Rhythm on monitor has been A-fib consistent with known history.  He is maintained on apixaban 2.5 mg twice daily  Neurology consulted  Lipid panel and A1c pending  PT OT and speech consulted  Patient is a resident of a skilled nursing facility however he is able to ambulate normally with front wheeled walker independently  Pending completed work-up anticipate discharge on full dose anticoagulation with apixaban 5 mg twice daily  Probable rehab discharge  Continue with telemetry    I reviewed CT scan  Plan is to hold Eliquis for 7 days as recommended by neurology.  I discussed plan of care with bedside RN.  I am transitioning his neurochecks from every 2 hourly to every 4 hourly.  Plan is to transfer him to neuro floor with telemetry.      Thrombocytopenia (HCC)- (present on admission)  Assessment & Plan  08/15/23    Patient runs chronically between 120 and 115  Continue to monitor  I ordered CBC for tomorrow.    Hypothyroidism due to acquired atrophy of thyroid- (present on admission)  Assessment & Plan  TSH done on this admission 3.89.  Continue current dose of replacement    Urinary retention  Assessment & Plan  Monitor for evidence of retention    Stage 3a chronic kidney disease (HCC)- (present on admission)  Assessment & Plan  08/15/23    Baseline creatinine around 1 which is where he is currently  Renally dose medications as appropriate  I ordered BMP for tomorrow.    JAMES on CPAP- (present on admission)  Assessment & Plan  Nightly CPAP    Pulmonary fibrosis (HCC)  Assessment & Plan  O2 RT protocols    Moderate mitral regurgitation by prior echocardiogram  Assessment & Plan  Checking cardiac echo    Chronic atrial fibrillation (HCC)- (present on admission)  Assessment & Plan  Patient is anticoagulated on dose adjusted apixaban 2.5 mg twice daily.  Held due to hemorrhagic conversion.  Plan  is to resume anticoagulation after 7 days.  He is not on any rate or rhythm control medications as an outpatient and has not required them in house    Elevated troponin  Assessment & Plan  Patient's troponin is running in the 40s however on review of previous records going back more than a year, his troponins are always in the 40s so this would appear to be at his baseline and not indicative of an acute ischemic event  No acute complaints of chest pain  Continue monitor on telemetry.    Vitamin D deficiency  Assessment & Plan  Continue replacement      I discussed plan of care with bedside RN.    VTE prophylaxis: SCDs    I have performed a physical exam and reviewed and updated ROS and Plan today (8/17/2023). In review of yesterday's note (8/16/2023), there are no changes except as documented above.

## 2023-08-17 NOTE — CARE PLAN
The patient is Stable - Low risk of patient condition declining or worsening    Shift Goals  Clinical Goals: stable neuro assessmet  Patient Goals: comfort  Family Goals: RYAN    Progress made toward(s) clinical / shift goals:  neuro stable, pt reports comfort   Problem: Optimal Care of the Stroke Patient  Goal: Optimal emergency care for the stroke patient  Description: Target End Date:  End of day 1    Time of Onset    1.  Time of last known well obtained  2.  Patient and family/caregiver verbalize understanding of diagnosis, medications and testing  3.  NIHSS performed and documented, including date and time, for ischemic stroke patients prior to any acute recanalization therapy (thrombolytics or mechanical) or within 12 hours of arrival if no intervention is warranted  4.  Consults and referrals placed to appropriate departments    Medications Administration as Ordered:    1.  Implement appropriate reversal agents for INR greater than 1.5  2.  Pre-alteplase administration of antihypertensives for SBP >185 DBP >110  3.  Post-alteplase administration of antihypertensives for SBP >185, DBP >105  4.  Thrombolytic Therapy for qualifying ischemic stroke patients who arrive within 4.5 hours of time of Last Known Well. Thrombolytic therapy administered within 30 minutes or a documented reason for delay  Outcome: Progressing     Problem: Knowledge Deficit - Stroke Education  Goal: Patient's knowledge of stroke and risk factors will improve  Description: Target End Date:  1-3 days or as soon as patient condition allows    Document in Patient Education    1.  Stroke education booklet provided  2.  Education regarding EMS activation, need for follow up, medication prescribed at discharge, risk factors for stroke/lifestyle modifications, warning signs and symptoms of stroke provided  Outcome: Progressing  Note: Booklet given      Problem: Psychosocial - Patient Condition  Goal: Patient's ability to verbalize feelings about  condition will improve  Description: Target End Date:  Prior to discharge or change in level of care    1.  Discuss coping with medical condition and its effects  2.  Encourage patient participation in care  3.  Encourage acknowledgement of body changes and accompanying emotions  4.  Perform depression screening  Outcome: Progressing     Problem: Neuro Status  Goal: Neuro status will remain stable or improve  Description: Target End Date:  Prior to discharge or change in level of care    Document on Neuro assessment in the Assessment flowsheet    1.  Assess and monitor neurologic status per provider order/protocol/unit policy  2.  Assess level of consciousness and orientation  3.  Assess for speech, dysarthria, dysphagia, facial symmetry  4.  Assess visual field, eye movements, gaze preference, pupil reaction and size  5.  Assess muscle strength and motor response in all four extremities  6.  Assess for sensation (numbness and tingling)  7.  Assess basic neuro reflexes (cough, gag, corneal)  8.  Identify changes in neuro status and report to provider for testing/treatment orders  Outcome: Progressing  Flowsheets (Taken 8/17/2023 7040)  Level of Consciousness: Alert     Problem: Risk for Aspiration  Goal: Patient's risk for aspiration will be absent or decrease  Description: Target End Date:  Prior to discharge or change in level of care    1.   Complete dysphagia screening on admission  2.   NPO until dysphagia screening complete or medically cleared  3.   Collaborate with Speech Therapy, Clinical Dietitian and interdisciplinary team  4.   Implement aspiration precautions  5.   Assist patient up to chair for meals  6.   Elevate head of bed 90 degrees if patient is unable to get out of bed  7.   Encourage small bites  8.   Ensure foods/liquids are of appropriate consistency  9.   Assess for any signs/symptoms of aspiration  10. Assess breath sounds and vital signs after oral intake  Outcome: Progressing  Flowsheets  (Taken 8/17/2023 4380)  Aspiration Prevention:   Collaborated with SLP   Supervised feedings  Head Of Bed: 90 degrees or greater for meals     Problem: Self Care  Goal: Patient will have the ability to perform ADLs independently or with assistance (bathe, groom, dress, toilet and feed)  Description: Target End Date:  Prior to discharge or change in level of care    Document on ADL flowsheet    1.  Assess the capability and level of deficiency to perform ADLs  2.  Encourage family/care giver involvement  3.  Provide assistive devices  4.  Consider PT/OT evaluations  5.  Maintain support, give positive feedback, encourage self-care allowing extra time and verbal cuing as needed  6.  Avoid doing something for patients they can do themselves, but provide assistance as needed  7.  Assist in anticipating/planning individual needs  8.  Collaborate with Case Management and  to meet discharge needs  Outcome: Progressing     Problem: Psychosocial  Goal: Patient's ability to verbalize feelings about condition will improve  Description: Target End Date:  Prior to discharge or change in level of care    1.  Discuss coping with medical condition and its effects  2.  Encourage patient participation in care  3.  Encourage acknowledgement of body changes and accompanying emotions  4.  Perform depression screening  Outcome: Progressing       Patient is not progressing towards the following goals:

## 2023-08-18 ENCOUNTER — HOSPITAL ENCOUNTER (INPATIENT)
Facility: REHABILITATION | Age: 88
LOS: 1 days | DRG: 056 | End: 2023-08-19
Attending: PHYSICAL MEDICINE & REHABILITATION | Admitting: PHYSICAL MEDICINE & REHABILITATION
Payer: MEDICARE

## 2023-08-18 VITALS
TEMPERATURE: 97.3 F | OXYGEN SATURATION: 95 % | WEIGHT: 169.31 LBS | RESPIRATION RATE: 19 BRPM | DIASTOLIC BLOOD PRESSURE: 72 MMHG | BODY MASS INDEX: 22.93 KG/M2 | HEART RATE: 62 BPM | HEIGHT: 72 IN | SYSTOLIC BLOOD PRESSURE: 129 MMHG

## 2023-08-18 PROBLEM — R94.31 PROLONGED Q-T INTERVAL ON ECG: Status: ACTIVE | Noted: 2023-08-18

## 2023-08-18 PROBLEM — H34.8192 CENTRAL RETINAL VEIN OCCLUSION: Status: ACTIVE | Noted: 2023-08-18

## 2023-08-18 PROBLEM — I63.9 ISCHEMIC STROKE (HCC): Status: ACTIVE | Noted: 2023-08-18

## 2023-08-18 PROCEDURE — 700102 HCHG RX REV CODE 250 W/ 637 OVERRIDE(OP): Performed by: STUDENT IN AN ORGANIZED HEALTH CARE EDUCATION/TRAINING PROGRAM

## 2023-08-18 PROCEDURE — 770010 HCHG ROOM/CARE - REHAB SEMI PRIVAT*

## 2023-08-18 PROCEDURE — A9270 NON-COVERED ITEM OR SERVICE: HCPCS | Performed by: PHYSICAL MEDICINE & REHABILITATION

## 2023-08-18 PROCEDURE — 700102 HCHG RX REV CODE 250 W/ 637 OVERRIDE(OP): Performed by: INTERNAL MEDICINE

## 2023-08-18 PROCEDURE — 99223 1ST HOSP IP/OBS HIGH 75: CPT | Performed by: PHYSICAL MEDICINE & REHABILITATION

## 2023-08-18 PROCEDURE — 97535 SELF CARE MNGMENT TRAINING: CPT

## 2023-08-18 PROCEDURE — A9270 NON-COVERED ITEM OR SERVICE: HCPCS | Performed by: INTERNAL MEDICINE

## 2023-08-18 PROCEDURE — 99239 HOSP IP/OBS DSCHRG MGMT >30: CPT | Performed by: INTERNAL MEDICINE

## 2023-08-18 PROCEDURE — 700102 HCHG RX REV CODE 250 W/ 637 OVERRIDE(OP): Performed by: PHYSICAL MEDICINE & REHABILITATION

## 2023-08-18 PROCEDURE — 94760 N-INVAS EAR/PLS OXIMETRY 1: CPT

## 2023-08-18 PROCEDURE — A9270 NON-COVERED ITEM OR SERVICE: HCPCS | Performed by: STUDENT IN AN ORGANIZED HEALTH CARE EDUCATION/TRAINING PROGRAM

## 2023-08-18 RX ORDER — AMOXICILLIN 250 MG
2 CAPSULE ORAL 2 TIMES DAILY
Status: DISCONTINUED | OUTPATIENT
Start: 2023-08-18 | End: 2023-08-23 | Stop reason: HOSPADM

## 2023-08-18 RX ORDER — ONDANSETRON 2 MG/ML
4 INJECTION INTRAMUSCULAR; INTRAVENOUS 4 TIMES DAILY PRN
Status: DISCONTINUED | OUTPATIENT
Start: 2023-08-18 | End: 2023-08-18

## 2023-08-18 RX ORDER — ALUMINA, MAGNESIA, AND SIMETHICONE 2400; 2400; 240 MG/30ML; MG/30ML; MG/30ML
20 SUSPENSION ORAL
Status: DISCONTINUED | OUTPATIENT
Start: 2023-08-18 | End: 2023-08-23 | Stop reason: HOSPADM

## 2023-08-18 RX ORDER — ECHINACEA PURPUREA EXTRACT 125 MG
2 TABLET ORAL PRN
Status: DISCONTINUED | OUTPATIENT
Start: 2023-08-18 | End: 2023-08-23 | Stop reason: HOSPADM

## 2023-08-18 RX ORDER — ATORVASTATIN CALCIUM 40 MG/1
40 TABLET, FILM COATED ORAL EVERY EVENING
Qty: 30 TABLET | Refills: 0 | Status: SHIPPED
Start: 2023-08-18

## 2023-08-18 RX ORDER — AMOXICILLIN 250 MG
2 CAPSULE ORAL 2 TIMES DAILY
Status: CANCELLED | OUTPATIENT
Start: 2023-08-18

## 2023-08-18 RX ORDER — CARBOXYMETHYLCELLULOSE SODIUM 5 MG/ML
1 SOLUTION/ DROPS OPHTHALMIC PRN
Status: DISCONTINUED | OUTPATIENT
Start: 2023-08-18 | End: 2023-08-23 | Stop reason: HOSPADM

## 2023-08-18 RX ORDER — ATORVASTATIN CALCIUM 40 MG/1
40 TABLET, FILM COATED ORAL EVERY EVENING
Status: CANCELLED | OUTPATIENT
Start: 2023-08-18

## 2023-08-18 RX ORDER — POLYETHYLENE GLYCOL 3350 17 G/17G
1 POWDER, FOR SOLUTION ORAL
Status: CANCELLED | OUTPATIENT
Start: 2023-08-18

## 2023-08-18 RX ORDER — LANOLIN ALCOHOL/MO/W.PET/CERES
3 CREAM (GRAM) TOPICAL NIGHTLY PRN
Status: DISCONTINUED | OUTPATIENT
Start: 2023-08-18 | End: 2023-08-23 | Stop reason: HOSPADM

## 2023-08-18 RX ORDER — PROCHLORPERAZINE MALEATE 10 MG
10 TABLET ORAL EVERY 6 HOURS PRN
Status: DISCONTINUED | OUTPATIENT
Start: 2023-08-18 | End: 2023-08-23 | Stop reason: HOSPADM

## 2023-08-18 RX ORDER — LACTULOSE 20 G/30ML
30 SOLUTION ORAL
Status: DISCONTINUED | OUTPATIENT
Start: 2023-08-18 | End: 2023-08-23 | Stop reason: HOSPADM

## 2023-08-18 RX ORDER — FLUDROCORTISONE ACETATE 0.1 MG/1
0.1 TABLET ORAL EVERY MORNING
Status: DISCONTINUED | OUTPATIENT
Start: 2023-08-19 | End: 2023-08-23 | Stop reason: HOSPADM

## 2023-08-18 RX ORDER — POLYETHYLENE GLYCOL 3350 17 G/17G
1 POWDER, FOR SOLUTION ORAL
Status: DISCONTINUED | OUTPATIENT
Start: 2023-08-18 | End: 2023-08-23 | Stop reason: HOSPADM

## 2023-08-18 RX ORDER — ACETAMINOPHEN 325 MG/1
650 TABLET ORAL EVERY 4 HOURS PRN
Status: DISCONTINUED | OUTPATIENT
Start: 2023-08-18 | End: 2023-08-23 | Stop reason: HOSPADM

## 2023-08-18 RX ORDER — TRAZODONE HYDROCHLORIDE 50 MG/1
50 TABLET ORAL
Status: DISCONTINUED | OUTPATIENT
Start: 2023-08-18 | End: 2023-08-23 | Stop reason: HOSPADM

## 2023-08-18 RX ORDER — ONDANSETRON 4 MG/1
4 TABLET, ORALLY DISINTEGRATING ORAL 4 TIMES DAILY PRN
Status: DISCONTINUED | OUTPATIENT
Start: 2023-08-18 | End: 2023-08-18

## 2023-08-18 RX ORDER — MIDAZOLAM HYDROCHLORIDE 5 MG/ML
5 INJECTION INTRAMUSCULAR; INTRAVENOUS PRN
Status: DISCONTINUED | OUTPATIENT
Start: 2023-08-18 | End: 2023-08-23 | Stop reason: HOSPADM

## 2023-08-18 RX ORDER — LEVOTHYROXINE SODIUM 175 UG/1
175 TABLET ORAL
Status: CANCELLED | OUTPATIENT
Start: 2023-08-19

## 2023-08-18 RX ORDER — BISACODYL 10 MG
10 SUPPOSITORY, RECTAL RECTAL
Status: DISCONTINUED | OUTPATIENT
Start: 2023-08-18 | End: 2023-08-23 | Stop reason: HOSPADM

## 2023-08-18 RX ORDER — BISACODYL 10 MG
10 SUPPOSITORY, RECTAL RECTAL
Status: CANCELLED | OUTPATIENT
Start: 2023-08-18

## 2023-08-18 RX ORDER — ATORVASTATIN CALCIUM 40 MG/1
40 TABLET, FILM COATED ORAL EVERY EVENING
Status: DISCONTINUED | OUTPATIENT
Start: 2023-08-18 | End: 2023-08-23 | Stop reason: HOSPADM

## 2023-08-18 RX ORDER — HYDROXYZINE HYDROCHLORIDE 25 MG/1
50 TABLET, FILM COATED ORAL EVERY 6 HOURS PRN
Status: DISCONTINUED | OUTPATIENT
Start: 2023-08-18 | End: 2023-08-23 | Stop reason: HOSPADM

## 2023-08-18 RX ORDER — FLUDROCORTISONE ACETATE 0.1 MG/1
0.1 TABLET ORAL EVERY MORNING
Status: CANCELLED | OUTPATIENT
Start: 2023-08-19

## 2023-08-18 RX ADMIN — SENNOSIDES AND DOCUSATE SODIUM 2 TABLET: 50; 8.6 TABLET ORAL at 20:44

## 2023-08-18 RX ADMIN — LEVOTHYROXINE SODIUM 175 MCG: 0.17 TABLET ORAL at 05:12

## 2023-08-18 RX ADMIN — SENNOSIDES AND DOCUSATE SODIUM 2 TABLET: 50; 8.6 TABLET ORAL at 05:12

## 2023-08-18 RX ADMIN — FLUDROCORTISONE ACETATE 0.1 MG: 0.1 TABLET ORAL at 05:12

## 2023-08-18 RX ADMIN — APIXABAN 5 MG: 5 TABLET, FILM COATED ORAL at 20:44

## 2023-08-18 RX ADMIN — ATORVASTATIN CALCIUM 40 MG: 40 TABLET, FILM COATED ORAL at 20:44

## 2023-08-18 ASSESSMENT — COGNITIVE AND FUNCTIONAL STATUS - GENERAL
TOILETING: TOTAL
PERSONAL GROOMING: A LITTLE
DRESSING REGULAR LOWER BODY CLOTHING: A LOT
DRESSING REGULAR UPPER BODY CLOTHING: A LITTLE
HELP NEEDED FOR BATHING: A LOT
DAILY ACTIVITIY SCORE: 15
SUGGESTED CMS G CODE MODIFIER DAILY ACTIVITY: CK

## 2023-08-18 ASSESSMENT — LIFESTYLE VARIABLES
EVER HAD A DRINK FIRST THING IN THE MORNING TO STEADY YOUR NERVES TO GET RID OF A HANGOVER: NO
TOTAL SCORE: 0
ALCOHOL_USE: NO
HOW MANY TIMES IN THE PAST YEAR HAVE YOU HAD 5 OR MORE DRINKS IN A DAY: 0
TOTAL SCORE: 0
HAVE YOU EVER FELT YOU SHOULD CUT DOWN ON YOUR DRINKING: NO
TOTAL SCORE: 0
AVERAGE NUMBER OF DAYS PER WEEK YOU HAVE A DRINK CONTAINING ALCOHOL: 0
CONSUMPTION TOTAL: NEGATIVE
EVER_SMOKED: NEVER
ON A TYPICAL DAY WHEN YOU DRINK ALCOHOL HOW MANY DRINKS DO YOU HAVE: 0
EVER FELT BAD OR GUILTY ABOUT YOUR DRINKING: NO
HAVE PEOPLE ANNOYED YOU BY CRITICIZING YOUR DRINKING: NO

## 2023-08-18 ASSESSMENT — PAIN DESCRIPTION - PAIN TYPE
TYPE: ACUTE PAIN
TYPE: ACUTE PAIN

## 2023-08-18 ASSESSMENT — PATIENT HEALTH QUESTIONNAIRE - PHQ9
SUM OF ALL RESPONSES TO PHQ9 QUESTIONS 1 AND 2: 0
1. LITTLE INTEREST OR PLEASURE IN DOING THINGS: NOT AT ALL
SUM OF ALL RESPONSES TO PHQ9 QUESTIONS 1 AND 2: 0
1. LITTLE INTEREST OR PLEASURE IN DOING THINGS: NOT AT ALL
2. FEELING DOWN, DEPRESSED, IRRITABLE, OR HOPELESS: NOT AT ALL
1. LITTLE INTEREST OR PLEASURE IN DOING THINGS: NOT AT ALL
2. FEELING DOWN, DEPRESSED, IRRITABLE, OR HOPELESS: NOT AT ALL
SUM OF ALL RESPONSES TO PHQ9 QUESTIONS 1 AND 2: 0
2. FEELING DOWN, DEPRESSED, IRRITABLE, OR HOPELESS: NOT AT ALL

## 2023-08-18 ASSESSMENT — FIBROSIS 4 INDEX
FIB4 SCORE: 4.28
FIB4 SCORE: 4.28

## 2023-08-18 NOTE — THERAPY
"Occupational Therapy  Daily Treatment     Patient Name: Guru Pena  Age:  89 y.o., Sex:  male  Medical Record #: [id number]  Today's Date: 8/18/2023     Precautions  Precautions: (P) Fall Risk, Swallow Precautions  Comments: Lft side inattention. 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 Pt able to sit at EOB for extended period of time for ADLs as well as transfer to chair today. Pt continues to be motivated to participate and always agreeable will continue to recommend post-acute placement and skilled therapy while admitted.    Plan    Treatment Plan Status: (P) Continue Current Treatment Plan  Type of Treatment: Manual Therapy Techniques  Treatment Frequency: 5 Times per Week  Treatment Duration: Until Therapy Goals Met    DC Equipment Recommendations: (P) Unable to determine at this time  Discharge Recommendations: (P) Recommend post-acute placement for additional occupational therapy services prior to discharge home    Subjective    \"My Ipad is out of sync I cant watch baseball\"     Objective       08/18/23 1250   Precautions   Precautions Fall Risk;Swallow Precautions   Pain 0 - 10 Group   Therapist Pain Assessment Post Activity Pain Same as Prior to Activity;Nurse Notified   Cognition    Cognition / Consciousness X   Speech/ Communication Delayed Responses;Slurred;Hard of Hearing   Orientation Level Not Oriented to Time   Level of Consciousness Alert   Sequencing Impaired   Comments Pleasant, cooperative   Strength Upper Body   Upper Body Strength  X   Comments RUE WFL, LUE still diminished strength   Fine Motor / Dexterity    Fine Motor / Dexterity Yes   Fine Motor / Dexterity Interventions similar presentation attempted to grab pen with left hand requiring more time to complete   Other Treatments   Other Treatments Provided EOB 15 min   Balance   Sitting Balance (Static) Fair   Sitting Balance " (Dynamic) Fair -   Standing Balance (Static) Poor +   Standing Balance (Dynamic) Poor   Weight Shift Sitting Fair   Weight Shift Standing Poor   Skilled Intervention Verbal Cuing;Facilitation   Comments w/ FWW   Bed Mobility    Supine to Sit Contact Guard Assist   Scooting Contact Guard Assist   Rolling Contact Guard Assist   Skilled Intervention Verbal Cuing;Facilitation   Comments HOB elevated   Activities of Daily Living   Grooming Standby Assist;Seated   Upper Body Dressing Minimal Assist   Lower Body Dressing Maximal Assist   Toileting Total Assist   Skilled Intervention Verbal Cuing;Facilitation   How much help from another person does the patient currently need...   Putting on and taking off regular lower body clothing? 2   Bathing (including washing, rinsing, and drying)? 2   Toileting, which includes using a toilet, bedpan, or urinal? 1   Putting on and taking off regular upper body clothing? 3   Taking care of personal grooming such as brushing teeth? 3   Eating meals? 4   6 Clicks Daily Activity Score 15   Modified McClain (mRS)   Modified McClain Score 4   Functional Mobility   Sit to Stand Minimal Assist   Bed, Chair, Wheelchair Transfer Moderate Assist   Transfer Method Stand Step   Mobility bed mobility, side steps, transfer to chair   Skilled Intervention Verbal Cuing   Comments w/ FWW   Activity Tolerance   Sitting in Chair left seated in chair   Sitting Edge of Bed 15 min   Standing 6 min   Short Term Goals   Short Term Goal # 1 Pt will complete ADL transfers with supervision   Goal Outcome # 1 Progressing as expected   Short Term Goal # 2 Pt will complete LB dressing with supervision   Goal Outcome # 2 Progressing slower than expected   Short Term Goal # 3 Pt will complete toileting with supervision   Goal Outcome # 3 Progressing slower than expected   Education Group   Education Provided Role of Occupational Therapist   Role of Occupational Therapist Patient Response Patient;Acceptance;Explanation    Occupational Therapy Treatment Plan    O.T. Treatment Plan Continue Current Treatment Plan   Anticipated Discharge Equipment and Recommendations   DC Equipment Recommendations Unable to determine at this time   Discharge Recommendations Recommend post-acute placement for additional occupational therapy services prior to discharge home   Interdisciplinary Plan of Care Collaboration   IDT Collaboration with  Nursing   Patient Position at End of Therapy Seated;Chair Alarm On;Call Light within Reach;Tray Table within Reach;Phone within Reach   Collaboration Comments RN updated

## 2023-08-18 NOTE — FLOWSHEET NOTE
08/18/23 1552   Events/Summary/Plan   Events/Summary/Plan RT Consult   Vital Signs   Pulse 93   Respiration 18   Pulse Oximetry 96 %   $ Pulse Oximetry (Spot Check) Yes   Respiratory Assessment   Level of Consciousness Alert   Chest Exam   Work Of Breathing / Effort Within Normal Limits   Breath Sounds   RUL Breath Sounds Clear   RML Breath Sounds Clear   RLL Breath Sounds Diminished   ASIF Breath Sounds Clear   LLL Breath Sounds Diminished   Oxygen   O2 (LPM) 2   O2 Delivery Device Silicone Nasal Cannula   Smoking History   Have you ever smoked Never

## 2023-08-18 NOTE — DISCHARGE SUMMARY
Discharge Summary    CHIEF COMPLAINT ON ADMISSION  Chief Complaint   Patient presents with    Possible Stroke     From SNF, LKW 09:30 while at breakfast with staff, pt was then found at 12:30 today by staff with weakness, pt arrives with L sided weakness, R sided facial droop, and inability to look left. GCS 15, VSS on RA. Hx A-fib on eliquis.        Reason for Admission  ems; stroke     Admission Date  8/12/2023    CODE STATUS  Full Code    HPI & HOSPITAL COURSE  89 y.o. male who presented 8/12/2023 with with history of atrial fibrillation on dose adjusted apixaban, hypothyroidism.  He is a resident at a skilled nursing facility     Patient presented on August 12 with left-sided deficits.  CTA was negative for LVO, CT noncontrast was negative for other acute pathologies.  Unable to tolerate positioning for perfusion study.  After review with neurology, and based on the severity of his symptoms, the decision was given to give him TNK, the patient was admitted to the ICU.  On the monitor the patient has been in rate controlled A-fib/flutter generally.  Patient was transferred to the neurology floor. On the morning of 8/16/2023 patient was noted for left monocular vision loss.  Repeat head CT without changes and patient was evaluated by neurology. Ophthalmology was consulted and assessed patient. Concern for central retinal vein occlusion, well perform further exam in next days. PT/OT assessed patient and determined to benefit from post acute placement which case management assisted in setting up. Patient was determined satisfactory for discharge with appropriate follow up.      Therefore, he is discharged in fair and stable condition to an inpatient rehabilitation hospital.    The patient met 2-midnight criteria for an inpatient stay at the time of discharge.    Discharge Date  08/18/2023    FOLLOW UP ITEMS POST DISCHARGE  Please follow up with PCP in 3-5 days for post hospitalization follow up and medication  reconciliation.     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 *      FOLLOW UP  Future Appointments   Date Time Provider Department Center   8/24/2023 11:00 AM Alex Smart M.D. 25M Uma Smart M.D.  [address]  [address] [id number] [phone]    Follow up  Please call your primary care provider to schedule a hospital follow up. Thank you.    [address] [id number] [phone]          MEDICATIONS ON DISCHARGE     Medication List        START taking these medications        Instructions   atorvastatin 40 MG Tabs  Commonly known as: Lipitor   Take 1 Tablet by mouth every evening.  Dose: 40 mg            CONTINUE taking these medications        Instructions   acetaminophen 325 MG Tabs  Commonly known as: Tylenol   Take 650 mg by mouth every four hours as needed.  Dose: 650 mg     apixaban 2.5mg Tabs  Commonly known as: Eliquis   Take 1 Tablet by mouth 2 times a day.  Dose: 2.5 mg     fludrocortisone 0.1 MG Tabs  Commonly known as: Florinef   Take 1 Tablet by mouth every morning.  Dose: 0.1 mg     ipratropium-albuterol 0.5-2.5 (3) MG/3ML nebulizer solution  Commonly known as: Duoneb   Take 3 mL by nebulization every 6 hours as needed for Shortness of Breath.  Dose: 3 mL     levothyroxine 175 MCG Tabs  Commonly known as: Synthroid   Take 175 mcg by mouth every morning on an  empty stomach.  Dose: 175 mcg     vitamin D3 1000 Unit (25 mcg) Tabs  Commonly known as: Cholecalciferol   Take 1,000 Units by mouth every day.  Dose: 1,000 Units            STOP taking these medications      amoxicillin-clavulanate 875-125 MG Tabs  Commonly known as: Augmentin     dexamethasone 6 MG Tabs  Commonly known as: Decadron     magnesium hydroxide 400 MG/5ML Susp  Commonly known as: Milk Of Magnesia     PreserVision AREDS Tabs     sucralfate 1 GM/10ML Susp  Commonly known as: Carafate     sulfamethoxazole-trimethoprim 800-160 MG tablet  Commonly known as: Bactrim DS              Allergies  Allergies   Allergen Reactions    Digoxin      nausea       DIET  Orders Placed This Encounter   Procedures    Diet Order Diet: Level 4 - Pureed (meds crushed. oral care before/after meals. perodic cough w MT2); Liquid level: Level 2 - Mildly Thick; Tray Modifications (optional): SLP - 1:1 Supervision by Nursing, SLP - Deliver to Nursing Station, No Straws     Standing Status:   Standing     Number of Occurrences:   1     Order Specific Question:   Diet:     Answer:   Level 4 - Pureed [25]     Comments:   meds crushed. oral care before/after meals. perodic cough w MT2     Order Specific Question:   Liquid level     Answer:   Level 2 - Mildly Thick     Order Specific Question:   Tray Modifications (optional)     Answer:   SLP - 1:1 Supervision by Nursing     Order Specific Question:   Tray Modifications (optional)     Answer:   SLP - Deliver to Nursing Station     Order Specific Question:   Tray Modifications (optional)     Answer:   No Straws       ACTIVITY  As tolerated.  Weight bearing as tolerated    CONSULTATIONS  Neurology  Ophthalmology      LABORATORY  Lab Results   Component Value Date    SODIUM 136 08/16/2023    POTASSIUM 3.7 08/16/2023    CHLORIDE 102 08/16/2023    CO2 20 08/16/2023    GLUCOSE 95 08/16/2023    BUN 17 08/16/2023    CREATININE 1.00 08/16/2023        Lab Results   Component Value Date    WBC 6.2  08/16/2023    HEMOGLOBIN 13.5 (L) 08/16/2023    HEMATOCRIT 40.5 (L) 08/16/2023    PLATELETCT 152 (L) 08/16/2023        Total time of the discharge process exceeds 38 minutes.

## 2023-08-18 NOTE — HOSPITAL COURSE
89 y.o. male who presented 8/12/2023 with with history of atrial fibrillation on dose adjusted apixaban, hypothyroidism.  He is a resident at a skilled nursing facility     Patient presented on August 12 with left-sided deficits.  CTA was negative for LVO, CT noncontrast was negative for other acute pathologies.  Unable to tolerate positioning for perfusion study.  After review with neurology, and based on the severity of his symptoms, the decision was given to give him TNK, the patient was admitted to the ICU.  On the monitor the patient has been in rate controlled A-fib/flutter generally.  Patient was transferred to the neurology floor. On the morning of 8/16/2023 patient was noted for left monocular vision loss.  Repeat head CT without changes and patient was evaluated by neurology. Ophthalmology was consulted and assessed patient. Concern for central retinal vein occlusion, well perform further exam in next days. PT/OT assessed patient and determined to benefit from post acute placement which case management assisted in setting up. Patient was determined satisfactory for discharge with appropriate follow up.

## 2023-08-18 NOTE — DISCHARGE PLANNING
Follow up with son Damon discussed return to community plan in the event that an assisted living situation is not available after rehab. Damon said he has spoken with Sharad at [facility] and Guru will  return to independent living with an army of care givers around the clock. Guru will then transition to assisted living when a room becomes available. Discussed with Dr. Kern she accepting to her service.

## 2023-08-18 NOTE — PROGRESS NOTES
Paged Hospitalist On-Call and RICU Rapid RN for concerns regarding pt's pupillary assessment.    Right pupil 4mm, round, non-reactive to light  Left pupil 3mm, round, non-reactive to light    All other neuro assessments unchanged from previous    No intervention considering pt's recent issue with L eye being non-reactive on 8/16 and ophthalmologist consult    Update 0430 - R eye now reacting to light

## 2023-08-18 NOTE — DISCHARGE PLANNING
Follow up for post acute services spoke with Rep at [facility] to verify level of care. Per rep. Assisted living barriers if patient is unable to feed himself, puree diet, patient can not be incontinent of bowel and bladder. Assisted living can help with transfer, medication management and showers. Per rep no assisted living beds available with a waiting list, unknown when a bed will be available. Call out to Manjula Conde for clarification pending return call. Left message with  to verify information.

## 2023-08-18 NOTE — PROGRESS NOTES
Monitor Summary: Afib 70-97, AK -0.-, QRS -0.16, QT -0.-, with frequent PVC/bigem, rare trigem and BBB per strip from the monitor room.

## 2023-08-18 NOTE — PROGRESS NOTES
Monitor summary: Nyqp26-09,QRS 0.16, QT 0.46, with frequent PVC/bigem, trig, coup  per strip from monitor room.

## 2023-08-18 NOTE — CONSULTS
DATE OF SERVICE:  08/17/2023     INPATIENT CONSULTATION     HISTORY OF PRESENT ILLNESS:  The patient is a 89-year-old gentleman who was   admitted with an acute CVA, decreased vision in the left eye was noted today.     PHYSICAL EXAMINATION:  On examination, the patient was quite somnolent.  Due   to somnolence, visual acuity measurement was difficult to do today.  Motility   was grossly full.  Pupils were reactive.  Unable to test for afferent   pupillary defect.  Anterior segment examination showed pseudophakia   bilaterally.  Dilated fundus examination showed hemorrhages and exudate in the   left eye suspicious for central retinal vein occlusion.     IMPRESSION:  Diffuse retinal hemorrhages in all 4 quadrants of the retina OS  suspicious for central retinal vein occlusion.     RECOMMENDATION: Further examination will be performed within the next couple   of days.    Addendum: patient is not on diuretics        ______________________________  JOHN GAMBOA MD    [facility]/DEDRICK/SHEILA    DD:  08/17/2023 21:50  DT:  08/17/2023 22:07    Job#:  [id number]

## 2023-08-18 NOTE — DISCHARGE PLANNING
Per Dr. Nogueira, patient medically cleared for transfer to [facility]; CM sent voice message to Michaela & Jazmín @ [facility] informing them of medical clearance.      **1205 Hrs - CM updated patient & son/Damon on DCP.  Both agreeable for transfer to [facility].      **1500 Hrs - Patient accepted for transfer to [facility] today with [facility] transport scheduled for 1530 Hrs, as per Sammy.   Transfer form handed to bedside nurseDarlene.

## 2023-08-18 NOTE — DISCHARGE PLANNING
Follow up for potential transfer to [facility]. Spoke with Dr. Chanda Zuñiga. Patient can follow up with Opthalmology as an out patient after IRF.

## 2023-08-18 NOTE — PROGRESS NOTES
NOC HOSPITALIST CROSS COVER    Notified by RN regarding nipples being nonreactive to light bilaterally.  Yesterday a similar issue occurred, with patient having left-sided monocular vision loss.  Ophthalmology was consulted and said bilateral pupils were reactive but demonstrated hemorrhages in the left eye suspicious for central retinal vein occlusion.  Suspect that pupillary change may be secondary to malocclusion.  Patient's neuro exam is otherwise unchanged from prior.  Later updated that the patient's right eye was reacting to light at 0430 neuro check.  Discussed with collaborating physician, will hold off on repeat head CT given that the patient's neuro assessment is otherwise unchanged.      Vitals:    08/18/23 0327   BP: 117/67   Pulse: 71   Resp: 16   Temp: 36.3 °C (97.3 °F)   SpO2: 90%          -----------------------------------------------------------------------------------------------------------    Electronically signed by:  Cynthia Marie, ADELA, MARILEE, WANDA-BC  Hospitalist Services

## 2023-08-19 ENCOUNTER — APPOINTMENT (OUTPATIENT)
Dept: RADIOLOGY | Facility: MEDICAL CENTER | Age: 88
DRG: 871 | End: 2023-08-19
Attending: EMERGENCY MEDICINE
Payer: MEDICARE

## 2023-08-19 ENCOUNTER — APPOINTMENT (OUTPATIENT)
Dept: SPEECH THERAPY | Facility: REHABILITATION | Age: 88
DRG: 871 | End: 2023-08-19
Payer: MEDICARE

## 2023-08-19 ENCOUNTER — APPOINTMENT (OUTPATIENT)
Dept: OCCUPATIONAL THERAPY | Facility: REHABILITATION | Age: 88
DRG: 871 | End: 2023-08-19
Payer: MEDICARE

## 2023-08-19 ENCOUNTER — APPOINTMENT (OUTPATIENT)
Dept: PHYSICAL THERAPY | Facility: REHABILITATION | Age: 88
DRG: 871 | End: 2023-08-19
Payer: MEDICARE

## 2023-08-19 ENCOUNTER — HOSPITAL ENCOUNTER (INPATIENT)
Facility: MEDICAL CENTER | Age: 88
LOS: 5 days | DRG: 871 | End: 2023-08-25
Attending: EMERGENCY MEDICINE | Admitting: STUDENT IN AN ORGANIZED HEALTH CARE EDUCATION/TRAINING PROGRAM
Payer: MEDICARE

## 2023-08-19 DIAGNOSIS — J96.21 ACUTE ON CHRONIC RESPIRATORY FAILURE WITH HYPOXIA (HCC): ICD-10-CM

## 2023-08-19 DIAGNOSIS — A41.9 SEPSIS, DUE TO UNSPECIFIED ORGANISM, UNSPECIFIED WHETHER ACUTE ORGAN DYSFUNCTION PRESENT (HCC): ICD-10-CM

## 2023-08-19 DIAGNOSIS — I63.9 ISCHEMIC STROKE (HCC): ICD-10-CM

## 2023-08-19 LAB
ABO GROUP BLD: NORMAL
ALBUMIN SERPL BCP-MCNC: 3 G/DL (ref 3.2–4.9)
ALBUMIN SERPL BCP-MCNC: 3.7 G/DL (ref 3.2–4.9)
ALBUMIN/GLOB SERPL: 1 G/DL
ALBUMIN/GLOB SERPL: 1 G/DL
ALP SERPL-CCNC: 114 U/L (ref 30–99)
ALP SERPL-CCNC: 82 U/L (ref 30–99)
ALT SERPL-CCNC: 11 U/L (ref 2–50)
ALT SERPL-CCNC: 16 U/L (ref 2–50)
ANION GAP SERPL CALC-SCNC: 16 MMOL/L (ref 7–16)
ANION GAP SERPL CALC-SCNC: 9 MMOL/L (ref 7–16)
APPEARANCE UR: ABNORMAL
APTT PPP: 33 SEC (ref 24.7–36)
AST SERPL-CCNC: 20 U/L (ref 12–45)
AST SERPL-CCNC: 28 U/L (ref 12–45)
BACTERIA #/AREA URNS HPF: ABNORMAL /HPF
BASE EXCESS BLDA CALC-SCNC: -1 MMOL/L (ref -4–3)
BASOPHILS # BLD AUTO: 0.3 % (ref 0–1.8)
BASOPHILS # BLD AUTO: 0.4 % (ref 0–1.8)
BASOPHILS # BLD: 0.01 K/UL (ref 0–0.12)
BASOPHILS # BLD: 0.02 K/UL (ref 0–0.12)
BILIRUB SERPL-MCNC: 0.5 MG/DL (ref 0.1–1.5)
BILIRUB SERPL-MCNC: 0.6 MG/DL (ref 0.1–1.5)
BILIRUB UR QL STRIP.AUTO: NEGATIVE
BLD GP AB SCN SERPL QL: NORMAL
BODY TEMPERATURE: 39.1 CENTIGRADE
BUN SERPL-MCNC: 15 MG/DL (ref 8–22)
BUN SERPL-MCNC: 18 MG/DL (ref 8–22)
CALCIUM ALBUM COR SERPL-MCNC: 9.2 MG/DL (ref 8.5–10.5)
CALCIUM ALBUM COR SERPL-MCNC: 9.7 MG/DL (ref 8.5–10.5)
CALCIUM SERPL-MCNC: 8.4 MG/DL (ref 8.5–10.5)
CALCIUM SERPL-MCNC: 9.5 MG/DL (ref 8.5–10.5)
CHLORIDE SERPL-SCNC: 100 MMOL/L (ref 96–112)
CHLORIDE SERPL-SCNC: 99 MMOL/L (ref 96–112)
CO2 SERPL-SCNC: 23 MMOL/L (ref 20–33)
CO2 SERPL-SCNC: 27 MMOL/L (ref 20–33)
COLOR UR: ABNORMAL
CREAT SERPL-MCNC: 1.01 MG/DL (ref 0.5–1.4)
CREAT SERPL-MCNC: 1.18 MG/DL (ref 0.5–1.4)
EOSINOPHIL # BLD AUTO: 0.03 K/UL (ref 0–0.51)
EOSINOPHIL # BLD AUTO: 0.08 K/UL (ref 0–0.51)
EOSINOPHIL NFR BLD: 1 % (ref 0–6.9)
EOSINOPHIL NFR BLD: 1.5 % (ref 0–6.9)
EPI CELLS #/AREA URNS HPF: ABNORMAL /HPF
ERYTHROCYTE [DISTWIDTH] IN BLOOD BY AUTOMATED COUNT: 53.1 FL (ref 35.9–50)
ERYTHROCYTE [DISTWIDTH] IN BLOOD BY AUTOMATED COUNT: 54.4 FL (ref 35.9–50)
FLUAV RNA SPEC QL NAA+PROBE: NEGATIVE
FLUBV RNA SPEC QL NAA+PROBE: NEGATIVE
GFR SERPLBLD CREATININE-BSD FMLA CKD-EPI: 56 ML/MIN/1.73 M 2
GFR SERPLBLD CREATININE-BSD FMLA CKD-EPI: 67 ML/MIN/1.73 M 2
GLOBULIN SER CALC-MCNC: 3 G/DL (ref 1.9–3.5)
GLOBULIN SER CALC-MCNC: 3.8 G/DL (ref 1.9–3.5)
GLUCOSE SERPL-MCNC: 125 MG/DL (ref 65–99)
GLUCOSE SERPL-MCNC: 90 MG/DL (ref 65–99)
GLUCOSE UR STRIP.AUTO-MCNC: NEGATIVE MG/DL
HCO3 BLDA-SCNC: 25 MMOL/L (ref 17–25)
HCT VFR BLD AUTO: 39.4 % (ref 42–52)
HCT VFR BLD AUTO: 46.5 % (ref 42–52)
HGB BLD-MCNC: 13.3 G/DL (ref 14–18)
HGB BLD-MCNC: 15.4 G/DL (ref 14–18)
HYALINE CASTS #/AREA URNS LPF: ABNORMAL /LPF
IMM GRANULOCYTES # BLD AUTO: 0.02 K/UL (ref 0–0.11)
IMM GRANULOCYTES # BLD AUTO: 0.02 K/UL (ref 0–0.11)
IMM GRANULOCYTES NFR BLD AUTO: 0.4 % (ref 0–0.9)
IMM GRANULOCYTES NFR BLD AUTO: 0.6 % (ref 0–0.9)
INHALED O2 FLOW RATE: 15 L/MIN (ref 2–10)
INHALED O2 FLOW RATE: ABNORMAL L/MIN
INR PPP: 1.35 (ref 0.87–1.13)
KETONES UR STRIP.AUTO-MCNC: ABNORMAL MG/DL
LACTATE SERPL-SCNC: 6 MMOL/L (ref 0.5–2)
LEUKOCYTE ESTERASE UR QL STRIP.AUTO: ABNORMAL
LIPASE SERPL-CCNC: 19 U/L (ref 11–82)
LYMPHOCYTES # BLD AUTO: 0.68 K/UL (ref 1–4.8)
LYMPHOCYTES # BLD AUTO: 0.83 K/UL (ref 1–4.8)
LYMPHOCYTES NFR BLD: 15.9 % (ref 22–41)
LYMPHOCYTES NFR BLD: 21.9 % (ref 22–41)
MAGNESIUM SERPL-MCNC: 2 MG/DL (ref 1.5–2.5)
MCH RBC QN AUTO: 32.5 PG (ref 27–33)
MCH RBC QN AUTO: 32.9 PG (ref 27–33)
MCHC RBC AUTO-ENTMCNC: 33.1 G/DL (ref 32.3–36.5)
MCHC RBC AUTO-ENTMCNC: 33.8 G/DL (ref 32.3–36.5)
MCV RBC AUTO: 96.3 FL (ref 81.4–97.8)
MCV RBC AUTO: 99.4 FL (ref 81.4–97.8)
MICRO URNS: ABNORMAL
MONOCYTES # BLD AUTO: 0.18 K/UL (ref 0–0.85)
MONOCYTES # BLD AUTO: 0.58 K/UL (ref 0–0.85)
MONOCYTES NFR BLD AUTO: 11.1 % (ref 0–13.4)
MONOCYTES NFR BLD AUTO: 5.8 % (ref 0–13.4)
MUCOUS THREADS #/AREA URNS HPF: ABNORMAL /HPF
NEUTROPHILS # BLD AUTO: 2.19 K/UL (ref 1.82–7.42)
NEUTROPHILS # BLD AUTO: 3.7 K/UL (ref 1.82–7.42)
NEUTROPHILS NFR BLD: 70.4 % (ref 44–72)
NEUTROPHILS NFR BLD: 70.7 % (ref 44–72)
NITRITE UR QL STRIP.AUTO: NEGATIVE
NRBC # BLD AUTO: 0 K/UL
NRBC # BLD AUTO: 0 K/UL
NRBC BLD-RTO: 0 /100 WBC (ref 0–0.2)
NRBC BLD-RTO: 0 /100 WBC (ref 0–0.2)
NT-PROBNP SERPL IA-MCNC: 2897 PG/ML (ref 0–125)
PCO2 BLDA: 43.5 MMHG (ref 26–37)
PCO2 TEMP ADJ BLDA: 34.8 MMHG (ref 26–37)
PH BLDA: 7.37 [PH] (ref 7.4–7.5)
PH TEMP ADJ BLDA: 7.45 [PH] (ref 7.4–7.5)
PH UR STRIP.AUTO: 5.5 [PH] (ref 5–8)
PLATELET # BLD AUTO: 220 K/UL (ref 164–446)
PLATELET # BLD AUTO: 267 K/UL (ref 164–446)
PMV BLD AUTO: 10.1 FL (ref 9–12.9)
PMV BLD AUTO: 10.5 FL (ref 9–12.9)
PO2 BLDA: 26.2 MMHG (ref 64–87)
PO2 TEMP ADJ BLDA: 18.2 MMHG (ref 64–87)
POTASSIUM SERPL-SCNC: 3.8 MMOL/L (ref 3.6–5.5)
POTASSIUM SERPL-SCNC: 4.1 MMOL/L (ref 3.6–5.5)
PROT SERPL-MCNC: 6 G/DL (ref 6–8.2)
PROT SERPL-MCNC: 7.5 G/DL (ref 6–8.2)
PROT UR QL STRIP: 30 MG/DL
PROTHROMBIN TIME: 16.4 SEC (ref 12–14.6)
RBC # BLD AUTO: 4.09 M/UL (ref 4.7–6.1)
RBC # BLD AUTO: 4.68 M/UL (ref 4.7–6.1)
RBC # URNS HPF: ABNORMAL /HPF
RBC UR QL AUTO: ABNORMAL
RH BLD: NORMAL
RSV RNA SPEC QL NAA+PROBE: NEGATIVE
SAO2 % BLDA: 45 % (ref 93–99)
SARS-COV-2 RNA RESP QL NAA+PROBE: NOTDETECTED
SODIUM SERPL-SCNC: 136 MMOL/L (ref 135–145)
SODIUM SERPL-SCNC: 138 MMOL/L (ref 135–145)
SP GR UR STRIP.AUTO: 1.02
SPECIMEN SOURCE: NORMAL
TROPONIN T SERPL-MCNC: 63 NG/L (ref 6–19)
UROBILINOGEN UR STRIP.AUTO-MCNC: 1 MG/DL
WBC # BLD AUTO: 3.1 K/UL (ref 4.8–10.8)
WBC # BLD AUTO: 5.2 K/UL (ref 4.8–10.8)
WBC #/AREA URNS HPF: ABNORMAL /HPF

## 2023-08-19 PROCEDURE — 92610 EVALUATE SWALLOWING FUNCTION: CPT

## 2023-08-19 PROCEDURE — 99285 EMERGENCY DEPT VISIT HI MDM: CPT

## 2023-08-19 PROCEDURE — 700105 HCHG RX REV CODE 258: Mod: JZ | Performed by: EMERGENCY MEDICINE

## 2023-08-19 PROCEDURE — 99232 SBSQ HOSP IP/OBS MODERATE 35: CPT | Performed by: PHYSICAL MEDICINE & REHABILITATION

## 2023-08-19 PROCEDURE — A9270 NON-COVERED ITEM OR SERVICE: HCPCS | Performed by: PHYSICAL MEDICINE & REHABILITATION

## 2023-08-19 PROCEDURE — 700102 HCHG RX REV CODE 250 W/ 637 OVERRIDE(OP): Performed by: EMERGENCY MEDICINE

## 2023-08-19 PROCEDURE — 86901 BLOOD TYPING SEROLOGIC RH(D): CPT

## 2023-08-19 PROCEDURE — 96374 THER/PROPH/DIAG INJ IV PUSH: CPT

## 2023-08-19 PROCEDURE — 84100 ASSAY OF PHOSPHORUS: CPT

## 2023-08-19 PROCEDURE — 82803 BLOOD GASES ANY COMBINATION: CPT

## 2023-08-19 PROCEDURE — 0241U HCHG SARS-COV-2 COVID-19 NFCT DS RESP RNA 4 TRGT MIC: CPT

## 2023-08-19 PROCEDURE — 96375 TX/PRO/DX INJ NEW DRUG ADDON: CPT

## 2023-08-19 PROCEDURE — 85025 COMPLETE CBC W/AUTO DIFF WBC: CPT | Mod: 91

## 2023-08-19 PROCEDURE — 700102 HCHG RX REV CODE 250 W/ 637 OVERRIDE(OP): Performed by: PHYSICAL MEDICINE & REHABILITATION

## 2023-08-19 PROCEDURE — 83605 ASSAY OF LACTIC ACID: CPT | Mod: 91

## 2023-08-19 PROCEDURE — 83735 ASSAY OF MAGNESIUM: CPT

## 2023-08-19 PROCEDURE — 81001 URINALYSIS AUTO W/SCOPE: CPT

## 2023-08-19 PROCEDURE — 93005 ELECTROCARDIOGRAM TRACING: CPT

## 2023-08-19 PROCEDURE — C9803 HOPD COVID-19 SPEC COLLECT: HCPCS | Performed by: EMERGENCY MEDICINE

## 2023-08-19 PROCEDURE — 86850 RBC ANTIBODY SCREEN: CPT

## 2023-08-19 PROCEDURE — 80053 COMPREHEN METABOLIC PANEL: CPT

## 2023-08-19 PROCEDURE — 85730 THROMBOPLASTIN TIME PARTIAL: CPT

## 2023-08-19 PROCEDURE — 36415 COLL VENOUS BLD VENIPUNCTURE: CPT

## 2023-08-19 PROCEDURE — 85025 COMPLETE CBC W/AUTO DIFF WBC: CPT

## 2023-08-19 PROCEDURE — A9270 NON-COVERED ITEM OR SERVICE: HCPCS | Performed by: EMERGENCY MEDICINE

## 2023-08-19 PROCEDURE — 97162 PT EVAL MOD COMPLEX 30 MIN: CPT

## 2023-08-19 PROCEDURE — 87040 BLOOD CULTURE FOR BACTERIA: CPT

## 2023-08-19 PROCEDURE — 97535 SELF CARE MNGMENT TRAINING: CPT

## 2023-08-19 PROCEDURE — 71045 X-RAY EXAM CHEST 1 VIEW: CPT

## 2023-08-19 PROCEDURE — 84484 ASSAY OF TROPONIN QUANT: CPT

## 2023-08-19 PROCEDURE — 700111 HCHG RX REV CODE 636 W/ 250 OVERRIDE (IP): Mod: JZ | Performed by: EMERGENCY MEDICINE

## 2023-08-19 PROCEDURE — 82533 TOTAL CORTISOL: CPT

## 2023-08-19 PROCEDURE — 94760 N-INVAS EAR/PLS OXIMETRY 1: CPT

## 2023-08-19 PROCEDURE — 83735 ASSAY OF MAGNESIUM: CPT | Mod: 91

## 2023-08-19 PROCEDURE — 92523 SPEECH SOUND LANG COMPREHEN: CPT

## 2023-08-19 PROCEDURE — 97530 THERAPEUTIC ACTIVITIES: CPT

## 2023-08-19 PROCEDURE — 83690 ASSAY OF LIPASE: CPT

## 2023-08-19 PROCEDURE — 83880 ASSAY OF NATRIURETIC PEPTIDE: CPT

## 2023-08-19 PROCEDURE — 97166 OT EVAL MOD COMPLEX 45 MIN: CPT

## 2023-08-19 PROCEDURE — 85610 PROTHROMBIN TIME: CPT

## 2023-08-19 PROCEDURE — 86900 BLOOD TYPING SEROLOGIC ABO: CPT

## 2023-08-19 PROCEDURE — 80053 COMPREHEN METABOLIC PANEL: CPT | Mod: 91

## 2023-08-19 RX ORDER — DILTIAZEM HYDROCHLORIDE 5 MG/ML
10 INJECTION INTRAVENOUS ONCE
Status: COMPLETED | OUTPATIENT
Start: 2023-08-19 | End: 2023-08-19

## 2023-08-19 RX ORDER — CEFTRIAXONE 2 G/1
2000 INJECTION, POWDER, FOR SOLUTION INTRAMUSCULAR; INTRAVENOUS ONCE
Status: COMPLETED | OUTPATIENT
Start: 2023-08-19 | End: 2023-08-19

## 2023-08-19 RX ORDER — SODIUM CHLORIDE, SODIUM LACTATE, POTASSIUM CHLORIDE, CALCIUM CHLORIDE 600; 310; 30; 20 MG/100ML; MG/100ML; MG/100ML; MG/100ML
1000 INJECTION, SOLUTION INTRAVENOUS ONCE
Status: COMPLETED | OUTPATIENT
Start: 2023-08-19 | End: 2023-08-20

## 2023-08-19 RX ORDER — ACETAMINOPHEN 325 MG/1
650 TABLET ORAL ONCE
Status: COMPLETED | OUTPATIENT
Start: 2023-08-19 | End: 2023-08-19

## 2023-08-19 RX ORDER — DILTIAZEM HYDROCHLORIDE 5 MG/ML
0.25 INJECTION INTRAVENOUS ONCE
Status: DISCONTINUED | OUTPATIENT
Start: 2023-08-19 | End: 2023-08-19

## 2023-08-19 RX ORDER — SODIUM CHLORIDE, SODIUM LACTATE, POTASSIUM CHLORIDE, CALCIUM CHLORIDE 600; 310; 30; 20 MG/100ML; MG/100ML; MG/100ML; MG/100ML
INJECTION, SOLUTION INTRAVENOUS CONTINUOUS
Status: DISCONTINUED | OUTPATIENT
Start: 2023-08-19 | End: 2023-08-22

## 2023-08-19 RX ADMIN — CEFTRIAXONE SODIUM 2000 MG: 2 INJECTION, POWDER, FOR SOLUTION INTRAMUSCULAR; INTRAVENOUS at 22:47

## 2023-08-19 RX ADMIN — ACETAMINOPHEN 650 MG: 325 TABLET, FILM COATED ORAL at 22:42

## 2023-08-19 RX ADMIN — DILTIAZEM HYDROCHLORIDE 10 MG: 5 INJECTION, SOLUTION INTRAVENOUS at 22:41

## 2023-08-19 RX ADMIN — ATORVASTATIN CALCIUM 40 MG: 40 TABLET, FILM COATED ORAL at 20:23

## 2023-08-19 RX ADMIN — LEVOTHYROXINE SODIUM 175 MCG: 0.12 TABLET ORAL at 05:45

## 2023-08-19 RX ADMIN — APIXABAN 5 MG: 5 TABLET, FILM COATED ORAL at 20:23

## 2023-08-19 RX ADMIN — APIXABAN 5 MG: 5 TABLET, FILM COATED ORAL at 09:50

## 2023-08-19 RX ADMIN — SODIUM CHLORIDE, POTASSIUM CHLORIDE, SODIUM LACTATE AND CALCIUM CHLORIDE: 600; 310; 30; 20 INJECTION, SOLUTION INTRAVENOUS at 22:50

## 2023-08-19 RX ADMIN — SODIUM CHLORIDE, POTASSIUM CHLORIDE, SODIUM LACTATE AND CALCIUM CHLORIDE 1000 ML: 600; 310; 30; 20 INJECTION, SOLUTION INTRAVENOUS at 22:39

## 2023-08-19 RX ADMIN — FLUDROCORTISONE ACETATE 0.1 MG: 0.1 TABLET ORAL at 09:49

## 2023-08-19 RX ADMIN — PROCHLORPERAZINE MALEATE 10 MG: 10 TABLET ORAL at 21:33

## 2023-08-19 RX ADMIN — SENNOSIDES AND DOCUSATE SODIUM 2 TABLET: 50; 8.6 TABLET ORAL at 20:23

## 2023-08-19 ASSESSMENT — CHA2DS2 SCORE
CHA2DS2 VASC SCORE: 4
HYPERTENSION: NO
PRIOR STROKE OR TIA OR THROMBOEMBOLISM: YES
DIABETES: NO
VASCULAR DISEASE: NO
CHF OR LEFT VENTRICULAR DYSFUNCTION: NO
SEX: MALE
AGE 65 TO 74: NO
AGE 75 OR GREATER: YES

## 2023-08-19 ASSESSMENT — PAIN DESCRIPTION - PAIN TYPE: TYPE: ACUTE PAIN

## 2023-08-19 ASSESSMENT — GAIT ASSESSMENTS
DEVIATION: INCREASED BASE OF SUPPORT;BRADYKINETIC;DECREASED HEEL STRIKE;DECREASED TOE OFF
ASSISTIVE DEVICE: PARALLEL BARS
GAIT LEVEL OF ASSIST: MODERATE ASSIST
DISTANCE (FEET): 10

## 2023-08-19 ASSESSMENT — BRIEF INTERVIEW FOR MENTAL STATUS (BIMS)
WHAT YEAR IS IT: CORRECT
WHAT DAY OF THE WEEK IS IT: INCORRECT
WHAT YEAR IS IT: CORRECT
ASKED TO RECALL BED: NO, COULD NOT RECALL
ASKED TO RECALL BLUE: YES, AFTER CUEING (A COLOR")"
WHAT MONTH IS IT: MISSED BY 6 DAYS TO 1 MONTH
ASKED TO RECALL BED: NO, COULD NOT RECALL
WHAT MONTH IS IT: MISSED BY 6 DAYS TO 1 MONTH
BIMS SUMMARY SCORE: 9
ASKED TO RECALL BLUE: YES, AFTER CUEING (A COLOR")"
WHAT DAY OF THE WEEK IS IT: INCORRECT
BIMS SUMMARY SCORE: 9
INITIAL REPETITION OF BED BLUE SOCK - FIRST ATTEMPT: 2
ASKED TO RECALL SOCK: YES, NO CUE REQUIRED
INITIAL REPETITION OF BED BLUE SOCK - FIRST ATTEMPT: 3
ASKED TO RECALL SOCK: YES, AFTER CUEING (SOMETHING TO WEAR")"

## 2023-08-19 ASSESSMENT — ACTIVITIES OF DAILY LIVING (ADL)
TOILETING: INDEPENDENT
BED_CHAIR_WHEELCHAIR_TRANSFER_DESCRIPTION: INCREASED TIME;INITIAL PREPARATION FOR TASK;SET-UP OF EQUIPMENT;VERBAL CUEING

## 2023-08-19 ASSESSMENT — FIBROSIS 4 INDEX: FIB4 SCORE: 2.69

## 2023-08-19 NOTE — CARE PLAN
The patient is Stable - Low risk of patient condition declining or worsening    Problem: Knowledge Deficit - Standard  Goal: Patient and family/care givers will demonstrate understanding of plan of care, disease process/condition, diagnostic tests and medications  Outcome: Progressing. Reviewed POC, all questions answered.        Problem: Skin Integrity  Goal: Skin integrity is maintained or improved  Outcome: Progressing. Pt's skin remains free from new or accidental injury. Skin protective measures in place.          Shift Goals  Clinical Goals: Safety  Patient Goals: Participate in therapy

## 2023-08-19 NOTE — CARE PLAN
The patient is Stable - Low risk of patient condition declining or worsening    Problem: Knowledge Deficit - Standard  Goal: Patient and family/care givers will demonstrate understanding of plan of care, disease process/condition, diagnostic tests and medications  Outcome: Progressing. Reviewed POC, all questions answered.        Problem: Fall Risk - Rehab  Goal: Patient will remain free from falls  Outcome: Progressing. Call light within reach, pt educated to use for assistance for safe transferring.      Shift Goals  Clinical Goals: Safety  Patient Goals: Participate in therapy

## 2023-08-19 NOTE — THERAPY
"Occupational Therapy   Initial Evaluation     Patient Name: Guru Pena  Age:  89 y.o., Sex:  male  Medical Record #: [id number]  Today's Date: 8/19/2023     Subjective    \"I used to walk to the dining room and then my wife would walk to the kitchen and meet me.\"      Objective       08/19/23 0701   OT Charge Group   Charges Yes   OT Self Care / ADL (Units) 1   OT Evaluation OT Evaluation Mod   OT Total Time Spent   OT Individual Total Time Spent (Mins) 60   Prior Living Situation   Prior Services Home-Independent   Housing / Facility Independent Living Facility   Steps Into Home 0   Steps In Home 0   Rail None   Elevator Yes   Bathroom Set up Walk In Shower;Grab Bars;Tub Transfer Bench   Equipment Owned 4-Wheel Walker;Front-Wheel Walker;Tub / Shower Seat;Grab Bar(s) In Tub / Shower;Grab Bar(s) By Toilet;Hand Held Shower  (transport chair)   Lives with - Patient's Self Care Capacity Spouse   Prior Level of ADL Function   Self Feeding Independent   Grooming / Hygiene Independent   Bathing Independent   Dressing Independent   Toileting Independent   Prior Level of IADL Function   Medication Management Dependent   Laundry Dependent   Kitchen Mobility Other (Comments)  (facility provides all meals)   Finances Dependent  (wife and son complete these tasks)   Home Management Dependent  (facility does cleaning)   Shopping Requires Assist   Prior Level Of Mobility Independent With Device in Home   Driving / Transportation Relatives / Others Provide Transportation  (son does the driving)   Occupation (Pre-Hospital Vocational) Retired Due To Age   Prior Functioning: Everyday Activities   Self Care Independent   Indoor Mobility (Ambulation) Independent   Stairs Needed some help   Functional Cognition Needed some help   Prior Device Use Walker   Vitals   O2 (LPM) 2   O2 Delivery Device Nasal Cannula   Pain   Intervention Declines   Pain 0 - 10 Group   Pain Rating Scale (NPRS) 0   Cognition    Orientation Level Not Oriented " "to Month;Not Oriented to Day   Level of Consciousness Alert   Ability To Follow Commands 1 Step   Safety Awareness Impaired   New Learning Impaired   Attention Impaired   Initiation Impaired   ABS (Agitated Behavior Scale)   Agitated Behavior Scale Performed Yes   Short Attention Span, Easy Distractibility, Inability to Concentrate 2   Impulsive, Impatient, Low Tolerance for Pain or Frustration 2   Uncooperative, Resistant to Care, Demanding 1   Violent and/or Threatening Violence Toward People or Property 1   Explosive and/or Unpredictable Anger 1   Rocking, Rubbing, Moaning, Other Self-Stimulating Behavior 2   Pulling at Tubes, Restraints, etc. 1   Wandering from Treatment Area 1   Restlessness, Pacing, Excessive Movement 2   Repetitive Behaviors, Motor and/or Verbal 1   Rapid, Loud or Excessive Talking 1   Sudden Changes of Mood 1   Easily Initiated - Excessive Crying and/or Laughter 1   Self-Abusiveness, Physical and/or Verbal 1   Agitated Behavior Scale Total Score 18   Level of Severity No Agitation   Cognitive Pattern Assessment   Cognitive Pattern Assessment Used BIMS   Brief Interview for Mental Status (BIMS)   Repetition of Three Words (First Attempt) 2   Temporal Orientation: Year Correct   Temporal Orientation: Month Missed by 6 days to 1 month   Temporal Orientation: Day Incorrect   Recall: \"Sock\" Yes, no cue required   Recall: \"Blue\" Yes, after cueing (\"a color\")   Recall: \"Bed\" No, could not recall   BIMS Summary Score 9   Confusion Assessment Method (CAM)   Is there evidence of an acute change in mental status from the patient's baseline? Yes   Inattention Behavior continuously present, does not fluctuate   Disorganized thinking Behavior continuously present, does not fluctuate   Altered level of consciousness Behavior present, fluctuates (comes and goes, changes in severity)   Vision Screen   Vision Not tested  (no reports of double/blurry vision)   Passive ROM Upper Body   Passive ROM Upper Body " WDL   Active ROM Upper Body   Active ROM Upper Body  WDL   Dominant Hand Right   Strength Upper Body   Upper Body Strength  X   Gross Strength Generalized Weakness, Equal Bilaterally.    Sensation Upper Body   Upper Extremity Sensation  WDL   Comments no reports of numbness/tingling in BUE   Upper Body Muscle Tone   Upper Body Muscle Tone  WDL   Balance Assessment   Sitting Balance (Static) Poor   Standing Balance (Static) Trace   Bed Mobility    Supine to Sit Maximal Assist   Sit to Stand Total Assist   Scooting Maximal Assist   Coordination Upper Body   Coordination WDL   Eating   Assistance Needed Physical assistance   Physical Assistance Level 26%-50%   CARE Score - Eating 3   Eating Discharge Goal   Discharge Goal 5   Oral Hygiene   Assistance Needed Physical assistance   Physical Assistance Level 26%-50%   CARE Score - Oral Hygiene 3   Oral Hygiene Discharge Goal   Discharge Goal 5   Shower/Bathe Self   Reason if not Attempted Safety concerns   CARE Score - Shower/Bathe Self 88   Shower/Bathe Self Discharge Goal   Discharge Goal 3   Upper Body Dressing   Assistance Needed Physical assistance   Physical Assistance Level 76% or more   CARE Score - Upper Body Dressing 2   Upper Body Dressing Discharge Goal   Discharge Goal 4   Lower Body Dressing   Assistance Needed Physical assistance   Physical Assistance Level Total assistance   CARE Score - Lower Body Dressing 1   Lower Body Dressing Discharge Goal   Discharge Goal 3   Putting On/Taking Off Footwear   Assistance Needed Physical assistance   Physical Assistance Level Total assistance   CARE Score - Putting On/Taking Off Footwear 1   Putting On/Taking Off Footwear Discharge Goal   Discharge Goal 3   Toileting Hygiene   Assistance Needed Physical assistance   Physical Assistance Level Total assistance   CARE Score - Toileting Hygiene 1   Toileting Hygiene Discharge Goal   Discharge Goal 3   Toilet Transfer   Assistance Needed Physical assistance   Physical  Assistance Level Total assistance   CARE Score - Toilet Transfer 1   Toilet Transfer Discharge Goal   Discharge Goal 4   Hearing, Speech, and Vision   Ability to Hear Minimal difficulty   Ability to See in Adequate Light Impaired   Expression of Ideas and Wants Some difficulty   Understanding Verbal and Non-Verbal Content Usually understands   Functional Level of Assist   Eating Minimal Assist   Grooming Minimal Assist   Upper Body Dressing Maximal Assist   Lower Body Dressing Total Assist   Toileting Total Assist   Bed, Chair, Wheelchair Transfer Total Assist   Toilet Transfers Total Assist   Tub / Shower Transfers Unable to Participate   Problem List   Problem List Decreased Active Daily Living Skills;Decreased Upper Extremity Strength Right;Decreased Upper Extremity Strength Left;Decreased Functional Mobility;Decreased Activity Tolerance;Safety Awareness Deficits / Cognition;Impaired Posture / Trunk Alignment;Impaired Cognitive Function;Impaired Postural Control / Balance   Precautions   Precautions Fall Risk   Comments decreased cognition; 2 person assist for xfer's; pureed and thickened liquids   Current Discharge Plan   Current Discharge Plan Return to Prior Living Situation   Benefit    Therapy Benefit Patient Would Benefit from Inpatient Rehab Occupational Therapy to Maximize [place] with ADLs, IADLs and Functional Mobility.   Interdisciplinary Plan of Care Collaboration   IDT Collaboration with  Physical Therapist   Patient Position at End of Therapy Seated;Chair Alarm On;Self Releasing Lap Belt Applied;Tray Table within Reach;Call Light within Reach;Phone within Reach   Collaboration Comments CLOF/PLOF   Strengths & Barriers   Strengths Able to follow instructions;Manages pain appropriately;Pleasant and cooperative;Willingly participates in therapeutic activities   Barriers Confused;Decreased endurance;Generalized weakness;Fatigue;Hearing impairment;Impaired activity tolerance;Impaired  balance;Impaired functional cognition       Assessment  Patient is 89 y.o. male with a diagnosis of ischemic stroke. Past medical history of atrial fibrillation on low-dose Eliquis, sleep apnea, urinary retention, hypothyroidism, hyperlipidemia, vitamin D deficiency, possible pulmonary fibrosis, recent COVID infection in July 2023; now admitted for acute inpatient rehabilitation with severe functional debility after an acute stroke.  Patient developed acute onset of left-sided weakness and right gaze deviation at his independent living facility.  NIH stroke scale 10 with dense left hemiplegia..  Head CT negative, CTA of head and neck negative, unable to obtain CT perfusion scan due to head positioning/patient unable to lay flat due to thoracic kyphosis.  The patient was outside the window at 4-1/2 hours since onset of symptoms, however after conversations with the neurologist regarding risk versus benefits patient agreed to receive tenecteplase.Follow-up head CT showed small right MCA stroke with petechial hemorrhage. HgbA1c 5.8, LDL 86, ECHO EF 55 %, prolonged QTc of 529.  Recommendation from neurology to restart Eliquis on 8/18 at full dose as patient had stroke on half dose.     On the morning of 8/16 rapid response was called given new onset of left arm monocular vision loss.  Repeat head CT without any new findings.  Patient was seen and evaluated by Dr. Chanda Zuñiga of ophthalmology with findings of hemorrhages and exudate in the left eye suspicious for central retinal vein occlusion.    Pt is a poor historian, as per medical chart, pt lives in [facility], independent with ADL's and mobility using 4WW. Pt lives with wife. As per pt, facility assists with medication mgmt, cooking and cleaning. Pt currently presents with generalized weakness, decreased sitting/standing balance/tolerance, decreased cognition- STM, attention, sequencing, decreased FMC and increased need for assist with ADL's and functional mobility.  Visual assessment not completed at this time- will need one for further clarification on visual deficits.     Plan  Recommend Occupational Therapy  minutes per day 5-7 days per week for 3 weeks for the following treatments:  OT Self Care/ADL, OT Cognitive Skill Dev, OT Community Reintegration, OT Neuro Re-Ed/Balance, OT Therapeutic Activity, OT Evaluation, and OT Therapeutic Exercise.    Passport items to be completed:  Perform bathroom transfers, complete dressing, complete feeding, get ready for the day, prepare a simple meal, participate in household tasks, adapt home for safety needs, demonstrate home exercise program, complete caregiver training     Goals:  Long term and short term goals have been discussed with patient and they are in agreement.    Occupational Therapy Goals (Active)       Problem: Dressing       Dates: Start:  08/19/23         Goal: STG-Within one week, patient will dress UB with mod a        Dates: Start:  08/19/23            Goal: STG-Within one week, patient will dress LB with max a        Dates: Start:  08/19/23               Problem: Functional Transfers       Dates: Start:  08/19/23         Goal: STG-Within one week, patient will transfer to toilet with max a        Dates: Start:  08/19/23               Problem: Grooming       Dates: Start:  08/19/23         Goal: STG-Within one week, patient will complete grooming with SBA        Dates: Start:  08/19/23               Problem: OT Long Term Goals       Dates: Start:  08/19/23         Goal: LTG-By discharge, patient will complete basic self care tasks with SPV-min a        Dates: Start:  08/19/23            Goal: LTG-By discharge, patient will perform bathroom transfers with SPV-min a        Dates: Start:  08/19/23               Problem: Toileting       Dates: Start:  08/19/23         Goal: STG-Within one week, patient will complete toileting tasks with max a        Dates: Start:  08/19/23

## 2023-08-19 NOTE — THERAPY
Physical Therapy   Initial Evaluation     Patient Name: Guru Pena  Age:  89 y.o., Sex:  male  Medical Record #: [id number]  Today's Date: 8/19/2023     Subjective    Patient asleep in bed when approached for physical therapy, easily wakes up and is motivated to participate in therapy.      Objective       08/19/23 1301   PT Charge Group   PT Therapeutic Activities (Units) 1   PT Evaluation PT Evaluation Mod   PT Total Time Spent   PT Individual Total Time Spent (Mins) 60   Prior Living Situation   Prior Services Home-Independent   Housing / Facility Independent Living Facility   Steps Into Home 0   Steps In Home 0   Equipment Owned 4-Wheel Walker;Front-Wheel Walker;Wheelchair   Lives with - Patient's Self Care Capacity Spouse   Comments Patient reports independence using a FWW prior to admission but did not walk outside his apartment; would be beneficial to speak with patient's son to verify   Prior Level of Functional Mobility   Bed Mobility Independent   Transfer Status Independent   Ambulation Independent   Distance Ambulation (Feet) 50  (patient reports he was limited to household ambulation only)   Assistive Devices Used Front-Wheel Walker  (also owns a 4WW)   Stairs Other (Comments)  (Did not negotiate stairs, per patient report)   Prior Functioning: Everyday Activities   Self Care Independent   Indoor Mobility (Ambulation) Independent   Stairs Not applicable  (patient reports he did not negotiate stairs prior to admission)   Functional Cognition Needed some help   Prior Device Use Walker   Strength Lower Body   Rt Hip Flexion Strength 3 (F)   Rt Hip Abduction Strength 2+ (P+)   Rt Hip Adduction Strength 2+ (P+)   Rt Knee Flexion Strength 3+ (F+)   Rt Knee Extension Strength 4- (G-)   Rt Ankle Dorsiflexion Strength 4- (G-)   Lt Hip Flexion Strength 3 (F)   Lt Hip Abduction Strength 2+ (P+)   Lt Hip Adduction Strength 2+ (P+)   Lt Knee Flexion Strength 3+ (F+)   Lt Knee Extension Strength 3+ (F+)   Lt  Ankle Dorsiflexion Strength 4- (G-)   Sensation Lower Body   Comments Patient reports intact light touch sensation; impaired proprioception at B great toe   Balance Assessment   Sitting Balance (Static) Poor +   Sitting Balance (Dynamic) Poor   Standing Balance (Static) Poor +   Standing Balance (Dynamic) Poor -   Weight Shift Sitting Fair   Weight Shift Standing Poor   Comments Heavy B UE support for static and dynamic standing   Bed Mobility    Supine to Sit Moderate Assist   Sit to Supine Minimal Assist   Sit to Stand Moderate Assist  (min A in // bars; mod-max A from WC)   Rolling Moderate Assist to Rt.;Moderate Assist to Lt.  (using bed rails)   Roll Left and Right   Physical Assistance Level 26%-50%   CARE Score - Roll Left and Right 3   Roll Left and Right Discharge Goal   Discharge Goal 6   Sit to Lying   Physical Assistance Level 25% or less   CARE Score - Sit to Lying 3   Sit to Lying Discharge Goal   Discharge Goal 6   Lying to Sitting on Side of Bed   Physical Assistance Level 26%-50%   CARE Score - Lying to Sitting on Side of Bed 3   Lying to Sitting on Side of Bed Discharge Goal   Discharge Goal 6   Sit to Stand   Physical Assistance Level 51%-75%   CARE Score - Sit to Stand 2   Sit to Stand Discharge Goal   Discharge Goal 4   Chair/Bed-to-Chair Transfer   Physical Assistance Level Total assistance   Comment second person present for safety   CARE Score - Chair/Bed-to-Chair Transfer 1   Chair/Bed-to-Chair Transfer Discharge Goal   Discharge Goal 4   Car Transfer   Reason if not Attempted Safety concerns   CARE Score - Car Transfer 88   Car Transfer Discharge Goal   Discharge Goal 3   Walk 10 Feet   Physical Assistance Level Total assistance   Comment // bars; second person  follow   CARE Score - Walk 10 Feet 1   Walk 10 Feet Discharge Goal   Discharge Goal 4   Walk 50 Feet with Two Turns   Reason if not Attempted Safety concerns   CARE Score - Walk 50 Feet with Two Turns 88   Walk 50 Feet with Two  Turns Discharge Goal   Discharge Goal 4   Walk 150 Feet   Reason if not Attempted Safety concerns   CARE Score - Walk 150 Feet 88   Walk 150 Feet Discharge Goal   Discharge Goal 4   Walking 10 Feet on Uneven Surfaces   Reason if not Attempted Safety concerns   CARE Score - Walking 10 Feet on Uneven Surfaces 88   Walking 10 Feet on Uneven Surfaces Discharge Goal   Discharge Goal 3   1 Step (Curb)   Reason if not Attempted Activity not applicable   CARE Score - 1 Step (Curb) 9   1 Step (Curb) Discharge Goal   Discharge Goal 9   4 Steps   Reason if not Attempted Activity not applicable   CARE Score - 4 Steps 9   4 Steps Discharge Goal   Discharge Goal 9   12 Steps   Reason if not Attempted Activity not applicable   CARE Score - 12 Steps 9   12 Steps Discharge Goal   Discharge Goal 9   Picking Up Object   Reason if not Attempted Safety concerns   CARE Score - Picking Up Object 88   Picking Up Object Discharge Goal   Discharge Goal 3   Change Reason using a reacher   Wheel 50 Feet with Two Turns   Physical Assistance Level 51%-75%   CARE Score - Wheel 50 Feet with Two Turns 2   Wheel 50 Feet with Two Turns Discharge Goal   Discharge Goal 4   Wheel 150 Feet   Physical Assistance Level 76% or more   CARE Score - Wheel 150 Feet 2   Wheel 150 Feet Discharge Goal   Discharge Goal 4   Gait Functional Level of Assist    Gait Level Of Assist Moderate Assist  (mod A from therapist; second person WC follow for safety)   Assistive Device Parallel Bars   Distance (Feet) 10   # of Times Distance was Traveled 5   Deviation Increased Base Of Support;Bradykinetic;Decreased Heel Strike;Decreased Toe Off  (posterior lean; kyphotic posture)   Wheelchair Functional Level of Assist   Wheelchair Assist Stand by Assist   Distance Wheelchair (Feet or Distance) 35   Wheelchair Description Extra time;Impaired coordination;Limited by fatigue;Verbal cueing   Stairs Functional Level of Assist   Level of Assist with Stairs Unable to Participate    Transfer Functional Level of Assist   Bed, Chair, Wheelchair Transfer Moderate Assist  (mod-max A)   Bed Chair Wheelchair Transfer Description Increased time;Initial preparation for task;Set-up of equipment;Verbal cueing  (Stand step bed <> WC (patient holding back of therapists arms for stability))   Problem List    Problems Impaired Bed Mobility;Impaired Transfers;Impaired Ambulation;Functional Strength Deficit;Impaired Balance;Impaired Coordination;Decreased Activity Tolerance   Current Discharge Plan   Current Discharge Plan Return to Prior Living Situation   Interdisciplinary Plan of Care Collaboration   IDT Collaboration with  Therapy Tech;Occupational Therapist   Patient Position at End of Therapy In Bed;Bed Alarm On;Call Light within Reach;Tray Table within Reach;Phone within Reach   Collaboration Comments Collaborated with OT re: CLOF, evaluation; Therapy tech assist during treatment   Benefit   Therapy Benefit Patient Would Benefit from Inpatient Rehabilitation Physical Therapy to Maximize Functional [place] with ADLs, IADLs and Mobility.   Strengths & Barriers   Strengths Motivated for self care and independence;Pleasant and cooperative;Willingly participates in therapeutic activities   Barriers Decreased endurance;Fatigue;Generalized weakness;Impaired activity tolerance;Impaired balance;Impaired functional cognition       Assessment  Patient is 89 y.o. male with a diagnosis of R MCA CVA. His hospital stay was complicated by central retinal vein occlusion (rapid response called in hospital; vision returned to baseline). He has history of a-fib, sleep apnea (uses C-pap) and recent COVID infection in July.     Patient reports he was previously independent with basic functional mobility, using a FWW for ambulation within his apartment. He lives at [facility] facility but during session also states that he is not going to be living there anymore (H&P reports he is transitioning to  assistive living, will need to verify with patient's son). He reports he does not do stairs.     Patient presents with significant limitations in all functional mobility due to impaired balance, poor activity tolerance/endurance and impaired coordination. He has severe kyphotic posture at thoracic spine, impacting sitting and standing balance. Patient is very pleasant and motivated to participate.     Plan  Recommend Physical Therapy  minutes per day 5-7 days per week for 14-21 days for the following treatments:  PT Gait Training, PT Therapeutic Exercises, PT Neuro Re-Ed/Balance, and PT Therapeutic Activity.    Passport items to be completed:  Get in/out of bed safely, in/out of a vehicle, safely use mobility device, walk or wheel around home/community, navigate up and down stairs, show how to get up/down from the ground, ensure home is accessible, demonstrate HEP, complete caregiver training    Goals:  Long term and short term goals have been discussed with patient and they are in agreement.    Physical Therapy Problems (Active)       Problem: Balance       Dates: Start:  08/19/23         Goal: STG-Within one week, patient will maintain static standing with B UE support and SBA, for at least 2 minutes.        Dates: Start:  08/19/23               Problem: Mobility       Dates: Start:  08/19/23         Goal: STG-Within one week, patient will ambulate x 20 feet with FWW and min A.        Dates: Start:  08/19/23               Problem: Mobility Transfers       Dates: Start:  08/19/23         Goal: STG-Within one week, patient will sit to stand with CGA (with or without FWW).        Dates: Start:  08/19/23            Goal: STG-Within one week, patient will transfer bed to chair with LRAD and min A.        Dates: Start:  08/19/23               Problem: PT-Long Term Goals       Dates: Start:  08/19/23         Goal: LTG-By discharge, patient will maintain balance with single UE and SBA, for at least 2 minutes.         Dates: Start:  08/19/23            Goal: LTG-By discharge, patient will ambulate at least 50 feet with LRAD and min A.        Dates: Start:  08/19/23            Goal: LTG-By discharge, patient will transfer one surface to another with LRAD and CGA.        Dates: Start:  08/19/23            Goal: LTG-By discharge, patient will transfer in/out of a car with LRAD and min A.        Dates: Start:  08/19/23

## 2023-08-19 NOTE — FLOWSHEET NOTE
08/19/23 1625   Events/Summary/Plan   Events/Summary/Plan 02 pulse ox check   Vital Signs   Pulse (!) 48   Respiration 18   Pulse Oximetry 97 %   $ Pulse Oximetry (Spot Check) Yes   Respiratory Assessment   Level of Consciousness Alert   Chest Exam   Work Of Breathing / Effort Within Normal Limits   Oxygen   O2 (LPM) 1  (placed on RA)   O2 Delivery Device Silicone Nasal Cannula

## 2023-08-19 NOTE — H&P
REHABILITATION HISTORY AND PHYSICAL/POST ADMISSION EVALUATION    8/18/2023  5:48 PM  Guru Pena  [id number]  Admission  8/18/2023  3:44 PM  [facility] Code/Reason for admission: 0001.1 - Stroke: Left Body Involvement (Right Brain)   Etiologic diagnosis/problem: Ischemic stroke (HCC)  Chief Complaint: Difficulty swallowing    HPI:  The patient is a 89 y.o. male with a past medical history of atrial fibrillation on low-dose Eliquis, sleep apnea, urinary retention, hypothyroidism, hyperlipidemia, vitamin D deficiency, possible pulmonary fibrosis, recent COVID infection in July 2023; now admitted for acute inpatient rehabilitation with severe functional debility after an acute stroke.      Patient developed acute onset of left-sided weakness and right gaze deviation at his independent living facility.  NIH stroke scale 10 with dense left hemiplegia..  Head CT negative, CTA of head and neck negative, unable to obtain CT perfusion scan due to head positioning/patient unable to lay flat due to thoracic kyphosis.  The patient was outside the window at 4-1/2 hours since onset of symptoms, however after conversations with the neurologist regarding risk versus benefits patient agreed to receive tenecteplase.    Follow-up head CT showed small right MCA stroke with petechial hemorrhage. HgbA1c 5.8, LDL 86, ECHO EF 55 %, prolonged QTc of 529.  Recommendation from neurology to restart Eliquis on 8/18 at full dose as patient had stroke on half dose.    On the morning of 8/16 rapid response was called given new onset of left arm monocular vision loss.  Repeat head CT without any new findings.  Patient was seen and evaluated by Dr. Chanda Zuñiga of ophthalmology with findings of hemorrhages and exudate in the left eye suspicious for central retinal vein occlusion.  Recommendation for outpatient follow-up.    Patient currently reports he does not like the puréed food as he feels like he chokes on it.  He really wants to get back to  eating his over hard eggs that he usually has in the mornings.  He has not noticed any focal weakness or paresthesias.  Vision appears to be back to baseline.    Patient was evaluated by Rehab Medicine physician and Physical Therapy, Occupational Therapy, and Speech Therapy and determined to be appropriate for acute inpatient rehab and was transferred to [facility] on 2023  3:44 PM.    With this acute therapeutic intervention, this patient hopes to improve his functional status, and return to independent living with the supportive care of family.    REVIEW OF SYSTEMS:     A complete review of systems was performed and was negative in detail with the exception of items mentioned elsewhere in this document.    PMH:  Past Medical History:   Diagnosis Date    A-fib (HCC)     COVID-19     Elevated troponin 2021    Generalized weakness 2021    Hypothyroid     Other specified hypothyroidism 2021    Pulmonary fibrosis (HCC)     Sleep apnea     Urinary retention        PSH:  No past surgical history on file.    No family history on file.     MEDICATIONS:  Scheduled Medications   Medication Dose Frequency    Pharmacy Consult Request  1 Each PHARMACY TO DOSE    atorvastatin  40 mg Q EVENING    [START ON 2023] fludrocortisone  0.1 mg QAM    [START ON 2023] levothyroxine  175 mcg AM ES    senna-docusate  2 Tablet BID       ALLERGIES:  Digoxin    PSYCHOSOCIAL HISTORY:  Pre-mobidly, the patient lived in a single level home with 0 steps to enter, in [place] with spouse.  They recently removed here from their home in [place] by their son Damon and live at [facility].  Plan to move to assisted side once the room opens up.  Patient has been  for very long time, had 3 children, one grown son recently  of unknown causes.  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  He denies tobacco and alcohol.    LEVEL OF FUNCTION PRIOR TO DISABILTY:  Independent,  no longer drives or manages finances    LEVEL OF FUNCTION PRIOR TO ADMISSION to [facility] 8/16:    Balance   Sitting Balance (Static) Fair   Sitting Balance (Dynamic) Fair -   Standing Balance (Static) Poor   Standing Balance (Dynamic) Poor -   Weight Shift Sitting Fair   Weight Shift Standing Poor   Comments standing w/FWW   Bed Mobility    Supine to Sit Moderate Assist   Sit to Supine Contact Guard Assist   Scooting Minimal Assist  (seated)   Rolling Minimal Assist to Rt.;Minimum Assist to Lt.   Skilled Intervention Verbal Cuing;Postural Facilitation;Compensatory Strategies   Comments HOB partially elevated and railing support for rolling.   Gait Analysis   Gait Level Of Assist Unable to Participate   Comments Side stepping along EOB w/walker assist. Pt struggled w/moving of Lft LE w/his efforts.   Functional Mobility   Sit to Stand Minimal Assist  (from elevated bed->FWW,)   Bed, Chair, Wheelchair Transfer Unable to Participate  (too fatigued)   Skilled Intervention Verbal Cuing;Tactile Cuing;Postural Facilitation;Compensatory Strategies   Comments Improvement w/STS from previous PT session       OT 8/18:    Cognition    Cognition / Consciousness X   Speech/ Communication Delayed Responses;Slurred;Hard of Hearing   Orientation Level Not Oriented to Time   Level of Consciousness Alert   Sequencing Impaired   Comments Pleasant, cooperative   Strength Upper Body   Upper Body Strength  X   Comments RUE WFL, LUE still diminished strength   Fine Motor / Dexterity    Fine Motor / Dexterity Yes   Fine Motor / Dexterity Interventions similar presentation attempted to grab pen with left hand requiring more time to complete   Other Treatments   Other Treatments Provided EOB 15 min   Balance   Sitting Balance (Static) Fair   Sitting Balance (Dynamic) Fair -   Standing Balance (Static) Poor +   Standing Balance (Dynamic) Poor   Weight Shift Sitting Fair   Weight Shift Standing Poor   Skilled Intervention Verbal  Cuing;Facilitation   Comments w/ FWW   Bed Mobility    Supine to Sit Contact Guard Assist   Scooting Contact Guard Assist   Rolling Contact Guard Assist   Skilled Intervention Verbal Cuing;Facilitation   Comments HOB elevated   Activities of Daily Living   Grooming Standby Assist;Seated   Upper Body Dressing Minimal Assist   Lower Body Dressing Maximal Assist   Toileting Total Assist   Skilled Intervention Verbal Cuing;Facilitation       SLP 8/16:    Assessment  Pt seen on this date for dysphagia therapy. Pt with generalized weakness and agreeable to PO trials. He consumed trials of puree and MTL via cup sip with no overt s/sx of aspiration. Trials of minced and moist resulted in immediate coughing and throat clearing concerning for airway invasion. Suspected delayed AP transport with minced and moist. Pt unable to feed self so 1:1 feeding provided by this clinician. Pt became more lethargic as session progressed requiring increased use of verbal cues so further PO trials were discontinued.        Clinical Impressions  Pt continues to present with an acute dysphagia 2/2 R MCA syndrome. Recommend continuation of modified puree/MTL diet with 1:1 feeding. Please discontinue PO with any s/sx of aspiration or difficulty.    Assessment     Portions of the COGNISTAT (Neurobehavioral Cognitive Status Assessment) were administered in conjunction with non-standardized assessments.      Patient scored the following on the Cognistat:  Orientation: WNL 10  Memory:Moderate 6  Calculations:Severe 0  Similarities (Reasoning):Mild 4        Non-standardized measures were used to assess other cognitive domains and severe deficits found in following three-step directives. Pt followed two-step directives and answered simple, moderate, and complex yes/no questions WFL. Pt with delayed response time to questions which increased as session progressed. Pt unable to provide information regarding PLOF. Increase in lethargy noted as session  progressed so further assessment was discontinued.        CURRENT LEVEL OF FUNCTION:   Same as level of function prior to admission to [facility]    PHYSICAL EXAM:     VITAL SIGNS:   height is 1.829 m (6') and weight is 72.1 kg (159 lb). His oral temperature is 36.7 °C (98.1 °F). His blood pressure is 124/85 and his pulse is 78. His respiration is 18 and oxygen saturation is 96%.     GENERAL: No apparent distress, frail  HEENT: Normocephalic/atraumatic, EOMI, PERRL, and No nystagmus dry mucous membranes,   CARDIAC: Regular rate, irregular rhythm, normal S1, S2, no murmurs, no peripheral edema   LUNGS: Clear to auscultation, normal respiratory effort, on 2 L oxygen  ABDOMINAL: bowel sounds present, soft, nontender, and nondistended    EXTREMITIES: no edema  MSK: Diffuse muscular atrophy, sarcopenia    NEURO:    Mental status: alert, delayed responses  Speech: fluent, no aphasia, mild dysarthria    CRANIAL NERVES:  2,3: visual acuity grossly intact, PERRL  3,4,6: EOMI bilaterally, no nystagmus or diplopia  5: intact in all branches  7: Mild left facial weakness  8: Very hard of hearing  9,10: symmetric palate elevation  11: SCM/Trapezius strength 5/5 bilaterally  12: tongue protrudes midline    Motor:  Shoulder flexors:  Right -  5/5, Left -  4/5  Elbow flexors:  Right -  4/5, Left -  4/5  Elbow extensors:  Right -  5/5, Left -  4/5  Minimal movement of left hand though limited by neglect  Hip flexors:  Right -  5/5, Left -  4/5  Knee ext:  Right -  5/5, Left -  4/5  Dorsiflexors:  Right -  5/5, Left -  4/5  Plantar flexors:  Right -  5/5, Left -  4/5     Sensory:   intact to light touch through out    Left neglect    RADIOLOGY:    Imaging personally reviewed and interpreted by me.    8/16/2023 5:20 AM     HISTORY/REASON FOR EXAM:   Blown pupils     TECHNIQUE/EXAM DESCRIPTION:  CT of the head without contrast.     Sequential axial images were obtained from the vertex to the skull base without  contrast.     Up to date radiation dose reduction adjustments have been utilized to meet ALARA standards for radiation dose reduction.     COMPARISON: August 14, 2023     FINDINGS:     There is mild diffuse parenchymal volume loss observed. Periventricular and subcortical white matter low-attenuation changes are seen, most commonly associated with small vessel ischemic disease. Moderate bilateral ventricular dilatation is seen, with   radiographic appearance favoring ex vacuo dilatation. Low-density area within the right MCA distribution is seen with slight hazy hyperdensity. 3.4 x 2.4 cm. Low-density fluid collection anterior to the left temporal lobe is seen.     The visualized paranasal sinuses and mastoid air cells are well aerated bilaterally. No depressed calvarial fractures are identified. The visualized globes and retrobulbar soft tissues appear within normal limits.  Atherosclerotic intracranial   calcifications are seen.     IMPRESSION:        1.  Area of low-density in the right MCA distribution with subtle hazy hyperdensity which appears similar to prior study and could represent subtle stable hemorrhagic transformation  2.  Nonspecific white matter changes, commonly associated with small vessel ischemic disease.  Associated mild cerebral atrophy is noted.  3.  Low-density fluid collection anterior to the left temporal lobe, location and appearance favors arachnoid cyst  4.  Atherosclerosis.            LABS:  Recent Labs     08/16/23  0249   SODIUM 136   POTASSIUM 3.7   CHLORIDE 102   CO2 20   GLUCOSE 95   BUN 17   CREATININE 1.00   CALCIUM 8.1*     Recent Labs     08/16/23  0249   WBC 6.2   RBC 4.13*   HEMOGLOBIN 13.5*   HEMATOCRIT 40.5*   MCV 98.1*   MCH 32.7   MCHC 33.3   RDW 53.6*   PLATELETCT 152*   MPV 10.3         PRIMARY REHAB DIAGNOSIS:    This patient is a 89 y.o. male admitted for acute inpatient rehabilitation with Ischemic stroke (HCC).    IMPAIRMENTS:    Cognitive  ADLs/IADLs  Mobility  Speech  Swallow    SECONDARY DIAGNOSIS/MEDICAL CO-MORBIDITIES AFFECTING FUNCTION:    Atrial fibrillation  Retinal vein occlusion  Normocytic anemia  Hypothyroidism  Thrombocytopenia  Hypotension  Hyperlipidemia  Sleep apnea  Acute hypoxemic respiratory failure  Prolonged QTc        RELEVANT CHANGES SINCE PREADMISSION EVALUATION:    Status unchanged    The patient's rehabilitation potential is Fair  The patient's medical prognosis is fair    PLAN:   Discussion and Recommendations, discussed with the patient and/or family:   1. The patient requires an acute inpatient rehabilitation program with a coordinated program of care at an intensity and frequency not available at a lower level of care. This recommendation is substantiated by the patient's medical physicians who recommend that the patient's intervention and assessment of medical issues needs to be done at an acute level of care for patient's safety and maximum outcome.     2. A coordinated program of care will be supplied by an interdisciplinary team of physical therapy, occupational therapy, rehab physician, rehab nursing, and, if needed, speech therapy and rehab psychology. Rehab team presents a patient-specific rehabilitation and education program concentrating on prevention of future problems related to accessibility, mobility, skin, bowel, bladder, sexuality, and psychosocial and medical/surgical problems.     3. Need for Rehabilitation Physician: The rehab physician will be evaluating the patient on a multi-weekly basis to help coordinate the program of care. The rehab physician communicates between medical physicians, therapists, and nurses to maximize the patient's potential outcome. Specific areas in which the rehab physician will be providing daily assessment include the following:   A. Assessing the patient's heart rate and blood pressure response (vitals monitoring) to activity and making adjustments in medications  or conservative measures as needed.   B. The rehab physician will be assessing the frequency at which the program can be increased to allow the patient to reach optimal functional outcome.   C. The rehab physician will also provide assessments in daily skin care, especially in light of patient's impairments in mobility.   D. The rehab physician will provide special expertise in understanding how to work with functional impairment and recommend appropriate interventions, compensatory techniques, and education that will facilitate the patient's outcome.     4. Rehab R.N.   The rehab RN will be working with patient to carry over in room mobility and activities of daily living when the patient is not in 3 hours of skilled therapy. Rehab nursing will be working in conjunction with rehab physician to address all the medical issues above and continue to assess laboratory work and discuss abnormalities with the treating physicians, assess vitals, and response to activity, and discuss and report abnormalities with the rehab physician. Rehab RN will also continue daily skin care, supervise bladder/bowel program, instruct in medication administration, and ensure patient safety.     5. Therapies to treat at intensity and frequency of (may change after completion of evaluation by all therapeutic disciplines):       PT:  Physical therapy to address mobility, transfer, gait training and evaluation for adaptive equipment needs 1hour/day at least 5 days/week for the duration of the ELOS (see below)       OT:  Occupational therapy to address ADLs, self-care, home management training, functional mobility/transfers and assistive device evaluation, and community re-integration 1hour/day at least 5 days/week for the duration of the ELOS (see below).        ST/Dysphagia:  Speech therapy to address speech, language, and cognitive deficits as well as swallowing difficulties with retraining/dysphagia management and community re-integration  with comprehension, expression, cognitive training 1hour/day at least 5 days/week for the duration of the ELOS (see below).     6. Medical management / Rehabilitation Issues/Adverse Potential affecting function as part of rehabilitation plan.    Atrial fibrillation  Currently not on rate or rhythm medications  Monitor    Retinal vein occlusion, left eye  Had brief monocular vision loss on 8/16  Outpatient follow-up with ophthalmology, Dr. Chanda Zuñiga    Normocytic anemia  Check morning labs    Hypothyroidism  Continue levothyroxine    Thrombocytopenia  Check morning labs    Hypotension  Continue Florinef    Hyperlipidemia  Continue atorvastatin    Sleep apnea  CPAP    Acute hypoxemic respiratory failure  Titrate oxygen    Prolonged QTc  Avoid meds that prolong    I performed a complete drug regimen review and did not identify any potential clinically significant medication issues.    The patient's CODE STATUS was confirmed as FULL CODE on admission, with the patient and/or family at bedside.    REHABILITATION ISSUES/ADVERSE POTENTIAL:  1.  CVA (Cerebrovascular Accident): Continue Eliquis for secondary prophylaxis as well as lipid and blood pressure management. Patient demonstrates functional deficits in strength, balance, coordination, and ADL's. Patient is admitted to [facility] for comprehensive rehabilitation therapy as described below.   Rehabilitation nursing monitors bowel and bladder control, educates on medication administration, co-morbidities and monitors patient safety.    2.  DVT prophylaxis:  Patient is on Eliquis for anticoagulation upon transfer. Encourage OOB. Monitor daily for signs and symptoms of DVT including but not limited to swelling and pain to prevent the development of DVT that may interfere with therapies.    3.  Pain: No issues with pain currently / Controlled with as needed oral analgesics.    4.  Nutrition/Dysphagia: Dietician monitors nutrient intake, recommend  supplements prn and provide nutrition education to pt/family to promote optimal nutrition for wound healing/recovery.     5.  Bladder/bowel:  Start bowel and bladder program, to prevent constipation, urinary retention (which may lead to UTI), and urinary incontinence (which will impact upon pt's functional independence).   - TV Q3h while awake with post void bladder scans, I&O cath for PVRs >400  - up to commode after meal     6.  Skin/dermal ulcer prophylaxis: Monitor for new skin conditions with q.2 h. turns as required to prevent the development of skin breakdown.     7.  GI prophylaxis: Omeprazole to prevent gastritis or GI bleed that would interfere with therapies.    8. Respiratory therapy: RT performs O2 management prn, breathing retraining, pulmonary hygiene and bronchospasm management prn to optimize participation in therapies.    Pt was seen today for 78 min, and entire time spent in face-to-face contact was >50% in counseling and coordination of care as detailed in A/P above.        GOALS/EXPECTED LEVEL OF FUNCTION BASED ON CURRENT MEDICAL AND FUNCTIONAL STATUS (may change based on patient's medical status and rate of impairment recovery):  Transfers:   Supervision  Mobility/Gait:   Supervision  ADL's:   Minimal Assistance  Cognition: Least verbal cues  Swallowing: Least restrictive diet    DISPOSITION: Discharge to pre-morbid independent living setting with the supportive care of patient's family.      ELOS: 23 days    Chante Kern M.D.  Physical Medicine and Rehabilitation

## 2023-08-19 NOTE — PROGRESS NOTES
Follow up with charge RN on neuro regarding patient's CPAP. Patient came over with belongings and noted patient have CPAP machine but no tubing or mask. Per neuro charge, patient was transferred to them from [facility] and did not provide CPAP tubing or mask. This charge RN will f/u with family.

## 2023-08-19 NOTE — PROGRESS NOTES
NURSING DAILY NOTE    Name: Guru Pena   Date of Admission: 8/18/2023   Admitting Diagnosis: Ischemic stroke (HCC)  Attending Physician: Chante Kern M.d.  Allergies: Digoxin    Safety  Patient Assist     Patient Precautions     Precaution Comments     Bed Transfer Status     Toilet Transfer Status      Assistive Devices     Oxygen  Nasal Cannula  Diet/Therapeutic Dining  Current Diet Order   Procedures    Diet Order Diet: Level 4 - Pureed (meds crushed. oral care before/after meals. perodic cough w MT2); Liquid level: Level 2 - Mildly Thick; Tray Modifications (optional): SLP - 1:1 Supervision by Nursing, SLP - Deliver to Nursing Station, No Straws     Pill Administration  crushed  Agitated Behavioral Scale     ABS Level of Severity       Fall Risk  Has the patient had a fall this admission?   No  Danette Mares Fall Risk Scoring  19, HIGH RISK  Fall Risk Safety Measures  bed alarm and chair alarm    Vitals  Temperature: 36.9 °C (98.5 °F)  Temp src: Oral  Pulse: 63  Respiration: 18  Blood Pressure : (!) 139/90  Blood Pressure MAP (Calculated): 106 MM HG  BP Location: Left, Upper Arm  Patient BP Position: Supine     Oxygen  Pulse Oximetry: 96 %  O2 (LPM): 2  O2 Delivery Device: Nasal Cannula    Bowel and Bladder  Last Bowel Movement  08/19/23  Stool Type  Type 6: Fluffy pieces with ragged edges, a mushy stool  Bowel Device  Diaper  Continent  Bladder: Continent void   Bowel: Continent movement  Bladder Function  Urinary Options: Yes  Urine Color: Daphne  Genitourinary Assessment   Bladder Assessment (WDL):  WDL Except  Urine Color: Daphne  Bladder Device: Other Bladder Device (Comment)  Bladder Scan: Other (Comments)  $ Bladder Scan Results (mL): 256    Skin  Vaibhav Score   14  Sensory Interventions   Bed Types: Other (Comments)  Skin Preventative Measures: Waffle Overlay  Skin Preventative Dressing: Protecta-gel (BIPAP & C-PAP)  Moisture  Interventions  Moisturizers/Barriers: Barrier Cream      Pain  Pain Rating Scale  0 - No Pain  Pain Location     Pain Location Orientation     Pain Interventions   Declines    ADLs    Bathing      Linen Change      Personal Hygiene     Chlorhexidine Bath      Oral Care     Teeth/Dentures     Shave     Nutrition Percentage Eaten     Environmental Precautions     Patient Turns/Positioning  Supine  Patient Turns Assistance/Tolerance  Assistance of One  Bed Positions     Head of Bed Elevated         Psychosocial/Neurologic Assessment  Psychosocial Assessment     Neurologic Assessment  Level of Consciousness: Alert  Orientation Level: Oriented X4  Cognition: Follows commands  EENT (WDL):  WDL Except    Cardio/Pulmonary Assessment  Edema      Respiratory Breath Sounds  RUL Breath Sounds: Clear  RML Breath Sounds: Clear  RLL Breath Sounds: Diminished  ASIF Breath Sounds: Clear  LLL Breath Sounds: Diminished  Cardiac Assessment   Cardiac (WDL):  WDL Except (afib)

## 2023-08-19 NOTE — PROGRESS NOTES
NURSING DAILY NOTE    Name: Guru Pena   Date of Admission: 8/18/2023   Admitting Diagnosis: Ischemic stroke (HCC)  Attending Physician: Chante Kern M.d.  Allergies: Digoxin    Safety  Patient Assist     Patient Precautions     Precaution Comments     Bed Transfer Status     Toilet Transfer Status      Assistive Devices     Oxygen  Nasal Cannula  Diet/Therapeutic Dining  Current Diet Order   Procedures    Diet Order Diet: Level 4 - Pureed (meds crushed. oral care before/after meals. perodic cough w MT2); Liquid level: Level 2 - Mildly Thick; Tray Modifications (optional): SLP - 1:1 Supervision by Nursing, SLP - Deliver to Nursing Station, No Straws     Pill Administration  crushed  Agitated Behavioral Scale     ABS Level of Severity       Fall Risk  Has the patient had a fall this admission?      Danette Mares Fall Risk Scoring  15, HIGH RISK  Fall Risk Safety Measures  bed alarm, chair alarm, poor balance, and low vision/ hearing    Vitals  Temperature: 36.7 °C (98.1 °F)  Temp src: Oral  Pulse: 78  Respiration: 18  Blood Pressure : 124/85  Blood Pressure MAP (Calculated): 98 MM HG  BP Location: Left, Upper Arm  Patient BP Position: Supine     Oxygen  Pulse Oximetry: 96 %  O2 (LPM): 2  O2 Delivery Device: Nasal Cannula    Bowel and Bladder  Last Bowel Movement  08/18/23 (per report)  Stool Type     Bowel Device     Continent  Bladder: Continent void   Bowel: Continent movement  Bladder Function     Genitourinary Assessment   Bladder Assessment (WDL):  WDL Except (incont)    Skin  Vaibhav Score   16  Sensory Interventions   Bed Types: Other (Comments)  Skin Preventative Measures: Waffle Overlay  Skin Preventative Dressing: Protecta-gel (BIPAP & C-PAP)  Moisture Interventions  Moisturizers/Barriers: Barrier Cream      Pain  Pain Rating Scale  0 - No Pain  Pain Location     Pain Location Orientation     Pain Interventions        ADLs    Bathing       Linen Change      Personal Hygiene     Chlorhexidine Bath      Oral Care     Teeth/Dentures     Shave     Nutrition Percentage Eaten     Environmental Precautions     Patient Turns/Positioning     Patient Turns Assistance/Tolerance     Bed Positions     Head of Bed Elevated         Psychosocial/Neurologic Assessment  Psychosocial Assessment     Neurologic Assessment  Level of Consciousness: Alert  Orientation Level: Oriented X4  Cognition: Follows commands  EENT (WDL):  WDL Except    Cardio/Pulmonary Assessment  Edema      Respiratory Breath Sounds  RUL Breath Sounds: Clear  RML Breath Sounds: Clear  RLL Breath Sounds: Diminished  ASIF Breath Sounds: Clear  LLL Breath Sounds: Diminished  Cardiac Assessment   Cardiac (WDL):  WDL Except (afib)

## 2023-08-19 NOTE — THERAPY
"Speech Language Pathology   Initial Assessment     Patient Name: Guru Pena  AGE:  89 y.o., SEX:  male  Medical Record #: [id number]  Today's Date: 8/19/2023     Subjective    Per H&P: \"The patient is a 89 y.o. male with a past medical history of atrial fibrillation on low-dose Eliquis, sleep apnea, urinary retention, hypothyroidism, hyperlipidemia, vitamin D deficiency, possible pulmonary fibrosis, recent COVID infection in July 2023; now admitted for acute inpatient rehabilitation with severe functional debility after an acute stroke.       Patient developed acute onset of left-sided weakness and right gaze deviation at his independent living facility.  NIH stroke scale 10 with dense left hemiplegia..  Head CT negative, CTA of head and neck negative, unable to obtain CT perfusion scan due to head positioning/patient unable to lay flat due to thoracic kyphosis.  The patient was outside the window at 4-1/2 hours since onset of symptoms, however after conversations with the neurologist regarding risk versus benefits patient agreed to receive tenecteplase.     Follow-up head CT showed small right MCA stroke with petechial hemorrhage. HgbA1c 5.8, LDL 86, ECHO EF 55 %, prolonged QTc of 529.  Recommendation from neurology to restart Eliquis on 8/18 at full dose as patient had stroke on half dose.     On the morning of 8/16 rapid response was called given new onset of left arm monocular vision loss.  Repeat head CT without any new findings.  Patient was seen and evaluated by Dr. Chanda Zuñiga of ophthalmology with findings of hemorrhages and exudate in the left eye suspicious for central retinal vein occlusion.  Recommendation for outpatient follow-up.     Patient currently reports he does not like the puréed food as he feels like he chokes on it.  He really wants to get back to eating his over hard eggs that he usually has in the mornings.  He has not noticed any focal weakness or paresthesias.  Vision appears to be " "back to baseline.\"     Objective       08/19/23 0801   Evaluation Charges   SLP Speech Language Evaluation Speech Sound Language Comprehension   SLP Oral Pharyngeal Evaluation Oral Pharyngeal Evaluation   SLP Total Time Spent   SLP Individual Total Time Spent (Mins) 90   Precautions   Precautions Fall Risk;Swallow Precautions   Comments Level 4- Puree and Level 2- Mildly Thick liquids   Prior Living Situation   Prior Services Home-Independent   Housing / Facility Independent Living Facility   Lives with - Patient's Self Care Capacity Spouse   Prior Level Of Function   Communication Within Functional Limits   Swallow Within Functional Limits   Dentition Intact   Dentures None   Hearing Within Functional Limits for Evaluation   Hearing Aid None   Vision Within Functional Limits for Evaluation;Reading    Patient's Primary Language English   Education Completed College   Occupation (Pre-Hospital Vocational) Retired Due To Age   Comments    Receptive Language / Auditory Comprehension   Receptive Language / Auditory Comprehension WDL   Expressive Language   Expressive Language (WDL) WDL   Written Language Expression   Dominant Hand Right   Cognition   Cognitive-Linguistic (WDL) X   Attention to Task Supervision (5)   Orientation  Minimal (4)   Functional Problem Solving Moderate (3)   Safety Awareness Moderate (3)   Insight into Deficits Minimal (4)   Executive Functioning / Organization Moderate (3)   Initiation Supervision (5)   ABS (Agitated Behavior Scale)   Agitated Behavior Scale Performed Yes   Short Attention Span, Easy Distractibility, Inability to Concentrate 2   Impulsive, Impatient, Low Tolerance for Pain or Frustration 2   Uncooperative, Resistant to Care, Demanding 1   Violent and/or Threatening Violence Toward People or Property 1   Explosive and/or Unpredictable Anger 1   Rocking, Rubbing, Moaning, Other Self-Stimulating Behavior 1   Pulling at Tubes, Restraints, etc. 1   Wandering from " "Treatment Area 1   Restlessness, Pacing, Excessive Movement 1   Repetitive Behaviors, Motor and/or Verbal 1   Rapid, Loud or Excessive Talking 1   Sudden Changes of Mood 1   Easily Initiated - Excessive Crying and/or Laughter 1   Self-Abusiveness, Physical and/or Verbal 1   Agitated Behavior Scale Total Score 16   Level of Severity No Agitation   Cognitive Pattern Assessment   Cognitive Pattern Assessment Used BIMS   Brief Interview for Mental Status (BIMS)   Repetition of Three Words (First Attempt) 3   Temporal Orientation: Year Correct   Temporal Orientation: Month Missed by 6 days to 1 month   Temporal Orientation: Day Incorrect   Recall: \"Sock\" Yes, after cueing (\"something to wear\")   Recall: \"Blue\" Yes, after cueing (\"a color\")   Recall: \"Bed\" No, could not recall   BIMS Summary Score 9   Confusion Assessment Method (CAM)   Is there evidence of an acute change in mental status from the patient's baseline? Yes   Inattention Behavior present, fluctuates (comes and goes, changes in severity)   Disorganized thinking Behavior present, fluctuates (comes and goes, changes in severity)   Altered level of consciousness Behavior not present   Social / Pragmatic Communication   Social / Pragmatic Communication WDL   Tracheostomy   Tracheostomy No   Speech Mechanisms / Voice Production   Speech Mechanisms / Voice Production (WDL) X   Voice Quality Gurgly;Breathy   Facial Weakness Left Minimal (4)   Conversational Intelligibility Supervision (5)   Labial Function   Labial Function (WDL) X   Frown, Pucker Minimal   Lingual Function   Lingual Function (WDL) WDL   Jaw Function   Jaw Function (WDL) WDL   Velar Function   Velar Function (WDL) WDL   Laryngeal Function   Laryngeal Function (WDL) WDL   Swallowing   Swallowing (WDL) X   Thick Nectar / Honey / Liquid Within Functional Limits   Pureed (4) Within Functional Limits   Thin Liquid Moderate   Single Swallow Thin / Nectar (Cup) Within Functional Limits   Serial Swallows " Thin / Nectar (Straw) Minimal   Dysphagia Strategies / Recommendations   Strategies / Interventions Recommended (Yes / No) Yes   Compensatory Strategies Direct Supervision During Meals;Assistance Needed for Meal Tray Set-up;Head of Bed 90 Degrees During Eating / Drinking;Cough / Clear Wet Vocal Quality Post Swallow;Liquids Via Cup;Multiple Swallows;Single Sips / Bites;Alternate Solids / Liquids;No Talking During Eating / Drinking   Diet / Liquid Recommendation Mildly Thick (2) - (Nectar Thick);Puree (4)   Medication Administration  Crush all Medications in Puree   Therapy Interventions Dysphagia Therapy By Speech Language Pathologist;Therapeutic Dining For Meals;MBSS Evaluation;Oral Motor Exercises;Pharyngeal / Laryngeal Exercises   Barriers To Oral Feeding   Barriers To Oral Feeding Generalized Weakness;Prior Dysphagia;Impaired Cognition / Attention;Impulsivity / Impaired Safety   Cervical Ausculatation Not Tested   Pre-Feeding Oral Stimulation Trial Not Tested   Swallowing/Nutritional Status   Swallowing/Nutritional Status Modified food consistency   Eating   Assistance Needed Physical assistance;Set-up / clean-up;Verbal cues   CARE Score - Eating -   Functional Level of Assist   Eating Minimal Assist   Eating Description Increased time;Modified diet;Set-up of equipment or meal/tube feeding;Supervision for safety;Verbal cueing   Comprehension Modified Independent   Comprehension Description   (Requires increased volume)   Expression Independent   Social Interaction Modified Independent   Social Interaction Description Increased time   Problem Solving Moderate Assist   Problem Solving Description Increased time;Seat belt;Therapy schedule;Verbal cueing;Bed/chair alarm   Memory Moderate Assist   Memory Description Increased time;Bed/chair alarm;Supervision;Therapy schedule;Verbal cueing   Problem List   Problem List Cognitive-Linguistic Deficits;Dysphagia;Hearing Deficit;Attention Deficit;Memory Deficit;Verbal  Problem Solving Deficits;Executive Function Deficit;Impaired Safety   Current Discharge Plan   Current Discharge Plan Return to Prior Living Situation   Benefit   Therapy Benefit Patient would benefit from Inpatient Rehab Speech-Language Pathology to address above identified deficits.   Interdisciplinary Plan of Care Collaboration   IDT Collaboration with  Nursing;Certified Nursing Assistant;Therapy Tech   Patient Position at End of Therapy Seated;Chair Alarm On;Self Releasing Lap Belt Applied;Call Light within Reach;Tray Table within Reach;Phone within Reach   Collaboration Comments CLOF/POC/T-Dine   Strengths & Barriers   Strengths Able to follow instructions;Independent prior level of function;Pleasant and cooperative;Supportive family;Willingly participates in therapeutic activities;Motivated for self care and independence   Barriers Generalized weakness;Hearing impairment;Impaired activity tolerance;Impaired appetite/intake;Impaired carryover of learning;Impaired insight/denial of deficits;Impaired functional cognition   Speech Language Pathologist Assigned   Assigned SLP / Treatment Time / Comments CW COG/SW TDINE       Assessment    Patient is 89 y.o. male with a diagnosis of Ischemic Stroke.  Additional factors influencing patient status/progress (ie: cognitive factors, co-morbidities, social support, etc): impaired functional cognition, dysphagia, with strong family support, motivation to participate, and indep PLOF.    Clinical bedside swallow evaluation completed. Oral mech exam of oral structures WFL for ROM, and coordination. Significant for minimal asymmetry noted on the L and weak volitional cough. Throat clear OK. Pt presents with wet vocal quality during conversation prior to PO intake.   Pt assessed with current diet of Level 4- Pureed textures and Level 2- Mildly Thick Liquids at breakfast meal. Pt demonstrated overt coughing post swallows of MTL2 via straw and sequential swallows. Pt tolerated  "MTL2 via cup sips and PU4 textures with no overt s/sx of aspiration noted. Pt recalled \"forced swallows\" introduced to him at [facility] and was able to demonstrate throughout meal. Pt noted to implement a chin tuck and turn his head to his R side. Pt demonstrated fatigue at the end of meal. Wet vocal quality persisted throughout evaluation. Cued throat clear and coughing to clear was unsuccessful.   Pt able to feed himself with meal tray set up. Pt stated, \"I have to eat a little at a time\" and \"eating is difficult for me.\" Pt required increased time and frequent breaks throughout breakfast meal.   Recommend MBSS to further assess and analyze swallow function and physiology. Pt participated in education re: IDDSI diet levels, dysphagia management, and general swallow precautions. Recommend pt continue with current diet of Level 4- Puree textures with Level 2- Mildly Thick Liquids. Continue medications crushed in puree. No Straws. Oral care Q3 daily to mitigate risks of developing aspiration PNA. Recommend T-Dine and ST to further assess swallow safety/function, reinforce safe swallow strategies during meal times, and determine least restrictive diet as appropriate.    Standardized cognitive assessment unable to be completed this session due to time constraints. Pt requesting frequent visits to the restroom, requiring CNA and nursing assistance and required increased time for breakfast meal. Pt required mod cues for safety sequencing and frequent cues for attention during transfers.   Discussed pt's PLOF. Pt lives at [facility] with his wife. Pt reports indep with medication management prior to hospitalization (uses a weekly pill box). Pt reports his wife and son manage the finances. Pt has a  1 x a week and his wife brings him his food from the dining room for meals.  Recommend completing standardized cognitive assessment in upcoming ST sessions as appropriate to determine level of cognitive impairment and " personalized ST goals.     Plan  Recommend Speech Therapy  minutes per day 5-6 days per week for 4 weeks for the following treatments:  SLP Speech Language Treatment, SLP Swallowing Dysfunction Treatment, SLP Oral Pharyngeal Evaluation, SLP Video Swallow Evaluation, SLP Cognitive Skill Development, and SLP Group Treatment.    Passport items to be completed:  Express basic needs, understand food/liquid recommendations, consistently follow swallow precautions, manage finances, manage medications, arrive to therapy appointments on time, complete daily memory log entries, solve problems related to safety situations, review education related to hospitalization, complete caregiver training     Goals:  Long term and short term goals have been discussed with patient and they are in agreement.    Speech Therapy Problems (Active)       Problem: Problem Solving STGs       Dates: Start:  08/19/23         Goal: STG-Within one week, patient will participate in standardized cognitive assessment (I.e., SCCAN) to determine cognitive impairment with goals to be added as appropriate.        Dates: Start:  08/19/23            Goal: STG-Within one week, patient will complete simple problem solving and/or safety awareness tasks with 70% accuracy provided with mod cues.       Dates: Start:  08/19/23               Problem: Speech/Swallowing LTGs       Dates: Start:  08/19/23         Goal: LTG-By discharge, patient will safely swallow least restrictive diet with no overt s/sx of aspiration noted.        Dates: Start:  08/19/23            Goal: LTG-By discharge, patient will solve complex problems with mod I for safe d/c home.        Dates: Start:  08/19/23               Problem: Swallowing STGs       Dates: Start:  08/19/23         Goal: STG-Within one week, patient will safely swallow Level 4- Puree textures with Level 2- Mildly Thick Liquids with no overt s/sx of aspiration noted provided with set up assistance and mod cues to  implement safe swallow strategies.         Dates: Start:  08/19/23            Goal: STG-Within one week, patient will participate in MBSS to further assess swallow function and safety with goals to be added as appropriate.          Dates: Start:  08/19/23

## 2023-08-19 NOTE — PROGRESS NOTES
4 Eyes Skin Assessment Completed by KAELYN Fair and KAELYN Hinkle.    Head WDL  Ears WDL  Nose WDL  Mouth WDL  Neck WDL  Breast/Chest WDL  Shoulder Blades WDL  Spine WDL  (R) Arm/Elbow/Hand Bruising  (L) Arm/Elbow/Hand Bruising  Abdomen WDL  Groin WDL  Scrotum/Coccyx/Buttocks WDL  (R) Leg Discoloration  (L) Leg Discoloration  (R) Heel/Foot/Toe Boggy  (L) Heel/Foot/Toe Boggy          Devices In Places Condom Cath and Nasal Cannula      Interventions In Place Gray Ear Foams and Waffle Overlay    Possible Skin Injury Yes    Pictures Uploaded Into Epic Yes  Wound Consult Placed N/A  RN Wound Prevention Protocol Ordered No

## 2023-08-19 NOTE — FLOWSHEET NOTE
08/19/23 1640   Events/Summary/Plan   Events/Summary/Plan RA pulse ox check   Vital Signs   Pulse 77  (history of AFIB)   Respiration 18   Pulse Oximetry 95 %   $ Pulse Oximetry (Spot Check) Yes   Respiratory Assessment   Level of Consciousness Alert   Chest Exam   Work Of Breathing / Effort Within Normal Limits   Oxygen   O2 Delivery Device Room air w/o2 available

## 2023-08-19 NOTE — CARE PLAN
"The patient is Stable - Low risk of patient condition declining or worsening    Shift Goals  Clinical Goals: safety  Patient Goals: safety, sleep/rest    Problem: Skin Integrity  Goal: Skin integrity is maintained or improved  Outcome: Progressing  Note: Dry skin noted to BLE with no open area noted. Patient's skin remains intact and free from new or accidental injury this shift.  Will continue to monitor.      Problem: Fall Risk - Rehab  Goal: Patient will remain free from falls  Outcome: Progressing  Note: Danette Mares Fall risk Assessment Score: 19    High fall risk Interventions   - Alarming seatbelt  - Bed and strip alarm   - Yellow sign by the door   - Yellow wrist band \"Fall risk\"  - Room near to the nurse station  - Do not leave patient unattended in the bathroom  Rails up with call light within reach. Instructed to use call light when in need of help for fall precaution and understood. Bed and chair alarm in place to alert staff. Continue on close monitoring.          "

## 2023-08-19 NOTE — PROGRESS NOTES
Rehab Progress Note     Encounter Date: 8/19/2023    CC: Right MCA stroke    Interval Events (Subjective)  Vitals reviewed: WNL   Admit labs reviewed: anemia stable, Electrolytes WNL.     Patient seen and examined in room.  Patient seated comfortably in manual wheelchair.  Patient reports he slept okay last night.  Patient has no complaints.  Patient denies shortness of breath, chest pain or lower extremity swelling.  Reviewed with patient that his anemia is stable.,  His calcium is improving.  Otherwise Does not report HA, lightheadedness, SOB, CP, abdominal pain, or changes in numbness/tingling/weakness.       Objective:  Physical Exam:  Vitals: /74   Pulse 95   Temp 37.2 °C (98.9 °F) (Oral)   Resp 16   Ht 1.829 m (6')   Wt 72.1 kg (159 lb)   SpO2 93%   Gen: NAD, seated comfortably in manual wheelchair  Head:  NC/AT  Eyes/ Nose/ Mouth: PERRLA, moist mucous membranes  Cardio: RRR, good distal perfusion, warm extremities  Pulm: normal respiratory effort, no cyanosis, on 2 L  Abd: Soft NTND, negative borborygmi   Ext: No peripheral edema. No calf tenderness. No clubbing.    Mental status:  A&Ox4 (person, place, date, situation) answers questions appropriately follows commands  Speech: fluent, no aphasia or dysarthria          Recent Results (from the past 72 hour(s))   CBC with Differential    Collection Time: 08/19/23  5:51 AM   Result Value Ref Range    WBC 5.2 4.8 - 10.8 K/uL    RBC 4.09 (L) 4.70 - 6.10 M/uL    Hemoglobin 13.3 (L) 14.0 - 18.0 g/dL    Hematocrit 39.4 (L) 42.0 - 52.0 %    MCV 96.3 81.4 - 97.8 fL    MCH 32.5 27.0 - 33.0 pg    MCHC 33.8 32.3 - 36.5 g/dL    RDW 53.1 (H) 35.9 - 50.0 fL    Platelet Count 220 164 - 446 K/uL    MPV 10.5 9.0 - 12.9 fL    Neutrophils-Polys 70.70 44.00 - 72.00 %    Lymphocytes 15.90 (L) 22.00 - 41.00 %    Monocytes 11.10 0.00 - 13.40 %    Eosinophils 1.50 0.00 - 6.90 %    Basophils 0.40 0.00 - 1.80 %    Immature Granulocytes 0.40 0.00 - 0.90 %    Nucleated RBC  0.00 0.00 - 0.20 /100 WBC    Neutrophils (Absolute) 3.70 1.82 - 7.42 K/uL    Lymphs (Absolute) 0.83 (L) 1.00 - 4.80 K/uL    Monos (Absolute) 0.58 0.00 - 0.85 K/uL    Eos (Absolute) 0.08 0.00 - 0.51 K/uL    Baso (Absolute) 0.02 0.00 - 0.12 K/uL    Immature Granulocytes (abs) 0.02 0.00 - 0.11 K/uL    NRBC (Absolute) 0.00 K/uL   Comp Metabolic Panel (CMP)    Collection Time: 08/19/23  5:51 AM   Result Value Ref Range    Sodium 136 135 - 145 mmol/L    Potassium 3.8 3.6 - 5.5 mmol/L    Chloride 100 96 - 112 mmol/L    Co2 27 20 - 33 mmol/L    Anion Gap 9.0 7.0 - 16.0    Glucose 90 65 - 99 mg/dL    Bun 15 8 - 22 mg/dL    Creatinine 1.01 0.50 - 1.40 mg/dL    Calcium 8.4 (L) 8.5 - 10.5 mg/dL    Correct Calcium 9.2 8.5 - 10.5 mg/dL    AST(SGOT) 20 12 - 45 U/L    ALT(SGPT) 11 2 - 50 U/L    Alkaline Phosphatase 82 30 - 99 U/L    Total Bilirubin 0.5 0.1 - 1.5 mg/dL    Albumin 3.0 (L) 3.2 - 4.9 g/dL    Total Protein 6.0 6.0 - 8.2 g/dL    Globulin 3.0 1.9 - 3.5 g/dL    A-G Ratio 1.0 g/dL   Magnesium    Collection Time: 08/19/23  5:51 AM   Result Value Ref Range    Magnesium 2.0 1.5 - 2.5 mg/dL   ESTIMATED GFR    Collection Time: 08/19/23  5:51 AM   Result Value Ref Range    GFR (CKD-EPI) 67 >60 mL/min/1.73 m 2       Current Facility-Administered Medications   Medication Frequency    hydrOXYzine HCl (Atarax) tablet 50 mg Q6HRS PRN    melatonin tablet 3 mg HS PRN    Respiratory Therapy Consult Continuous RT    acetaminophen (Tylenol) tablet 650 mg Q4HRS PRN    lactulose 20 GM/30ML solution 30 mL QDAY PRN    docusate sodium (Enemeez) enema 283 mg QDAY PRN    carboxymethylcellulose (Refresh Tears) 0.5 % ophthalmic drops 1 Drop PRN    benzocaine-menthol (Cepacol) lozenge 1 Lozenge Q2HRS PRN    mag hydrox-al hydrox-simeth (Maalox Plus Es Or Mylanta Ds) suspension 20 mL Q2HRS PRN    traZODone (Desyrel) tablet 50 mg QHS PRN    sodium chloride (Ocean) 0.65 % nasal spray 2 Spray PRN    midazolam (VERSED) 5 mg/mL (1 mL vial) PRN     atorvastatin (Lipitor) tablet 40 mg Q EVENING    fludrocortisone (Florinef) tablet 0.1 mg QAM    levothyroxine (Synthroid) tablet 175 mcg AM ES    senna-docusate (Pericolace Or Senokot S) 8.6-50 MG per tablet 2 Tablet BID    And    polyethylene glycol/lytes (Miralax) PACKET 1 Packet QDAY PRN    And    magnesium hydroxide (Milk Of Magnesia) suspension 30 mL QDAY PRN    And    bisacodyl (Dulcolax) suppository 10 mg QDAY PRN    prochlorperazine (Compazine) tablet 10 mg Q6HRS PRN    apixaban (Eliquis) tablet 5 mg BID       Orders Placed This Encounter   Procedures    Diet Order Diet: Level 4 - Pureed (meds crushed. oral care before/after meals. perodic cough w MT2); Liquid level: Level 2 - Mildly Thick; Tray Modifications (optional): SLP - 1:1 Supervision by Nursing, SLP - Deliver to Nursing Station, No Straws     Standing Status:   Standing     Number of Occurrences:   1     Order Specific Question:   Diet:     Answer:   Level 4 - Pureed [25]     Comments:   meds crushed. oral care before/after meals. perodic cough w MT2     Order Specific Question:   Liquid level     Answer:   Level 2 - Mildly Thick     Order Specific Question:   Tray Modifications (optional)     Answer:   SLP - 1:1 Supervision by Nursing     Order Specific Question:   Tray Modifications (optional)     Answer:   SLP - Deliver to Nursing Station     Order Specific Question:   Tray Modifications (optional)     Answer:   No Straws       Assessment:  Active Hospital Problems    Diagnosis     *Ischemic stroke (HCC)     Central retinal vein occlusion     Prolonged Q-T interval on ECG     Monocular vision loss     Acute hypoxemic respiratory failure (HCC)     Hypothyroidism due to acquired atrophy of thyroid     JAMES on CPAP     Pulmonary hypertension (HCC)- RVSP= 30,  ECHO 7/2021     Urinary retention     Chronic atrial fibrillation (HCC)     Pulmonary fibrosis (HCC)     Impaired vision in both eyes        Medical Decision Making and Plan:  Right MCA CVA  -  Evidence of right MCA on imaging  - 8/16 repeat CT head showed subtle hemorrhagic conversion  - Secondary stroke prophylaxis with statin and Eliquis, first dose of Eliquis was initiated on 8/18  - Initiate comprehensive IRF level therapy with 3 hours of therapy 5 days a week with services from PT/OT/SLP    Atrial fibrillation  - Currently rate controlled, on Eliquis for anticoagulation    Normocytic anemia  - 8/19 hemoglobin 13.3, stable    Hypothyroidism  - On home dose Synthroid    Thrombocytopenia, resolved  - 8/19 platelets 220    Hypotension  - Improved, no episodes of orthostatic hypotension today  - Continues on home dose Florinef    Hyperlipidemia  - Continue Lipitor    Acute hypoxemic respiratory failure  - Stable on 2 L, continue to wean    Prolonged QTc  - Avoid QTc prolonging medications  - Does not currently require any Seroquel or any medications for behavior    Bowel  - Continue with scheduled bowel meds  - Last BM 8/19    Bladder  - Timed voids, bladder scan if no void x4 hours, PVR  - Bladder scanned for 256 mL, was able to void 400 mL, no retention currently    Patient was seen for 38 minutes on unit/floor of which > 50% of time was spent on counseling and coordination of care regarding the above, including prognosis, risk reduction, benefits of treatment, and options for next stage of care.        Total time:  36 minutes.  I spent greater than 50% of the time for patient care and coordination on this date, including unit/floor time, and face-to-face time with the patient as per assessment and plan above including reviewing admission labs..    Maribel Zimmerman D.O.

## 2023-08-20 ENCOUNTER — APPOINTMENT (OUTPATIENT)
Dept: RADIOLOGY | Facility: MEDICAL CENTER | Age: 88
DRG: 871 | End: 2023-08-20
Attending: STUDENT IN AN ORGANIZED HEALTH CARE EDUCATION/TRAINING PROGRAM
Payer: MEDICARE

## 2023-08-20 VITALS
HEART RATE: 116 BPM | RESPIRATION RATE: 30 BRPM | SYSTOLIC BLOOD PRESSURE: 159 MMHG | TEMPERATURE: 97.6 F | HEIGHT: 72 IN | BODY MASS INDEX: 21.54 KG/M2 | DIASTOLIC BLOOD PRESSURE: 95 MMHG | OXYGEN SATURATION: 93 % | WEIGHT: 159 LBS

## 2023-08-20 DIAGNOSIS — E78.5 DYSLIPIDEMIA: ICD-10-CM

## 2023-08-20 DIAGNOSIS — E03.4 HYPOTHYROIDISM DUE TO ACQUIRED ATROPHY OF THYROID: ICD-10-CM

## 2023-08-20 DIAGNOSIS — I48.20 CHRONIC ATRIAL FIBRILLATION (HCC): ICD-10-CM

## 2023-08-20 PROBLEM — A41.9 SEPTIC SHOCK (HCC): Status: ACTIVE | Noted: 2023-08-20

## 2023-08-20 PROBLEM — J96.00 ACUTE RESPIRATORY FAILURE (HCC): Status: RESOLVED | Noted: 2023-08-20 | Resolved: 2023-08-20

## 2023-08-20 PROBLEM — G92.8 TOXIC METABOLIC ENCEPHALOPATHY: Status: ACTIVE | Noted: 2023-08-20

## 2023-08-20 PROBLEM — Z71.89 ACP (ADVANCE CARE PLANNING): Status: ACTIVE | Noted: 2023-08-20

## 2023-08-20 PROBLEM — J96.20 ACUTE ON CHRONIC RESPIRATORY FAILURE (HCC): Status: ACTIVE | Noted: 2023-08-20

## 2023-08-20 PROBLEM — R65.21 SEPTIC SHOCK (HCC): Status: ACTIVE | Noted: 2023-08-20

## 2023-08-20 PROBLEM — J96.00 ACUTE RESPIRATORY FAILURE (HCC): Status: ACTIVE | Noted: 2023-08-20

## 2023-08-20 PROBLEM — R50.9 ACUTE FEBRILE ILLNESS: Status: ACTIVE | Noted: 2023-08-20

## 2023-08-20 LAB
AMMONIA PLAS-SCNC: 10 UMOL/L (ref 11–45)
APTT PPP: 39.8 SEC (ref 24.7–36)
CORTIS SERPL-MCNC: 39.6 UG/DL (ref 0–23)
EKG IMPRESSION: NORMAL
GLUCOSE BLD STRIP.AUTO-MCNC: 102 MG/DL (ref 65–99)
INR PPP: 1.65 (ref 0.87–1.13)
LACTATE SERPL-SCNC: 1.6 MMOL/L (ref 0.5–2)
LACTATE SERPL-SCNC: 2.6 MMOL/L (ref 0.5–2)
LACTATE SERPL-SCNC: 2.8 MMOL/L (ref 0.5–2)
LACTATE SERPL-SCNC: 2.9 MMOL/L (ref 0.5–2)
LACTATE SERPL-SCNC: 3.1 MMOL/L (ref 0.5–2)
LACTATE SERPL-SCNC: 3.4 MMOL/L (ref 0.5–2)
MAGNESIUM SERPL-MCNC: 1.9 MG/DL (ref 1.5–2.5)
PHOSPHATE SERPL-MCNC: 4 MG/DL (ref 2.5–4.5)
PROCALCITONIN SERPL-MCNC: 1.66 NG/ML
PROTHROMBIN TIME: 19.2 SEC (ref 12–14.6)
TROPONIN T SERPL-MCNC: 48 NG/L (ref 6–19)
TROPONIN T SERPL-MCNC: 62 NG/L (ref 6–19)
UFH PPP CHRO-ACNC: >1.1 IU/ML

## 2023-08-20 PROCEDURE — 84484 ASSAY OF TROPONIN QUANT: CPT | Mod: 91

## 2023-08-20 PROCEDURE — 70450 CT HEAD/BRAIN W/O DYE: CPT

## 2023-08-20 PROCEDURE — 700105 HCHG RX REV CODE 258: Mod: JZ | Performed by: HOSPITALIST

## 2023-08-20 PROCEDURE — 85520 HEPARIN ASSAY: CPT

## 2023-08-20 PROCEDURE — A9270 NON-COVERED ITEM OR SERVICE: HCPCS | Performed by: HOSPITALIST

## 2023-08-20 PROCEDURE — 700102 HCHG RX REV CODE 250 W/ 637 OVERRIDE(OP): Performed by: HOSPITALIST

## 2023-08-20 PROCEDURE — 85730 THROMBOPLASTIN TIME PARTIAL: CPT

## 2023-08-20 PROCEDURE — 82962 GLUCOSE BLOOD TEST: CPT

## 2023-08-20 PROCEDURE — 82140 ASSAY OF AMMONIA: CPT

## 2023-08-20 PROCEDURE — 700105 HCHG RX REV CODE 258: Performed by: STUDENT IN AN ORGANIZED HEALTH CARE EDUCATION/TRAINING PROGRAM

## 2023-08-20 PROCEDURE — 83605 ASSAY OF LACTIC ACID: CPT | Mod: 91

## 2023-08-20 PROCEDURE — 92610 EVALUATE SWALLOWING FUNCTION: CPT

## 2023-08-20 PROCEDURE — 84145 PROCALCITONIN (PCT): CPT

## 2023-08-20 PROCEDURE — 99291 CRITICAL CARE FIRST HOUR: CPT | Mod: AI | Performed by: STUDENT IN AN ORGANIZED HEALTH CARE EDUCATION/TRAINING PROGRAM

## 2023-08-20 PROCEDURE — 700101 HCHG RX REV CODE 250: Performed by: STUDENT IN AN ORGANIZED HEALTH CARE EDUCATION/TRAINING PROGRAM

## 2023-08-20 PROCEDURE — 700111 HCHG RX REV CODE 636 W/ 250 OVERRIDE (IP): Mod: JZ | Performed by: STUDENT IN AN ORGANIZED HEALTH CARE EDUCATION/TRAINING PROGRAM

## 2023-08-20 PROCEDURE — 700105 HCHG RX REV CODE 258: Mod: JZ | Performed by: EMERGENCY MEDICINE

## 2023-08-20 PROCEDURE — 85610 PROTHROMBIN TIME: CPT

## 2023-08-20 PROCEDURE — 770000 HCHG ROOM/CARE - INTERMEDIATE ICU *

## 2023-08-20 PROCEDURE — 700111 HCHG RX REV CODE 636 W/ 250 OVERRIDE (IP): Mod: JZ | Performed by: HOSPITALIST

## 2023-08-20 RX ORDER — LACTULOSE 20 G/30ML
30 SOLUTION ORAL
COMMUNITY

## 2023-08-20 RX ORDER — FLUDROCORTISONE ACETATE 0.1 MG/1
0.1 TABLET ORAL EVERY MORNING
Status: DISCONTINUED | OUTPATIENT
Start: 2023-08-20 | End: 2023-08-25 | Stop reason: HOSPADM

## 2023-08-20 RX ORDER — HEPARIN SODIUM 1000 [USP'U]/ML
80 INJECTION, SOLUTION INTRAVENOUS; SUBCUTANEOUS ONCE
Status: DISCONTINUED | OUTPATIENT
Start: 2023-08-20 | End: 2023-08-20

## 2023-08-20 RX ORDER — NOREPINEPHRINE BITARTRATE 0.03 MG/ML
0-1 INJECTION, SOLUTION INTRAVENOUS CONTINUOUS
Status: DISCONTINUED | OUTPATIENT
Start: 2023-08-20 | End: 2023-08-21

## 2023-08-20 RX ORDER — MAGNESIUM SULFATE HEPTAHYDRATE 40 MG/ML
2 INJECTION, SOLUTION INTRAVENOUS ONCE
Status: COMPLETED | OUTPATIENT
Start: 2023-08-20 | End: 2023-08-20

## 2023-08-20 RX ORDER — PROCHLORPERAZINE MALEATE 10 MG
10 TABLET ORAL EVERY 6 HOURS PRN
COMMUNITY

## 2023-08-20 RX ORDER — POLYETHYLENE GLYCOL 3350 17 G/17G
17 POWDER, FOR SOLUTION ORAL PRN
COMMUNITY

## 2023-08-20 RX ORDER — HEPARIN SODIUM 1000 [USP'U]/ML
6000 INJECTION, SOLUTION INTRAVENOUS; SUBCUTANEOUS ONCE
Status: DISCONTINUED | OUTPATIENT
Start: 2023-08-20 | End: 2023-08-20

## 2023-08-20 RX ORDER — HEPARIN SODIUM 1000 [USP'U]/ML
40 INJECTION, SOLUTION INTRAVENOUS; SUBCUTANEOUS PRN
Status: DISCONTINUED | OUTPATIENT
Start: 2023-08-20 | End: 2023-08-20

## 2023-08-20 RX ORDER — SODIUM CHLORIDE, SODIUM LACTATE, POTASSIUM CHLORIDE, CALCIUM CHLORIDE 600; 310; 30; 20 MG/100ML; MG/100ML; MG/100ML; MG/100ML
1000 INJECTION, SOLUTION INTRAVENOUS ONCE
Status: COMPLETED | OUTPATIENT
Start: 2023-08-20 | End: 2023-08-20

## 2023-08-20 RX ORDER — ONDANSETRON 2 MG/ML
4 INJECTION INTRAMUSCULAR; INTRAVENOUS EVERY 4 HOURS PRN
Status: DISCONTINUED | OUTPATIENT
Start: 2023-08-20 | End: 2023-08-25 | Stop reason: HOSPADM

## 2023-08-20 RX ORDER — AZITHROMYCIN 250 MG/1
500 TABLET, FILM COATED ORAL DAILY
Status: COMPLETED | OUTPATIENT
Start: 2023-08-21 | End: 2023-08-22

## 2023-08-20 RX ORDER — ENOXAPARIN SODIUM 100 MG/ML
1 INJECTION SUBCUTANEOUS EVERY 12 HOURS
Status: DISCONTINUED | OUTPATIENT
Start: 2023-08-20 | End: 2023-08-21

## 2023-08-20 RX ORDER — ECHINACEA PURPUREA EXTRACT 125 MG
1 TABLET ORAL PRN
COMMUNITY

## 2023-08-20 RX ORDER — ONDANSETRON 4 MG/1
4 TABLET, ORALLY DISINTEGRATING ORAL EVERY 4 HOURS PRN
Status: DISCONTINUED | OUTPATIENT
Start: 2023-08-20 | End: 2023-08-25 | Stop reason: HOSPADM

## 2023-08-20 RX ORDER — IPRATROPIUM BROMIDE AND ALBUTEROL SULFATE 2.5; .5 MG/3ML; MG/3ML
3 SOLUTION RESPIRATORY (INHALATION)
Status: DISCONTINUED | OUTPATIENT
Start: 2023-08-20 | End: 2023-08-25 | Stop reason: HOSPADM

## 2023-08-20 RX ORDER — AMOXICILLIN 250 MG
2 CAPSULE ORAL DAILY
COMMUNITY

## 2023-08-20 RX ORDER — CARBOXYMETHYLCELLULOSE SODIUM 5 MG/ML
1 SOLUTION/ DROPS OPHTHALMIC PRN
COMMUNITY

## 2023-08-20 RX ORDER — TRAZODONE HYDROCHLORIDE 50 MG/1
50 TABLET ORAL NIGHTLY PRN
COMMUNITY

## 2023-08-20 RX ORDER — SODIUM CHLORIDE, SODIUM LACTATE, POTASSIUM CHLORIDE, AND CALCIUM CHLORIDE .6; .31; .03; .02 G/100ML; G/100ML; G/100ML; G/100ML
500 INJECTION, SOLUTION INTRAVENOUS ONCE
Status: COMPLETED | OUTPATIENT
Start: 2023-08-20 | End: 2023-08-20

## 2023-08-20 RX ORDER — HEPARIN SODIUM 5000 [USP'U]/100ML
INJECTION, SOLUTION INTRAVENOUS CONTINUOUS
Status: DISCONTINUED | OUTPATIENT
Start: 2023-08-20 | End: 2023-08-20

## 2023-08-20 RX ORDER — LANOLIN ALCOHOL/MO/W.PET/CERES
3 CREAM (GRAM) TOPICAL NIGHTLY PRN
COMMUNITY

## 2023-08-20 RX ORDER — AZITHROMYCIN 500 MG/5ML
500 INJECTION, POWDER, LYOPHILIZED, FOR SOLUTION INTRAVENOUS EVERY 24 HOURS
Status: DISCONTINUED | OUTPATIENT
Start: 2023-08-20 | End: 2023-08-20

## 2023-08-20 RX ORDER — DOCUSATE SODIUM 283 MG/5ML
283 LIQUID RECTAL PRN
COMMUNITY

## 2023-08-20 RX ORDER — HEPARIN SODIUM 5000 [USP'U]/100ML
0-30 INJECTION, SOLUTION INTRAVENOUS CONTINUOUS
Status: DISCONTINUED | OUTPATIENT
Start: 2023-08-20 | End: 2023-08-20

## 2023-08-20 RX ORDER — HEPARIN SODIUM 1000 [USP'U]/ML
3200 INJECTION, SOLUTION INTRAVENOUS; SUBCUTANEOUS PRN
Status: DISCONTINUED | OUTPATIENT
Start: 2023-08-20 | End: 2023-08-20

## 2023-08-20 RX ADMIN — HYDROCORTISONE SODIUM SUCCINATE 100 MG: 100 INJECTION, POWDER, FOR SOLUTION INTRAMUSCULAR; INTRAVENOUS at 04:02

## 2023-08-20 RX ADMIN — THIAMINE HYDROCHLORIDE 500 MG: 100 INJECTION, SOLUTION INTRAMUSCULAR; INTRAVENOUS at 04:52

## 2023-08-20 RX ADMIN — AZITHROMYCIN 500 MG: 500 INJECTION, POWDER, LYOPHILIZED, FOR SOLUTION INTRAVENOUS at 03:40

## 2023-08-20 RX ADMIN — LEVOTHYROXINE SODIUM 175 MCG: 0.17 TABLET ORAL at 15:08

## 2023-08-20 RX ADMIN — SODIUM CHLORIDE, POTASSIUM CHLORIDE, SODIUM LACTATE AND CALCIUM CHLORIDE 500 ML: 600; 310; 30; 20 INJECTION, SOLUTION INTRAVENOUS at 09:52

## 2023-08-20 RX ADMIN — NOREPINEPHRINE BITARTRATE 0.05 MCG/KG/MIN: 1 INJECTION, SOLUTION, CONCENTRATE INTRAVENOUS at 03:14

## 2023-08-20 RX ADMIN — FLUDROCORTISONE ACETATE 0.1 MG: 0.1 TABLET ORAL at 15:07

## 2023-08-20 RX ADMIN — SODIUM CHLORIDE, POTASSIUM CHLORIDE, SODIUM LACTATE AND CALCIUM CHLORIDE 1000 ML: 600; 310; 30; 20 INJECTION, SOLUTION INTRAVENOUS at 00:52

## 2023-08-20 RX ADMIN — ENOXAPARIN SODIUM 80 MG: 100 INJECTION SUBCUTANEOUS at 21:55

## 2023-08-20 RX ADMIN — MAGNESIUM SULFATE HEPTAHYDRATE 2 G: 2 INJECTION, SOLUTION INTRAVENOUS at 05:54

## 2023-08-20 RX ADMIN — HYDROCORTISONE SODIUM SUCCINATE 25 MG: 100 INJECTION, POWDER, FOR SOLUTION INTRAMUSCULAR; INTRAVENOUS at 21:53

## 2023-08-20 RX ADMIN — ENOXAPARIN SODIUM 80 MG: 100 INJECTION SUBCUTANEOUS at 11:42

## 2023-08-20 RX ADMIN — CEFTRIAXONE SODIUM 1000 MG: 10 INJECTION, POWDER, FOR SOLUTION INTRAVENOUS at 17:46

## 2023-08-20 RX ADMIN — HYDROCORTISONE SODIUM SUCCINATE 50 MG: 100 INJECTION, POWDER, FOR SOLUTION INTRAMUSCULAR; INTRAVENOUS at 15:03

## 2023-08-20 SDOH — ECONOMIC STABILITY: TRANSPORTATION INSECURITY
IN THE PAST 12 MONTHS, HAS THE LACK OF TRANSPORTATION KEPT YOU FROM MEDICAL APPOINTMENTS OR FROM GETTING MEDICATIONS?: NO

## 2023-08-20 SDOH — ECONOMIC STABILITY: TRANSPORTATION INSECURITY
IN THE PAST 12 MONTHS, HAS LACK OF RELIABLE TRANSPORTATION KEPT YOU FROM MEDICAL APPOINTMENTS, MEETINGS, WORK OR FROM GETTING THINGS NEEDED FOR DAILY LIVING?: NO

## 2023-08-20 ASSESSMENT — PAIN DESCRIPTION - PAIN TYPE
TYPE: ACUTE PAIN

## 2023-08-20 ASSESSMENT — ENCOUNTER SYMPTOMS
CHILLS: 1
FEVER: 1
SHORTNESS OF BREATH: 1
WEAKNESS: 1

## 2023-08-20 ASSESSMENT — FIBROSIS 4 INDEX: FIB4 SCORE: 2.57

## 2023-08-20 NOTE — ED TRIAGE NOTES
Chief Complaint   Patient presents with    Shortness of Breath     Pt BIB [facility] from [facility] for above complaint. Pt wear CPAP at night but is on RA during the day. Pt complaining of SOB x 1 week and RA saturation for rehab staff this evening was 78%. Pt placed on NRB at 15L now sating >90%. Hx of a fib, for EMS pt found to be in a fib with -140s.

## 2023-08-20 NOTE — PROGRESS NOTES
Critical Care Triage Note     I have seen and examined the patient today.  I have reviewed the medical record, laboratory data, imaging, and all relevant studies.  Guru Pena Is a 89 y.o. who presented on 8/19/2023 for the problem of acute on chronic hypoxic respiratory failure. He was recently discharge last week due to a stroke. He has a hx of chronic hypoxic respiratory failure, JAMES and A fib.  He was found desaturating at 76% and improved with oxygen supplementation, also found on A fib with RVR and improved with a single dose of diltiazem. Initial lactic acid was elevated but is already improving.  He did get soft Bps but were improved with IVF, suspected to have a septic component.       Pertinent labs and imaging results:   DX-CHEST-PORTABLE (1 VIEW)   Final Result      1.  Enlarged cardiac silhouette with changes of interstitial edema.   2.  Bibasilar airspace disease could be related to edema, atelectasis or pneumonitis.           Problems:  Acute on chronic hypoxic respiratory failure  A-fib with RVR, improving  Lactic acidosis  Leukopenia  Abnormal chest x-ray with pulmonary edema versus early infiltrate      Assessment:  Agree patient is appropriate for IMCU  Agree with continuation of oxygen supplementation  Agree with careful administration of IVF  Agree with empiric antibiotics  Transfer center to contact hospitalist for admission      Scott Sacuedo MD FACP FCCP  Pulmonary/Critical Care  [facility]   of Clinical Internal Medicine  [facility]

## 2023-08-20 NOTE — PROGRESS NOTES
Staff observed CNA using vital machine with patient when passing room. Patient had been complaining of being cold quite frequently up until this point, and when observed, patient appeared to be shivering and in respiratory distress with quick labored respirations and audible stridor also reporting nausea. Overnight oximetry was reading in the low 70s and tachycardia with irregular rhythm. Patient placed on bedside O2 at 4L via nasal cannula with no real improvement in O2 saturation. O2 titrated to 6L with another small improvement but still saturating in mid 70s. At this point rapid response called. Oxymask retrieved for better O2 flow and patient mouth breathing labored respirations. Patient given Compazine PRN for nausea. Full set of vitals taken (see doc flowsheets). - Vitals difficult to get due to patient shivering. On call physician Dr. Zimmerman paged and updated regarding patients current presenting condition with no real improvement in vitals. Orders given to transfer patient to ER for further evaluation. [facility] called and updated for emergency transport to [facility] ER. Charge RN updated family member and called in report to [facility] ER for oncoming patient. Patient left hospital via [facility] transport approximately 2200.    Please refer to doc flowsheets for further data

## 2023-08-20 NOTE — FLOWSHEET NOTE
08/19/23 1852   Events/Summary/Plan   Events/Summary/Plan Placed on overnight pulse ox. Gave night RN report   Vital Signs   Pulse (!) 107   Respiration 18   Pulse Oximetry 88 %   $ Pulse Oximetry (Spot Check) Yes   Respiratory Assessment   Level of Consciousness Alert   Chest Exam   Work Of Breathing / Effort Within Normal Limits   Oxygen   O2 Delivery Device Room air w/o2 available

## 2023-08-20 NOTE — ASSESSMENT & PLAN NOTE
Reinitiate rate control once the patient's blood pressure has stabilized, for the time being transition to midodrine  Telemetry monitoring   Optimize electrolytes  Restarted Eliquis  Echo 8/23 EF 55%

## 2023-08-20 NOTE — ASSESSMENT & PLAN NOTE
Stable   Avoid nephrotoxic agents  Renally adjust all medications  Bladder scan PRN   Insert emanuel catheter

## 2023-08-20 NOTE — DISCHARGE PLANNING
Case Management / Initial authorization:    Auth # [id number]  Days authorized: 08/16-08/22  Name: Luis SHELTON  Phone: [phone]  Fax: [phone]

## 2023-08-20 NOTE — PROGRESS NOTES
4 Eyes Skin Assessment Completed by KAELYN Freeman and KAELYN Quiros.    Head WDL  Ears Bilateral Pink and Blanching   Nose Redness and Blanching      Mouth WDL  Neck WDL  Breast/Chest WDL  Shoulder Blades WDL  Spine WDL  (R) Arm/Elbow/Hand Bruising  (L) Arm/Elbow/Hand Bruising  Abdomen WDL  Groin Pink and Blanching  Scrotum/Coccyx/Buttocks Redness and Blanching      (R) Leg Scar and Scab  (L) Leg Scar and Scab  (R) Heel/Foot/Toe Dry and Scaly  (L) Heel/Foot/Toe Dry and Scaly          Devices In Places ECG, Blood Pressure Cuff, Pulse Ox, Catherine, and Oxy Mask      Interventions In Place Waffle Overlay, TAP System, Pillows, Q2 Turns, Low Air Loss Mattress, and ZFlo Pillow    Possible Skin Injury No    Pictures Uploaded Into Epic Yes  Wound Consult Placed N/A  RN Wound Prevention Protocol Ordered Yes

## 2023-08-20 NOTE — ED NOTES
Pt transported to floor with ACLS RN connected to monitor and oxygen. All belongings and chart transported with pt.

## 2023-08-20 NOTE — THERAPY
Speech Language Therapy Contact Note    Patient Name: Guru Pena  Age:  89 y.o., Sex:  male  Medical Record #: [id number]  Today's Date: 8/20/2023    Orders received for CSE. RN reports poor alertness/able to rouse and maintain arousal. Not appropriate for CSE at this time, especially with consideration of recent documented history of dysphagia. Will follow when pt's alertness indicates readiness to participate in CSE. Please keep pt NPO until SLP evaluation.     08/20/23 0832   Treatment Variance   Reason For Missed Therapy Medical - Patient Unarousable;Medical - Patient Is Not Medically Stable   Interdisciplinary Plan of Care Collaboration   IDT Collaboration with  Nursing

## 2023-08-20 NOTE — CARE PLAN
The patient is Unstable - High likelihood or risk of patient condition declining or worsening    Shift Goals  Clinical Goals: Maintain adequate oxygenation, increase mendation  Patient Goals: Rest  Family Goals: RYAN    Progress made toward(s) clinical / shift goals:    Problem: Respiratory  Goal: Patient will achieve/maintain optimum respiratory ventilation and gas exchange  Outcome: Progressing  Flowsheets  Taken 8/20/2023 0400  O2 Delivery Device: Oxymask  Taken 8/20/2023 0240  Deep Breathe and Cough: Performs Correctly  Note: Pt is tolerating titration of O2. Will continue to wean.     Problem: Pain - Standard  Goal: Alleviation of pain or a reduction in pain to the patient’s comfort goal  Outcome: Progressing  Flowsheets (Taken 8/20/2023 0400)  Pain Rating Scale (NPRS): 0  Note: Pt does not endorse pain       Patient is not progressing towards the following goals:

## 2023-08-20 NOTE — ED PROVIDER NOTES
ED Provider Note    CHIEF COMPLAINT  Chief Complaint   Patient presents with    Shortness of Breath       EXTERNAL RECORDS REVIEWED  Inpatient Notes discharge summary from yesterday reviewed with primary diagnosis of stroke.  It is also noted the patient had elevated troponin with chronic A-fib and chronic obstructive sleep apnea on CPAP.  Also known pulmonary hypertension as well as stage IIIa CKD.    Echocardiogram 8/13/2023: CONCLUSIONS  Prior study on 07/25/2023, compared to the report of the prior study,   there has been no significant change.   Limited echocardiogram.  Grossly normal left ventricular systolic function.  The left ventricular ejection fraction is visually estimated to be 55%.  Nondiagnostic agitated saline study.  The aortic root is dilated with a diameter of 4.8 cm.  A definite cardiac source for a systemic thromboembolic event is not   identified, failure to do so does not exclude its presence. If   clinically indicated, a transesophageal study would be useful.     HPI/ROS  LIMITATION TO HISTORY   Select: Respiratory failure  OUTSIDE HISTORIAN(S):  EMS report directly to myself    Guru Pena is a 89 y.o. male who presents to the emergency department with acute respiratory failure.  Past medical history as document below.  Patient recently transferred to [facility] rehab after stroke.  Past medical history as document below.  Chronic A-fib on Eliquis.  EMS reports that the patient was reportedly doing an oxygen study for oxygen weaning however he was noted to be significant hypoxic in the high 70s and then had respiratory decompensation.  For this reason oxygen supplementation resuscitation was initiated with nonrebreather mask at 15 L/min with oxygen improving into the mid 90s.      At this point the patient's primary complaint is dyspnea in addition to some left-sided chest discomfort.  Denies any other significant changes. history is somewhat limited secondary to current clinical  condition and respiratory failure    PAST MEDICAL HISTORY   has a past medical history of A-fib (HCC), COVID-19, Elevated troponin (06/30/2021), Generalized weakness (06/22/2021), Hypothyroid, Other specified hypothyroidism (06/01/2021), Pulmonary fibrosis (HCC), Sleep apnea, and Urinary retention.    SURGICAL HISTORY  patient denies any surgical history    FAMILY HISTORY  No family history on file.    SOCIAL HISTORY  Social History     Tobacco Use    Smoking status: Never    Smokeless tobacco: Never   Vaping Use    Vaping Use: Never used   Substance and Sexual Activity    Alcohol use: Never    Drug use: Never    Sexual activity: Not on file       CURRENT MEDICATIONS  Home Medications       Reviewed by Pratibha Huitron (Pharmacy Tech) on 08/20/23 at 0031  Med List Status: Complete     Medication Last Dose Status   acetaminophen (TYLENOL) 325 MG Tab PRN Active   Alum & Mag Hydroxide-Simeth (MAALOX PLUS PO) PRN Active   apixaban (ELIQUIS) 2.5mg Tab 8/19/2023 Active   atorvastatin (LIPITOR) 40 MG Tab 8/19/2023 Active   carboxymethylcellulose (REFRESH TEARS) 0.5 % Solution PRN Active   docusate sodium (DOCUSATE MINI) 283 MG enema PRN Active   fludrocortisone (FLORINEF) 0.1 MG Tab 8/19/2023 Active   ipratropium-albuterol (DUONEB) 0.5-2.5 (3) MG/3ML nebulizer solution PRN Active   lactulose 20 GM/30ML Solution PRN Active   levothyroxine (SYNTHROID) 175 MCG Tab 8/19/2023 Active   magnesium hydroxide (MILK OF MAGNESIA) 400 MG/5ML Suspension prn Active   melatonin 3 MG Tab PRN Active   menthol (CEPACOL) 3 MG lozenge PRN Active   polyethylene glycol/lytes (MIRALAX) 17 g Pack PPRN Active   prochlorperazine (COMPAZINE) 10 MG Tab 8/19/2023 Active   senna-docusate (PERICOLACE OR SENOKOT S) 8.6-50 MG Tab 8/19/2023 Active   sodium chloride (OCEAN) 0.65 % Solution prn Active   traZODone (DESYREL) 50 MG Tab prn Active   vitamin D (CHOLECALCIFEROL) 1000 Unit (25 mcg) Tab 8/12/2023 Active                    ALLERGIES  Allergies    Allergen Reactions    Digoxin      nausea       PHYSICAL EXAM  VITAL SIGNS: BP 92/50   Pulse (!) 108   Temp 37.7 °C (99.9 °F) (Axillary)   Resp (!) 24   Ht 1.829 m (6')   Wt 71.7 kg (158 lb)   SpO2 95%   BMI 21.43 kg/m²      Pulse ox interpretation: I interpret this pulse ox as normal.  Constitutional: Alert in no apparent distress.  HENT: No signs of trauma, Bilateral external ears normal, Nose normal.   Eyes: Pupils are equal and reactive  Neck: Normal range of motion, No tenderness, Supple  Cardiovascular: Regular rate and rhythm, no murmurs.   Thorax & Lungs: Normal breath sounds, No respiratory distress, No wheezing, No chest tenderness.   Abdomen: Bowel sounds normal, Soft, No tenderness  Skin: Warm, Dry, No erythema, No rash.   Extremities: Intact distal pulses, No edema  Musculoskeletal: Good range of motion in all major joints. No tenderness to palpation or major deformities noted.   Neurologic: Alert , Normal motor function, Normal sensory function, No focal deficits noted.   Psychiatric: Affect normal, Judgment normal, Mood normal.         DIAGNOSTIC STUDIES / PROCEDURES    LABS  Results for orders placed or performed during the hospital encounter of 08/19/23   CBC With Differential   Result Value Ref Range    WBC 3.1 (L) 4.8 - 10.8 K/uL    RBC 4.68 (L) 4.70 - 6.10 M/uL    Hemoglobin 15.4 14.0 - 18.0 g/dL    Hematocrit 46.5 42.0 - 52.0 %    MCV 99.4 (H) 81.4 - 97.8 fL    MCH 32.9 27.0 - 33.0 pg    MCHC 33.1 32.3 - 36.5 g/dL    RDW 54.4 (H) 35.9 - 50.0 fL    Platelet Count 267 164 - 446 K/uL    MPV 10.1 9.0 - 12.9 fL    Neutrophils-Polys 70.40 44.00 - 72.00 %    Lymphocytes 21.90 (L) 22.00 - 41.00 %    Monocytes 5.80 0.00 - 13.40 %    Eosinophils 1.00 0.00 - 6.90 %    Basophils 0.30 0.00 - 1.80 %    Immature Granulocytes 0.60 0.00 - 0.90 %    Nucleated RBC 0.00 0.00 - 0.20 /100 WBC    Neutrophils (Absolute) 2.19 1.82 - 7.42 K/uL    Lymphs (Absolute) 0.68 (L) 1.00 - 4.80 K/uL    Monos (Absolute)  0.18 0.00 - 0.85 K/uL    Eos (Absolute) 0.03 0.00 - 0.51 K/uL    Baso (Absolute) 0.01 0.00 - 0.12 K/uL    Immature Granulocytes (abs) 0.02 0.00 - 0.11 K/uL    NRBC (Absolute) 0.00 K/uL   Comp Metabolic Panel   Result Value Ref Range    Sodium 138 135 - 145 mmol/L    Potassium 4.1 3.6 - 5.5 mmol/L    Chloride 99 96 - 112 mmol/L    Co2 23 20 - 33 mmol/L    Anion Gap 16.0 7.0 - 16.0    Glucose 125 (H) 65 - 99 mg/dL    Bun 18 8 - 22 mg/dL    Creatinine 1.18 0.50 - 1.40 mg/dL    Calcium 9.5 8.5 - 10.5 mg/dL    Correct Calcium 9.7 8.5 - 10.5 mg/dL    AST(SGOT) 28 12 - 45 U/L    ALT(SGPT) 16 2 - 50 U/L    Alkaline Phosphatase 114 (H) 30 - 99 U/L    Total Bilirubin 0.6 0.1 - 1.5 mg/dL    Albumin 3.7 3.2 - 4.9 g/dL    Total Protein 7.5 6.0 - 8.2 g/dL    Globulin 3.8 (H) 1.9 - 3.5 g/dL    A-G Ratio 1.0 g/dL   Lipase   Result Value Ref Range    Lipase 19 11 - 82 U/L   Lactic Acid   Result Value Ref Range    Lactic Acid 6.0 (HH) 0.5 - 2.0 mmol/L   Lactic Acid   Result Value Ref Range    Lactic Acid 3.4 (H) 0.5 - 2.0 mmol/L   CoV-2, Flu A/B, And RSV by PCR ([employer])    Specimen: Respirate   Result Value Ref Range    Influenza virus A RNA Negative Negative    Influenza virus B, PCR Negative Negative    RSV, PCR Negative Negative    SARS-CoV-2 by PCR NotDetected     SARS-CoV-2 Source NP Swab    Troponin   Result Value Ref Range    Troponin T 63 (H) 6 - 19 ng/L   proBrain Natriuretic Peptide, NT   Result Value Ref Range    NT-proBNP 2897 (H) 0 - 125 pg/mL   Prothrombin Time   Result Value Ref Range    PT 16.4 (H) 12.0 - 14.6 sec    INR 1.35 (H) 0.87 - 1.13   APTT   Result Value Ref Range    APTT 33.0 24.7 - 36.0 sec   COD - Adult (Type and Screen)   Result Value Ref Range    ABO Grouping Only O     Rh Grouping Only POS     Antibody Screen-Cod NEG    URINALYSIS    Specimen: Urine, Clean Catch   Result Value Ref Range    Color DK Yellow     Character Cloudy (A)     Specific Gravity 1.022 <1.035    Ph 5.5 5.0 - 8.0    Glucose Negative  Negative mg/dL    Ketones Trace (A) Negative mg/dL    Protein 30 (A) Negative mg/dL    Bilirubin Negative Negative    Urobilinogen, Urine 1.0 Negative    Nitrite Negative Negative    Leukocyte Esterase Small (A) Negative    Occult Blood Small (A) Negative    Micro Urine Req Microscopic    URINE MICROSCOPIC (W/UA)   Result Value Ref Range    WBC 5-10 (A) /hpf    RBC 2-5 (A) /hpf    Bacteria Few (A) None /hpf    Epithelial Cells Few /hpf    Mucous Threads Few /hpf    Hyaline Cast 3-5 (A) /lpf   ABG - LAB   Result Value Ref Range    Ph 7.37 (L) 7.40 - 7.50    Pco2 43.5 (H) 26.0 - 37.0 mmHg    Po2 26.2 (LL) 64.0 - 87.0 mmHg    O2 Saturation 45.0 (L) 93.0 - 99.0 %    Hco3 25 17 - 25 mmol/L    Base Excess -1 -4 - 3 mmol/L    Body Temp 39.1 Centigrade    O2 Therapy 15Lo2     O2 Therapy 15.0 (H) 2.0 - 10.0 L/min    Ph -TC 7.45 7.40 - 7.50    Pco2 -TC 34.8 26.0 - 37.0 mmHg    Po2 -TC 18.2 (LL) 64.0 - 87.0 mmHg   ESTIMATED GFR   Result Value Ref Range    GFR (CKD-EPI) 56 (A) >60 mL/min/1.73 m 2   EKG   Result Value Ref Range    Report       [facility] Emergency Dept.    Test Date:  2023  Pt Name:    JANICE HARRISON                  Department: ER  MRN:        [id number]                      Room:        08  Gender:     Male                         Technician: [id number]  :        [id number]                   Requested By:SANCHEZ CALZADA  Order #:    [id number]                    Reading MD: Sanchez Calzada    Measurements  Intervals                                Axis  Rate:       141                          P:          0  IA:         0                            QRS:        254  QRSD:       158                          T:          49  QT:         301  QTc:        461    Interpretive Statements  Extreme tachycardia with wide complex, no further rhythm analysis attempted  Compared to ECG 2023 14:32:32  Sinus tachycardia no longer present  Left anterior fascicular block no longer present  Right  bundle-branch block no longer present  Electronically Signed On 08- 00:01:29 PDT by Sanchez Ahuja           RADIOLOGY  I have independently interpreted the diagnostic imaging associated with this visit and am waiting the final reading from the radiologist.   My preliminary interpretation is as follows: Vascular congestion and general haziness likely secondary to chronic fibrosis  Radiologist interpretation:   DX-CHEST-PORTABLE (1 VIEW)   Final Result      1.  Enlarged cardiac silhouette with changes of interstitial edema.   2.  Bibasilar airspace disease could be related to edema, atelectasis or pneumonitis.        CRITICAL CARE  The very real possibilty of a deterioration of this patient's condition required the highest level of my preparedness for sudden, emergent intervention.  I provided critical care services, which included medication orders, frequent reevaluations of the patient's condition and response to treatment, ordering and reviewing test results, and discussing the case with various consultants.  The critical care time associated with the care of the patient was 40 minutes. Review chart for interventions. This time is exclusive of any other billable procedures.       COURSE & MEDICAL DECISION MAKING    ED Observation Status? No; Patient does not meet criteria for ED Observation.     INITIAL ASSESSMENT, COURSE AND PLAN  Care Narrative: Patient presenting with department with respiratory failure and hypoxia.  History as above.  SIRS positive.  Will initiate sepsis protocol to include IV fluids, antibiotics and work-up as appropriate.  DISPOSITION AND DISCUSSIONS  I have discussed management of the patient with the following physicians and LILI's: Intensivist and hospitalist    Discussion of management with other [facility] or appropriate source(s): Pharmacy for medication verification      89-year-old male presenting the [place] apartment with acute respiratory failure with hypoxia.  SIRS positive concern  for sepsis.  Initial EKG monitoring and screening significant for A-fib with RVR.  Sepsis work-up as above.  Initial lactic acid of 6.  Downtrending to just over 3 after volume resuscitation and ongoing oxygen supplementation.  Additional IV antibiotics and rate control has been provided.  Additional febrile control also given.  Again chest x-ray is somewhat difficult to interpret given longstanding fibrosis.  Concern for possible infiltrative infectious etiology given his current clinical presentation and correlating clinically.    At this point I do believe the patient would be best served by placement in the IMCU.  I do not believe that he meets clinical criteria for full ICU placement at this time.  Patient is a full code.  I discussed case with the intensivist as well as the hospitalist for ongoing inpatient care which agree with the above plan and treatment course.    FINAL DIAGNOSIS  1. Acute on chronic respiratory failure with hypoxia (HCC)    2. Sepsis, due to unspecified organism, unspecified whether acute organ dysfunction present (HCC)    3.  A-fib with RVR  4.  Lactic acidosis       Electronically signed by: Sanchez Ahuja M.D., 8/19/2023 10:30 PM

## 2023-08-20 NOTE — ASSESSMENT & PLAN NOTE
Given Age and critical illness highly recommend palliative consultation  In discussion with patient and patient's son the patient at this time requested full code and will want maximized medical care

## 2023-08-20 NOTE — THERAPY
"Speech Language Pathology   Clinical Swallow Evaluation     Patient Name: Guru Pena  AGE:  89 y.o., SEX:  male  Medical Record #: [id number]  Date of Service: 2023      History of Present Illness  89 y/o male presented  from [facility] for SOB and hypoxia into 70% oxygenation. Placed on NRB at 15L. Admitted for septic shock related to possible pneumonia, A-fib with RVR, toxic metabolic encephalopathy.    Recently seen by SLP at [facility] for FEES on  with recommendations for PU/MT. Has since been on that diet.    CMHx: Recent CVA s/p TNK, septic shock, afib, CKD, acute on chronic respiratory failure, toxic metabolic encephalopathy  PMHx: COVID, urinary retention, OSE on CPAP, pulmonary HTN    CXR :  \"1.  Enlarged cardiac silhouette with changes of interstitial edema.  2.  Bibasilar airspace disease could be related to edema, atelectasis or pneumonitis.\"      General Information:  Vitals  Pulse Oximetry: 95 %  O2 (LPM): 4  O2 Delivery Device: Nasal Cannula  Vitals Comments: changed to NC by RN upon SLP arrival  Level of Consciousness: Alert, Awake  Patient Behaviors: Confused  Orientation: Self, , General place  Follows Directives: Yes - simple commands only      Prior Living Situation & Level of Function:  Comments: [facility]; previously 24 hr care living with spouse in [place]  Swallowing: Impaired - recent FEES during ICU admit from [facility] recommended PU/MT with acceptance of some risk. Previously RG/TN per pt. Pt does recall modified diet but does not recall FEES porcedure.       Oral Mechanism Evaluation:  Dentition: Upper partial, Lower partial   Facial Symmetry:  (Very mild L central droop, near resolved)  Facial Sensation: Equal     Labial Observations: WFL   Lingual Observations: Midline, Xerostomia  Motor Speech: WFL            Laryngeal Function:  Secretion Management: Adequate  Voice Quality: WFL  Cough: Perceptually WNL         Subjective  Pt agreeable and cooperative " "with SLP evaluation tasks. \"Cranberry juice\" and \"That sounds like a well thought-out test.\" Family at bedside able to assist with clinical interview.      Assessment  Current Method of Nutrition: NPO until cleared by speech pathology  Bolus Administration: Patient, SLP  O2 (LPM): 4 O2 Delivery Device: Nasal Cannula  Factor(s) Affecting Performance: None  Tracheostomy : No      Swallowing Trials:  Mildly Thick Liquid (MT2): WFL  Pureed (PU4): WFL      Comments: Pt w/ appropriate self-feeding. Appropriate oral bolus stripping and containment. Total AP transit and effective bolus formation. Functional and timely mastication of solid consistencies. No overt s/sx of aspiration noted w/ single sips of MT2 juice. Able to complete throat clear strategy. No increase in WOB, no cough/clear, no change in vocal quality. One to two swallows appreciated per bolus.         Clinical Impressions  Pt presents with appropriateness to resume diet recommendations from most recent instrumental evaluation. However, given both new respiratory concern and time elapsed since time of study, would recommend FEES (over MBSS related to mobility and hx of spinal fx) to further evaluate swallow and inform POC. RN, family updated.       Recommendations  Puree solids with mildly thick liquids  Instrumentation: FEES  Medication: Crush with applesauce, as appropriate, Non Oral  Supervision: 1:1 feeding with constant supervision, Encourage self-feeding  Positioning: Fully upright and midline during oral intake  Risk Management : Small bites/sips, Slow rate of intake, Physical mobility, as tolerated, No straws, Alternate bites and sips (Periodic throat clear)  Oral Care: Pre- and post-meals         SLP Treatment Plan  Treatment Plan: Dysphagia Treatment, Patient/Family/Caregiver Training  SLP Frequency: 3x Per Week  Estimated Duration: Until Therapy Goals Met      Anticipated Discharge Needs  Discharge Recommendations: Recommend post-acute placement " "for additional speech therapy services prior to discharge home   Therapy Recommendations Upon DC: Dysphagia Training, Patient / Family / Caregiver Education, Community Re-Integration        Patient / Family Goals  Patient / Family Goal #1: \"That's a well thought out test.\"  Short Term Goals  Short Term Goal # 1: Pt will consume diet of PU/MT given 1:1 spv and strategy use with no worsening of respiratory status.  Short Term Goal # 2: Pt will complete FEES w SLP to further evaluate swallow function and inform POC.      Roxanna Ash, SLP   "

## 2023-08-20 NOTE — CARE PLAN
The patient is Unstable - High likelihood or risk of patient condition declining or worsening    Shift Goals  Clinical Goals: stable BPs  Patient Goals: sleep  Family Goals: no family present at this time      Problem: Knowledge Deficit - Standard  Goal: Patient and family/care givers will demonstrate understanding of plan of care, disease process/condition, diagnostic tests and medications  Outcome: Not Progressing     Problem: Hemodynamics  Goal: Patient's hemodynamics, fluid balance and neurologic status will be stable or improve  Outcome: Not Progressing     Problem: Fluid Volume  Goal: Fluid volume balance will be maintained  Outcome: Not Progressing     Problem: Pain - Standard  Goal: Alleviation of pain or a reduction in pain to the patient’s comfort goal  Outcome: Progressing     Problem: Skin Integrity  Goal: Skin integrity is maintained or improved  Outcome: Progressing     Problem: Fall Risk  Goal: Patient will remain free from falls  Outcome: Progressing

## 2023-08-20 NOTE — PROGRESS NOTES
Late entry:    Pt arrived to T-605 with this RN at 0224. Two RN skin assessment completed. CHG bath provided. Pt oriented to room and call light is at bedside.     At 0300 pt's blood pressure dropped to 63/38 with a map of 47. Notified MD Holguin. Orders for norepinephrine gtt.

## 2023-08-20 NOTE — PROGRESS NOTES
Staff reported to undersigned that the patient's oxygen sat is declining and obvious distress is noted. Assessed patient. Vital signs taken. Oxygen saturation is 85% on nasal cannula. Placed on oxymask but oxygen saturation still going down. Non rebreather applied and patient's sat went up to 88%. Staff contacted Dr. Zimmerman while intervention is being done. Ordered to send patient out for further management. 911 called and charge nurse Mitch on [facility] informed that the patient is coming. Patient's next of kin attempted to call several times but did not . Left voicemail for a call back. Patient went out at 2200 via gurney with 2 paramedics.

## 2023-08-20 NOTE — PROGRESS NOTES
NURSING DAILY NOTE    Name: Guru Pena   Date of Admission: 8/18/2023   Admitting Diagnosis: Ischemic stroke (HCC)  Attending Physician: Chante Kern M.d.  Allergies: Digoxin    Safety  Patient Assist     Patient Precautions  Fall Risk, Swallow Precautions  Precaution Comments  Level 4- Puree and Level 2- Mildly Thick liquids  Bed Transfer Status  Moderate Assist (mod-max A)  Toilet Transfer Status   Total Assist  Assistive Devices  Rails, Wheelchair, Wheelchair push  Oxygen  Room air w/o2 available  Diet/Therapeutic Dining  Current Diet Order   Procedures    Diet Order Diet: Level 4 - Pureed (meds crushed. oral care before/after meals. perodic cough w MT2); Liquid level: Level 2 - Mildly Thick; Tray Modifications (optional): SLP - 1:1 Supervision by Nursing, SLP - Deliver to Nursing Station, No Straws     Pill Administration  crushed  Agitated Behavioral Scale  16  ABS Level of Severity  No Agitation    Fall Risk  Has the patient had a fall this admission?   No  Danette Mares Fall Risk Scoring  19, HIGH RISK  Fall Risk Safety Measures  bed alarm, chair alarm, poor balance, and low vision/ hearing    Vitals  Temperature: 37.2 °C (98.9 °F)  Temp src: Oral  Pulse: 77 (history of AFIB)  Respiration: 18  Blood Pressure : 130/87  Blood Pressure MAP (Calculated): 101 MM HG  BP Location: Right, Upper Arm  Patient BP Position: Supine     Oxygen  Pulse Oximetry: 95 %  O2 (LPM): 1 (placed on RA)  O2 Delivery Device: Room air w/o2 available    Bowel and Bladder  Last Bowel Movement  08/19/23  Stool Type  Type 6: Fluffy pieces with ragged edges, a mushy stool  Bowel Device  Diaper  Continent  Bladder: Continent void   Bowel: Continent movement  Bladder Function  Urine Void (mL): 100 ml (urinal)  Number of Times Voided: 1  Urinary Options: Yes  Urine Color: Daphne  Genitourinary Assessment   Bladder Assessment (WDL):  WDL Except (incontinent with condom  catheter in place)  Urine Color: Daphne  Bladder Device: Other Bladder Device (Comment)  Time Void: Yes  Bladder Scan: Post Void  $ Bladder Scan Results (mL): 272    Skin  Vaibhav Score   14  Sensory Interventions   Bed Types: Standard/Trauma Mattress  Skin Preventative Measures: Waffle Overlay  Skin Preventative Dressing: Protecta-gel (BIPAP & C-PAP)  Moisture Interventions  Moisturizers/Barriers: Barrier Cream      Pain  Pain Rating Scale  0 - No Pain  Pain Location     Pain Location Orientation     Pain Interventions   Declines    ADLs    Bathing      Linen Change   Partial  Personal Hygiene  Change Aurora Pads, Moist Aurora Wipes, Perineal Care  Chlorhexidine Bath      Oral Care     Teeth/Dentures     Shave     Nutrition Percentage Eaten  Dinner, Between 25-50% Consumed  Environmental Precautions     Patient Turns/Positioning  Supine  Patient Turns Assistance/Tolerance  Assistance of One  Bed Positions     Head of Bed Elevated         Psychosocial/Neurologic Assessment  Psychosocial Assessment     Neurologic Assessment  Level of Consciousness: Alert  Orientation Level: Oriented X4  Cognition: Follows commands  EENT (WDL):  WDL Except    Cardio/Pulmonary Assessment  Edema      Respiratory Breath Sounds  RUL Breath Sounds: Clear  RML Breath Sounds: Clear  RLL Breath Sounds: Diminished  ASIF Breath Sounds: Clear  LLL Breath Sounds: Diminished  Cardiac Assessment   Cardiac (WDL):  WDL Except (afib)

## 2023-08-20 NOTE — ASSESSMENT & PLAN NOTE
This is Septic shock Present on admission  SIRS criteria identified on my evaluation include: Fever, with temperature greater than 100.9 deg F, Tachycardia, with heart rate greater than 90 BPM and Leukopenia, with WBC less than 4,000  Clinical indicators of end organ dysfunction include Hypotension with systolic blood pressure less than 90 or MAP less than 65, Lactic Acid greater than 2 and Toxic Metabolic Encephalopathy  Indicators of septic shock include: Sepsis present and persistent hypotension as well as initial lactate level result is greater than or equal to 4mmol/L   Sources is: Possible PNA   Sepsis protocol initiated  Crystalloid Fluid Administration: Fluid resuscitation ordered per standard protocol - 30 mL/kg per current or ideal body weight  IV antibiotics as appropriate for source of sepsis  Reassessment: I have reassessed the patient's hemodynamic status    Rocephin/Zithromax   F/u respiratory, blood cx   Levophed, gtt to maintain MAP> 65, SBP > 90   Gentle IV Fluids, monitor for fluid overload   Insert emanuel monitor intake/output

## 2023-08-20 NOTE — DISCHARGE PLANNING
CASE MANAGEMENT INITIAL ASSESSMENT    Admit Date:  8/18/2023     Case Management has reviewed the medical chart and will meet with patient to discuss role of case management / discharge planning / team conference.   Patient is a  89 y.o. [place] [facility] [place]    Attending physician: Dr. Kern  PCP: Dr. Alex Smart     Diagnosis: Ischemic stroke ([facility]) [I63.9]    Co-morbidities:   Patient Active Problem List    Diagnosis Date Noted    Acute on chronic respiratory failure ([facility]) 08/20/2023    Septic shock ([facility]) 08/20/2023    Toxic metabolic encephalopathy 08/20/2023    Acute febrile illness 08/20/2023    ACP (advance care planning) 08/20/2023    Central retinal vein occlusion 08/18/2023    Ischemic stroke ([facility]) 08/18/2023    Prolonged Q-T interval on ECG 08/18/2023    Monocular vision loss 08/16/2023    CVA (cerebral vascular accident) ([facility]) 08/12/2023    Idiopathic hypotension 08/12/2023    COVID-19 07/23/2023    Acute hypoxemic respiratory failure ([facility]) 07/23/2023    Thrombocytopenia ([facility]) 07/23/2023    Hypothyroidism due to acquired atrophy of thyroid 02/01/2022    JAMES on CPAP 08/10/2021    Pulmonary hypertension ([facility])- RVSP= 30,  ECHO 7/2021 08/10/2021    Mild concentric left ventricular hypertrophy (LVH) 08/10/2021    Stage 3a chronic kidney disease ([facility]) 08/10/2021    Urinary retention 08/10/2021    Chronic atrial fibrillation ([facility]) 07/29/2021    Moderate mitral regurgitation by prior echocardiogram 07/29/2021    Mild aortic stenosis by prior echocardiogram 07/29/2021    Pulmonary fibrosis ([facility]) 07/29/2021    Anticoagulated- Eliquis for a. fib 06/30/2021    Elevated troponin 06/30/2021    Weakness 06/22/2021    Atrial fibrillation with RVR ([facility]) 06/01/2021    Vitamin D deficiency 06/01/2021    Impaired vision in both eyes 06/01/2021     Prior Living Situation:  Housing / Facility: Independent Living Facility  Lives with - Patient's Self Care Capacity: Spouse    Prior Level of Function:  Medication  Management: Dependent  Finances: Dependent (wife and son complete these tasks)  Home Management: Dependent (facility does cleaning)  Shopping: Requires Assist  Prior Level Of Mobility: Independent With Device in Home  Driving / Transportation: Relatives / Others Provide Transportation (son does the driving)    Support Systems:  Primary : Damon Pena (son)    Previous Services Utilized:   Equipment Owned: 4-Wheel Walker, Front-Wheel Walker, Wheelchair  Prior Services: Home-Independent    Other Information:  Occupation (Pre-Hospital Vocational): Retired Due To Age  Primary Payor Source: [employer] (medicare)  Primary Care Practitioner : Dr. Alex Smart    Plan:  1. Continue to follow patient through hospitalization and provide discharge planning in collaboration with patient, family, physicians and ancillary services.     2. Utilize community resources to ensure a safe discharge.

## 2023-08-20 NOTE — ASSESSMENT & PLAN NOTE
Unclear maybe secondary to sepsis, Had recent CVA will obtain CT head w/o   Neuro checks every 4 hours  Fall, seizure, aspiration precautions   Patient much improved overall, back to baseline

## 2023-08-20 NOTE — PROGRESS NOTES
Patient seen and examined today.  Data, Medication data reviewed.  Case discussed with nursing as available.  Plan of Care reviewed with patient and notified of changes.     8/20  A.m. admission follow-up note  The patient wakes up, he moves all 4 extremities  Additional fluids provided, titrating off pressors  Patient is currently afebrile  Unlabored respiration, on 4 L nasal cannula oxygen  Laboratories reviewed, white cell count 3.1,  Follow-up lactic acid down to 2.6,  Adequate cortisol level at 39.6,  Elevated Pro-Allen at 1.66,  CT head without new changes, limited quality secondary to additionally  Enlarged cardiac silhouette  Bibasilar airspace disease    Additional fluids provided  Ongoing antibiotics  Close a.m. follow-up with  See orders

## 2023-08-20 NOTE — DISCHARGE PLANNING
LSW attempted to call pt's son, Damon, at both [phone] and [phone]. No answer, VM left at both numbers. LSW attempted to call pt's dtr, Mai, however no answer and unable to leave VM.

## 2023-08-20 NOTE — ASSESSMENT & PLAN NOTE
Recent CVA s/p TNK   Repeat CT head w/o contrast negative for new changes  Neuro checks every 4 hours  Fall, seizure, aspiration precautions

## 2023-08-20 NOTE — ASSESSMENT & PLAN NOTE
Acute on chronic respiratory failure, improving  Initially requiring 15 l NRBM. Oxygenation improving   CXR showing pulm fibrosis, vs infection vs edema.   T max of 102.4 Empiric Rocephin/Zithromax for PNA   F/u respiratory, blood cultures   Downtrended lactic acid

## 2023-08-20 NOTE — H&P
Hospital Medicine History & Physical Note    Date of Service  8/20/2023    Primary Care Physician  Alex Smart M.D.    Consultants  critical care    Specialist Names: Dr. Saucedo     Code Status  Full Code    Chief Complaint  Chief Complaint   Patient presents with    Shortness of Breath       History of Presenting Illness  Guru Pena is a 89 y.o. male past medical history of recent CVA status post TNK, A-fib on Eliquis, chronic respiratory failure oxygen dependent, hypothyroidism who presented 8/19/2023 with shortness of breath and hypoxia saturating down to 70%.  Patient placed on nonrebreather mask at 15 L.  History is limited given patient's critical illness and AMS.  Patient does respond to painful and verbal stimuli however cannot participate in interview.    In the ER, patient noted to meet sepsis criteria started on IV antibiotics and given sepsis protocol fluids.  Critical care consulted and deemed patient appropriate for IMC placement.  Patient will be admitted to IMCU in guarded condition.  Septic shock secondary to possible pneumonia, A-fib with RVR, toxic metabolic encephalopathy.    I discussed the plan of care with bedside RN and ER physician, Dr. Ahuja .    Review of Systems  Review of Systems   Unable to perform ROS: Critical illness   Constitutional:  Positive for chills and fever.   Respiratory:  Positive for shortness of breath.    Neurological:  Positive for weakness.       Past Medical History   has a past medical history of A-fib (HCC), COVID-19, Elevated troponin (06/30/2021), Generalized weakness (06/22/2021), Hypothyroid, Other specified hypothyroidism (06/01/2021), Pulmonary fibrosis (HCC), Sleep apnea, and Urinary retention.    Surgical History   has no past surgical history on file.     Family History  family history is not on file.   Family history reviewed with patient. There is no family history that is pertinent to the chief complaint.     Social History   reports  that he has never smoked. He has never used smokeless tobacco. He reports that he does not drink alcohol and does not use drugs.    Allergies  Allergies   Allergen Reactions    Digoxin      nausea       Medications  Prior to Admission Medications   Prescriptions Last Dose Informant Patient Reported? Taking?   Alum & Mag Hydroxide-Simeth (MAALOX PLUS PO) PRN at PRN MAR from Other Facility Yes Yes   Sig: Take 20 mL by mouth every 2 hours as needed (DYSPEPSIA).   acetaminophen (TYLENOL) 325 MG Tab PRN at PRN MAR from Other Facility Yes No   Sig: Take 650 mg by mouth every four hours as needed.   apixaban (ELIQUIS) 2.5mg Tab 8/19/2023 at 2023 MAR from Other Facility No No   Sig: Take 1 Tablet by mouth 2 times a day.   atorvastatin (LIPITOR) 40 MG Tab 8/19/2023 at 2023 MAR from Other Facility No No   Sig: Take 1 Tablet by mouth every evening.   carboxymethylcellulose (REFRESH TEARS) 0.5 % Solution PRN at PRN MAR from Other Facility Yes Yes   Sig: Administer 1 Drop into both eyes as needed (DRY EYES).   docusate sodium (DOCUSATE MINI) 283 MG enema PRN at PRN MAR from Other Facility Yes Yes   Sig: Insert 283 mg into the rectum as needed (Constipation, if lactulose ineffective after 24 hours).   fludrocortisone (FLORINEF) 0.1 MG Tab 8/19/2023 at 0949 MAR from Other Facility No No   Sig: Take 1 Tablet by mouth every morning.   ipratropium-albuterol (DUONEB) 0.5-2.5 (3) MG/3ML nebulizer solution PRN at PRN MAR from Other Facility Yes No   Sig: Take 3 mL by nebulization every 6 hours as needed for Shortness of Breath.   lactulose 20 GM/30ML Solution PRN at PRN MAR from Other Facility Yes Yes   Sig: Take 30 mL by mouth 1 time a day as needed (IF MOM INEFFECTIVE AFTER 24 HOURS). Indications: Constipation   levothyroxine (SYNTHROID) 175 MCG Tab 8/19/2023 at 0545 MAR from Other Facility Yes No   Sig: Take 175 mcg by mouth every morning on an empty stomach.   magnesium hydroxide (MILK OF MAGNESIA) 400 MG/5ML Suspension prn at prn  MAR from Other Facility Yes Yes   Sig: Take 30 mL by mouth 1 time a day as needed. Indications: Constipation   melatonin 3 MG Tab PRN at PRN MAR from Other Facility Yes Yes   Sig: Take 3 mg by mouth at bedtime as needed. Indications: Trouble Sleeping   menthol (CEPACOL) 3 MG lozenge PRN at PRN MAR from Other Facility Yes Yes   Sig: Take 1 Lozenge by mouth every 2 hours as needed (THROAT/MOUTH PAIN).   polyethylene glycol/lytes (MIRALAX) 17 g Pack PPRN at PRN MAR from Other Facility Yes Yes   Sig: Take 17 g by mouth as needed (If sennosides and docusate ineffective after 24 hours). Indications: Constipation   prochlorperazine (COMPAZINE) 10 MG Tab 8/19/2023 at 2133 MAR from Other Facility Yes Yes   Sig: Take 10 mg by mouth every 6 hours as needed for Nausea/Vomiting.   senna-docusate (PERICOLACE OR SENOKOT S) 8.6-50 MG Tab 8/19/2023 at 2023 MAR from Other Facility Yes Yes   Sig: Take 2 Tablets by mouth every day.   sodium chloride (OCEAN) 0.65 % Solution prn at prn MAR from Other Facility Yes Yes   Sig: Administer 1 Spray into affected nostril(S) as needed for Congestion.   traZODone (DESYREL) 50 MG Tab prn at prn MAR from Other Facility Yes Yes   Sig: Take 50 mg by mouth at bedtime as needed. Indications: Trouble Sleeping   vitamin D (CHOLECALCIFEROL) 1000 Unit (25 mcg) Tab 8/12/2023 at 0800 Patient, MAR from Other Facility Yes No   Sig: Take 1,000 Units by mouth every day.      Facility-Administered Medications: None       Physical Exam  Temp:  [36.3 °C (97.3 °F)-39.1 °C (102.4 °F)] 37.7 °C (99.9 °F)  Pulse:  [] 108  Resp:  [12-42] 24  BP: ()/(50-96) 92/50  SpO2:  [74 %-100 %] 95 %  Blood Pressure : 92/50   Temperature: 37.7 °C (99.9 °F)   Pulse: (!) 108   Respiration: (!) 24   Pulse Oximetry: 95 %       Physical Exam  Vitals and nursing note reviewed.   Constitutional:       General: He is in acute distress.      Appearance: He is ill-appearing and toxic-appearing.      Comments: Somnolent but  arousable    HENT:      Head: Normocephalic and atraumatic.      Right Ear: Tympanic membrane normal.      Left Ear: Tympanic membrane normal.      Nose: Nose normal.      Mouth/Throat:      Mouth: Mucous membranes are dry.      Pharynx: Oropharynx is clear.   Eyes:      Extraocular Movements: Extraocular movements intact.      Pupils: Pupils are equal, round, and reactive to light.   Cardiovascular:      Rate and Rhythm: Normal rate and regular rhythm.      Pulses: Normal pulses.      Heart sounds: Normal heart sounds.   Pulmonary:      Breath sounds: Rales present.      Comments: Tachypneic   Abdominal:      General: Bowel sounds are normal. There is no distension.      Palpations: Abdomen is soft. There is no mass.   Musculoskeletal:         General: No swelling or tenderness. Normal range of motion.      Cervical back: Neck supple.      Right lower leg: Edema (trace) present.      Left lower leg: Edema (trace) present.   Skin:     General: Skin is warm.      Capillary Refill: Capillary refill takes more than 3 seconds.      Coloration: Skin is pale. Skin is not jaundiced.   Neurological:      Mental Status: He is disoriented.      Motor: Weakness present.   Psychiatric:         Mood and Affect: Mood normal.         Behavior: Behavior normal.         Laboratory:  Recent Labs     08/19/23  0551 08/19/23 2230   WBC 5.2 3.1*   RBC 4.09* 4.68*   HEMOGLOBIN 13.3* 15.4   HEMATOCRIT 39.4* 46.5   MCV 96.3 99.4*   MCH 32.5 32.9   MCHC 33.8 33.1   RDW 53.1* 54.4*   PLATELETCT 220 267   MPV 10.5 10.1     Recent Labs     08/19/23  0551 08/19/23 2230   SODIUM 136 138   POTASSIUM 3.8 4.1   CHLORIDE 100 99   CO2 27 23   GLUCOSE 90 125*   BUN 15 18   CREATININE 1.01 1.18   CALCIUM 8.4* 9.5     Recent Labs     08/19/23  0551 08/19/23 2230   ALTSGPT 11 16   ASTSGOT 20 28   ALKPHOSPHAT 82 114*   TBILIRUBIN 0.5 0.6   LIPASE  --  19   GLUCOSE 90 125*     Recent Labs     08/19/23 2230 08/20/23  0316   APTT 33.0 39.8*   INR 1.35*  1.65*     Recent Labs     08/19/23  2230   NTPROBNP 2897*         Recent Labs     08/19/23 2230 08/20/23  0316   TROPONINT 63* 62*       Imaging:  DX-CHEST-PORTABLE (1 VIEW)   Final Result      1.  Enlarged cardiac silhouette with changes of interstitial edema.   2.  Bibasilar airspace disease could be related to edema, atelectasis or pneumonitis.      CT-HEAD W/O    (Results Pending)       X-Ray:  I have personally reviewed the images and compared with prior images.  EKG:  I have personally reviewed the images and compared with prior images.    Assessment/Plan:  Justification for Admission Status  I anticipate this patient will require at least two midnights for appropriate medical management, necessitating inpatient admission because Septic shock secondary to PNA requiring vasopressors acute on chronic respiratory failure, atrial fibrillation with RVR requiring step down unit.    Patient will need a Intermediate Care (Adult and Pediatrics) bed on MEDICAL service .  The need is secondary to see above.    * Septic shock (HCC)  Assessment & Plan  This is Septic shock Present on admission  SIRS criteria identified on my evaluation include: Fever, with temperature greater than 100.9 deg F, Tachycardia, with heart rate greater than 90 BPM and Leukopenia, with WBC less than 4,000  Clinical indicators of end organ dysfunction include Hypotension with systolic blood pressure less than 90 or MAP less than 65, Lactic Acid greater than 2 and Toxic Metabolic Encephalopathy  Indicators of septic shock include: Sepsis present and persistent hypotension as well as initial lactate level result is greater than or equal to 4mmol/L   Sources is: Possible PNA   Sepsis protocol initiated  Crystalloid Fluid Administration: Fluid resuscitation ordered per standard protocol - 30 mL/kg per current or ideal body weight  IV antibiotics as appropriate for source of sepsis  Reassessment: I have reassessed the patient's hemodynamic  status    Rocephin/Zithromax   F/u respiratory, blood cx   Levophed, gtt to maintain MAP> 65, SBP > 90   Gentle IV Fluids, monitor for fluid overload   Insert emanuel monitor intake/output       Acute on chronic respiratory failure (HCC)  Assessment & Plan  Acute on chronic respiratory failure     Initially requiring 15 l NRBM. Oxygenation improving   CXR showing pulm fibrosis, vs infection vs edema.   T max of 102.4 Empiric Rocephin/Zithromax for PNA   F/u respiratory, blood cultures   Trend lactic acid       ACP (advance care planning)  Assessment & Plan  Given Age and critical illness highly recommend palliative consultation this admission.    Acute febrile illness  Assessment & Plan  T max of 102.4*F   Continue with IV abx     Toxic metabolic encephalopathy  Assessment & Plan  Unclear maybe secondary to sepsis, Had recent CVA will obtain CT head w/o   Neuro checks every 4 hours  Fall, seizure, aspiration precautions   High dose IV thiamine       Idiopathic hypotension- (present on admission)  Assessment & Plan  On Florinef at home   Given encephalopathy  Unable to tolerate PO  Start on stress hydrocortisone given recurrent hypotension. 30 cc/kg fluids given already on vasopressors maintain MAP>65 or SBP> 90   Check cortisol level     CVA (cerebral vascular accident) (HCC)- (present on admission)  Assessment & Plan  Recent CVA s/p TNK   Repeat CT head w/o contrast   Neuro checks every 4 hours  Fall, seizure, aspiration precautions     Hypothyroidism due to acquired atrophy of thyroid- (present on admission)  Assessment & Plan  Hold po medications given encephalopathy     Stage 3a chronic kidney disease (HCC)- (present on admission)  Assessment & Plan  Stable   Avoid nephrotoxic agents  Renally adjust all medications  Bladder scan PRN   Insert emanuel catheter     Pulmonary fibrosis (HCC)- (present on admission)  Assessment & Plan  Hx of   Bronchodilators as needed    Moderate mitral regurgitation by prior  echocardiogram- (present on admission)  Assessment & Plan  Cautious fluid administration   May need Diuresis in am     Chronic atrial fibrillation (HCC)- (present on admission)  Assessment & Plan  Patient is on eliquis BID at home   Check CT head w/o     If CT head negative for bleed start on heparin, gtt   Received cardizem 10 mg IV currently rate 100-110   Monitor on telemetry   Check magnesium. Goal > 2     Anticoagulated- Eliquis for a. fib- (present on admission)  Assessment & Plan  On eliquis at home.   Obtain CT head w/o   If negative continue with heparin, gtt given instability   Low threshold to transfer to ICU     Atrial fibrillation with RVR (HCC)  Assessment & Plan  Admit to IMCU   Telemetry monitoring   Received IV Cardizem 10 mg x 1, BP borderline on vasopressors. Rate currently 100-110.   Check magnesium. Goal > 2   On Eliquis at home. Start on heparin, gtt if CT head negative for bleed   Echo 8/23 EF 55%           I independently reviewed pertinent clinical lab tests since admission and ordered additional follow up clinical lab tests.  I independently reviewed pertinent radiographic images and the radiologist's reports since admission and ordered additional follow up radiographic studies.  The details of the available patient records in [facility] (including laboratory tests, culture data, medications, imaging, and other pertinent diagnostic tests) and that information was utilized as warranted in today's medical decision making for this patient.  I personally evaluated the patient condition at bedside.     Care interventions include:   Review of interval medical and surgical history, current medications and outpatient medication reconciliation, interval imaging studies and radiologist interpretation, and interval laboratory values.     Aggregated care time spent evaluating, reassessing, reviewing documentation, providing care, and managing this patient exclusive of procedures: 75 minutes      Patient is  critically ill.   The patient continues to have: acute on chronic respiratory failure, Atrial fibrillation with RVR, Septic shock requiring vasopressors, toxic metabolic encephalopathy  The vital organ system that is affected is the: Mutli-organs  If untreated there is a high chance of deterioration into death.   The critical care that I am providing today is: Review of interval medical and surgical history, current medications and outpatient medication reconciliation, interval imaging studies and radiologist interpretation, and interval laboratory values. Frequent hemodynamic monitoring, discussion with nursing staff, RN, Pharmacy      The critical that has been undertaken is medically complex.   There has been no overlap in critical care time.   Critical Care Time not including procedures: 60 minutes      VTE prophylaxis: SCDs/TEDs start on therapeutic anticoagulation for A fib if CT head w/o negative for bleed.

## 2023-08-21 LAB
ALBUMIN SERPL BCP-MCNC: 2.3 G/DL (ref 3.2–4.9)
ALBUMIN/GLOB SERPL: 0.8 G/DL
ALP SERPL-CCNC: 74 U/L (ref 30–99)
ALT SERPL-CCNC: 13 U/L (ref 2–50)
ANION GAP SERPL CALC-SCNC: 10 MMOL/L (ref 7–16)
AST SERPL-CCNC: 28 U/L (ref 12–45)
BASOPHILS # BLD AUTO: 0.1 % (ref 0–1.8)
BASOPHILS # BLD: 0.01 K/UL (ref 0–0.12)
BILIRUB SERPL-MCNC: 0.3 MG/DL (ref 0.1–1.5)
BUN SERPL-MCNC: 15 MG/DL (ref 8–22)
CALCIUM ALBUM COR SERPL-MCNC: 9.5 MG/DL (ref 8.5–10.5)
CALCIUM SERPL-MCNC: 8.1 MG/DL (ref 8.5–10.5)
CHLORIDE SERPL-SCNC: 103 MMOL/L (ref 96–112)
CO2 SERPL-SCNC: 21 MMOL/L (ref 20–33)
CREAT SERPL-MCNC: 0.95 MG/DL (ref 0.5–1.4)
EOSINOPHIL # BLD AUTO: 0 K/UL (ref 0–0.51)
EOSINOPHIL NFR BLD: 0 % (ref 0–6.9)
ERYTHROCYTE [DISTWIDTH] IN BLOOD BY AUTOMATED COUNT: 53.3 FL (ref 35.9–50)
GFR SERPLBLD CREATININE-BSD FMLA CKD-EPI: 72 ML/MIN/1.73 M 2
GLOBULIN SER CALC-MCNC: 3 G/DL (ref 1.9–3.5)
GLUCOSE SERPL-MCNC: 124 MG/DL (ref 65–99)
HCT VFR BLD AUTO: 34.4 % (ref 42–52)
HGB BLD-MCNC: 11.8 G/DL (ref 14–18)
IMM GRANULOCYTES # BLD AUTO: 0.05 K/UL (ref 0–0.11)
IMM GRANULOCYTES NFR BLD AUTO: 0.4 % (ref 0–0.9)
LYMPHOCYTES # BLD AUTO: 0.48 K/UL (ref 1–4.8)
LYMPHOCYTES NFR BLD: 4.3 % (ref 22–41)
MAGNESIUM SERPL-MCNC: 2 MG/DL (ref 1.5–2.5)
MCH RBC QN AUTO: 33.4 PG (ref 27–33)
MCHC RBC AUTO-ENTMCNC: 34.3 G/DL (ref 32.3–36.5)
MCV RBC AUTO: 97.5 FL (ref 81.4–97.8)
MONOCYTES # BLD AUTO: 0.73 K/UL (ref 0–0.85)
MONOCYTES NFR BLD AUTO: 6.5 % (ref 0–13.4)
NEUTROPHILS # BLD AUTO: 9.99 K/UL (ref 1.82–7.42)
NEUTROPHILS NFR BLD: 88.7 % (ref 44–72)
NRBC # BLD AUTO: 0 K/UL
NRBC BLD-RTO: 0 /100 WBC (ref 0–0.2)
PHOSPHATE SERPL-MCNC: 3.1 MG/DL (ref 2.5–4.5)
PLATELET # BLD AUTO: 221 K/UL (ref 164–446)
PMV BLD AUTO: 10.6 FL (ref 9–12.9)
POTASSIUM SERPL-SCNC: 4.1 MMOL/L (ref 3.6–5.5)
PROT SERPL-MCNC: 5.3 G/DL (ref 6–8.2)
RBC # BLD AUTO: 3.53 M/UL (ref 4.7–6.1)
SODIUM SERPL-SCNC: 134 MMOL/L (ref 135–145)
WBC # BLD AUTO: 11.3 K/UL (ref 4.8–10.8)

## 2023-08-21 PROCEDURE — 99222 1ST HOSP IP/OBS MODERATE 55: CPT | Mod: 25 | Performed by: NURSE PRACTITIONER

## 2023-08-21 PROCEDURE — A9270 NON-COVERED ITEM OR SERVICE: HCPCS | Performed by: HOSPITALIST

## 2023-08-21 PROCEDURE — 700111 HCHG RX REV CODE 636 W/ 250 OVERRIDE (IP): Performed by: STUDENT IN AN ORGANIZED HEALTH CARE EDUCATION/TRAINING PROGRAM

## 2023-08-21 PROCEDURE — 700105 HCHG RX REV CODE 258: Performed by: STUDENT IN AN ORGANIZED HEALTH CARE EDUCATION/TRAINING PROGRAM

## 2023-08-21 PROCEDURE — 92612 ENDOSCOPY SWALLOW (FEES) VID: CPT

## 2023-08-21 PROCEDURE — 85025 COMPLETE CBC W/AUTO DIFF WBC: CPT

## 2023-08-21 PROCEDURE — 84100 ASSAY OF PHOSPHORUS: CPT

## 2023-08-21 PROCEDURE — 36415 COLL VENOUS BLD VENIPUNCTURE: CPT

## 2023-08-21 PROCEDURE — 80053 COMPREHEN METABOLIC PANEL: CPT

## 2023-08-21 PROCEDURE — 770020 HCHG ROOM/CARE - TELE (206)

## 2023-08-21 PROCEDURE — 700105 HCHG RX REV CODE 258: Mod: JZ | Performed by: STUDENT IN AN ORGANIZED HEALTH CARE EDUCATION/TRAINING PROGRAM

## 2023-08-21 PROCEDURE — 99233 SBSQ HOSP IP/OBS HIGH 50: CPT | Performed by: HOSPITALIST

## 2023-08-21 PROCEDURE — 51798 US URINE CAPACITY MEASURE: CPT

## 2023-08-21 PROCEDURE — 83735 ASSAY OF MAGNESIUM: CPT

## 2023-08-21 PROCEDURE — 700102 HCHG RX REV CODE 250 W/ 637 OVERRIDE(OP): Performed by: HOSPITALIST

## 2023-08-21 PROCEDURE — 99497 ADVNCD CARE PLAN 30 MIN: CPT | Performed by: NURSE PRACTITIONER

## 2023-08-21 RX ORDER — CARBOXYMETHYLCELLULOSE SODIUM 5 MG/ML
1 SOLUTION/ DROPS OPHTHALMIC PRN
Status: DISCONTINUED | OUTPATIENT
Start: 2023-08-21 | End: 2023-08-25 | Stop reason: HOSPADM

## 2023-08-21 RX ORDER — ATORVASTATIN CALCIUM 40 MG/1
40 TABLET, FILM COATED ORAL EVERY EVENING
Status: DISCONTINUED | OUTPATIENT
Start: 2023-08-21 | End: 2023-08-25 | Stop reason: HOSPADM

## 2023-08-21 RX ORDER — MIDODRINE HYDROCHLORIDE 5 MG/1
5 TABLET ORAL
Status: DISCONTINUED | OUTPATIENT
Start: 2023-08-21 | End: 2023-08-22

## 2023-08-21 RX ORDER — AMOXICILLIN AND CLAVULANATE POTASSIUM 875; 125 MG/1; MG/1
1 TABLET, FILM COATED ORAL EVERY 12 HOURS
Status: COMPLETED | OUTPATIENT
Start: 2023-08-21 | End: 2023-08-24

## 2023-08-21 RX ORDER — METOPROLOL SUCCINATE 25 MG/1
25 TABLET, EXTENDED RELEASE ORAL
Qty: 45 TABLET | Refills: 3 | Status: SHIPPED | OUTPATIENT
Start: 2023-08-21

## 2023-08-21 RX ADMIN — APIXABAN 2.5 MG: 2.5 TABLET, FILM COATED ORAL at 17:10

## 2023-08-21 RX ADMIN — APIXABAN 2.5 MG: 2.5 TABLET, FILM COATED ORAL at 11:03

## 2023-08-21 RX ADMIN — MIDODRINE HYDROCHLORIDE 5 MG: 5 TABLET ORAL at 11:03

## 2023-08-21 RX ADMIN — ATORVASTATIN CALCIUM 40 MG: 40 TABLET, FILM COATED ORAL at 17:08

## 2023-08-21 RX ADMIN — AZITHROMYCIN DIHYDRATE 500 MG: 250 TABLET ORAL at 06:31

## 2023-08-21 RX ADMIN — FLUDROCORTISONE ACETATE 0.1 MG: 0.1 TABLET ORAL at 06:31

## 2023-08-21 RX ADMIN — THIAMINE HYDROCHLORIDE 500 MG: 100 INJECTION, SOLUTION INTRAMUSCULAR; INTRAVENOUS at 06:32

## 2023-08-21 RX ADMIN — AMOXICILLIN AND CLAVULANATE POTASSIUM 1 TABLET: 875; 125 TABLET, FILM COATED ORAL at 17:08

## 2023-08-21 RX ADMIN — SODIUM CHLORIDE, POTASSIUM CHLORIDE, SODIUM LACTATE AND CALCIUM CHLORIDE: 600; 310; 30; 20 INJECTION, SOLUTION INTRAVENOUS at 17:24

## 2023-08-21 RX ADMIN — MIDODRINE HYDROCHLORIDE 5 MG: 5 TABLET ORAL at 17:08

## 2023-08-21 RX ADMIN — LEVOTHYROXINE SODIUM 175 MCG: 0.17 TABLET ORAL at 06:31

## 2023-08-21 ASSESSMENT — ENCOUNTER SYMPTOMS
CHILLS: 0
MEMORY LOSS: 1
CARDIOVASCULAR NEGATIVE: 1
VOMITING: 0
GASTROINTESTINAL NEGATIVE: 1
PALPITATIONS: 0
WEAKNESS: 1
FEVER: 0
EYES NEGATIVE: 1
NERVOUS/ANXIOUS: 0
COUGH: 0
HEARTBURN: 0
SHORTNESS OF BREATH: 1
RESPIRATORY NEGATIVE: 1
CONSTIPATION: 1
WEIGHT LOSS: 0
DEPRESSION: 0
BACK PAIN: 1
NAUSEA: 0

## 2023-08-21 ASSESSMENT — CHA2DS2 SCORE
PRIOR STROKE OR TIA OR THROMBOEMBOLISM: YES
VASCULAR DISEASE: NO
CHA2DS2 VASC SCORE: 4
AGE 65 TO 74: NO
DIABETES: NO
CHF OR LEFT VENTRICULAR DYSFUNCTION: NO
HYPERTENSION: NO
AGE 75 OR GREATER: YES
SEX: MALE

## 2023-08-21 ASSESSMENT — PAIN DESCRIPTION - PAIN TYPE
TYPE: ACUTE PAIN

## 2023-08-21 ASSESSMENT — FIBROSIS 4 INDEX: FIB4 SCORE: 3.44

## 2023-08-21 NOTE — CARE PLAN
The patient is Stable - Low risk of patient condition declining or worsening    Shift Goals  Clinical Goals: stable BPs  Patient Goals: rest and food  Family Goals: no family present at this time    Progress made toward(s) clinical / shift goals:        Problem: Knowledge Deficit - Standard  Goal: Patient and family/care givers will demonstrate understanding of plan of care, disease process/condition, diagnostic tests and medications  Outcome: Progressing     Problem: Hemodynamics  Goal: Patient's hemodynamics, fluid balance and neurologic status will be stable or improve  Outcome: Progressing     Problem: Respiratory  Goal: Patient will achieve/maintain optimum respiratory ventilation and gas exchange  Outcome: Progressing     Problem: Pain - Standard  Goal: Alleviation of pain or a reduction in pain to the patient’s comfort goal  Outcome: Progressing     Problem: Fall Risk  Goal: Patient will remain free from falls  Outcome: Progressing

## 2023-08-21 NOTE — THERAPY
Speech Language Pathology   Flexible Endoscopic Evaluation of Swallowing (FEES)        Patient Name: Guru Pena  AGE:  89 y.o., SEX:  male  Medical Record #: [id number]  Date of Service: 8/21/2023      History of Present Illness  89 [facility] and hypoxia into 70% oxygenation. Placed on NRB at 15L. Admitted for septic shock related to possible pneumonia, A-fib with RVR, toxic metabolic encephalopathy.    CMHx: Recent CVA s/p TNK, septic shock, afib, CKD, acute on chronic respiratory failure, toxic metabolic encephalopathy  PMHx: COVID, urinary retention, OSE on CPAP, pulmonary HTN      Pertinent Information  Current Method of Nutrition: Oral diet (PU4/MT2)  Patient Behaviors: Confused   Dentition: Fair   Feeding Tube: None   Tracheostomy: No   Factor(s) Affecting Performance: None     Discussed the risks, benefits, and alternatives of the FEES procedure. Patient/family acknowledged and agreed to proceed.      Assessment  Flexible Endoscopic Evaluation of Swallowing (FEES) completed at bedside today. The endoscope was passed transnasally via Left nare to evaluate the anatomy and physiology of swallowing. Pt tolerated the procedure with no apparent distress.    Anatomic Findings: prominent R arytenoid with mismatch upon adduction  Vocal Fold Motion: limited mobility of L arytenoid; over-adduction of R arytenoid   Secretion Management: Adequate  PO Trials: Ice Chips, Thin Liquid, Mildly Thick Liquid, Liquidised, Pudding, Regular Solid, Mixed      Consistency PAS Score Timing Residue Comments   Thin Liquid 6 During swallow, Post swallow, Pre Swallow Vallecular Residue: Trace (1%-5%)  Pyriform Sinus Residue: Moderate (25%-50%) Tsp - PAS 2  Cup - PAS 5 before  Cup, bolus hold - PAS 5 after (3 during)  Cup/straw right head turn - PAS 5, PAS 6  Straw, left head turn - PAS 5, PAS 1   Mildly Thick 1 N/A Vallecular Residue: Trace (1%-5%)  Pyriform Sinus Residue: Mild (5%-25%) Cup,  straw   Liquidised 1 N/A Vallecular Residue: Trace (1%-5%)  Pyriform Sinus Residue: Mild (5%-25%)    Pudding 1 N/A Vallecular Residue: Trace (1%-5%)  Pyriform Sinus Residue: Mild (5%-25%)    Regular Solid 1 N/A Vallecular Residue: None (0%)  Pyriform Sinus Residue: None (0%)    Mixed (SB6/TN0) 1 N/A Vallecular Residue: Trace (1%-5%)  Pyriform Sinus Residue: Trace (1%-5%)      Penetration-Aspiration Scale (PAS)  1     No contrast enters airway  2     Contrast enters the airway, remains above the vocal folds, and is ejected from the airway (not seen in the airway at the end of the swallow).  3     Contrast enters the airway, remains above the vocal folds, and is not ejected from the airway (is seen in the airway after the swallow).  4     Contrast enters the airway, contacts the vocal folds, and is ejected from the airway.  5     Contrast enters the airway, contacts the vocal folds, and is not ejected from the airway  6     Contrast enters the airway, crosses the plane of the vocal folds, and is ejected from the airway.  7     Contrast enters the airway, crosses the plane of the vocal folds, and is not ejected from the airway despite effort.  8     Contrast enters the airway, crosses the plane of the vocal folds, is not ejected from the airway and there is no response to aspiration.      Oral phase:  Patient with adequate labial seal around cup and straw without anterior loss of bolus. Timely lingual manipulation; frequently waiting for SLP to cue to swallow. Mastication was complete and functional without oral bolus residue upon oral inspection at end of study. Loss of bolus control contributed to airway invasion before the swallow.     Pharyngeal phase:  - Loss of oral bolus control resulted in laryngeal penetration to the level of the vocal folds with thin liquids before the swallow.   - Incomplete laryngeal vestibule closure resulted in laryngeal penetration and flash aspiration of thin liquids during the swallow.  Variable laryngeal sensation; cued/reflexive cough cleared.    - Impaired pharyngeal shortening resulted in moderate pyriform sinus residue with thin liquids and mild pyriform sinus residue with more viscous consistencies. Residue of thin liquid with deep laryngeal penetration after the swallow.   - Impaired pharyngeal constriction resulted in posterior pharyngeal wall residue with liquidized and pureed solids.      Of note, frequent wet vocal quality r/t pharyngeal secretions without airway invasion.     Compensatory Strategies:  - Left head turn inconsistently eliminated laryngeal penetration.   - Right head turn did not impact airway invasion.   - Bolus hold reduced penetration during the swallow but did not eliminate descent of penetrate to vocal folds.   - Cleansing swallows reduced pharyngeal residue.   - Cued/reflexive cough cleared penetrate/aspirate.     Patient completed right head turn strategy without cue and was perseverative on doing such, requiring cues to complete normal swallows. Patient stated he was told to complete right head turn but then amended statement to left head turn later. Overall confused about his recommended strategies but very redirectable/amenable to cueing.     Severity Rating:  RPIYA: Mild-Moderate      Clinical Impressions  Patient presents with a mild-moderate oropharyngeal dysphagia, which represents an improvement in swallow function since previous diagnostic on 8/13/23. Presentation is consistent with recent CVA, advanced age, and generalized weakness. Swallow safety and efficiency are both impaired. Primary deficits include incomplete laryngeal vestibule closure, impaired pharyngeal shortening, and impaired pharyngeal constriction. Deficits are best managed with diet modification and compensatory strategies. Patient is a good candidate for behavioral and exercise-based swallow rehabilitation. A repeat swallowing diagnostic is likely indicated prior to diet advancement related  "to variable laryngeal sensation.       Recommendations  Diet Consistency: Soft and bite size solids with mildly thick liquids  Medication: Crush with applesauce, as appropriate, Whole with puree  Supervision: 1:1 feeding with constant supervision  Positioning: Fully upright and midline during oral intake  Strategies: Small bites/sips, Slow rate of intake, Alternate bites and sips (clear throat if voice sounds wet)  Oral Care: Q6h  Additional Instrumentation: Repeat diagnostic study when clinically appropriate         SLP Treatment Plan  Treatment Plan: Dysphagia Treatment  SLP Frequency: 3x Per Week  Estimated Duration: Until Therapy Goals Met      Anticipated Discharge Needs  Discharge Recommendations: Recommend post-acute placement for additional speech therapy services prior to discharge home   Therapy Recommendations Upon DC: Dysphagia Training, Patient / Family / Caregiver Education       Patient / Family Goals  Patient / Family Goal #1: \"That's a well thought out test.\"  Goal #1 Outcome: Progressing as expected  Short Term Goal # 1: EDIT 8/21: Pt will consume diet of SB/MT given 1:1 spv and strategy use with no worsening of respiratory status.  Short Term Goal # 2: Pt will complete FEES w SLP to further evaluate swallow function and inform POC.  Goal Outcome # 2 : Goal met, new goal aded  Short Term Goal # 2 B : Patient will complete swallowing exercises targeting LVC, pharyngeal shortening, and pharyngeal constrictions x20.      Keke Puga, SLP  "

## 2023-08-21 NOTE — PROGRESS NOTES
4 Eyes Skin Assessment Completed by KAELYN Estrada and KAELYN Collier.    Head WDL  Ears WDL  Nose Scab  Mouth dry  Neck WDL  Breast/Chest WDL  Shoulder Blades WDL  Spine WDL  (R) Arm/Elbow/Hand Bruising  (L) Arm/Elbow/Hand Bruising and Swelling  Abdomen WDL  Groin WDL; with minimal bleeding coming from his penis post cath removal  Scrotum/Coccyx/Buttocks Redness and Blanching  (R) Leg Redness and Blanching, flaky; dusky discoloration  (L) Leg Scab; flaky; dusky discloration  (R) Heel/Foot/Toe Redness and Blanching  (L) Heel/Foot/Toe Redness and Blanching          Devices In Places Tele Box, Blood Pressure Cuff, Pulse Ox, SCD's, and Nasal Cannula      Interventions In Place Gray Ear Foams, Waffle Overlay, TAP System, Pillows, Elbow Mepilex, Q2 Turns, and Heels Loaded W/Pillows    Possible Skin Injury No    Pictures Uploaded Into Epic No, needs to be completed  Wound Consult Placed N/A  RN Wound Prevention Protocol Ordered Yes

## 2023-08-21 NOTE — CARE PLAN
Problem: Knowledge Deficit - Standard  Goal: Patient and family/care givers will demonstrate understanding of plan of care, disease process/condition, diagnostic tests and medications  Outcome: Progressing     Problem: Hemodynamics  Goal: Patient's hemodynamics, fluid balance and neurologic status will be stable or improve  Outcome: Progressing     Problem: Urinary - Renal Perfusion  Goal: Ability to achieve and maintain adequate renal perfusion and functioning will improve  Outcome: Progressing     Problem: Pain - Standard  Goal: Alleviation of pain or a reduction in pain to the patient’s comfort goal  Outcome: Progressing     Problem: Fall Risk  Goal: Patient will remain free from falls  Outcome: Progressing   The patient is Watcher - Medium risk of patient condition declining or worsening    Shift Goals  Clinical Goals: stable BPs  Patient Goals: sleep  Family Goals: no family present at this time    Progress made toward(s) clinical / shift goals:  BP 90s/60s, vasopressors have been off over 12h.    Patient is not progressing towards the following goals: pt able to get sleep intermittently.

## 2023-08-21 NOTE — DISCHARGE PLANNING
Guru was sent acute from [facility].  Would appreciate a PMR consult referral as well as TX evals once appropriate to assess for a possible return once medically cleared.     1120-PMR consult referral received from Dr. Campo.

## 2023-08-21 NOTE — CONSULTS
MRN: [id number]  Date of initial palliative care consult: 8/20  Reason for palliative medicine consultation/visit: ACP  Referring provider:   Location of consult[place] TBD ([place] [facility] [place]  Additional consulting services: Therapies, ophthalmology.    HPI:   Guru Pena is a 89 y.o. male with a recent past medical history of CVA status post TNK, A-fib on Eliquis, chronic respiratory failure oxygen dependent, hypothyroidism who presented on August 19 with shortness of breath and hypoxia saturating down to 70%.  Patient was placed on 15 L nonrebreather, however patient was minimally responsive.  Noted to meet sepsis criteria and started on IV antibiotics as well as fluids per protocol.  Critical care consulted and deemed appropriate for IMC placement.  Admitted for septic shock secondary to possible pneumonia, A-fib with RVR, toxic metabolic encephalopathy.    Patient recently hospitalized here from August 12 through August 18 with a forementioned stroke, is a resident in a skilled nursing facility.  He was sent to acute rehab on August 18.  Notes from today indicate he is improved back to baseline mentation and alert and oriented x4.  He is being reevaluated for rehab and pending transfer to telemetry floor.    Palliative care is consulted for advance care planning.  Patient is seen today [place] [facility] [place] seventh floor.  He is alert and oriented with some recent and long-term memory deficits however able to make all needs known.    Pertinent PMHx: COVID infection July 2023,    Pain History: Patient denies pain  Onset:   Location:   Duration:   Characteristics:   Aggravating factors:   Alleviating factors:   Radiation:   Treatments:   Severity:     Additional symptoms: Intermittent dyspnea worse with exertion, intermittent constipation, increased sleeping, waning and waxing appetite.  Mood is okay per patient.      Interval History: As above    Medication  Allergy/Sensitivities:  Allergies   Allergen Reactions    Digoxin      nausea       Pertinent Family History:  None pertinent    ROS:    Review of Systems   Constitutional:  Positive for malaise/fatigue. Negative for chills, fever and weight loss.   Respiratory:  Positive for shortness of breath.    Cardiovascular:  Positive for leg swelling. Negative for chest pain and palpitations.   Gastrointestinal:  Positive for constipation. Negative for heartburn, nausea and vomiting.   Psychiatric/Behavioral:  Negative for depression. The patient is not nervous/anxious.        PE:   Recent vital signs  BMI: Body mass index is 23.38 kg/m².    Temp (24hrs), Av.3 °C (97.3 °F), Min:36.1 °C (97 °F), Max:36.4 °C (97.5 °F)  Temperature: 36.3 °C (97.3 °F), Monitored Temp: 36.7 °C (98.1 °F)  Pulse  Av.3  Min: 43  Max: 155   Blood Pressure : 105/60       Physical Exam  Vitals and nursing note reviewed.   Constitutional:       General: He is not in acute distress.     Appearance: He is underweight. He is ill-appearing.      Interventions: Nasal cannula in place.      Comments: 4 L   HENT:      Head: Normocephalic.   Cardiovascular:      Rate and Rhythm: Rhythm irregular.      Pulses: Normal pulses.      Heart sounds: Heart sounds are distant. Murmur heard.      Comments: Trace BLE edema  Pulmonary:      Effort: Pulmonary effort is normal. No respiratory distress.      Breath sounds: Examination of the right-lower field reveals rales. Examination of the left-lower field reveals rales. Decreased breath sounds and rales present.   Abdominal:      General: Bowel sounds are decreased.      Palpations: Abdomen is soft.      Tenderness: There is no abdominal tenderness.   Skin:     General: Skin is warm and dry.      Capillary Refill: Capillary refill takes less than 2 seconds.      Coloration: Skin is pale.      Comments: Translucent skin, fragile, diffuse bruising.    Neurological:      Mental Status: He is alert and oriented to  person, place, and time.      Cranial Nerves: Cranial nerves 2-12 are intact.   Psychiatric:         Attention and Perception: Attention normal.         Mood and Affect: Mood normal.         Speech: Speech normal.         Behavior: Behavior normal. Behavior is cooperative.         Thought Content: Thought content normal.         Cognition and Memory: He exhibits impaired recent memory and impaired remote memory.         Judgment: Judgment normal.       Recent Labs     08/19/23  0551 08/19/23  2230 08/21/23  0300   SODIUM 136 138 134*   POTASSIUM 3.8 4.1 4.1   CHLORIDE 100 99 103   CO2 27 23 21   GLUCOSE 90 125* 124*   BUN 15 18 15   CREATININE 1.01 1.18 0.95   CALCIUM 8.4* 9.5 8.1*     Recent Labs     08/19/23  0551 08/19/23 2230 08/21/23  0405   WBC 5.2 3.1* 11.3*   RBC 4.09* 4.68* 3.53*   HEMOGLOBIN 13.3* 15.4 11.8*   HEMATOCRIT 39.4* 46.5 34.4*   MCV 96.3 99.4* 97.5   MCH 32.5 32.9 33.4*   MCHC 33.8 33.1 34.3   RDW 53.1* 54.4* 53.3*   PLATELETCT 220 267 221   MPV 10.5 10.1 10.6       ASSESSMENT/PLAN WITH SHARED DECISION MAKING:   Review  Pertinent imaging reviewed.    PHYSICAL ASPECTS OF CARE  Palliative Performance Scale: 40%    #Septic shock due to possible pneumonia  #Acute febrile illness  #Toxic metabolic encephalopathy possibly secondary to sepsis, advanced age, recent CVA  #Acute on chronic respiratory failure with hypoxemia secondary to pulmonary fibrosis  #Idiopathic hypotension  #CVA status post TNK recent history  #Stage IIIa chronic kidney disease  #Chronic atrial fibrillation on chronic anticoagulation, recent episode RVR with sepsis  #Functional debility secondary to advanced age recent stroke.      SOCIAL ASPECTS OF CARE  Lives at [facility] assisted living with his wife Kim.  He reports that currently they are on the independent side however, he is willing to move to the assisted living side if needed.  He has 3 children Damon, who lives here in [facility], who lives in [place],  "and KRAS who is .  In addition he has 1 grandson, , who close to.  He is a retired  from [employer] originally from [place].  Prior to living at [facility] he was residing in what sounds like a group home.    SPIRITUAL ASPECTS OF CARE   Patient states he is a [employer] however \"I do not go to Alevism\".  He reports \"I see a lot of prayers\".  He states that he knows his turn is coming.  He did receive a visit from a member of his sons [employer] and was happy for this.  He denies any spiritual needs currently.    GOALS OF CARE/SERIOUS ILLNESS CONVERSATION  Introduced myself and role of palliative care.  Permission obtained to discuss goals of care, CODE STATUS.  Patient reports he derives john from his family particularly his grandson  who he did a variety of sports with when he was younger.  His goals are to be able to work his body better and return to [facility]with his wife.  He does not fear dying but does state he fears \"becoming incapacitated\".  He takes pride in abstain from smoking, drinking, and an otherwise \"unhealthy lifestyle\".  He attributes this to his longevity.  We discussed his CODE STATUS.  Explored and explained concept of likelihood of meaningful outcome given his age and comorbidities if he were to be successfully resuscitated.  Explained likelihood that his family would have to make decisions regarding his care and cessation of same in this situation.  He is thoughtful regarding this as difficulty making the decision stating \"that is a hard 1\".  This time, he would like to remain a full code until he can discuss it with his son who is his DPOA.  I have assured him I am happy to return to discuss with his son.  Therapeutic support, active listening, validation, reminiscing, normalization all applied during this encounter. Outcome: Patient to remain a full code.  Palliative care to continue to follow as needed, goals clearly established during visit, please " notify should patient's son/DPOA request further discussion.*Please request copy of advance directive from son*    Code Status: Full code    ACP Documents: None on file, however has advanced directive please request.    25 minutes spent discussing advance care planning, this time excludes any other billed services.    I spent a total of 67 minutes reviewing medical records, direct face-to-face time with the patient and/or family, documentation and coordination of care. This is separate from the time spent on advance care planning, which is documented above.    Vesna Vogel, MSN, APRN, ACNPC-AG.  Palliative Care Nurse Practitioner  [phone]

## 2023-08-21 NOTE — DISCHARGE SUMMARY
Admission Date: 8/18/2023    Discharge Date: 8/21/2023    Attending Provider: Chante Kern MD    Admission Diagnosis:   Active Hospital Problems    Diagnosis     *Ischemic stroke (HCC)     Central retinal vein occlusion     Prolonged Q-T interval on ECG     Monocular vision loss     Acute hypoxemic respiratory failure (HCC)     Hypothyroidism due to acquired atrophy of thyroid     JAMES on CPAP     Pulmonary hypertension (HCC)- RVSP= 30,  ECHO 7/2021     Urinary retention     Chronic atrial fibrillation (HCC)     Pulmonary fibrosis (HCC)     Impaired vision in both eyes        Discharge Diagnosis:  Active Hospital Problems    Diagnosis     *Ischemic stroke (HCC)     Central retinal vein occlusion     Prolonged Q-T interval on ECG     Monocular vision loss     Acute hypoxemic respiratory failure (HCC)     Hypothyroidism due to acquired atrophy of thyroid     JAMES on CPAP     Pulmonary hypertension (HCC)- RVSP= 30,  ECHO 7/2021     Urinary retention     Chronic atrial fibrillation (HCC)     Pulmonary fibrosis (HCC)     Impaired vision in both eyes        HPI per H&P:    The patient is a 89 y.o. male with a past medical history of atrial fibrillation on low-dose Eliquis, sleep apnea, urinary retention, hypothyroidism, hyperlipidemia, vitamin D deficiency, possible pulmonary fibrosis, recent COVID infection in July 2023; now admitted for acute inpatient rehabilitation with severe functional debility after an acute stroke.       Patient developed acute onset of left-sided weakness and right gaze deviation at his independent living facility.  NIH stroke scale 10 with dense left hemiplegia..  Head CT negative, CTA of head and neck negative, unable to obtain CT perfusion scan due to head positioning/patient unable to lay flat due to thoracic kyphosis.  The patient was outside the window at 4-1/2 hours since onset of symptoms, however after conversations with the neurologist regarding risk versus benefits patient agreed to  receive tenecteplase.     Follow-up head CT showed small right MCA stroke with petechial hemorrhage. HgbA1c 5.8, LDL 86, ECHO EF 55 %, prolonged QTc of 529.  Recommendation from neurology to restart Eliquis on 8/18 at full dose as patient had stroke on half dose.     On the morning of 8/16 rapid response was called given new onset of left arm monocular vision loss.  Repeat head CT without any new findings.  Patient was seen and evaluated by Dr. Chanda Zuñiga of ophthalmology with findings of hemorrhages and exudate in the left eye suspicious for central retinal vein occlusion.  Recommendation for outpatient follow-up.     Patient currently reports he does not like the puréed food as he feels like he chokes on it.  He really wants to get back to eating his over hard eggs that he usually has in the mornings.  He has not noticed any focal weakness or paresthesias.  Vision appears to be back to baseline.     Patient was evaluated by Rehab Medicine physician and Physical Therapy, Occupational Therapy, and Speech Therapy and determined to be appropriate for acute inpatient rehab and was transferred to [facility] on 8/18/2023  3:44 PM.    Rehab Hospital Course by Problem List:    Patient was only at the rehabilitation hospital for about 24 hours when he developed acute onset of hypoxia on the night of 8/19.  He was transferred to the emergency room room for further work-up and treatment.    Functional Status at Discharge  Eating:  Minimal Assist  Eating Description:  Increased time, Modified diet, Set-up of equipment or meal/tube feeding, Supervision for safety, Verbal cueing  Grooming:  Minimal Assist  Grooming Description:     Bathing:     Bathing Description:     Upper Body Dressing:  Maximal Assist  Upper Body Dressing Description:     Lower Body Dressing:  Total Assist  Lower Body Dressing Description:        Walk:  Moderate Assist (mod A from therapist; second person WC follow for safety)  Distance  Walked:  10  Number of Times Distance Was Traveled:  5  Assistive Device:  Parallel Bars  Gait Deviation:  Increased Base Of Support, Bradykinetic, Decreased Heel Strike, Decreased Toe Off (posterior lean; kyphotic posture)  Wheelchair:  Stand by Assist  Distance Propelled:  35   Wheelchair Description:  Extra time, Impaired coordination, Limited by fatigue, Verbal cueing  Stairs Unable to Participate  Stairs Description       Comprehension:  Modified Independent  Comprehension Description:   (Requires increased volume)  Expression:  Independent  Expression Description:     Social Interaction:  Modified Independent  Social Interaction Description:  Increased time  Problem Solving:  Moderate Assist  Problem Solving Description:  Increased time, Seat belt, Therapy schedule, Verbal cueing, Bed/chair alarm  Memory:  Moderate Assist  Memory Description:  Increased time, Bed/chair alarm, Supervision, Therapy schedule, Verbal cueing       Chante NUNO M.D., personally performed a complete drug regimen review and no potential clinically significant medication issues were identified.     Discharge Medication:     Medication List        ASK your doctor about these medications        Instructions   acetaminophen 325 MG Tabs  Commonly known as: Tylenol   Take 650 mg by mouth every four hours as needed.  Dose: 650 mg     apixaban 2.5mg Tabs  Commonly known as: Eliquis   Take 1 Tablet by mouth 2 times a day.  Dose: 2.5 mg     atorvastatin 40 MG Tabs  Commonly known as: Lipitor   Take 1 Tablet by mouth every evening.  Dose: 40 mg     carboxymethylcellulose 0.5 % Soln  Commonly known as: Refresh Tears   Administer 1 Drop into both eyes as needed (DRY EYES).  Dose: 1 Drop     Docusate Mini 283 MG enema  Generic drug: docusate sodium   Insert 283 mg into the rectum as needed (Constipation, if lactulose ineffective after 24 hours).  Dose: 283 mg     fludrocortisone 0.1 MG Tabs  Commonly known as: Florinef   Take 1 Tablet by  mouth every morning.  Dose: 0.1 mg     ipratropium-albuterol 0.5-2.5 (3) MG/3ML nebulizer solution  Commonly known as: Duoneb   Take 3 mL by nebulization every 6 hours as needed for Shortness of Breath.  Dose: 3 mL     lactulose 20 GM/30ML Soln   Take 30 mL by mouth 1 time a day as needed (IF MOM INEFFECTIVE AFTER 24 HOURS). Indications: Constipation  Dose: 30 mL     levothyroxine 175 MCG Tabs  Commonly known as: Synthroid   Take 175 mcg by mouth every morning on an empty stomach.  Dose: 175 mcg     MAALOX PLUS PO   Take 20 mL by mouth every 2 hours as needed (DYSPEPSIA).  Dose: 20 mL     magnesium hydroxide 400 MG/5ML Susp  Commonly known as: Milk Of Magnesia   Take 30 mL by mouth 1 time a day as needed. Indications: Constipation  Dose: 30 mL     melatonin 3 MG Tabs   Take 3 mg by mouth at bedtime as needed. Indications: Trouble Sleeping  Dose: 3 mg     menthol 3 MG lozenge  Commonly known as: Cepacol   Take 1 Lozenge by mouth every 2 hours as needed (THROAT/MOUTH PAIN).  Dose: 1 Lozenge     metoprolol SR 25 MG Tb24  Commonly known as: Toprol XL   TAKE 1 TABLET BY MOUTH EVERY 48 HOURS. (IN OTHER WORDS EVERY OTHER DAY)  Dose: 25 mg     polyethylene glycol/lytes 17 g Pack  Commonly known as: Miralax   Take 17 g by mouth as needed (If sennosides and docusate ineffective after 24 hours). Indications: Constipation  Dose: 17 g     prochlorperazine 10 MG Tabs  Commonly known as: Compazine   Take 10 mg by mouth every 6 hours as needed for Nausea/Vomiting.  Dose: 10 mg     senna-docusate 8.6-50 MG Tabs  Commonly known as: Pericolace Or Senokot S   Take 2 Tablets by mouth every day.  Dose: 2 Tablet     sodium chloride 0.65 % Soln  Commonly known as: Ocean   Administer 1 Spray into affected nostril(S) as needed for Congestion.  Dose: 1 Spray     traZODone 50 MG Tabs  Commonly known as: Desyrel   Take 50 mg by mouth at bedtime as needed. Indications: Trouble Sleeping  Dose: 50 mg     vitamin D3 1000 Unit (25 mcg)  Tabs  Commonly known as: Cholecalciferol   Take 1,000 Units by mouth every day.  Dose: 1,000 Units              Discharge Diet:  Per speech therapy    Discharge Activity:  As tolerated    Disposition:  Patient to discharge to emergency department for further work up and management of her symptoms.      Condition on Discharge:  Guarded    Chante Kern M.D.    Date of Service: 8/19/2023

## 2023-08-21 NOTE — PROGRESS NOTES
Hospital Medicine Daily Progress Note    Date of Service  8/21/2023    Chief Complaint  Guru Pena is a 89 y.o. male admitted 8/19/2023 with lethargy, atrial fibrillation with RVR hypertension, transferred from the acute inpatient rehabilitation unit    Hospital Course  Guru Pena is a 89 y.o. male past medical history of recent CVA status post TNK, A-fib on Eliquis, chronic respiratory failure oxygen dependent, hypothyroidism who presented 8/19/2023 with shortness of breath and hypoxia saturating down to 70%.  Patient placed on nonrebreather mask at 15 L.  History is limited given patient's critical illness and AMS.  Patient does respond to painful and verbal stimuli however cannot participate in interview.  In the ER, patient noted to meet sepsis criteria started on IV antibiotics and given sepsis protocol fluids.  Septic shock secondary to possible pneumonia, A-fib with RVR, toxic metabolic encephalopathy.  The patient was residing on the acute rehab unit where he was undergoing physical therapy after his CVA, the patient previously lived at a assisted living facility, apparently was ambulatory with a walker      Interval Problem Update  Patient seen and examined today.  Data, Medication data reviewed.  Case discussed with nursing as available.  Plan of Care reviewed with patient and notified of changes.   8/21 the patient much improved today, he is back to baseline mentation ordered appears, he is alert and oriented x4, is interested to hear a plan of action ongoing, afebrile, heart rate around 100, atrial fibrillation, respiration unlabored, 98% saturation on 3 L, blood pressure 105/60, off pressors, laboratory data with a white cell count 11.3, hemoglobin 11.8, hematocrit 34.4, platelet count 221, sodium 134, glucose 124, BUN 15, creatinine 0.95, calcium 8.1, albumin 2.3, normalized lactic acid,  Cultures so far negative  Evaluating the patient with PT OT, PMR physicians for possible return  to acute rehab    I have discussed this patient's plan of care and discharge plan at IDT rounds today with Case Management, Nursing, Nursing leadership, and other members of the IDT team.    Consultants/Specialty  critical care and physiatry    Code Status  Full Code    Disposition    Anticipate discharge to: an inpatient rehabilitation hospital    I have placed the appropriate orders for post-discharge needs.    Review of Systems  Review of Systems   Constitutional:  Positive for malaise/fatigue.   HENT: Negative.     Eyes: Negative.    Respiratory: Negative.  Negative for cough.    Cardiovascular: Negative.  Negative for chest pain.   Gastrointestinal: Negative.    Genitourinary: Negative.    Musculoskeletal:  Positive for back pain.   Skin: Negative.    Neurological:  Positive for weakness.   Endo/Heme/Allergies: Negative.    Psychiatric/Behavioral:  Positive for memory loss.    All other systems reviewed and are negative.       Physical Exam  Temp:  [36.1 °C (97 °F)-36.4 °C (97.5 °F)] 36.3 °C (97.3 °F)  Pulse:  [] 118  Resp:  [13-33] 23  BP: ()/(55-73) 100/70  SpO2:  [93 %-99 %] 95 %    Physical Exam  Vitals and nursing note reviewed.   Constitutional:       Appearance: He is well-developed.      Comments: Pt seen and examined.  Elderly male, no acute distress, pale   HENT:      Head: Normocephalic and atraumatic.   Eyes:      Pupils: Pupils are equal, round, and reactive to light.   Cardiovascular:      Rate and Rhythm: Tachycardia present. Rhythm irregular.      Heart sounds: Normal heart sounds.   Pulmonary:      Effort: Pulmonary effort is normal.      Breath sounds: Rhonchi present.   Abdominal:      General: Bowel sounds are normal.      Palpations: Abdomen is soft.   Musculoskeletal:         General: Normal range of motion.      Cervical back: Normal range of motion and neck supple.   Skin:     General: Skin is warm and dry.      Coloration: Skin is pale.   Neurological:      General: No focal  deficit present.      Mental Status: He is alert and oriented to person, place, and time.   Psychiatric:         Behavior: Behavior normal.         Fluids    Intake/Output Summary (Last 24 hours) at 8/21/2023 0808  Last data filed at 8/21/2023 0200  Gross per 24 hour   Intake 282.83 ml   Output 825 ml   Net -542.17 ml       Laboratory  Recent Labs     08/19/23  0551 08/19/23 2230 08/21/23  0405   WBC 5.2 3.1* 11.3*   RBC 4.09* 4.68* 3.53*   HEMOGLOBIN 13.3* 15.4 11.8*   HEMATOCRIT 39.4* 46.5 34.4*   MCV 96.3 99.4* 97.5   MCH 32.5 32.9 33.4*   MCHC 33.8 33.1 34.3   RDW 53.1* 54.4* 53.3*   PLATELETCT 220 267 221   MPV 10.5 10.1 10.6     Recent Labs     08/19/23  0551 08/19/23 2230 08/21/23  0300   SODIUM 136 138 134*   POTASSIUM 3.8 4.1 4.1   CHLORIDE 100 99 103   CO2 27 23 21   GLUCOSE 90 125* 124*   BUN 15 18 15   CREATININE 1.01 1.18 0.95   CALCIUM 8.4* 9.5 8.1*     Recent Labs     08/19/23 2230 08/20/23  0316   APTT 33.0 39.8*   INR 1.35* 1.65*               Imaging  CT-HEAD W/O   Final Result         1. Limited exam secondary to difficulty with positioning and otherwise artifact.   2. Within this limitation, no change from the previous exam. Stable right MCA distribution infarct with minimal hemorrhagic transformation.         DX-CHEST-PORTABLE (1 VIEW)   Final Result      1.  Enlarged cardiac silhouette with changes of interstitial edema.   2.  Bibasilar airspace disease could be related to edema, atelectasis or pneumonitis.           Assessment/Plan  * Septic shock (HCC)  Assessment & Plan  This is Septic shock Present on admission  SIRS criteria identified on my evaluation include: Fever, with temperature greater than 100.9 deg F, Tachycardia, with heart rate greater than 90 BPM and Leukopenia, with WBC less than 4,000  Clinical indicators of end organ dysfunction include Hypotension with systolic blood pressure less than 90 or MAP less than 65, Lactic Acid greater than 2 and Toxic Metabolic  Encephalopathy  Indicators of septic shock include: Sepsis present and persistent hypotension as well as initial lactate level result is greater than or equal to 4mmol/L   Sources is: Possible PNA   Sepsis protocol initiated  Crystalloid Fluid Administration: Fluid resuscitation ordered per standard protocol - 30 mL/kg per current or ideal body weight  IV antibiotics as appropriate for source of sepsis  Reassessment: I have reassessed the patient's hemodynamic status    Rocephin/Zithromax   F/u respiratory, blood cx   Levophed, gtt to maintain MAP> 65, SBP > 90   Gentle IV Fluids, monitor for fluid overload   Insert emanuel monitor intake/output       ACP (advance care planning)- (present on admission)  Assessment & Plan  Given Age and critical illness highly recommend palliative consultation  In discussion with patient and patient's son the patient at this time requested full code and will want maximized medical care    Acute febrile illness  Assessment & Plan  T max of 102.4*F   Continue with IV abx     Toxic metabolic encephalopathy  Assessment & Plan  Unclear maybe secondary to sepsis, Had recent CVA will obtain CT head w/o   Neuro checks every 4 hours  Fall, seizure, aspiration precautions   Patient much improved overall, back to baseline      Acute on chronic respiratory failure (HCC)  Assessment & Plan  Acute on chronic respiratory failure, improving  Initially requiring 15 l NRBM. Oxygenation improving   CXR showing pulm fibrosis, vs infection vs edema.   T max of 102.4 Empiric Rocephin/Zithromax for PNA   F/u respiratory, blood cultures   Downtrended lactic acid      Idiopathic hypotension- (present on admission)  Assessment & Plan  On Florinef prior, question of orthostasis, add midodrine for now  Cortisol level was adequate    CVA (cerebral vascular accident) (HCC)- (present on admission)  Assessment & Plan  Recent CVA s/p TNK   Repeat CT head w/o contrast negative for new changes  Neuro checks every 4  hours  Fall, seizure, aspiration precautions     Hypothyroidism due to acquired atrophy of thyroid- (present on admission)  Assessment & Plan  Restart medication regimen    Stage 3a chronic kidney disease (HCC)- (present on admission)  Assessment & Plan  Stable   Avoid nephrotoxic agents  Renally adjust all medications  Bladder scan PRN   Insert emanuel catheter     Pulmonary fibrosis (HCC)- (present on admission)  Assessment & Plan  Hx of, oxygen support  Bronchodilators as needed    Moderate mitral regurgitation by prior echocardiogram- (present on admission)  Assessment & Plan  Cautious fluid administration       Chronic atrial fibrillation (HCC)- (present on admission)  Assessment & Plan  Restart Eliquis and beta-blockade once stabilized  Ongoing telemetry  Focus on rate control      Anticoagulated- Eliquis for a. fib- (present on admission)  Assessment & Plan  Restarting Eliquis, CT head negative for new changes    Atrial fibrillation with RVR (HCC)  Assessment & Plan  Reinitiate rate control once the patient's blood pressure has stabilized, for the time being transition to midodrine  Telemetry monitoring   Optimize electrolytes  Restarted Eliquis  Echo 8/23 EF 55%            Plan  Continue antibiotics, changed to p.o. formulation at this time the patient is improved  Restart anticoagulation from of apixaban  Midodrine to maintain off pressors  Ongoing mild fluid resuscitation, monitor  Close laboratory follow-up  A.m. labs  PT OT rehab evaluation  Patient is has a high medical complexity, complex decision making and is at high risk for complication, morbidity, and mortality.  My total time spent caring for the patient on the day of the encounter was 58 minutes.   This does not include time spent on separately billable procedures/tests.      VTE prophylaxis:    therapeutic anticoagulation with eliquis 2.5 mg BID      I have performed a physical exam and reviewed and updated ROS and Plan today (8/21/2023). In  review of yesterday's note (8/20/2023), there are no changes except as documented above.        Please note that this dictation was created using voice recognition software. I have made every reasonable attempt to correct obvious errors, but I expect that there are errors of grammar and possibly context that I did not discover before finalizing the note.

## 2023-08-21 NOTE — PROGRESS NOTES
Heri from Lab called with critical result of Hgb 11.8 and Hct 34.4 at 0503. Critical lab result read back to Heri.   Dr. Holguin notified of critical lab result at 0511.  Critical lab result read back by  0196.

## 2023-08-22 LAB
ALBUMIN SERPL BCP-MCNC: 2.7 G/DL (ref 3.2–4.9)
ALBUMIN/GLOB SERPL: 0.9 G/DL
ALP SERPL-CCNC: 81 U/L (ref 30–99)
ALT SERPL-CCNC: 13 U/L (ref 2–50)
ANION GAP SERPL CALC-SCNC: 11 MMOL/L (ref 7–16)
AST SERPL-CCNC: 25 U/L (ref 12–45)
BASOPHILS # BLD AUTO: 0.2 % (ref 0–1.8)
BASOPHILS # BLD: 0.02 K/UL (ref 0–0.12)
BILIRUB SERPL-MCNC: 0.3 MG/DL (ref 0.1–1.5)
BUN SERPL-MCNC: 19 MG/DL (ref 8–22)
CALCIUM ALBUM COR SERPL-MCNC: 9.5 MG/DL (ref 8.5–10.5)
CALCIUM SERPL-MCNC: 8.5 MG/DL (ref 8.5–10.5)
CHLORIDE SERPL-SCNC: 100 MMOL/L (ref 96–112)
CO2 SERPL-SCNC: 22 MMOL/L (ref 20–33)
CREAT SERPL-MCNC: 0.98 MG/DL (ref 0.5–1.4)
EOSINOPHIL # BLD AUTO: 0.03 K/UL (ref 0–0.51)
EOSINOPHIL NFR BLD: 0.3 % (ref 0–6.9)
ERYTHROCYTE [DISTWIDTH] IN BLOOD BY AUTOMATED COUNT: 55 FL (ref 35.9–50)
GFR SERPLBLD CREATININE-BSD FMLA CKD-EPI: 70 ML/MIN/1.73 M 2
GLOBULIN SER CALC-MCNC: 3 G/DL (ref 1.9–3.5)
GLUCOSE SERPL-MCNC: 97 MG/DL (ref 65–99)
HCT VFR BLD AUTO: 35.3 % (ref 42–52)
HGB BLD-MCNC: 11.9 G/DL (ref 14–18)
IMM GRANULOCYTES # BLD AUTO: 0.06 K/UL (ref 0–0.11)
IMM GRANULOCYTES NFR BLD AUTO: 0.6 % (ref 0–0.9)
LYMPHOCYTES # BLD AUTO: 0.94 K/UL (ref 1–4.8)
LYMPHOCYTES NFR BLD: 9.2 % (ref 22–41)
MAGNESIUM SERPL-MCNC: 2 MG/DL (ref 1.5–2.5)
MCH RBC QN AUTO: 33.1 PG (ref 27–33)
MCHC RBC AUTO-ENTMCNC: 33.7 G/DL (ref 32.3–36.5)
MCV RBC AUTO: 98.1 FL (ref 81.4–97.8)
MONOCYTES # BLD AUTO: 1 K/UL (ref 0–0.85)
MONOCYTES NFR BLD AUTO: 9.8 % (ref 0–13.4)
NEUTROPHILS # BLD AUTO: 8.12 K/UL (ref 1.82–7.42)
NEUTROPHILS NFR BLD: 79.9 % (ref 44–72)
NRBC # BLD AUTO: 0 K/UL
NRBC BLD-RTO: 0 /100 WBC (ref 0–0.2)
NT-PROBNP SERPL IA-MCNC: 5015 PG/ML (ref 0–125)
PHOSPHATE SERPL-MCNC: 2.5 MG/DL (ref 2.5–4.5)
PLATELET # BLD AUTO: 226 K/UL (ref 164–446)
PMV BLD AUTO: 10.6 FL (ref 9–12.9)
POTASSIUM SERPL-SCNC: 3.4 MMOL/L (ref 3.6–5.5)
PROT SERPL-MCNC: 5.7 G/DL (ref 6–8.2)
RBC # BLD AUTO: 3.6 M/UL (ref 4.7–6.1)
SODIUM SERPL-SCNC: 133 MMOL/L (ref 135–145)
WBC # BLD AUTO: 10.2 K/UL (ref 4.8–10.8)

## 2023-08-22 PROCEDURE — A9270 NON-COVERED ITEM OR SERVICE: HCPCS | Performed by: HOSPITALIST

## 2023-08-22 PROCEDURE — 700102 HCHG RX REV CODE 250 W/ 637 OVERRIDE(OP): Performed by: HOSPITALIST

## 2023-08-22 PROCEDURE — 80053 COMPREHEN METABOLIC PANEL: CPT

## 2023-08-22 PROCEDURE — 700111 HCHG RX REV CODE 636 W/ 250 OVERRIDE (IP): Performed by: STUDENT IN AN ORGANIZED HEALTH CARE EDUCATION/TRAINING PROGRAM

## 2023-08-22 PROCEDURE — 700105 HCHG RX REV CODE 258: Mod: JZ | Performed by: STUDENT IN AN ORGANIZED HEALTH CARE EDUCATION/TRAINING PROGRAM

## 2023-08-22 PROCEDURE — 83880 ASSAY OF NATRIURETIC PEPTIDE: CPT

## 2023-08-22 PROCEDURE — 770020 HCHG ROOM/CARE - TELE (206)

## 2023-08-22 PROCEDURE — 97166 OT EVAL MOD COMPLEX 45 MIN: CPT

## 2023-08-22 PROCEDURE — 700102 HCHG RX REV CODE 250 W/ 637 OVERRIDE(OP): Mod: JZ | Performed by: STUDENT IN AN ORGANIZED HEALTH CARE EDUCATION/TRAINING PROGRAM

## 2023-08-22 PROCEDURE — 51798 US URINE CAPACITY MEASURE: CPT

## 2023-08-22 PROCEDURE — 99233 SBSQ HOSP IP/OBS HIGH 50: CPT | Performed by: STUDENT IN AN ORGANIZED HEALTH CARE EDUCATION/TRAINING PROGRAM

## 2023-08-22 PROCEDURE — A9270 NON-COVERED ITEM OR SERVICE: HCPCS | Mod: JZ | Performed by: STUDENT IN AN ORGANIZED HEALTH CARE EDUCATION/TRAINING PROGRAM

## 2023-08-22 PROCEDURE — 97163 PT EVAL HIGH COMPLEX 45 MIN: CPT

## 2023-08-22 PROCEDURE — 83735 ASSAY OF MAGNESIUM: CPT

## 2023-08-22 PROCEDURE — 84100 ASSAY OF PHOSPHORUS: CPT

## 2023-08-22 PROCEDURE — 36415 COLL VENOUS BLD VENIPUNCTURE: CPT

## 2023-08-22 PROCEDURE — 700105 HCHG RX REV CODE 258: Performed by: STUDENT IN AN ORGANIZED HEALTH CARE EDUCATION/TRAINING PROGRAM

## 2023-08-22 PROCEDURE — 85025 COMPLETE CBC W/AUTO DIFF WBC: CPT

## 2023-08-22 RX ORDER — MIDODRINE HYDROCHLORIDE 5 MG/1
2.5 TABLET ORAL
Status: DISCONTINUED | OUTPATIENT
Start: 2023-08-22 | End: 2023-08-23

## 2023-08-22 RX ORDER — POTASSIUM CHLORIDE 20 MEQ/1
40 TABLET, EXTENDED RELEASE ORAL ONCE
Status: COMPLETED | OUTPATIENT
Start: 2023-08-22 | End: 2023-08-22

## 2023-08-22 RX ADMIN — AMOXICILLIN AND CLAVULANATE POTASSIUM 1 TABLET: 875; 125 TABLET, FILM COATED ORAL at 16:38

## 2023-08-22 RX ADMIN — LEVOTHYROXINE SODIUM 175 MCG: 0.17 TABLET ORAL at 05:28

## 2023-08-22 RX ADMIN — MIDODRINE HYDROCHLORIDE 2.5 MG: 5 TABLET ORAL at 16:38

## 2023-08-22 RX ADMIN — THIAMINE HYDROCHLORIDE 500 MG: 100 INJECTION, SOLUTION INTRAMUSCULAR; INTRAVENOUS at 05:27

## 2023-08-22 RX ADMIN — APIXABAN 2.5 MG: 2.5 TABLET, FILM COATED ORAL at 05:28

## 2023-08-22 RX ADMIN — MIDODRINE HYDROCHLORIDE 5 MG: 5 TABLET ORAL at 08:27

## 2023-08-22 RX ADMIN — APIXABAN 2.5 MG: 2.5 TABLET, FILM COATED ORAL at 16:38

## 2023-08-22 RX ADMIN — MIDODRINE HYDROCHLORIDE 2.5 MG: 5 TABLET ORAL at 10:55

## 2023-08-22 RX ADMIN — ATORVASTATIN CALCIUM 40 MG: 40 TABLET, FILM COATED ORAL at 16:38

## 2023-08-22 RX ADMIN — POTASSIUM CHLORIDE 40 MEQ: 1500 TABLET, EXTENDED RELEASE ORAL at 10:55

## 2023-08-22 RX ADMIN — AZITHROMYCIN DIHYDRATE 500 MG: 250 TABLET ORAL at 05:28

## 2023-08-22 RX ADMIN — AMOXICILLIN AND CLAVULANATE POTASSIUM 1 TABLET: 875; 125 TABLET, FILM COATED ORAL at 05:28

## 2023-08-22 RX ADMIN — FLUDROCORTISONE ACETATE 0.1 MG: 0.1 TABLET ORAL at 05:28

## 2023-08-22 RX ADMIN — SODIUM CHLORIDE, POTASSIUM CHLORIDE, SODIUM LACTATE AND CALCIUM CHLORIDE: 600; 310; 30; 20 INJECTION, SOLUTION INTRAVENOUS at 05:25

## 2023-08-22 ASSESSMENT — COGNITIVE AND FUNCTIONAL STATUS - GENERAL
DAILY ACTIVITIY SCORE: 15
MOBILITY SCORE: 10
PERSONAL GROOMING: A LITTLE
MOVING FROM LYING ON BACK TO SITTING ON SIDE OF FLAT BED: A LOT
EATING MEALS: A LITTLE
DRESSING REGULAR LOWER BODY CLOTHING: A LOT
STANDING UP FROM CHAIR USING ARMS: A LOT
WALKING IN HOSPITAL ROOM: TOTAL
MOVING TO AND FROM BED TO CHAIR: A LOT
SUGGESTED CMS G CODE MODIFIER DAILY ACTIVITY: CK
HELP NEEDED FOR BATHING: A LOT
TURNING FROM BACK TO SIDE WHILE IN FLAT BAD: A LOT
CLIMB 3 TO 5 STEPS WITH RAILING: TOTAL
DRESSING REGULAR UPPER BODY CLOTHING: A LITTLE
SUGGESTED CMS G CODE MODIFIER MOBILITY: CL
TOILETING: A LOT

## 2023-08-22 ASSESSMENT — FIBROSIS 4 INDEX: FIB4 SCORE: 3.01

## 2023-08-22 ASSESSMENT — PAIN DESCRIPTION - PAIN TYPE: TYPE: ACUTE PAIN

## 2023-08-22 ASSESSMENT — ACTIVITIES OF DAILY LIVING (ADL): TOILETING: INDEPENDENT

## 2023-08-22 ASSESSMENT — GAIT ASSESSMENTS: GAIT LEVEL OF ASSIST: UNABLE TO PARTICIPATE

## 2023-08-22 NOTE — THERAPY
Physical Therapy   Initial Evaluation     Patient Name: Guru Pena  Age:  89 y.o., Sex:  male  Medical Record #: [id number]  Today's Date: 8/22/2023     Precautions  Precautions: Fall Risk;Swallow Precautions    Assessment  Patient is 89 y.o. male with diagnosis of acute respiratory failure, with past medical history of recent CVA status post TNK, A-fib on Eliquis, chronic respiratory failure oxygen dependent, hypothyroidism who presented 8/19/2023 with shortness of breath and hypoxia saturating down to 70%..  Additional factors influencing patient status / progress : today, pt is pleasant, motivated, struggles to answer questions of PLF, but cooperative with treatment. Pt with BM, max assist for rolling in bed to clean up,  needing heavy sequencing cues and assist. Pt needs max assist to come to sitting up at EOB. Pt shows R sidebend in trunk with sitting and standing, mod A for sit to stand, unable to weight shift enough to take steps. Pt to follow to address issues with motor planning, functional strength, balance and endurance.     Plan    Physical Therapy Initial Treatment Plan   Treatment Plan : Bed Mobility, Gait Training, Neuro Re-Education / Balance, Therapeutic Activities, Therapeutic Exercise  Treatment Frequency: 4 Times per Week  Duration: Until Therapy Goals Met    DC Equipment Recommendations: Unable to determine at this time  Discharge Recommendations: Recommend post-acute placement for additional physical therapy services prior to discharge home            Objective       08/22/23 1008   Charge Group   PT Evaluation PT Evaluation High   Total Time Spent   PT Total Time Yes   PT Evaluation Time Spent (Mins) 45   PT Total Time Spent (Calculated) 45   Initial Contact Note    Initial Contact Note Order Received and Verified, Physical Therapy Evaluation in Progress with Full Report to Follow.   Precautions   Precautions Fall Risk;Swallow Precautions   Vitals   O2 (LPM) 3   O2 Delivery Device  Silicone Nasal Cannula   Pain 0 - 10 Group   Therapist Pain Assessment During Activity;Nurse Notified  (no c/o pain)   Prior Living Situation   Prior Services Continuous (24 Hour) Care Giving Per Service   [facility]ousing [facility] Facility [facility]   Comments pt is here from rehab, [facility] lives [facility] the independent side with his wife.   Prior Level of Functional Mobility   Bed Mobility Required Assist   Transfer Status Required Assist   Ambulation Required Assist   Ambulation Distance working with therapy at rehab   Assistive Devices Used Front-Wheel Walker   Cognition    Level of Consciousness Alert   Comments pt struggles to answer questions, but does report being at [facility] for several years and trying to walk down to diningroom for meals but having more trouble recently.   Strength Lower Body   Lower Body Strength  X   Comments functional for standing, but unable to weight shift to take steps   Balance Assessment   Sitting Balance (Static) Poor   Sitting Balance (Dynamic) Poor   Standing Balance (Static) Trace +   Standing Balance (Dynamic) Trace +   Weight Shift Sitting Poor   Weight Shift Standing Absent   Comments standing with FWW, trunk shows R sidebend in standing.   Bed Mobility    Supine to Sit Maximal Assist   Sit to Supine Maximal Assist   Scooting Maximal Assist   Rolling Maximal Assist to Rt.;Maximum Assist to Lt.   Comments heavy sequencing needed, especially for rolling.   Gait Analysis   Gait Level Of Assist Unable to Participate   Comments pt able to move R foot medially, but unable to take full step.   Functional Mobility   Sit to Stand Moderate Assist  (x 2 reps with bed height raised.)   Bed, Chair, Wheelchair Transfer Unable to Participate   How much difficulty does the patient currently have...   Turning over in bed (including adjusting bedclothes, sheets and blankets)? 2   Sitting down on and standing up from a chair with arms (e.g., wheelchair, bedside commode, etc.) 2   Moving  from lying on back to sitting on the side of the bed? 2   How much help from another person does the patient currently need...   Moving to and from a bed to a chair (including a wheelchair)? 2   Need to walk in a hospital room? 1   Climbing 3-5 steps with a railing? 1   6 clicks Mobility Score 10   Activity Tolerance   Standing 15 sec x 2   Short Term Goals    Short Term Goal # 1 Pt will perform bed mobility with min A in 6 visits to improve functional indep.   Short Term Goal # 2 Pt will transfer with min A in 6 visits to improve functional indep.   Short Term Goal # 3 Pt will ambulate x 20 feet using FWW with mod A in 6 visits to improve functional indep.   Education Group   Education Provided Role of Physical Therapist   Role of Physical Therapist Patient Response Patient;Acceptance;Explanation;Verbal Demonstration   Physical Therapy Initial Treatment Plan    Treatment Plan  Bed Mobility;Gait Training;Neuro Re-Education / Balance;Therapeutic Activities;Therapeutic Exercise   Treatment Frequency 4 Times per Week   Duration Until Therapy Goals Met   Problem List    Problems Impaired Bed Mobility;Impaired Transfers;Impaired Ambulation;Impaired Balance;Functional Strength Deficit;Decreased Activity Tolerance;Motor Planning / Sequencing   Anticipated Discharge Equipment and Recommendations   DC Equipment Recommendations Unable to determine at this time   Discharge Recommendations Recommend post-acute placement for additional physical therapy services prior to discharge home   Interdisciplinary Plan of Care Collaboration   IDT Collaboration with  Nursing   Patient Position at End of Therapy In Bed;Call Light within Reach;Tray Table within Reach;Phone within Reach;Bed Alarm On   Collaboration Comments zeinab updated   Session Information   Date / Session Number  8/22-1 (1/4, 8/28)

## 2023-08-22 NOTE — THERAPY
"Occupational Therapy   Initial Evaluation     Patient Name: Guru Pena  Age:  89 y.o., Sex:  male  Medical Record #: [id number]  Today's Date: 8/22/2023       Precautions: Fall Risk, Swallow Precautions    Assessment  Patient is 89 y.o. male with a recent past medical history of CVA status post TNK, A-fib on Eliquis, chronic respiratory failure oxygen dependent, hypothyroidism who presented on August 19 with shortness of breath and hypoxia saturating down to 70% from Rehab.  Pt found to be in septic shock secondary to possible pneumonia, A-fib with RVR & toxic metabolic encephalopathy.  Today pt was pleasant, co-operative & eager to regain his strength & independence.  Pt required Mod A & extra time for supine to sit EOB.  Pt has a forward head, kyphotic thoracic spine & post lean while seated EOB.  Pt able to scoot forward with Min A & extra time.  Pt did stand with Mod A but again had a strong post lean.  Pt returned to supine in bed with Mod A.  Pt would be a good candidate to return to Rehab to regain his independence prior to returning home with his spouse.    Plan    Occupational Therapy Initial Treatment Plan   Treatment Interventions: Self Care / Activities of Daily Living, Adaptive Equipment, Neuro Re-Education / Balance, Therapeutic Exercises, Therapeutic Activity  Treatment Frequency: 3 Times per Week  Duration: Until Therapy Goals Met    DC Equipment Recommendations: Unable to determine at this time  Discharge Recommendations: Recommend post-acute placement for additional occupational therapy services prior to discharge home     Subjective    \"I just don't know why I'm so tired?\"     Objective      Prior Living Situation   Prior Services Continuous (24 Hour) Care Giving Per Service   Housing / Facility Independent Living Facility  (pt lives at [facility] on the independent side)   Steps Into Home 0   Elevator Yes   Bathroom Set up Walk In Shower;Grab Bars;Shower Chair   Equipment Owned 4-Wheel " Walker   Lives with - Patient's Self Care Capacity Spouse   Comments Pt has been at Rehab [facility] following his CVA   Prior Level of ADL Function   Self Feeding Independent   Grooming / Hygiene Independent   Bathing Requires Assist   Dressing Requires Assist   Toileting Independent   Prior Level of IADL Function   Medication Management Requires Assist   Laundry Requires Assist   Kitchen Mobility Requires Assist   Finances Requires Assist   Home Management Requires Assist   Shopping Requires Assist   Prior Level Of Mobility Supervision With Device in Community   Precautions   Precautions Fall Risk;Swallow Precautions   Vitals   Pulse Oximetry 95 %   O2 (LPM) 3   O2 Delivery Device Silicone Nasal Cannula   Pain   Pain Scales 0 to 10 Scale    Intervention Ambulation / Increased Activity   Pain 0 - 10 Group   Therapist Pain Assessment During Activity;Nurse Notified;0   Cognition    Cognition / Consciousness X   Speech/ Communication Delayed Responses;Hard of Hearing   Level of Consciousness Alert   Comments pt is pleasant & co-oeprative, delayed responses   Passive ROM Upper Body   Passive ROM Upper Body WDL   Active ROM Upper Body   Active ROM Upper Body  WDL   Dominant Hand Right   Strength Upper Body   Upper Body Strength  WDL   Sensation Upper Body   Upper Extremity Sensation  WDL   Upper Body Muscle Tone   Upper Body Muscle Tone  WDL   Neurological Concerns   Neurological Concerns Yes   Sitting Posture During ADL's Forward Head;Kyphotic;Posterior Lean   Standing Posture During ADL's Posterior Lean;Forward Head;Kyphotic   Coordination Upper Body   Coordination WDL   Balance Assessment   Sitting Balance (Static) Fair -   Sitting Balance (Dynamic) Poor   Standing Balance (Static) Trace +   Standing Balance (Dynamic) Trace   Weight Shift Sitting Poor   Weight Shift Standing Poor   Bed Mobility    Supine to Sit Moderate Assist   Sit to Supine Moderate Assist   Scooting Moderate Assist   Rolling Maximal Assist to Rt.    Comments pt required extra time   ADL Assessment   Eating Minimal Assist   Grooming Minimal Assist;Seated   Bathing Moderate Assist   Upper Body Dressing Minimal Assist   Lower Body Dressing Maximal Assist   Toileting Maximal Assist   Functional Mobility   Sit to Stand Moderate Assist   Bed, Chair, Wheelchair Transfer Unable to Participate   Activity Tolerance   Sitting Edge of Bed 15   Standing 1   Patient / Family Goals   Patient / Family Goal #1 To get my strength back & not be so tired   Short Term Goals   Short Term Goal # 1 Pt will be Min A for ADL transfers   Short Term Goal # 2 Pt will sit up in chair for 2 meals/day   Short Term Goal # 3 Pt will groom seated with SBA   Education Group   Role of Occupational Therapist Patient Response Patient;Acceptance;Explanation;Verbal Demonstration   Occupational Therapy Initial Treatment Plan    Treatment Interventions Self Care / Activities of Daily Living;Adaptive Equipment;Neuro Re-Education / Balance;Therapeutic Exercises;Therapeutic Activity   Treatment Frequency 3 Times per Week   Duration Until Therapy Goals Met   Problem List   Problem List Decreased Active Daily Living Skills;Decreased Functional Mobility;Decreased Activity Tolerance;Safety Awareness Deficits / Cognition;Impaired Posture / Trunk Alignment;Impaired Postural Control / Balance   Anticipated Discharge Equipment and Recommendations   DC Equipment Recommendations Unable to determine at this time   Discharge Recommendations Recommend post-acute placement for additional occupational therapy services prior to discharge home   Interdisciplinary Plan of Care Collaboration   IDT Collaboration with  Nursing   Patient Position at End of Therapy In Bed;Bed Alarm On;Call Light within Reach;Tray Table within Reach;Phone within Reach   Collaboration Comments Nsg notified of OT findings   Session Information   Date / Session Number  8/22 #1 (1/3, 8/28)

## 2023-08-22 NOTE — CARE PLAN
Problem: Knowledge Deficit - Standard  Goal: Patient and family/care givers will demonstrate understanding of plan of care, disease process/condition, diagnostic tests and medications  Description: Target End Date:  1-3 days or as soon as patient condition allows    Document in Patient Education    1.  Patient and family/caregiver oriented to unit, equipment, visitation policy and means for communicating concern  2.  Complete/review Learning Assessment  3.  Assess knowledge level of disease process/condition, treatment plan, diagnostic tests and medications  4.  Explain disease process/condition, treatment plan, diagnostic tests and medications  Outcome: Progressing- patient updated on POC to provide IV fluids, antibiotics and monitor HR and blood pressure. Pt verbalizes understanding.      Problem: Respiratory  Goal: Patient will achieve/maintain optimum respiratory ventilation and gas exchange  Description: Target End Date:  Prior to discharge or change in level of care    Document on Assessment flowsheet    1.  Assess and monitor rate, rhythm, depth and effort of respiration  2.  Breath sounds assessed qshift and/or as needed  3.  Assess O2 saturation, administer/titrate oxygen as ordered  4.  Position patient for maximum ventilatory efficiency  5.  Turn, cough, and deep breath with splinting to improve effectiveness  6.  Collaborate with RT to administer medication/treatments per order  7.  Encourage use of incentive spirometer and encourage patient to cough after use and utilize splinting techniques if applicable  8.  Airway suctioning  9.  Monitor sputum production for changes in color, consistency and frequency  10. Perform frequent oral hygiene  11. Alternate physical activity with rest periods  Outcome: Progressing- patient on CPAP at night which is baseline at home.      Problem: Skin Integrity  Goal: Skin integrity is maintained or improved  Description: Target End Date:  Prior to discharge or change in  level of care    Document interventions on Skin Risk/Vaibhav flowsheet groups and corresponding LDA    1.  Assess and monitor skin integrity, appearance and/or temperature  2.  Assess risk factors for impaired skin integrity and/or pressures ulcers  3.  Implement precautions to protect skin integrity in collaboration with interdisciplinary team  4.  Implement pressure ulcer prevention protocol if at risk for skin breakdown  5.  Confirm wound care consult if at risk for skin breakdown  6.  Ensure patient use of pressure relieving devices  (Low air loss bed, waffle overlay, heel protectors, ROHO cushion, etc)  Outcome: Progressing- patient is turned every two hours and encouraged to move as much as possible to prevent pressure injury. Patient verbalizes understanding and has pressure injury preventative measures in place.      Problem: Fall Risk  Goal: Patient will remain free from falls  Description: Target End Date:  Prior to discharge or change in level of care    Document interventions on the Danette Mares Fall Risk Assessment    1.  Assess for fall risk factors  2.  Implement fall precautions  Outcome: Progressing- patient has bed alarm in place and is educated on use of call light. Patient has shown he understands use of the call light before attempting ambulation.        The patient is Watcher - Medium risk of patient condition declining or worsening    Shift Goals  Clinical Goals: stable BP and HR  Patient Goals: rest  Family Goals: rest and comfort

## 2023-08-22 NOTE — THERAPY
Speech Language Therapy Contact Note    Patient Name: Guru Pena  Age:  89 y.o., Sex:  male  Medical Record #: [id number]  Today's Date: 8/22/2023    Attempted to see pt for dysphagia exercises targeting deficits noted on FEES. Pt on the phone at time of attempted SLP session, wished to continue his phone call instead of therapy at this time. Will follow as time allows, possibly tomorrow, for ongoing dysphagia managment. New swallow precaution sign hung HOB; please maintain current recommendations.     08/22/23 6173   Treatment Variance   Reason For Missed Therapy Non-Medical - Patient Refused   Total Treatment Time   SLP Missed Visit (Mins) 15   Interdisciplinary Plan of Care Collaboration   IDT Collaboration with  Nursing   Patient Position at End of Therapy In Bed;Bed Alarm On;Tray Table within Reach   Collaboration Comments    Session Information   Date / Session Number 8/21, 2 (2/3, 8/26) - note 8/22   Priority 3  (3x/wk. SB6/MT2, +FEES. recent cva, sepsis. admit from rehab)

## 2023-08-22 NOTE — PROGRESS NOTES
Report received from day RN. Pt a&ox4, family at bedside. POC discussed. Pt declines any needs at this time. Bed alarm on, bed locked and in lowest position. Call light within reach.

## 2023-08-23 LAB
ANION GAP SERPL CALC-SCNC: 10 MMOL/L (ref 7–16)
BUN SERPL-MCNC: 14 MG/DL (ref 8–22)
CALCIUM SERPL-MCNC: 8.4 MG/DL (ref 8.5–10.5)
CHLORIDE SERPL-SCNC: 103 MMOL/L (ref 96–112)
CO2 SERPL-SCNC: 23 MMOL/L (ref 20–33)
CREAT SERPL-MCNC: 1.06 MG/DL (ref 0.5–1.4)
GFR SERPLBLD CREATININE-BSD FMLA CKD-EPI: 63 ML/MIN/1.73 M 2
GLUCOSE SERPL-MCNC: 137 MG/DL (ref 65–99)
MAGNESIUM SERPL-MCNC: 2 MG/DL (ref 1.5–2.5)
POTASSIUM SERPL-SCNC: 4 MMOL/L (ref 3.6–5.5)
SODIUM SERPL-SCNC: 136 MMOL/L (ref 135–145)

## 2023-08-23 PROCEDURE — 83735 ASSAY OF MAGNESIUM: CPT

## 2023-08-23 PROCEDURE — A9270 NON-COVERED ITEM OR SERVICE: HCPCS | Performed by: HOSPITALIST

## 2023-08-23 PROCEDURE — 92526 ORAL FUNCTION THERAPY: CPT

## 2023-08-23 PROCEDURE — 80048 BASIC METABOLIC PNL TOTAL CA: CPT

## 2023-08-23 PROCEDURE — A9270 NON-COVERED ITEM OR SERVICE: HCPCS | Performed by: STUDENT IN AN ORGANIZED HEALTH CARE EDUCATION/TRAINING PROGRAM

## 2023-08-23 PROCEDURE — 770020 HCHG ROOM/CARE - TELE (206)

## 2023-08-23 PROCEDURE — 700102 HCHG RX REV CODE 250 W/ 637 OVERRIDE(OP): Performed by: HOSPITALIST

## 2023-08-23 PROCEDURE — 36415 COLL VENOUS BLD VENIPUNCTURE: CPT

## 2023-08-23 PROCEDURE — 700102 HCHG RX REV CODE 250 W/ 637 OVERRIDE(OP): Performed by: STUDENT IN AN ORGANIZED HEALTH CARE EDUCATION/TRAINING PROGRAM

## 2023-08-23 PROCEDURE — 99233 SBSQ HOSP IP/OBS HIGH 50: CPT | Performed by: STUDENT IN AN ORGANIZED HEALTH CARE EDUCATION/TRAINING PROGRAM

## 2023-08-23 PROCEDURE — 700101 HCHG RX REV CODE 250: Performed by: NURSE PRACTITIONER

## 2023-08-23 PROCEDURE — 700111 HCHG RX REV CODE 636 W/ 250 OVERRIDE (IP): Mod: JZ | Performed by: STUDENT IN AN ORGANIZED HEALTH CARE EDUCATION/TRAINING PROGRAM

## 2023-08-23 RX ORDER — METOPROLOL TARTRATE 1 MG/ML
5 INJECTION, SOLUTION INTRAVENOUS
Status: COMPLETED | OUTPATIENT
Start: 2023-08-23 | End: 2023-08-24

## 2023-08-23 RX ORDER — POTASSIUM CHLORIDE 20 MEQ/1
40 TABLET, EXTENDED RELEASE ORAL ONCE
Status: COMPLETED | OUTPATIENT
Start: 2023-08-23 | End: 2023-08-23

## 2023-08-23 RX ORDER — FUROSEMIDE 10 MG/ML
40 INJECTION INTRAMUSCULAR; INTRAVENOUS ONCE
Status: COMPLETED | OUTPATIENT
Start: 2023-08-23 | End: 2023-08-23

## 2023-08-23 RX ADMIN — FUROSEMIDE 40 MG: 10 INJECTION INTRAMUSCULAR; INTRAVENOUS at 09:30

## 2023-08-23 RX ADMIN — APIXABAN 2.5 MG: 2.5 TABLET, FILM COATED ORAL at 16:20

## 2023-08-23 RX ADMIN — APIXABAN 2.5 MG: 2.5 TABLET, FILM COATED ORAL at 04:40

## 2023-08-23 RX ADMIN — POTASSIUM CHLORIDE 40 MEQ: 1500 TABLET, EXTENDED RELEASE ORAL at 09:30

## 2023-08-23 RX ADMIN — ATORVASTATIN CALCIUM 40 MG: 40 TABLET, FILM COATED ORAL at 16:20

## 2023-08-23 RX ADMIN — AMOXICILLIN AND CLAVULANATE POTASSIUM 1 TABLET: 875; 125 TABLET, FILM COATED ORAL at 04:40

## 2023-08-23 RX ADMIN — FLUDROCORTISONE ACETATE 0.1 MG: 0.1 TABLET ORAL at 04:40

## 2023-08-23 RX ADMIN — AMOXICILLIN AND CLAVULANATE POTASSIUM 1 TABLET: 875; 125 TABLET, FILM COATED ORAL at 16:20

## 2023-08-23 RX ADMIN — METOPROLOL TARTRATE 5 MG: 5 INJECTION INTRAVENOUS at 19:55

## 2023-08-23 RX ADMIN — MIDODRINE HYDROCHLORIDE 2.5 MG: 5 TABLET ORAL at 08:09

## 2023-08-23 RX ADMIN — LEVOTHYROXINE SODIUM 175 MCG: 0.17 TABLET ORAL at 04:41

## 2023-08-23 ASSESSMENT — ENCOUNTER SYMPTOMS
GASTROINTESTINAL NEGATIVE: 1
RESPIRATORY NEGATIVE: 1
CARDIOVASCULAR NEGATIVE: 1
EYES NEGATIVE: 1
COUGH: 0
BACK PAIN: 1
WEAKNESS: 1
MEMORY LOSS: 1

## 2023-08-23 ASSESSMENT — PAIN DESCRIPTION - PAIN TYPE: TYPE: ACUTE PAIN

## 2023-08-23 ASSESSMENT — FIBROSIS 4 INDEX: FIB4 SCORE: 3.01

## 2023-08-23 NOTE — PROGRESS NOTES
Hospital Medicine Daily Progress Note    Date of Service  8/22/2023    Chief Complaint  Guru Pena is a 89 y.o. male admitted 8/19/2023 with lethargy, atrial fibrillation with RVR hypertension, transferred from the acute inpatient rehabilitation unit    Hospital Course  Guru Pena is a 89 y.o. male past medical history of recent CVA status post TNK, A-fib on Eliquis, chronic respiratory failure oxygen dependent, hypothyroidism who presented 8/19/2023 with shortness of breath and hypoxia saturating down to 70%.  Patient placed on nonrebreather mask at 15 L.  History is limited given patient's critical illness and AMS.  Patient does respond to painful and verbal stimuli however cannot participate in interview.  In the ER, patient noted to meet sepsis criteria started on IV antibiotics and given sepsis protocol fluids.  Septic shock secondary to possible pneumonia, A-fib with RVR, toxic metabolic encephalopathy.  The patient was residing on the acute rehab unit where he was undergoing physical therapy after his CVA, the patient previously lived at a assisted living facility, apparently was ambulatory with a walker      Interval Problem Update  Patient seen and examined today.  Data, Medication data reviewed.  Case discussed with nursing as available.  Plan of Care reviewed with patient and notified of changes.     8/21 the patient much improved today, he is back to baseline mentation ordered appears, he is alert and oriented x4, is interested to hear a plan of action ongoing, afebrile, heart rate around 100, atrial fibrillation, respiration unlabored, 98% saturation on 3 L, blood pressure 105/60, off pressors, laboratory data with a white cell count 11.3, hemoglobin 11.8, hematocrit 34.4, platelet count 221, sodium 134, glucose 124, BUN 15, creatinine 0.95, calcium 8.1, albumin 2.3, normalized lactic acid,  Cultures so far negative  Evaluating the patient with PT OT, PMR physicians for possible return  to acute rehab    8/22/23  Examined in his inpatient room.  Vitals reviewed, he is afebrile, pulse from 60s to 110, systolic blood pressure from 120s to 140s pulse ox 83 to 98% on 3 L nasal cannula.  Labs reviewed, leukocytosis resolved, stable anemia, potassium 3.4, low protein, BNP 5000.  Decrease midodrine today, will start diuresis.  No acute complaints, feeling weak, agrees that he will likely need rehab following this hospitalization.    I have discussed this patient's plan of care and discharge plan at IDT rounds today with Case Management, Nursing, Nursing leadership, and other members of the IDT team.    Consultants/Specialty  critical care and physiatry    Code Status  Full Code    Disposition  The patient is not medically cleared for discharge to home or a post-acute facility.      I have placed the appropriate orders for post-discharge needs.    Review of Systems  Review of Systems   Constitutional:  Positive for malaise/fatigue.   HENT: Negative.     Eyes: Negative.    Respiratory: Negative.  Negative for cough.    Cardiovascular: Negative.  Negative for chest pain.   Gastrointestinal: Negative.    Genitourinary: Negative.    Musculoskeletal:  Positive for back pain.   Skin: Negative.    Neurological:  Positive for weakness.   Endo/Heme/Allergies: Negative.    Psychiatric/Behavioral:  Positive for memory loss.    All other systems reviewed and are negative.       Physical Exam  Temp:  [36.5 °C (97.7 °F)-36.7 °C (98.1 °F)] 36.6 °C (97.9 °F)  Pulse:  [] 66  Resp:  [16-19] 19  BP: (123-148)/(65-94) 123/65  SpO2:  [90 %-98 %] 98 %    Physical Exam  Vitals and nursing note reviewed.   Constitutional:       General: He is not in acute distress.     Appearance: He is well-developed. He is not toxic-appearing.      Comments: Pt seen and examined.  Elderly male, no acute distress, pale   HENT:      Head: Normocephalic and atraumatic.      Nose: Nose normal. No rhinorrhea.      Mouth/Throat:      Mouth:  Mucous membranes are moist.      Pharynx: Oropharynx is clear.   Eyes:      General: No scleral icterus.     Extraocular Movements: Extraocular movements intact.      Conjunctiva/sclera: Conjunctivae normal.   Cardiovascular:      Rate and Rhythm: Normal rate. Rhythm irregular.      Heart sounds: Normal heart sounds.   Pulmonary:      Effort: Pulmonary effort is normal. No respiratory distress.      Breath sounds: Rales present. No wheezing or rhonchi.   Abdominal:      General: Bowel sounds are normal.      Palpations: Abdomen is soft.      Tenderness: There is no abdominal tenderness.   Musculoskeletal:         General: Normal range of motion.      Cervical back: Normal range of motion and neck supple.   Skin:     General: Skin is warm and dry.      Coloration: Skin is pale.   Neurological:      General: No focal deficit present.      Mental Status: He is alert and oriented to person, place, and time.   Psychiatric:         Behavior: Behavior normal.         Fluids    Intake/Output Summary (Last 24 hours) at 8/23/2023 0832  Last data filed at 8/23/2023 0438  Gross per 24 hour   Intake 320 ml   Output 975 ml   Net -655 ml         Laboratory  Recent Labs     08/21/23  0405 08/22/23  0126   WBC 11.3* 10.2   RBC 3.53* 3.60*   HEMOGLOBIN 11.8* 11.9*   HEMATOCRIT 34.4* 35.3*   MCV 97.5 98.1*   MCH 33.4* 33.1*   MCHC 34.3 33.7   RDW 53.3* 55.0*   PLATELETCT 221 226   MPV 10.6 10.6       Recent Labs     08/21/23  0300 08/22/23  0126   SODIUM 134* 133*   POTASSIUM 4.1 3.4*   CHLORIDE 103 100   CO2 21 22   GLUCOSE 124* 97   BUN 15 19   CREATININE 0.95 0.98   CALCIUM 8.1* 8.5                       Imaging  CT-HEAD W/O   Final Result         1. Limited exam secondary to difficulty with positioning and otherwise artifact.   2. Within this limitation, no change from the previous exam. Stable right MCA distribution infarct with minimal hemorrhagic transformation.         DX-CHEST-PORTABLE (1 VIEW)   Final Result      1.   Enlarged cardiac silhouette with changes of interstitial edema.   2.  Bibasilar airspace disease could be related to edema, atelectasis or pneumonitis.             Assessment/Plan  * Septic shock (HCC)  Assessment & Plan  This is Septic shock Present on admission  SIRS criteria identified on my evaluation include: Fever, with temperature greater than 100.9 deg F, Tachycardia, with heart rate greater than 90 BPM and Leukopenia, with WBC less than 4,000  Clinical indicators of end organ dysfunction include Hypotension with systolic blood pressure less than 90 or MAP less than 65, Lactic Acid greater than 2 and Toxic Metabolic Encephalopathy  Indicators of septic shock include: Sepsis present and persistent hypotension as well as initial lactate level result is greater than or equal to 4mmol/L   Sources is: Possible PNA   Sepsis protocol initiated  Crystalloid Fluid Administration: Fluid resuscitation ordered per standard protocol - 30 mL/kg per current or ideal body weight  IV antibiotics as appropriate for source of sepsis  Reassessment: I have reassessed the patient's hemodynamic status    Rocephin/Zithromax   F/u respiratory, blood cx   Levophed, gtt to maintain MAP> 65, SBP > 90   Gentle IV Fluids, monitor for fluid overload   Insert emanuel monitor intake/output       ACP (advance care planning)- (present on admission)  Assessment & Plan  Given Age and critical illness highly recommend palliative consultation  In discussion with patient and patient's son the patient at this time requested full code and will want maximized medical care    Acute febrile illness  Assessment & Plan  T max of 102.4*F   Continue with IV abx     Toxic metabolic encephalopathy  Assessment & Plan  Unclear maybe secondary to sepsis, Had recent CVA will obtain CT head w/o   Neuro checks every 4 hours  Fall, seizure, aspiration precautions   Patient much improved overall, back to baseline      Acute on chronic respiratory failure  ([facility])  Assessment & Plan  Acute on chronic respiratory failure, improving  Initially requiring 15 l NRBM. Oxygenation improving   CXR showing pulm fibrosis, vs infection vs edema.   T max of 102.4 Empiric Rocephin/Zithromax for PNA   F/u respiratory, blood cultures   Downtrended lactic acid      Idiopathic hypotension- (present on admission)  Assessment & Plan  On Florinef prior, question of orthostasis, add midodrine for now  Cortisol level was adequate    CVA (cerebral vascular accident) ([facility])- (present on admission)  Assessment & Plan  Recent CVA s/p TNK   Repeat CT head w/o contrast negative for new changes  Neuro checks every 4 hours  Fall, seizure, aspiration precautions     Hypothyroidism due to acquired atrophy of thyroid- (present on admission)  Assessment & Plan  Restart medication regimen    Stage 3a chronic kidney disease ([facility])- (present on admission)  Assessment & Plan  Stable   Avoid nephrotoxic agents  Renally adjust all medications  Bladder scan PRN   Insert emanuel catheter     Pulmonary fibrosis ([facility])- (present on admission)  Assessment & Plan  Hx of, oxygen support  Bronchodilators as needed    Moderate mitral regurgitation by prior echocardiogram- (present on admission)  Assessment & Plan  Cautious fluid administration       Chronic atrial fibrillation ([facility])- (present on admission)  Assessment & Plan  Restart Eliquis and beta-blockade once stabilized  Ongoing telemetry  Focus on rate control      Anticoagulated- Eliquis for a. fib- (present on admission)  Assessment & Plan  Restarting Eliquis, CT head negative for new changes    Atrial fibrillation with RVR ([facility])  Assessment & Plan  Reinitiate rate control once the patient's blood pressure has stabilized, for the time being transition to midodrine  Telemetry monitoring   Optimize electrolytes  Restarted Eliquis  Echo 8/23 EF 55%              This does not include time spent on separately billable procedures/tests.  Total time spent 51 minutes. I  spent greater than 50% of the time for patient care, counseling, and coordination on this date, including unit/floor time, and face-to-face time with the patient as per interval events, my own review of patient's imaging and lab analysis and developing my assessment and plan above. Patient is has a high medical complexity, complex decision making and is at high risk for complication, morbidity, and mortality.        VTE prophylaxis:   SCDs/TEDs   therapeutic anticoagulation with eliquis 2.5 mg BID      I have performed a physical exam and reviewed and updated ROS and Plan today (8/22/2023). In review of yesterday's note (8/21/2023), there are no changes except as documented above.        Please note that this dictation was created using voice recognition software. I have made every reasonable attempt to correct obvious errors, but I expect that there are errors of grammar and possibly context that I did not discover before finalizing the note.

## 2023-08-23 NOTE — DISCHARGE PLANNING
Will discuss this case with Administration this am.     1029-Current recommendation is for SNF.  [facility] will no longer follow.  Please reach out to myself with any interval changes/questions.

## 2023-08-23 NOTE — THERAPY
"Speech Language Pathology   Daily Treatment     Patient Name: Guru Pena  AGE:  89 y.o., SEX:  male  Medical Record #: [id number]  Date of Service: 8/23/2023      Precautions:  Fall risk  Swallow precautions    Subjective  Patient pleasant and agreeable to ST services. Pt's son was present for the entirety of the session. Pt's son expressed concerns of moisture accumulation in the Pt's CPAP lines last night.  Pt reported he is \"doing okay\" with his current diet textures and does not wish to make any changes at this time.  Pt perceptually weak this session and reported that he has been requiring assistance with eating.    Assessment    Trials 0-Thin liquids by cup edge resulted in 2x coughing events, which the pt reported made him uncomfortable.    Trials 2- Mildly Thick liquids by spoon and cup edge were tolerated well by pt report, however, pt required cues to elicit cough and/or throat clears in order to alleviate \"wet\" or \"gurgly\" voice. Pt educated on volitional coughs/throat clears in order to protect the airway; stimulable for these airway protection strategies.    6- Soft & Bite Sized trials of string cheese were reported by the patient to require more chewing than is comfortable for him. Min oral residue present after 6- Soft & Bite Sized trials; 2- Mildly Thick liquid wash able to clear oral residue.    Clinical Impressions    Observations this day remain consistent with oral-pharyngeal dysphagia. Pt reported he okay with remaining on a diet of 6- Soft & Bite Sized / 2- Mildly Thick liquids, medications floated whole in puree as tolerated--crushed in puree when necessary. Pt would benefit from continued SLP services at lower level of care upon d/c to address dysphagia.      Recommendations          Dysphagia Treatment  Diet Consistency: Soft and Bite Sized (IDDSI 6) / Thin Liquids (IDDSI 0)  Instrumentation: Instrumental swallow study pending clinical progress (Plan to d/c to rehab; appropriate for " "MBSS upon admission to lower level of care)  Medication: Whole with puree  Supervision: 1:1 feeding with constant supervision  Positioning: Fully upright and midline during oral intake, Remain upright for 30 minutes after oral intake  Risk Management : Small bites/sips, Alternate bites and sips  Oral Care: Pre- and post-meals                     SLP Treatment Plan  Treatment Plan: Dysphagia Treatment  SLP Frequency: 3x Per Week  Estimated Duration: Until Therapy Goals Met      Anticipated Discharge Needs  Discharge Recommendations: Recommend post-acute placement for additional speech therapy services prior to discharge home  Therapy Recommendations Upon DC: Dysphagia Training, Patient / Family / Caregiver Education      Patient / Family Goals  Patient / Family Goal #1: \"The gurgling from my CPAP scared me last night\"  Goal #1 Outcome: Progressing as expected  Short Term Goals  Short Term Goal # 1: Pt will consume diet of SB/MT given 1:1 spv and strategy use with no worsening of respiratory status.  Goal Outcome # 1: Progressing as expected  Short Term Goal # 2: Pt will complete FEES w SLP to further evaluate swallow function and inform POC.  Goal Outcome # 2 : Goal met  Short Term Goal # 2 B : Patient will complete swallowing exercises targeting LVC, pharyngeal shortening, and pharyngeal constrictions x20.  Short Term Goal # 3: Not targeted this session      VALENTIN Irwin  "

## 2023-08-23 NOTE — CARE PLAN
The patient is Stable - Low risk of patient condition declining or worsening    Shift Goals  Clinical Goals: hemodynamic stability  Patient Goals: sleep  Family Goals: no family present    Progress made toward(s) clinical / shift goals:      Problem: Knowledge Deficit - Standard  Goal: Patient and family/care givers will demonstrate understanding of plan of care, disease process/condition, diagnostic tests and medications  Outcome: Progressing     Problem: Hemodynamics  Goal: Patient's hemodynamics, fluid balance and neurologic status will be stable or improve  Outcome: Progressing     Problem: Respiratory  Goal: Patient will achieve/maintain optimum respiratory ventilation and gas exchange  Outcome: Progressing       Patient educated on treatment plan, condition, and medications being administered. Pt verbalized understanding. Pt is hypertensive but all other vitals are stable. Patient is still on 4L of oxygen during the day and utilizing CPAP machine at night. Patient having good output.

## 2023-08-23 NOTE — DISCHARGE PLANNING
Care Transition Team Assessment    LMSW met with pt and pt's son at bedside to complete assessment. Pt's son able to verify the information on the face sheet. Pt lives with his wife at [facility]. Pt has a walker. Per Damon, pt's supports include pt's spouse, children, and staff at living facility. Pt is retired and receives SSI monthly deposits. There are no SA or MH concerns.    Per pt's son, he prefers pt be d/c to [facility]. LSW informed Damon that rehab is consulted. Damon provided POA documents to be uploaded to pt's chart. LSW faxed to DPA.    POA documents now listed.    1213 Per Aron's note, SNF is recommended instead of rehab. LSW called pt's son about decision. Son gave choice for [facility] as pt has already been a pt there before. LSW completed and faxed form to DPA.    Information Source  Orientation Level: (P) Oriented X4  Information Given By: Relative  Informant's Name: Damon Jean  Who is responsible for making decisions for patient? : Patient    Readmission Evaluation  Is this a readmission?: Yes - unplanned readmission    Elopement Risk  Legal Hold: No  Ambulatory or Self Mobile in Wheelchair: No-Not an Elopement Risk  Elopement Risk: Not at Risk for Elopement    Interdisciplinary Discharge Planning  Lives with - Patient's Self Care Capacity: Spouse  Housing / Facility: Independent Living Facility (pt lives at [facility] on the independent side)  Name of Care Facility: [facility]  Prior Services: Continuous (24 Hour) Care Giving Per Service  Durable Medical Equipment: Walker    Discharge Preparedness  What is your plan after discharge?: Skilled nursing facility  Prior Functional Level: Independent with Activities of Daily Living, Independent with Medication Management, Uses Walker    Functional Assesment  Prior Functional Level: Independent with Activities of Daily Living, Independent with Medication Management, Uses Walker    Finances  Financial Barriers to Discharge: No    Vision  / Hearing Impairment  Right Eye Vision: Wears Glasses, Impaired  Left Eye Vision: Wears Glasses, Impaired  Hearing Impairment: Hearing Device Not Available    Advance Directive  Advance Directive?: DPOA for Health Care  Durable Power of  Name and Contact : Damon Pena [phone]    Psychological Assessment  History of Substance Abuse: None  History of Psychiatric Problems: No    Anticipated Discharge Information  Discharge Disposition: D/T to SNF with Medicare cert in anticipation of skilled care (03)

## 2023-08-24 PROCEDURE — 770020 HCHG ROOM/CARE - TELE (206)

## 2023-08-24 PROCEDURE — 92526 ORAL FUNCTION THERAPY: CPT

## 2023-08-24 PROCEDURE — 700101 HCHG RX REV CODE 250: Performed by: NURSE PRACTITIONER

## 2023-08-24 PROCEDURE — A9270 NON-COVERED ITEM OR SERVICE: HCPCS | Performed by: HOSPITALIST

## 2023-08-24 PROCEDURE — 99233 SBSQ HOSP IP/OBS HIGH 50: CPT | Performed by: STUDENT IN AN ORGANIZED HEALTH CARE EDUCATION/TRAINING PROGRAM

## 2023-08-24 PROCEDURE — 700111 HCHG RX REV CODE 636 W/ 250 OVERRIDE (IP): Mod: JZ | Performed by: STUDENT IN AN ORGANIZED HEALTH CARE EDUCATION/TRAINING PROGRAM

## 2023-08-24 PROCEDURE — 700102 HCHG RX REV CODE 250 W/ 637 OVERRIDE(OP): Performed by: HOSPITALIST

## 2023-08-24 RX ORDER — METOPROLOL SUCCINATE 25 MG/1
25 TABLET, EXTENDED RELEASE ORAL
Status: DISCONTINUED | OUTPATIENT
Start: 2023-08-25 | End: 2023-08-25 | Stop reason: HOSPADM

## 2023-08-24 RX ORDER — FUROSEMIDE 10 MG/ML
40 INJECTION INTRAMUSCULAR; INTRAVENOUS ONCE
Status: COMPLETED | OUTPATIENT
Start: 2023-08-24 | End: 2023-08-24

## 2023-08-24 RX ADMIN — FUROSEMIDE 40 MG: 10 INJECTION INTRAMUSCULAR; INTRAVENOUS at 12:21

## 2023-08-24 RX ADMIN — LEVOTHYROXINE SODIUM 175 MCG: 0.17 TABLET ORAL at 04:31

## 2023-08-24 RX ADMIN — ATORVASTATIN CALCIUM 40 MG: 40 TABLET, FILM COATED ORAL at 16:32

## 2023-08-24 RX ADMIN — APIXABAN 2.5 MG: 2.5 TABLET, FILM COATED ORAL at 16:32

## 2023-08-24 RX ADMIN — APIXABAN 2.5 MG: 2.5 TABLET, FILM COATED ORAL at 04:31

## 2023-08-24 RX ADMIN — METOPROLOL TARTRATE 5 MG: 5 INJECTION INTRAVENOUS at 04:30

## 2023-08-24 RX ADMIN — AMOXICILLIN AND CLAVULANATE POTASSIUM 1 TABLET: 875; 125 TABLET, FILM COATED ORAL at 04:31

## 2023-08-24 RX ADMIN — METOPROLOL TARTRATE 5 MG: 5 INJECTION INTRAVENOUS at 13:43

## 2023-08-24 RX ADMIN — FLUDROCORTISONE ACETATE 0.1 MG: 0.1 TABLET ORAL at 04:31

## 2023-08-24 ASSESSMENT — ENCOUNTER SYMPTOMS
BACK PAIN: 1
CARDIOVASCULAR NEGATIVE: 1
WEAKNESS: 1
COUGH: 0
MEMORY LOSS: 1
GASTROINTESTINAL NEGATIVE: 1
EYES NEGATIVE: 1
RESPIRATORY NEGATIVE: 1

## 2023-08-24 ASSESSMENT — FIBROSIS 4 INDEX: FIB4 SCORE: 3.01

## 2023-08-24 ASSESSMENT — PAIN DESCRIPTION - PAIN TYPE: TYPE: ACUTE PAIN

## 2023-08-24 NOTE — THERAPY
"Speech Language Pathology   Daily Treatment     Patient Name: Guru Pena  AGE:  89 y.o., SEX:  male  Medical Record #: [id number]  Date of Service: 8/24/2023      Precautions:  Precautions: Fall Risk, Swallow Precautions         Subjective  Pt agreeable and cooperative with SLP tx tasks - \"Oh this is wonderful.\"      Assessment  Pt seen for dysphagia management. Trials of MT2 used to facilitate swallow exercises. Pt able to complete effortful swallow x30 with good accuracy and effort. Other exercises probed, pt not able to reliably complete at this time. Continues to demonstrate R head turn without cues to do so; continued pattern even with verbal cues to keep head at midline. Education provided on FEES results and concern for ongoing dysphagia. Pt agreeable to independent rehearsal of exercises - \"So I'll keep this here and do more later.\"      Clinical Impressions  Patient presents with a mild-moderate oropharyngeal dysphagia per FEES. Swallow improved from time of first study; pt appears to be a good candidate for ongoing behavioral and exercise-based swallow rehabilitation with SLP. Swallow outcomes can be further maximized with use of frequent, thorough oral care and mobilization as pt is able. Consider repeating dx study in coming days if patient remains at [facility].       Recommendations  Soft and Bite Sized (IDDSI 6) / Thin Liquids (IDDSI 0)  Instrumentation: Instrumental swallow study pending clinical progress  Medication: Whole with puree  Supervision: Assist with meal tray set up, Distant supervision - check on patient 2-3 times per meal  Positioning: Fully upright and midline during oral intake, Remain upright for 30 minutes after oral intake  Risk Management : Small bites/sips, Alternate bites and sips, Slow rate of intake, throat clear w wet voice  Oral Care: Pre- and post-meals        SLP Treatment Plan  Treatment Plan: Dysphagia Treatment, Patient/Family/Caregiver Training  SLP Frequency: 4x Per " "Week  Estimated Duration: Until Therapy Goals Met      Anticipated Discharge Needs  Discharge Recommendations: Recommend post-acute placement for additional speech therapy services prior to discharge home  Therapy Recommendations Upon DC: Dysphagia Training, Patient / Family / Caregiver Education, Community Re-Integration      Patient / Family Goals  Patient / Family Goal #1: \"You're a miracle woman.\"  Goal #1 Outcome: Progressing as expected  Short Term Goals  Short Term Goal # 1: Pt will consume diet of SB/MT given 1:1 spv and strategy use with no worsening of respiratory status.  Goal Outcome # 1: Progressing as expected  Short Term Goal # 2: Pt will complete FEES w SLP to further evaluate swallow function and inform POC.  Goal Outcome # 2 : Goal met, new goal aded  Short Term Goal # 2 B : Patient will complete swallowing exercises targeting LVC, pharyngeal shortening, and pharyngeal constrictions x20.  Goal Outcome  # 2 B: Progressing as expected      Roxanna Ash, SLP  "

## 2023-08-24 NOTE — PROGRESS NOTES
Hospital Medicine Daily Progress Note    Date of Service  8/23/2023    Chief Complaint  Guru Pena is a 89 y.o. male admitted 8/19/2023 with lethargy, atrial fibrillation with RVR hypertension, transferred from the acute inpatient rehabilitation unit    Hospital Course  Guru Pena is a 89 y.o. male past medical history of recent CVA status post TNK, A-fib on Eliquis, chronic respiratory failure oxygen dependent, hypothyroidism who presented 8/19/2023 with shortness of breath and hypoxia saturating down to 70%.  Patient placed on nonrebreather mask at 15 L.  History is limited given patient's critical illness and AMS.  Patient does respond to painful and verbal stimuli however cannot participate in interview.  In the ER, patient noted to meet sepsis criteria started on IV antibiotics and given sepsis protocol fluids.  Septic shock secondary to possible pneumonia, A-fib with RVR, toxic metabolic encephalopathy.  The patient was residing on the acute rehab unit where he was undergoing physical therapy after his CVA, the patient previously lived at a assisted living facility, apparently was ambulatory with a walker      Interval Problem Update  Patient seen and examined today.  Data, Medication data reviewed.  Case discussed with nursing as available.  Plan of Care reviewed with patient and notified of changes.     8/21 the patient much improved today, he is back to baseline mentation ordered appears, he is alert and oriented x4, is interested to hear a plan of action ongoing, afebrile, heart rate around 100, atrial fibrillation, respiration unlabored, 98% saturation on 3 L, blood pressure 105/60, off pressors, laboratory data with a white cell count 11.3, hemoglobin 11.8, hematocrit 34.4, platelet count 221, sodium 134, glucose 124, BUN 15, creatinine 0.95, calcium 8.1, albumin 2.3, normalized lactic acid,  Cultures so far negative  Evaluating the patient with PT OT, PMR physicians for possible return  to acute rehab    8/22/23  Examined in his inpatient room.  Vitals reviewed, he is afebrile, pulse from 60s to 110, systolic blood pressure from 120s to 140s pulse ox 83 to 98% on 3 L nasal cannula.  Labs reviewed, leukocytosis resolved, stable anemia, potassium 3.4, low protein, BNP 5000.  Decrease midodrine today, will start diuresis.  No acute complaints, feeling weak, agrees that he will likely need rehab following this hospitalization.    8/23  Examined in his inpatient room, patient has been afebrile, heart rate from 63-85 today, respiratory rate 1821, systolic blood pressure 113-139 pulse ox 89 to 98% 3 L nasal cannula.  No acute complaints today, feeling little more tired today than yesterday.  Inpatient rehab recommending SNF placement, discharge planning ongoing.  Continue to monitor electrolytes, respiratory status, blood pressure.  Off midodrine.  3 L urine output today, will continue with IV diuresis tomorrow. Monitor electrolytes closely.    I have discussed this patient's plan of care and discharge plan at IDT rounds today with Case Management, Nursing, Nursing leadership, and other members of the IDT team.    Consultants/Specialty  critical care and physiatry    Code Status  Full Code    Disposition  The patient is not medically cleared for discharge to home or a post-acute facility.      I have placed the appropriate orders for post-discharge needs.    Review of Systems  Review of Systems   Constitutional:  Positive for malaise/fatigue.   HENT: Negative.     Eyes: Negative.    Respiratory: Negative.  Negative for cough.    Cardiovascular: Negative.  Negative for chest pain.   Gastrointestinal: Negative.    Genitourinary: Negative.    Musculoskeletal:  Positive for back pain.   Skin: Negative.    Neurological:  Positive for weakness.   Endo/Heme/Allergies: Negative.    Psychiatric/Behavioral:  Positive for memory loss.    All other systems reviewed and are negative.       Physical Exam  Temp:  [36.3  °C (97.3 °F)-36.6 °C (97.9 °F)] 36.6 °C (97.9 °F)  Pulse:  [] 115  Resp:  [18-22] 18  BP: (113-139)/(65-94) 125/92  SpO2:  [89 %-98 %] 95 %    Physical Exam  Vitals and nursing note reviewed.   Constitutional:       General: He is not in acute distress.     Appearance: He is well-developed. He is not toxic-appearing.      Comments: Elderly male, no acute distress   HENT:      Head: Normocephalic and atraumatic.      Nose: Nose normal. No rhinorrhea.      Mouth/Throat:      Mouth: Mucous membranes are moist.      Pharynx: Oropharynx is clear.   Eyes:      General: No scleral icterus.     Extraocular Movements: Extraocular movements intact.      Conjunctiva/sclera: Conjunctivae normal.   Cardiovascular:      Rate and Rhythm: Normal rate. Rhythm irregular.      Heart sounds: Normal heart sounds.   Pulmonary:      Effort: Pulmonary effort is normal. No respiratory distress.      Breath sounds: Rales present. No wheezing or rhonchi.   Abdominal:      General: Bowel sounds are normal.      Palpations: Abdomen is soft.      Tenderness: There is no abdominal tenderness.   Musculoskeletal:         General: Normal range of motion.      Cervical back: Normal range of motion and neck supple. No rigidity.   Skin:     General: Skin is warm and dry.      Coloration: Skin is pale.   Neurological:      General: No focal deficit present.      Mental Status: He is alert and oriented to person, place, and time.   Psychiatric:         Mood and Affect: Mood normal.         Behavior: Behavior normal.         Fluids    Intake/Output Summary (Last 24 hours) at 8/24/2023 0345  Last data filed at 8/23/2023 2000  Gross per 24 hour   Intake 600 ml   Output 4200 ml   Net -3600 ml         Laboratory  Recent Labs     08/21/23  0405 08/22/23  0126   WBC 11.3* 10.2   RBC 3.53* 3.60*   HEMOGLOBIN 11.8* 11.9*   HEMATOCRIT 34.4* 35.3*   MCV 97.5 98.1*   MCH 33.4* 33.1*   MCHC 34.3 33.7   RDW 53.3* 55.0*   PLATELETCT 221 226   MPV 10.6 10.6        Recent Labs     08/22/23  0126 08/23/23  1017   SODIUM 133* 136   POTASSIUM 3.4* 4.0   CHLORIDE 100 103   CO2 22 23   GLUCOSE 97 137*   BUN 19 14   CREATININE 0.98 1.06   CALCIUM 8.5 8.4*                       Imaging  CT-HEAD W/O   Final Result         1. Limited exam secondary to difficulty with positioning and otherwise artifact.   2. Within this limitation, no change from the previous exam. Stable right MCA distribution infarct with minimal hemorrhagic transformation.         DX-CHEST-PORTABLE (1 VIEW)   Final Result      1.  Enlarged cardiac silhouette with changes of interstitial edema.   2.  Bibasilar airspace disease could be related to edema, atelectasis or pneumonitis.             Assessment/Plan  * Septic shock (HCC)  Assessment & Plan  This is Septic shock Present on admission  SIRS criteria identified on my evaluation include: Fever, with temperature greater than 100.9 deg F, Tachycardia, with heart rate greater than 90 BPM and Leukopenia, with WBC less than 4,000  Clinical indicators of end organ dysfunction include Hypotension with systolic blood pressure less than 90 or MAP less than 65, Lactic Acid greater than 2 and Toxic Metabolic Encephalopathy  Indicators of septic shock include: Sepsis present and persistent hypotension as well as initial lactate level result is greater than or equal to 4mmol/L   Sources is: Possible PNA   Sepsis protocol initiated  Crystalloid Fluid Administration: Fluid resuscitation ordered per standard protocol - 30 mL/kg per current or ideal body weight  IV antibiotics as appropriate for source of sepsis  Reassessment: I have reassessed the patient's hemodynamic status    Rocephin/Zithromax   F/u respiratory, blood cx   Levophed, gtt to maintain MAP> 65, SBP > 90   Gentle IV Fluids, monitor for fluid overload   Insert emanuel monitor intake/output       ACP (advance care planning)- (present on admission)  Assessment & Plan  Given Age and critical illness highly  recommend palliative consultation  In discussion with patient and patient's son the patient at this time requested full code and will want maximized medical care    Acute febrile illness  Assessment & Plan  T max of 102.4*F   Continue with IV abx     Toxic metabolic encephalopathy  Assessment & Plan  Unclear maybe secondary to sepsis, Had recent CVA will obtain CT head w/o   Neuro checks every 4 hours  Fall, seizure, aspiration precautions   Patient much improved overall, back to baseline      Acute on chronic respiratory failure (HCC)  Assessment & Plan  Acute on chronic respiratory failure, improving  Initially requiring 15 l NRBM. Oxygenation improving   CXR showing pulm fibrosis, vs infection vs edema.   T max of 102.4 Empiric Rocephin/Zithromax for PNA   F/u respiratory, blood cultures   Downtrended lactic acid      Idiopathic hypotension- (present on admission)  Assessment & Plan  On Florinef prior, question of orthostasis, add midodrine for now  Cortisol level was adequate    CVA [facility]cere[facility] vascular accident) ([facility])- (present on admission)  Assessment & Plan  Recent CVA s/p TNK   Repeat CT head w/o contrast negative for new changes  Neuro checks every 4 hours  Fall, seizure, aspiration precautions     Hypothyroidism due to acquired atrophy of thyroid- (present on admission)  Assessment & Plan  Restart medication regimen    Stage 3a chronic kidney disease (HCC)- (present on admission)  Assessment & Plan  Stable   Avoid nephrotoxic agents  Renally adjust all medications  Bladder scan PRN   Insert emanuel catheter     Pulmonary fibrosis (HCC)- (present on admission)  Assessment & Plan  Hx of, oxygen support  Bronchodilators as needed    Moderate mitral regurgitation by prior echocardiogram- (present on admission)  Assessment & Plan  Cautious fluid administration       Chronic atrial fibrillation (HCC)- (present on admission)  Assessment & Plan  Restart Eliquis and beta-blockade once stabilized  Ongoing  telemetry  Focus on rate control      Anticoagulated- Eliquis for a. fib- (present on admission)  Assessment & Plan  Restarting Eliquis, CT head negative for new changes    Atrial fibrillation with RVR ([facility])  Assessment & Plan  Reinitiate rate control once the patient's blood pressure has stabilized, for the time being transition to midodrine  Telemetry monitoring   Optimize electrolytes  Restarted Eliquis  Echo 8/23 EF 55%              This does not include time spent on separately billable procedures/tests.  Total time spent 55 minutes. I spent greater than 50% of the time for patient care, counseling, and coordination on this date, including unit/floor time, and face-to-face time with the patient as per interval events, my own review of patient's imaging and lab analysis and developing my assessment and plan above. Patient is has a high medical complexity, complex decision making and is at high risk for complication, morbidity, and mortality.        VTE prophylaxis:   SCDs/TEDs   therapeutic anticoagulation with eliquis 2.5 mg BID      I have performed a physical exam and reviewed and updated ROS and Plan today (8/23/2023). In review of yesterday's note (8/22/2023), there are no changes except as documented above.

## 2023-08-24 NOTE — PROGRESS NOTES
Received bedside report from RN, pt care assumed, VSS, pt assessment complete. Pt AAOx3, disoriented to situation.  No signs of acute distress noted at this time. Plan of care discussed with pt and verbalizes no questions. Pt denies any additional needs at this time. Bed locked/in lowest position, bed alarm on, pt educated on fall risk and verbalized understanding, call light within reach, hourly rounding initiated.

## 2023-08-24 NOTE — DISCHARGE PLANNING
0940  Agency/Facility Name: [facility]   Spoke To: Roya   Outcome: DPA called to notify pt is medically cleared, but per oRya pt will need prior auth. Roya will now start insurance auth.

## 2023-08-25 VITALS
HEART RATE: 71 BPM | OXYGEN SATURATION: 98 % | TEMPERATURE: 97.7 F | BODY MASS INDEX: 22.96 KG/M2 | RESPIRATION RATE: 18 BRPM | WEIGHT: 169.53 LBS | DIASTOLIC BLOOD PRESSURE: 58 MMHG | SYSTOLIC BLOOD PRESSURE: 105 MMHG | HEIGHT: 72 IN

## 2023-08-25 LAB
ANION GAP SERPL CALC-SCNC: 11 MMOL/L (ref 7–16)
BACTERIA BLD CULT: NORMAL
BACTERIA BLD CULT: NORMAL
BUN SERPL-MCNC: 16 MG/DL (ref 8–22)
CALCIUM SERPL-MCNC: 8.9 MG/DL (ref 8.5–10.5)
CHLORIDE SERPL-SCNC: 99 MMOL/L (ref 96–112)
CO2 SERPL-SCNC: 29 MMOL/L (ref 20–33)
CREAT SERPL-MCNC: 1.13 MG/DL (ref 0.5–1.4)
GFR SERPLBLD CREATININE-BSD FMLA CKD-EPI: 59 ML/MIN/1.73 M 2
GLUCOSE SERPL-MCNC: 99 MG/DL (ref 65–99)
MAGNESIUM SERPL-MCNC: 1.9 MG/DL (ref 1.5–2.5)
POTASSIUM SERPL-SCNC: 3.4 MMOL/L (ref 3.6–5.5)
SIGNIFICANT IND 70042: NORMAL
SIGNIFICANT IND 70042: NORMAL
SITE SITE: NORMAL
SITE SITE: NORMAL
SODIUM SERPL-SCNC: 139 MMOL/L (ref 135–145)
SOURCE SOURCE: NORMAL
SOURCE SOURCE: NORMAL

## 2023-08-25 PROCEDURE — 700102 HCHG RX REV CODE 250 W/ 637 OVERRIDE(OP): Performed by: STUDENT IN AN ORGANIZED HEALTH CARE EDUCATION/TRAINING PROGRAM

## 2023-08-25 PROCEDURE — A9270 NON-COVERED ITEM OR SERVICE: HCPCS | Performed by: STUDENT IN AN ORGANIZED HEALTH CARE EDUCATION/TRAINING PROGRAM

## 2023-08-25 PROCEDURE — 700102 HCHG RX REV CODE 250 W/ 637 OVERRIDE(OP): Performed by: HOSPITALIST

## 2023-08-25 PROCEDURE — 80048 BASIC METABOLIC PNL TOTAL CA: CPT

## 2023-08-25 PROCEDURE — A9270 NON-COVERED ITEM OR SERVICE: HCPCS | Performed by: HOSPITALIST

## 2023-08-25 PROCEDURE — 83735 ASSAY OF MAGNESIUM: CPT

## 2023-08-25 PROCEDURE — 36415 COLL VENOUS BLD VENIPUNCTURE: CPT

## 2023-08-25 PROCEDURE — 99239 HOSP IP/OBS DSCHRG MGMT >30: CPT | Performed by: STUDENT IN AN ORGANIZED HEALTH CARE EDUCATION/TRAINING PROGRAM

## 2023-08-25 RX ORDER — POTASSIUM CHLORIDE 20 MEQ/1
40 TABLET, EXTENDED RELEASE ORAL ONCE
Status: DISCONTINUED | OUTPATIENT
Start: 2023-08-25 | End: 2023-08-25 | Stop reason: HOSPADM

## 2023-08-25 RX ADMIN — APIXABAN 2.5 MG: 2.5 TABLET, FILM COATED ORAL at 05:08

## 2023-08-25 RX ADMIN — FLUDROCORTISONE ACETATE 0.1 MG: 0.1 TABLET ORAL at 05:08

## 2023-08-25 RX ADMIN — METOPROLOL SUCCINATE 25 MG: 25 TABLET, EXTENDED RELEASE ORAL at 05:08

## 2023-08-25 RX ADMIN — LEVOTHYROXINE SODIUM 175 MCG: 0.17 TABLET ORAL at 05:07

## 2023-08-25 NOTE — DISCHARGE PLANNING
DC Transport Scheduled    Received request at: 8/25/2023 at 1029    Transport Company Scheduled:  [employer]  Spoke with Roya at [facility] to schedule transport.    Scheduled Date: 8/25/2023  Scheduled Time: 1300    Destination: [facility] at [address] [place] [address] [place] LOERA    Notified care team of scheduled transport via Vo[employer].     If there are any changes needed to the DC transportation scheduled, please contact [facility] at ext. [phone] between the hours of 4451-4985 Mon-Fri. If outside those hours, contact the ED Case Manager at ext. [phone].

## 2023-08-25 NOTE — CARE PLAN
The patient is Stable - Low risk of patient condition declining or worsening    Shift Goals  Clinical Goals: Monitor labs, vitals  Patient Goals: Home  Family Goals: no family present    Progress made toward(s) clinical / shift goals:      Problem: Knowledge Deficit - Standard  Goal: Patient and family/care givers will demonstrate understanding of plan of care, disease process/condition, diagnostic tests and medications  Outcome: Progressing     Problem: Hemodynamics  Goal: Patient's hemodynamics, fluid balance and neurologic status will be stable or improve  Outcome: Progressing     Problem: Respiratory  Goal: Patient will achieve/maintain optimum respiratory ventilation and gas exchange  Outcome: Progressing       Patient is not progressing towards the following goals:

## 2023-08-25 NOTE — PROGRESS NOTES
Hospital Medicine Daily Progress Note    Date of Service  8/24/2023    Chief Complaint  Guru Pena is a 89 y.o. male admitted 8/19/2023 with lethargy, atrial fibrillation with RVR hypertension, transferred from the acute inpatient rehabilitation unit    Hospital Course  Guru Pena is a 89 y.o. male past medical history of recent CVA status post TNK, A-fib on Eliquis, chronic respiratory failure oxygen dependent, hypothyroidism who presented 8/19/2023 with shortness of breath and hypoxia saturating down to 70%.  Patient placed on nonrebreather mask at 15 L.  History is limited given patient's critical illness and AMS.  Patient does respond to painful and verbal stimuli however cannot participate in interview.  In the ER, patient noted to meet sepsis criteria started on IV antibiotics and given sepsis protocol fluids.  Septic shock secondary to possible pneumonia, A-fib with RVR, toxic metabolic encephalopathy.  The patient was residing on the acute rehab unit where he was undergoing physical therapy after his CVA, the patient previously lived at a assisted living facility, apparently was ambulatory with a walker.  Required vasopressors for a short time. Started on midodrine and weaned off pressors. Mentation returned to baseline. He was transferred out of the [facility].  Midodrine weaned down and he was diuresed.  Metoprolol restarted.  Inpatient rehab recommending SNF placement.    Interval Problem Update  Patient seen and examined today.  Data, Medication data reviewed.  Case discussed with nursing as available.  Plan of Care reviewed with patient and notified of changes.     8/24  Examined in his inpatient room.  Afebrile, pulse in the 90s to 114 respiratory rate 18-19 systolic blood pressure 113-139 pulse ox 91 to 96% on 3 L nasal cannula.  Labs reviewed.  Restarted metoprolol today.  Gave additional dose of IV Lasix.  No acute complaints, looking forward to be going to rehab to get his strength  back.  Still feeling weak however no new complaints.  Better spirits today.  Repeat labs in the morning BMP magnesium to follow renal function electrolytes.    I have discussed this patient's plan of care and discharge plan at IDT rounds today with Case Management, Nursing, Nursing leadership, and other members of the IDT team.    Consultants/Specialty  critical care and physiatry    Code Status  Full Code    Disposition  The patient is not medically cleared for discharge to home or a post-acute facility.      I have placed the appropriate orders for post-discharge needs.    Review of Systems  Review of Systems   Constitutional:  Positive for malaise/fatigue.   HENT: Negative.     Eyes: Negative.    Respiratory: Negative.  Negative for cough.    Cardiovascular: Negative.  Negative for chest pain.   Gastrointestinal: Negative.    Genitourinary: Negative.    Musculoskeletal:  Positive for back pain.   Skin: Negative.    Neurological:  Positive for weakness.   Endo/Heme/Allergies: Negative.    Psychiatric/Behavioral:  Positive for memory loss.    All other systems reviewed and are negative.       Physical Exam  Temp:  [36.4 °C (97.5 °F)] 36.4 °C (97.5 °F)  Pulse:  [] 120  Resp:  [18-19] 18  BP: (118-131)/(66-92) 122/66  SpO2:  [91 %-96 %] 95 %    Physical Exam  Vitals and nursing note reviewed.   Constitutional:       General: He is not in acute distress.     Appearance: He is well-developed. He is not toxic-appearing.      Comments: Elderly male, no acute distress   HENT:      Head: Normocephalic and atraumatic.      Nose: Nose normal. No rhinorrhea.      Mouth/Throat:      Mouth: Mucous membranes are moist.      Pharynx: Oropharynx is clear.   Eyes:      General: No scleral icterus.     Extraocular Movements: Extraocular movements intact.      Conjunctiva/sclera: Conjunctivae normal.   Cardiovascular:      Rate and Rhythm: Normal rate. Rhythm irregular.      Heart sounds: Normal heart sounds.   Pulmonary:       Effort: Pulmonary effort is normal. No respiratory distress.      Breath sounds: Rales present. No wheezing or rhonchi.   Abdominal:      General: There is no distension.      Palpations: Abdomen is soft.      Tenderness: There is no abdominal tenderness. There is no guarding or rebound.   Musculoskeletal:         General: Normal range of motion.      Cervical back: Normal range of motion and neck supple. No rigidity.   Skin:     General: Skin is warm and dry.      Coloration: Skin is pale.   Neurological:      General: No focal deficit present.      Mental Status: He is alert and oriented to person, place, and time.   Psychiatric:         Mood and Affect: Mood normal.         Behavior: Behavior normal.         Thought Content: Thought content normal.         Fluids    Intake/Output Summary (Last 24 hours) at 8/24/2023 2246  Last data filed at 8/24/2023 2000  Gross per 24 hour   Intake 480 ml   Output 2000 ml   Net -1520 ml         Laboratory  Recent Labs     08/22/23  0126   WBC 10.2   RBC 3.60*   HEMOGLOBIN 11.9*   HEMATOCRIT 35.3*   MCV 98.1*   MCH 33.1*   MCHC 33.7   RDW 55.0*   PLATELETCT 226   MPV 10.6       Recent Labs     08/22/23  0126 08/23/23  1017   SODIUM 133* 136   POTASSIUM 3.4* 4.0   CHLORIDE 100 103   CO2 22 23   GLUCOSE 97 137*   BUN 19 14   CREATININE 0.98 1.06   CALCIUM 8.5 8.4*                       Imaging  CT-HEAD W/O   Final Result         1. Limited exam secondary to difficulty with positioning and otherwise artifact.   2. Within this limitation, no change from the previous exam. Stable right MCA distribution infarct with minimal hemorrhagic transformation.         DX-CHEST-PORTABLE (1 VIEW)   Final Result      1.  Enlarged cardiac silhouette with changes of interstitial edema.   2.  Bibasilar airspace disease could be related to edema, atelectasis or pneumonitis.             Assessment/Plan  * Septic shock (HCC)  Assessment & Plan  This is Septic shock Present on admission  SIRS criteria  identified on my evaluation include: Fever, with temperature greater than 100.9 deg F, Tachycardia, with heart rate greater than 90 BPM and Leukopenia, with WBC less than 4,000  Clinical indicators of end organ dysfunction include Hypotension with systolic blood pressure less than 90 or MAP less than 65, Lactic Acid greater than 2 and Toxic Metabolic Encephalopathy  Indicators of septic shock include: Sepsis present and persistent hypotension as well as initial lactate level result is greater than or equal to 4mmol/L   Sources is: Possible PNA   Sepsis protocol initiated  Crystalloid Fluid Administration: Fluid resuscitation ordered per standard protocol - 30 mL/kg per current or ideal body weight  IV antibiotics as appropriate for source of sepsis  Reassessment: I have reassessed the patient's hemodynamic status    Rocephin/Zithromax   F/u respiratory, blood cx   Levophed, gtt to maintain MAP> 65, SBP > 90   Gentle IV Fluids, monitor for fluid overload   Insert emanuel monitor intake/output       ACP (advance care planning)- (present on admission)  Assessment & Plan  Given Age and critical illness highly recommend palliative consultation  In discussion with patient and patient's son the patient at this time requested full code and will want maximized medical care    Acute febrile illness  Assessment & Plan  T max of 102.4*F   Continue with IV abx     Toxic metabolic encephalopathy  Assessment & Plan  Unclear maybe secondary to sepsis, Had recent CVA will obtain CT head w/o   Neuro checks every 4 hours  Fall, seizure, aspiration precautions   Patient much improved overall, back to baseline      Acute on chronic respiratory failure (HCC)  Assessment & Plan  Acute on chronic respiratory failure, improving  Initially requiring 15 l NRBM. Oxygenation improving   CXR showing pulm fibrosis, vs infection vs edema.   T max of 102.4 Empiric Rocephin/Zithromax for PNA   F/u respiratory, blood cultures   Downtrended lactic  acid      Idiopathic hypotension- (present on admission)  Assessment & Plan  On Florinef prior, question of orthostasis, add midodrine for now  Cortisol level was adequate    CVA (cerebral vascular accident) ([facility])- (present on admission)  Assessment & Plan  Recent CVA s/p TNK   Repeat CT head w/o contrast negative for new changes  Neuro checks every 4 hours  Fall, seizure, aspiration precautions     Hypothyroidism due to acquired atrophy of thyroid- (present on admission)  Assessment & Plan  Restart medication regimen    Stage 3a chronic kidney disease ([facility])- (present on admission)  Assessment & Plan  Stable   Avoid nephrotoxic agents  Renally adjust all medications  Bladder scan PRN   Insert emanuel catheter     Pulmonary fibrosis ([facility])- (present on admission)  Assessment & Plan  Hx of, oxygen support  Bronchodilators as needed    Moderate mitral regurgitation by prior echocardiogram- (present on admission)  Assessment & Plan  Cautious fluid administration       Chronic atrial fibrillation ([facility])- (present on admission)  Assessment & Plan  Restart Eliquis and beta-blockade once stabilized  Ongoing telemetry  Focus on rate control      Anticoagulated- Eliquis for a. fib- (present on admission)  Assessment & Plan  Restarting Eliquis, CT head negative for new changes    Atrial fibrillation with RVR ([facility])  Assessment & Plan  Reinitiate rate control once the patient's blood pressure has stabilized, for the time being transition to midodrine  Telemetry monitoring   Optimize electrolytes  Restarted Eliquis  Echo 8/23 EF 55%              This does not include time spent on separately billable procedures/tests.  Total time spent 59 minutes. I spent greater than 50% of the time for patient care, counseling, and coordination on this date, including unit/floor time, and face-to-face time with the patient as per interval events, my own review of patient's imaging and lab analysis and developing my assessment and plan above.  Patient is has a high medical complexity, complex decision making and is at high risk for complication, morbidity, and mortality.      VTE prophylaxis:   SCDs/TEDs   therapeutic anticoagulation with eliquis 2.5 mg BID      I have performed a physical exam and reviewed and updated ROS and Plan today (8/24/2023). In review of yesterday's note (8/23/2023), there are no changes except as documented above.

## 2023-08-25 NOTE — DISCHARGE SUMMARY
Discharge Summary    CHIEF COMPLAINT ON ADMISSION  Chief Complaint   Patient presents with    Shortness of Breath       Reason for Admission  EMS    Admission Date  8/19/2023     CODE STATUS  Full Code    HPI & HOSPITAL COURSE  Guru Pena is a 89 y.o. male past medical history of recent CVA status post TNK, A-fib on Eliquis, chronic respiratory failure oxygen dependent, hypothyroidism who presented 8/19/2023 with shortness of breath and hypoxia saturating down to 70%.  Patient placed on nonrebreather mask at 15 L.  History is limited given patient's critical illness and AMS.  Patient does respond to painful and verbal stimuli however cannot participate in interview.  In the ER, patient noted to meet sepsis criteria started on IV antibiotics and given sepsis protocol fluids.  Septic shock secondary to possible pneumonia, A-fib with RVR, toxic metabolic encephalopathy.  The patient was residing on the acute rehab unit where he was undergoing physical therapy after his CVA, the patient previously lived at a assisted living facility, apparently was ambulatory with a walker.  Required vasopressors for a short time. Started on midodrine and weaned off pressors. Mentation returned to baseline. He was transferred out of the [facility].  Midodrine weaned down and he was diuresed.  Metoprolol restarted.  Inpatient rehab recommending SNF placement. He was accepted to SNF and transferred.     Therefore, he is discharged in fair and stable condition to skilled nursing facility.    The patient met 2-midnight criteria for an inpatient stay at the time of discharge.      FOLLOW UP ITEMS POST DISCHARGE  PT/OT  PCP follow up    DISCHARGE DIAGNOSES  Principal Problem:    Septic shock (HCC) (POA: Unknown)  Active Problems:    Atrial fibrillation with RVR (HCC) (POA: Unknown)    Anticoagulated- Eliquis for a. fib (POA: Yes)    Chronic atrial fibrillation (HCC) (Chronic) (POA: Yes)    Moderate mitral regurgitation by prior  echocardiogram (Chronic) (POA: Yes)    Pulmonary fibrosis (HCC) (Chronic) (POA: Yes)    Stage 3a chronic kidney disease (HCC) (Chronic) (POA: Yes)    Hypothyroidism due to acquired atrophy of thyroid (Chronic) (POA: Yes)    CVA (cerebral vascular accident) (HCC) (POA: Yes)    Idiopathic hypotension (POA: Yes)      Overview: History of hypotension, bradycardia during previous admission stopped       metoprolol, was started on fludrocortisone, work-up was a.m. cortisol at       approximately 5 AM of 1.1, systolic blood pressure of 1 30-1 50 in the       emergency department      -will continue home fludrocortisone now to avoid hypotension in the       setting of CVA.      - will require further work-up    Acute on chronic respiratory failure (HCC) (POA: Unknown)    Toxic metabolic encephalopathy (POA: Unknown)    Acute febrile illness (POA: Unknown)    ACP (advance care planning) (POA: Yes)  Resolved Problems:    * No resolved hospital problems. *      FOLLOW UP  No future appointments.  [address] [address]  [phone]        Alex Smart M.D.  [address]  [address]  [facility] [id number] [phone]    Call  As needed      MEDICATIONS ON DISCHARGE     Medication List        START taking these medications        Instructions   metoprolol SR 25 MG Tb24  Commonly known as: Toprol XL   TAKE 1 TABLET BY MOUTH EVERY 48 HOURS. (IN OTHER WORDS EVERY OTHER DAY)  Dose: 25 mg            CONTINUE taking these medications        Instructions   acetaminophen 325 MG Tabs  Commonly known as: Tylenol   Take 650 mg by mouth every four hours as needed.  Dose: 650 mg     apixaban 2.5mg Tabs  Commonly known as: Eliquis   Take 1 Tablet by mouth 2 times a day.  Dose: 2.5 mg     atorvastatin 40 MG Tabs  Commonly known as: Lipitor   Take 1 Tablet by mouth every evening.  Dose: 40 mg     carboxymethylcellulose 0.5 % Soln  Commonly known as: Refresh Tears   Administer 1 Drop into both eyes as  needed (DRY EYES).  Dose: 1 Drop     Docusate Mini 283 MG enema  Generic drug: docusate sodium   Insert 283 mg into the rectum as needed (Constipation, if lactulose ineffective after 24 hours).  Dose: 283 mg     fludrocortisone 0.1 MG Tabs  Commonly known as: Florinef   Take 1 Tablet by mouth every morning.  Dose: 0.1 mg     ipratropium-albuterol 0.5-2.5 (3) MG/3ML nebulizer solution  Commonly known as: Duoneb   Take 3 mL by nebulization every 6 hours as needed for Shortness of Breath.  Dose: 3 mL     lactulose 20 GM/30ML Soln   Take 30 mL by mouth 1 time a day as needed (IF MOM INEFFECTIVE AFTER 24 HOURS). Indications: Constipation  Dose: 30 mL     levothyroxine 175 MCG Tabs  Commonly known as: Synthroid   Take 175 mcg by mouth every morning on an empty stomach.  Dose: 175 mcg     MAALOX PLUS PO   Take 20 mL by mouth every 2 hours as needed (DYSPEPSIA).  Dose: 20 mL     magnesium hydroxide 400 MG/5ML Susp  Commonly known as: Milk Of Magnesia   Take 30 mL by mouth 1 time a day as needed. Indications: Constipation  Dose: 30 mL     melatonin 3 MG Tabs   Take 3 mg by mouth at bedtime as needed. Indications: Trouble Sleeping  Dose: 3 mg     menthol 3 MG lozenge  Commonly known as: Cepacol   Take 1 Lozenge by mouth every 2 hours as needed (THROAT/MOUTH PAIN).  Dose: 1 Lozenge     polyethylene glycol/lytes 17 g Pack  Commonly known as: Miralax   Take 17 g by mouth as needed (If sennosides and docusate ineffective after 24 hours). Indications: Constipation  Dose: 17 g     prochlorperazine 10 MG Tabs  Commonly known as: Compazine   Take 10 mg by mouth every 6 hours as needed for Nausea/Vomiting.  Dose: 10 mg     senna-docusate 8.6-50 MG Tabs  Commonly known as: Pericolace Or Senokot S   Take 2 Tablets by mouth every day.  Dose: 2 Tablet     sodium chloride 0.65 % Soln  Commonly known as: Ocean   Administer 1 Spray into affected nostril(S) as needed for Congestion.  Dose: 1 Spray     traZODone 50 MG Tabs  Commonly known as:  Desyrel   Take 50 mg by mouth at bedtime as needed. Indications: Trouble Sleeping  Dose: 50 mg     vitamin D3 1000 Unit (25 mcg) Tabs  Commonly known as: Cholecalciferol   Take 1,000 Units by mouth every day.  Dose: 1,000 Units              Allergies  Allergies   Allergen Reactions    Digoxin      nausea       DIET  Orders Placed This Encounter   Procedures    Diet Order Diet: Level 6 - Soft and Bite Sized (meds crushed. oral care pre/post meals.likes eggs, cranberry juice, rolando cake, peaches); Liquid level: Level 2 - Mildly Thick; Tray Modifications (optional): SLP - Deliver to Nursing Station, SLP - ...     Standing Status:   Standing     Number of Occurrences:   1     Order Specific Question:   Diet:     Answer:   Level 6 - Soft and Bite Sized [23]     Comments:   meds crushed. oral care pre/post meals.likes eggs, cranberry juice, rolando cake, peaches     Order Specific Question:   Liquid level     Answer:   Level 2 - Mildly Thick     Order Specific Question:   Tray Modifications (optional)     Answer:   SLP - Deliver to Nursing Station     Order Specific Question:   Tray Modifications (optional)     Answer:   SLP - 1:1 Supervision by Nursing       ACTIVITY  As tolerated.  Weight bearing as tolerated    LINES, DRAINS, AND WOUNDS  This is an automated list. Peripheral IVs will be removed prior to discharge.  Peripheral IV 08/24/23 22 G Anterior;Left Forearm (Active)   Site Assessment Clean;Dry;Intact 08/24/23 2100   Dressing Type Transparent Film 08/24/23 2100   Line Status Scrubbed the hub prior to access;Flushed;Saline locked 08/24/23 2100   Dressing Status Clean;Dry;Intact 08/24/23 2100   Dressing Intervention Initial dressing 08/24/23 2100   Infiltration Grading ([facility], [facility]) 0 08/24/23 2100   Phlebitis Scale ([facility] Only) 0 08/24/23 2100     External Urinary Catheter ([facility]) (Active)   Collection Container Standard drainage bag 08/24/23 2000   Suction Level (Female Wick Catheter) 80 mmHg 08/23/23 2000    Output (mL) 700 mL 08/25/23 0508         Peripheral IV 08/24/23 22 G Anterior;Left Forearm (Active)   Site Assessment Clean;Dry;Intact 08/24/23 2100   Dressing Type Transparent Film 08/24/23 2100   Line Status Scrubbed the hub prior to access;Flushed;Saline locked 08/24/23 2100   Dressing Status Clean;Dry;Intact 08/24/23 2100   Dressing Intervention Initial dressing 08/24/23 2100   Infiltration Grading ([facility], [facility]) 0 08/24/23 2100   Phlebitis Scale ([facility] Only) 0 08/24/23 2100               MENTAL STATUS ON TRANSFER      A&Ox4 baseline       CONSULTATIONS  Critical Care, palliative    PROCEDURES  None    LABORATORY  Lab Results   Component Value Date    SODIUM 139 08/25/2023    POTASSIUM 3.4 (L) 08/25/2023    CHLORIDE 99 08/25/2023    CO2 29 08/25/2023    GLUCOSE 99 08/25/2023    BUN 16 08/25/2023    CREATININE 1.13 08/25/2023        Lab Results   Component Value Date    WBC 10.2 08/22/2023    HEMOGLOBIN 11.9 (L) 08/22/2023    HEMATOCRIT 35.3 (L) 08/22/2023    PLATELETCT 226 08/22/2023        Total time of the discharge process exceeds 35 minutes.

## 2023-08-25 NOTE — DISCHARGE PLANNING
0913  Luba from [facility] left VM regarding insurance auth has been obtained.     [id number]  Agency/Facility Name: [facility]   Spoke To: Roya   Outcome: JP called back, and Roya requesting  JESSIKA to set up transport at 1300.     LSW notified.

## 2023-08-25 NOTE — PROGRESS NOTES
Received bedside report from RN, pt care assumed, VSS, pt assessment complete. Pt AAOx2, disoriented to time and situation. No signs of acute distress noted at this time. Plan of care discussed with pt. Pt denies any additional needs at this time. Bed locked/in lowest position, bed alarm on, call light within reach, hourly rounding initiated.